# Patient Record
Sex: FEMALE | Race: WHITE | Employment: PART TIME | URBAN - NONMETROPOLITAN AREA
[De-identification: names, ages, dates, MRNs, and addresses within clinical notes are randomized per-mention and may not be internally consistent; named-entity substitution may affect disease eponyms.]

---

## 2017-01-03 DIAGNOSIS — G89.4 CHRONIC PAIN SYNDROME: ICD-10-CM

## 2017-01-03 DIAGNOSIS — M70.71 HIP BURSITIS, RIGHT: ICD-10-CM

## 2017-01-03 DIAGNOSIS — M46.1 SACROILIAC INFLAMMATION (HCC): ICD-10-CM

## 2017-01-03 DIAGNOSIS — M47.816 SPONDYLOSIS OF LUMBAR REGION WITHOUT MYELOPATHY OR RADICULOPATHY: ICD-10-CM

## 2017-01-03 RX ORDER — HYDROCODONE BITARTRATE AND ACETAMINOPHEN 5; 325 MG/1; MG/1
TABLET ORAL
Qty: 30 TABLET | Refills: 0 | Status: SHIPPED | OUTPATIENT
Start: 2017-01-03 | End: 2017-01-25 | Stop reason: SDUPTHER

## 2017-01-10 ENCOUNTER — NURSE TRIAGE (OUTPATIENT)
Dept: ADMINISTRATIVE | Age: 52
End: 2017-01-10

## 2017-01-25 ENCOUNTER — OFFICE VISIT (OUTPATIENT)
Dept: PHYSICAL MEDICINE AND REHAB | Age: 52
End: 2017-01-25

## 2017-01-25 VITALS
HEIGHT: 64 IN | DIASTOLIC BLOOD PRESSURE: 82 MMHG | WEIGHT: 293 LBS | SYSTOLIC BLOOD PRESSURE: 139 MMHG | BODY MASS INDEX: 50.02 KG/M2 | HEART RATE: 101 BPM

## 2017-01-25 DIAGNOSIS — M47.816 SPONDYLOSIS OF LUMBAR REGION WITHOUT MYELOPATHY OR RADICULOPATHY: ICD-10-CM

## 2017-01-25 DIAGNOSIS — M46.1 SACROILIAC INFLAMMATION (HCC): ICD-10-CM

## 2017-01-25 DIAGNOSIS — G89.4 CHRONIC PAIN SYNDROME: ICD-10-CM

## 2017-01-25 DIAGNOSIS — M70.71 HIP BURSITIS, RIGHT: Primary | ICD-10-CM

## 2017-01-25 PROCEDURE — 99213 OFFICE O/P EST LOW 20 MIN: CPT | Performed by: NURSE PRACTITIONER

## 2017-01-25 RX ORDER — MULTIVIT WITH MINERALS/LUTEIN
1000 TABLET ORAL DAILY
COMMUNITY
End: 2017-02-15

## 2017-01-25 RX ORDER — HYDROCODONE BITARTRATE AND ACETAMINOPHEN 5; 325 MG/1; MG/1
1 TABLET ORAL 2 TIMES DAILY PRN
Qty: 60 TABLET | Refills: 0 | Status: SHIPPED | OUTPATIENT
Start: 2017-01-25 | End: 2017-03-10 | Stop reason: SDUPTHER

## 2017-01-25 RX ORDER — VIT C/B6/B5/MAGNESIUM/HERB 173 50-5-6-5MG
CAPSULE ORAL DAILY
COMMUNITY

## 2017-01-25 RX ORDER — CALCIUM CARBONATE 500(1250)
500 TABLET ORAL 2 TIMES DAILY
COMMUNITY

## 2017-01-25 ASSESSMENT — ENCOUNTER SYMPTOMS
CONSTIPATION: 0
COLOR CHANGE: 0
VOMITING: 0
BACK PAIN: 1
COUGH: 0
RHINORRHEA: 0
WHEEZING: 0
NAUSEA: 0
SORE THROAT: 0
ABDOMINAL PAIN: 0
SHORTNESS OF BREATH: 0
DIARRHEA: 0
PHOTOPHOBIA: 0
CHEST TIGHTNESS: 0
EYE PAIN: 0
SINUS PRESSURE: 0

## 2017-02-15 ENCOUNTER — INITIAL CONSULT (OUTPATIENT)
Dept: PULMONOLOGY | Age: 52
End: 2017-02-15

## 2017-02-15 ENCOUNTER — TELEPHONE (OUTPATIENT)
Dept: PULMONOLOGY | Age: 52
End: 2017-02-15

## 2017-02-15 VITALS
WEIGHT: 293 LBS | DIASTOLIC BLOOD PRESSURE: 72 MMHG | OXYGEN SATURATION: 96 % | HEART RATE: 97 BPM | SYSTOLIC BLOOD PRESSURE: 128 MMHG | HEIGHT: 65 IN | BODY MASS INDEX: 48.82 KG/M2

## 2017-02-15 DIAGNOSIS — R06.83 SNORING: Primary | ICD-10-CM

## 2017-02-15 DIAGNOSIS — F32.A DEPRESSION, UNSPECIFIED DEPRESSION TYPE: ICD-10-CM

## 2017-02-15 DIAGNOSIS — G47.10 HYPERSOMNIA: ICD-10-CM

## 2017-02-15 DIAGNOSIS — I10 ESSENTIAL HYPERTENSION: ICD-10-CM

## 2017-02-15 PROCEDURE — 99203 OFFICE O/P NEW LOW 30 MIN: CPT | Performed by: INTERNAL MEDICINE

## 2017-03-08 DIAGNOSIS — G47.33 OSA (OBSTRUCTIVE SLEEP APNEA): Primary | ICD-10-CM

## 2017-03-10 DIAGNOSIS — M46.1 SACROILIAC INFLAMMATION (HCC): ICD-10-CM

## 2017-03-10 DIAGNOSIS — M47.816 SPONDYLOSIS OF LUMBAR REGION WITHOUT MYELOPATHY OR RADICULOPATHY: ICD-10-CM

## 2017-03-10 DIAGNOSIS — G89.4 CHRONIC PAIN SYNDROME: ICD-10-CM

## 2017-03-10 DIAGNOSIS — M70.71 HIP BURSITIS, RIGHT: ICD-10-CM

## 2017-03-10 RX ORDER — HYDROCODONE BITARTRATE AND ACETAMINOPHEN 5; 325 MG/1; MG/1
1 TABLET ORAL 2 TIMES DAILY PRN
Qty: 60 TABLET | Refills: 0 | Status: SHIPPED | OUTPATIENT
Start: 2017-03-10 | End: 2017-04-18 | Stop reason: SDUPTHER

## 2017-03-15 ENCOUNTER — OFFICE VISIT (OUTPATIENT)
Dept: PHYSICAL MEDICINE AND REHAB | Age: 52
End: 2017-03-15

## 2017-03-15 VITALS
DIASTOLIC BLOOD PRESSURE: 66 MMHG | HEART RATE: 92 BPM | WEIGHT: 290 LBS | HEIGHT: 65 IN | BODY MASS INDEX: 48.32 KG/M2 | SYSTOLIC BLOOD PRESSURE: 123 MMHG

## 2017-03-15 DIAGNOSIS — M70.71 HIP BURSITIS, RIGHT: Primary | ICD-10-CM

## 2017-03-15 DIAGNOSIS — M47.816 SPONDYLOSIS OF LUMBAR REGION WITHOUT MYELOPATHY OR RADICULOPATHY: ICD-10-CM

## 2017-03-15 DIAGNOSIS — G89.4 CHRONIC PAIN SYNDROME: ICD-10-CM

## 2017-03-15 DIAGNOSIS — M46.1 SACROILIAC INFLAMMATION (HCC): ICD-10-CM

## 2017-03-15 PROCEDURE — 99213 OFFICE O/P EST LOW 20 MIN: CPT | Performed by: NURSE PRACTITIONER

## 2017-03-15 ASSESSMENT — ENCOUNTER SYMPTOMS
WHEEZING: 0
BACK PAIN: 1
APNEA: 0
STRIDOR: 0
TROUBLE SWALLOWING: 0
EYE PAIN: 0
ABDOMINAL DISTENTION: 0
RHINORRHEA: 0
COUGH: 0
ANAL BLEEDING: 0
FACIAL SWELLING: 0
VOMITING: 0
BLOOD IN STOOL: 0
CHEST TIGHTNESS: 0
VOICE CHANGE: 0
EYE DISCHARGE: 0
CHOKING: 0
SORE THROAT: 0
ABDOMINAL PAIN: 0
SINUS PRESSURE: 0
SHORTNESS OF BREATH: 0
NAUSEA: 0
EYE ITCHING: 0
PHOTOPHOBIA: 0
COLOR CHANGE: 0
CONSTIPATION: 0
EYE REDNESS: 0
RECTAL PAIN: 0
DIARRHEA: 0

## 2017-04-18 DIAGNOSIS — M47.816 SPONDYLOSIS OF LUMBAR REGION WITHOUT MYELOPATHY OR RADICULOPATHY: ICD-10-CM

## 2017-04-18 DIAGNOSIS — M70.71 HIP BURSITIS, RIGHT: ICD-10-CM

## 2017-04-18 DIAGNOSIS — G89.4 CHRONIC PAIN SYNDROME: ICD-10-CM

## 2017-04-18 DIAGNOSIS — M46.1 SACROILIAC INFLAMMATION (HCC): ICD-10-CM

## 2017-04-18 RX ORDER — HYDROCODONE BITARTRATE AND ACETAMINOPHEN 5; 325 MG/1; MG/1
1 TABLET ORAL 2 TIMES DAILY PRN
Qty: 60 TABLET | Refills: 0 | Status: SHIPPED | OUTPATIENT
Start: 2017-04-18 | End: 2017-05-16 | Stop reason: SDUPTHER

## 2017-05-01 ENCOUNTER — OFFICE VISIT (OUTPATIENT)
Dept: PULMONOLOGY | Age: 52
End: 2017-05-01

## 2017-05-01 VITALS
HEIGHT: 65 IN | WEIGHT: 293 LBS | OXYGEN SATURATION: 97 % | SYSTOLIC BLOOD PRESSURE: 112 MMHG | BODY MASS INDEX: 48.82 KG/M2 | HEART RATE: 91 BPM | DIASTOLIC BLOOD PRESSURE: 66 MMHG

## 2017-05-01 DIAGNOSIS — G47.33 OSA ON CPAP: ICD-10-CM

## 2017-05-01 DIAGNOSIS — Z99.89 OSA ON CPAP: ICD-10-CM

## 2017-05-01 PROCEDURE — 99213 OFFICE O/P EST LOW 20 MIN: CPT | Performed by: PHYSICIAN ASSISTANT

## 2017-05-09 ENCOUNTER — TELEPHONE (OUTPATIENT)
Dept: PHYSICAL MEDICINE AND REHAB | Age: 52
End: 2017-05-09

## 2017-05-16 ENCOUNTER — OFFICE VISIT (OUTPATIENT)
Dept: PHYSICAL MEDICINE AND REHAB | Age: 52
End: 2017-05-16

## 2017-05-16 VITALS
HEART RATE: 88 BPM | WEIGHT: 293 LBS | HEIGHT: 65 IN | DIASTOLIC BLOOD PRESSURE: 85 MMHG | SYSTOLIC BLOOD PRESSURE: 141 MMHG | BODY MASS INDEX: 48.82 KG/M2

## 2017-05-16 DIAGNOSIS — M46.1 SACROILIAC INFLAMMATION (HCC): ICD-10-CM

## 2017-05-16 DIAGNOSIS — M70.71 HIP BURSITIS, RIGHT: ICD-10-CM

## 2017-05-16 DIAGNOSIS — G89.4 CHRONIC PAIN SYNDROME: ICD-10-CM

## 2017-05-16 DIAGNOSIS — M47.816 SPONDYLOSIS OF LUMBAR REGION WITHOUT MYELOPATHY OR RADICULOPATHY: Primary | ICD-10-CM

## 2017-05-16 PROCEDURE — 99213 OFFICE O/P EST LOW 20 MIN: CPT | Performed by: NURSE PRACTITIONER

## 2017-05-16 RX ORDER — HYDROCODONE BITARTRATE AND ACETAMINOPHEN 5; 325 MG/1; MG/1
1 TABLET ORAL 2 TIMES DAILY PRN
Qty: 60 TABLET | Refills: 0 | Status: SHIPPED | OUTPATIENT
Start: 2017-05-16 | End: 2017-07-10 | Stop reason: SDUPTHER

## 2017-05-16 ASSESSMENT — ENCOUNTER SYMPTOMS
EYE PAIN: 0
VOMITING: 0
BLOOD IN STOOL: 0
TROUBLE SWALLOWING: 0
BACK PAIN: 1
DIARRHEA: 0
ABDOMINAL DISTENTION: 0
EYE DISCHARGE: 0
CHEST TIGHTNESS: 0
EYE REDNESS: 0
ANAL BLEEDING: 0
SINUS PRESSURE: 0
VOICE CHANGE: 0
STRIDOR: 0
CHOKING: 0
FACIAL SWELLING: 0
RECTAL PAIN: 0
SORE THROAT: 0
NAUSEA: 0
COLOR CHANGE: 0
RHINORRHEA: 0
WHEEZING: 0
APNEA: 0
PHOTOPHOBIA: 0
COUGH: 0
ABDOMINAL PAIN: 0
SHORTNESS OF BREATH: 0
CONSTIPATION: 0
EYE ITCHING: 0

## 2017-07-10 DIAGNOSIS — M70.71 HIP BURSITIS, RIGHT: ICD-10-CM

## 2017-07-10 DIAGNOSIS — M46.1 SACROILIAC INFLAMMATION (HCC): ICD-10-CM

## 2017-07-10 DIAGNOSIS — G89.4 CHRONIC PAIN SYNDROME: ICD-10-CM

## 2017-07-10 DIAGNOSIS — M47.816 SPONDYLOSIS OF LUMBAR REGION WITHOUT MYELOPATHY OR RADICULOPATHY: ICD-10-CM

## 2017-07-10 RX ORDER — HYDROCODONE BITARTRATE AND ACETAMINOPHEN 5; 325 MG/1; MG/1
1 TABLET ORAL 2 TIMES DAILY PRN
Qty: 60 TABLET | Refills: 0 | Status: SHIPPED | OUTPATIENT
Start: 2017-07-10 | End: 2017-08-22 | Stop reason: SDUPTHER

## 2017-08-22 ENCOUNTER — OFFICE VISIT (OUTPATIENT)
Dept: PHYSICAL MEDICINE AND REHAB | Age: 52
End: 2017-08-22
Payer: COMMERCIAL

## 2017-08-22 VITALS
WEIGHT: 293 LBS | HEIGHT: 64 IN | HEART RATE: 85 BPM | SYSTOLIC BLOOD PRESSURE: 117 MMHG | DIASTOLIC BLOOD PRESSURE: 74 MMHG | BODY MASS INDEX: 50.02 KG/M2

## 2017-08-22 DIAGNOSIS — M47.816 SPONDYLOSIS OF LUMBAR REGION WITHOUT MYELOPATHY OR RADICULOPATHY: ICD-10-CM

## 2017-08-22 DIAGNOSIS — M70.61 TROCHANTERIC BURSITIS OF RIGHT HIP: Primary | ICD-10-CM

## 2017-08-22 DIAGNOSIS — G89.4 CHRONIC PAIN SYNDROME: ICD-10-CM

## 2017-08-22 DIAGNOSIS — M46.1 SACROILIAC INFLAMMATION (HCC): ICD-10-CM

## 2017-08-22 PROCEDURE — 99213 OFFICE O/P EST LOW 20 MIN: CPT | Performed by: NURSE PRACTITIONER

## 2017-08-22 RX ORDER — HYDROCODONE BITARTRATE AND ACETAMINOPHEN 5; 325 MG/1; MG/1
1 TABLET ORAL 2 TIMES DAILY PRN
Qty: 60 TABLET | Refills: 0 | Status: SHIPPED | OUTPATIENT
Start: 2017-08-22 | End: 2017-09-25 | Stop reason: SDUPTHER

## 2017-08-22 ASSESSMENT — ENCOUNTER SYMPTOMS
EYES NEGATIVE: 1
BACK PAIN: 1

## 2017-09-15 ENCOUNTER — TELEPHONE (OUTPATIENT)
Dept: PHYSICAL MEDICINE AND REHAB | Age: 52
End: 2017-09-15

## 2017-09-25 DIAGNOSIS — G89.4 CHRONIC PAIN SYNDROME: ICD-10-CM

## 2017-09-25 DIAGNOSIS — M47.816 SPONDYLOSIS OF LUMBAR REGION WITHOUT MYELOPATHY OR RADICULOPATHY: ICD-10-CM

## 2017-09-25 DIAGNOSIS — M46.1 SACROILIAC INFLAMMATION (HCC): ICD-10-CM

## 2017-09-25 RX ORDER — HYDROCODONE BITARTRATE AND ACETAMINOPHEN 5; 325 MG/1; MG/1
1 TABLET ORAL 2 TIMES DAILY PRN
Qty: 60 TABLET | Refills: 0 | Status: SHIPPED | OUTPATIENT
Start: 2017-09-25 | End: 2017-10-30 | Stop reason: SDUPTHER

## 2017-10-16 ENCOUNTER — HOSPITAL ENCOUNTER (OUTPATIENT)
Age: 52
Setting detail: OUTPATIENT SURGERY
Discharge: HOME | End: 2017-10-16
Attending: PAIN MEDICINE | Admitting: PAIN MEDICINE
Payer: COMMERCIAL

## 2017-10-16 ENCOUNTER — ANESTHESIA EVENT (OUTPATIENT)
Dept: OPERATING ROOM | Age: 52
End: 2017-10-16
Payer: COMMERCIAL

## 2017-10-16 ENCOUNTER — ANESTHESIA (OUTPATIENT)
Dept: OPERATING ROOM | Age: 52
End: 2017-10-16
Payer: COMMERCIAL

## 2017-10-16 ENCOUNTER — APPOINTMENT (OUTPATIENT)
Dept: GENERAL RADIOLOGY | Age: 52
End: 2017-10-16
Attending: PAIN MEDICINE
Payer: COMMERCIAL

## 2017-10-16 VITALS
OXYGEN SATURATION: 97 % | HEIGHT: 64 IN | BODY MASS INDEX: 50.02 KG/M2 | TEMPERATURE: 98.2 F | DIASTOLIC BLOOD PRESSURE: 45 MMHG | RESPIRATION RATE: 12 BRPM | HEART RATE: 92 BPM | WEIGHT: 293 LBS | SYSTOLIC BLOOD PRESSURE: 109 MMHG

## 2017-10-16 VITALS — OXYGEN SATURATION: 99 % | DIASTOLIC BLOOD PRESSURE: 51 MMHG | SYSTOLIC BLOOD PRESSURE: 120 MMHG

## 2017-10-16 PROCEDURE — 2500000003 HC RX 250 WO HCPCS: Performed by: NURSE ANESTHETIST, CERTIFIED REGISTERED

## 2017-10-16 PROCEDURE — 2580000003 HC RX 258: Performed by: NURSE ANESTHETIST, CERTIFIED REGISTERED

## 2017-10-16 PROCEDURE — 7100000011 HC PHASE II RECOVERY - ADDTL 15 MIN: Performed by: PAIN MEDICINE

## 2017-10-16 PROCEDURE — 2500000003 HC RX 250 WO HCPCS: Performed by: PAIN MEDICINE

## 2017-10-16 PROCEDURE — 20610 DRAIN/INJ JOINT/BURSA W/O US: CPT | Performed by: PAIN MEDICINE

## 2017-10-16 PROCEDURE — 3700000000 HC ANESTHESIA ATTENDED CARE: Performed by: PAIN MEDICINE

## 2017-10-16 PROCEDURE — 77002 NEEDLE LOCALIZATION BY XRAY: CPT | Performed by: PAIN MEDICINE

## 2017-10-16 PROCEDURE — 6360000002 HC RX W HCPCS: Performed by: PAIN MEDICINE

## 2017-10-16 PROCEDURE — 6360000002 HC RX W HCPCS: Performed by: NURSE ANESTHETIST, CERTIFIED REGISTERED

## 2017-10-16 PROCEDURE — 7100000010 HC PHASE II RECOVERY - FIRST 15 MIN: Performed by: PAIN MEDICINE

## 2017-10-16 PROCEDURE — 3209999900 FLUORO FOR SURGICAL PROCEDURES

## 2017-10-16 PROCEDURE — 3600000054 HC PAIN LEVEL 3 BASE: Performed by: PAIN MEDICINE

## 2017-10-16 RX ORDER — BUPIVACAINE HYDROCHLORIDE 2.5 MG/ML
INJECTION, SOLUTION EPIDURAL; INFILTRATION; INTRACAUDAL PRN
Status: DISCONTINUED | OUTPATIENT
Start: 2017-10-16 | End: 2017-10-16 | Stop reason: HOSPADM

## 2017-10-16 RX ORDER — LIDOCAINE HYDROCHLORIDE 10 MG/ML
INJECTION, SOLUTION INFILTRATION; PERINEURAL PRN
Status: DISCONTINUED | OUTPATIENT
Start: 2017-10-16 | End: 2017-10-16 | Stop reason: SDUPTHER

## 2017-10-16 RX ORDER — LIDOCAINE HYDROCHLORIDE 10 MG/ML
INJECTION, SOLUTION INFILTRATION; PERINEURAL PRN
Status: DISCONTINUED | OUTPATIENT
Start: 2017-10-16 | End: 2017-10-16 | Stop reason: HOSPADM

## 2017-10-16 RX ORDER — PROPOFOL 10 MG/ML
INJECTION, EMULSION INTRAVENOUS PRN
Status: DISCONTINUED | OUTPATIENT
Start: 2017-10-16 | End: 2017-10-16 | Stop reason: SDUPTHER

## 2017-10-16 RX ORDER — SODIUM CHLORIDE 9 MG/ML
INJECTION, SOLUTION INTRAVENOUS CONTINUOUS PRN
Status: DISCONTINUED | OUTPATIENT
Start: 2017-10-16 | End: 2017-10-16 | Stop reason: SDUPTHER

## 2017-10-16 RX ORDER — METHYLPREDNISOLONE ACETATE 80 MG/ML
INJECTION, SUSPENSION INTRA-ARTICULAR; INTRALESIONAL; INTRAMUSCULAR; SOFT TISSUE PRN
Status: DISCONTINUED | OUTPATIENT
Start: 2017-10-16 | End: 2017-10-16 | Stop reason: HOSPADM

## 2017-10-16 RX ADMIN — PROPOFOL 70 MG: 10 INJECTION, EMULSION INTRAVENOUS at 13:01

## 2017-10-16 RX ADMIN — LIDOCAINE HYDROCHLORIDE 20 MG: 10 INJECTION, SOLUTION INFILTRATION; PERINEURAL at 13:01

## 2017-10-16 RX ADMIN — SODIUM CHLORIDE: 9 INJECTION, SOLUTION INTRAVENOUS at 12:58

## 2017-10-16 ASSESSMENT — PAIN DESCRIPTION - DESCRIPTORS
DESCRIPTORS: SHOOTING;ACHING
DESCRIPTORS: ACHING

## 2017-10-16 ASSESSMENT — PULMONARY FUNCTION TESTS
PIF_VALUE: 0

## 2017-10-16 ASSESSMENT — PAIN - FUNCTIONAL ASSESSMENT: PAIN_FUNCTIONAL_ASSESSMENT: 0-10

## 2017-10-16 NOTE — ANESTHESIA POSTPROCEDURE EVALUATION
Department of Anesthesiology  Postprocedure Note    Patient: Brock Huitron  MRN: [id number]  YOB: 1965  Date of evaluation: 10/16/2017  Time:  1:30 PM     Procedure Summary     Date:  10/16/17 Room / Location:  [facility] 03 / [address] [facility]    Anesthesia Start:  1257 Anesthesia Stop:  5442    Procedure:  RIGHT HIP LARGE JOINT INJECTION (Right ) Diagnosis:  (TROCHANTERIC BURSITIS RIGHT HIP)    Surgeon:  Anthony Mendez MD Responsible Provider:  Lambert Olguin DO    Anesthesia Type:  MAC ASA Status:  3          Anesthesia Type: MAC    Jose Phase I:      Jose Phase II:      Last vitals: Reviewed and per EMR flowsheets.        Anesthesia Post Evaluation    Patient location during evaluation: PACU  Patient participation: complete - patient participated  Level of consciousness: awake and alert  Airway patency: patent  Nausea & Vomiting: no nausea and no vomiting  Complications: no  Cardiovascular status: hemodynamically stable  Respiratory status: acceptable  Hydration status: euvolemic

## 2017-10-16 NOTE — ANESTHESIA PRE PROCEDURE
Department of Anesthesiology  Preprocedure Note       Name:  Meghna Sands   Age:  47 y.o.  :  1965                                          MRN:  [id number]         Date:  10/16/2017      Surgeon: Vimal Mosqueda):  Amanda Campo MD    Procedure: Procedure(s):  RIGHT HIP LARGE JOINT INJECTION    Medications prior to admission:   Prior to Admission medications    Medication Sig Start Date End Date Taking? Authorizing Provider   HYDROcodone-acetaminophen (NORCO) 5-325 MG per tablet Take 1 tablet by mouth 2 times daily as needed for Pain . 17  Yes Amanda Campo MD   vitamin D (CHOLECALCIFEROL) 1000 UNIT TABS tablet Take 500 Units by mouth daily   Yes Historical Provider, MD   calcium carbonate (OSCAL) 500 MG TABS tablet Take 500 mg by mouth 2 times daily   Yes Historical Provider, MD   cyclobenzaprine (FLEXERIL) 10 MG tablet Take 1 tablet by mouth 3 times daily as needed for Muscle spasms WARNING: This medication may make your drowsy. Do not operate heavy machinery or motor vehicles during use. 16  Yes Lance Palomo PA-C   lisinopril (PRINIVIL;ZESTRIL) 10 MG tablet Take 10 mg by mouth nightly  16  Yes Historical Provider, MD   meloxicam (MOBIC) 15 MG tablet Take 15 mg by mouth daily  10/3/15  Yes Historical Provider, MD   citalopram (CELEXA) 20 MG tablet Take 20 mg by mouth daily  10/2/15  Yes Historical Provider, MD   spironolactone (ALDACTONE) 25 MG tablet Take 1 tablet by mouth daily. 13  Yes Karon Garcia MD   Misc. Devices MISC SI belt 17   Asmita Carbone, CNP   Turmeric 500 MG CAPS Take by mouth daily    Historical Provider, MD   Tens Unit MISC by Does not apply route 16   Amanda Campo MD       Current medications:    No current facility-administered medications for this encounter. Allergies:     Allergies   Allergen Reactions    Other Other (See Comments)     Artificial sweeteners - migraines    Sulfa Antibiotics Hives    Sulfur Problem List:    Patient Active Problem List   Diagnosis Code    Occult blood in stools R19.5    Second degree hemorrhoids K64.1    ABDI on CPAP G47.33, Z99.89       Past Medical History:        Diagnosis Date    Arthritis     Depression     GERD (gastroesophageal reflux disease)     Hypertension     Hypoglycemia     ABDI on CPAP        Past Surgical History:        Procedure Laterality Date    COLONOSCOPY  2016   [address] [facility] SURGERY  2002     right front upper implant    HAND SURGERY Right 05/15/2015    index finger cleaned out D/T infected cat bite    HEMORRHOID SURGERY  2016    series of 3 bandings for internal hemorrhoids, Dr. Stephy Hdz HISTORY  4/11/16    L4-5, L5-S1 Facet injection    OTHER SURGICAL HISTORY  06/13/2016    Right Hip Injection    TONSILLECTOMY      as child    WISDOM TOOTH EXTRACTION         Social History:    Social History   Substance Use Topics    Smoking status: Former Smoker     Packs/day: 1.00     Years: 7.00     Types: Cigarettes, E-Cigarettes     Start date: 5/1/2005     Quit date: 1/1/2011    Smokeless tobacco: Never Used    Alcohol use 0.0 oz/week      Comment: occasionally, 1-3 times a month                                Counseling given: Not Answered      Vital Signs (Current):   Vitals:    10/09/17 1054 10/16/17 1150   BP:  133/75   Pulse:  98   Resp:  16   Temp:  99.1 °F (37.3 °C)   TempSrc:  Temporal   SpO2:  94%   Weight: 300 lb (136.1 kg) 299 lb 3.2 oz (135.7 kg)   Height: 5' 4\" (1.626 m) 5' 4\" (1.626 m)                                              BP Readings from Last 3 Encounters:   10/16/17 133/75   08/22/17 117/74   07/11/17 113/64       NPO Status: Time of last liquid consumption: 2130                        Time of last solid consumption: 2130                        Date of last liquid consumption: 10/15/17                        Date of last solid food consumption: 10/15/17    BMI:   Wt Readings from Last 3 Encounters:   10/16/17 299 lb [address] [facility],    10/16/2017

## 2017-10-16 NOTE — PROGRESS NOTES
1306-  Patient arrived to Phase II via cart. Spontaneous respirations even and unlabored. Placed on monitor---VSS. Report received from [employer]. 1307-  Assessment completed. Patient is alert and oriented x4. Injection sites clean and dry. IV capped off-- no complications. 1310-  Orange juice and chips given to patient. 1320-  Belongings brought to patient. 1330-  Patient's friend at bedside. 1335-  Patient getting dressed. 1338-  Discharge instructions given. Understanding verbalized. 1344-  Patient discharged in stable condition with all belongings via wheelchair.

## 2017-10-30 ENCOUNTER — OFFICE VISIT (OUTPATIENT)
Dept: PHYSICAL MEDICINE AND REHAB | Age: 52
End: 2017-10-30
Payer: COMMERCIAL

## 2017-10-30 VITALS
DIASTOLIC BLOOD PRESSURE: 75 MMHG | WEIGHT: 293 LBS | BODY MASS INDEX: 50.02 KG/M2 | HEIGHT: 64 IN | HEART RATE: 99 BPM | SYSTOLIC BLOOD PRESSURE: 141 MMHG

## 2017-10-30 DIAGNOSIS — M70.61 TROCHANTERIC BURSITIS OF RIGHT HIP: ICD-10-CM

## 2017-10-30 DIAGNOSIS — M47.816 SPONDYLOSIS OF LUMBAR REGION WITHOUT MYELOPATHY OR RADICULOPATHY: ICD-10-CM

## 2017-10-30 DIAGNOSIS — G89.4 CHRONIC PAIN SYNDROME: ICD-10-CM

## 2017-10-30 DIAGNOSIS — M70.62 TROCHANTERIC BURSITIS OF LEFT HIP: Primary | ICD-10-CM

## 2017-10-30 DIAGNOSIS — M46.1 SACROILIAC INFLAMMATION (HCC): ICD-10-CM

## 2017-10-30 DIAGNOSIS — M16.0 PRIMARY OSTEOARTHRITIS OF BOTH HIPS: ICD-10-CM

## 2017-10-30 PROCEDURE — G8427 DOCREV CUR MEDS BY ELIG CLIN: HCPCS | Performed by: NURSE PRACTITIONER

## 2017-10-30 PROCEDURE — 3014F SCREEN MAMMO DOC REV: CPT | Performed by: NURSE PRACTITIONER

## 2017-10-30 PROCEDURE — G8484 FLU IMMUNIZE NO ADMIN: HCPCS | Performed by: NURSE PRACTITIONER

## 2017-10-30 PROCEDURE — 99213 OFFICE O/P EST LOW 20 MIN: CPT | Performed by: NURSE PRACTITIONER

## 2017-10-30 PROCEDURE — 1036F TOBACCO NON-USER: CPT | Performed by: NURSE PRACTITIONER

## 2017-10-30 PROCEDURE — 3017F COLORECTAL CA SCREEN DOC REV: CPT | Performed by: NURSE PRACTITIONER

## 2017-10-30 PROCEDURE — G8417 CALC BMI ABV UP PARAM F/U: HCPCS | Performed by: NURSE PRACTITIONER

## 2017-10-30 RX ORDER — HYDROCODONE BITARTRATE AND ACETAMINOPHEN 5; 325 MG/1; MG/1
1 TABLET ORAL 2 TIMES DAILY PRN
Qty: 60 TABLET | Refills: 0 | Status: SHIPPED | OUTPATIENT
Start: 2017-10-30 | End: 2017-11-30 | Stop reason: SDUPTHER

## 2017-10-30 ASSESSMENT — ENCOUNTER SYMPTOMS: BACK PAIN: 1

## 2017-10-30 NOTE — PROGRESS NOTES
[facility]  [address]  Dept: [phone]  Dept Fax: [phone]: [phone]    Visit Date: 10/30/2017    Functionality Assessment/Goals Worksheet     On a scale of 0 (Does not Interfere) to 10 (Completely Interferes)     1. Which number describes how during the past week pain has interfered with       the following:  A. General Activity:  10  B. Mood: 9  C. Walking Ability:  10  D. Normal Work (Includes both work outside the home and housework):  10  E. Relations with Other People:   7  F. Sleep:   9  G. Enjoyment of Life:   8    2. Patient Prefers to Take their Pain Medications:     [x]  On a regular basis   [x]  Only when necessary    []  Does not take pain medications    3. What are the Patient's Goals/Expectations for Visiting Pain Management? [x]  Learn about my pain    [x]  Receive Medication   [x]  Physical Therapy     []  Treat Depression   []  Receive Injections    []  Treat Sleep   []  Deal with Anxiety and Stress   []  Treat Opoid Dependence/Addiction   []  Other:      HPI:   Davide Alvarez is a 47 y.o. female is here today for    Chief Complaint:  Left hip pain, right hip pain, lower back pain     HPI    FU right hip large joint injection from 10/16/2016. Receiving 90% relief from right hip injection, feels much better. Now experienceing left hip pain and tenderness \"what was my bad hip is now my good hip\". Lower back and SI pain remain tolerable. Norco remains tolerable    Medications reviewed. Patient constipation  side effects with medications. Patient states she is  taking medications as prescribed. She denies receiving pain medications from other sources. She denies any ER visits since last visit. Pain scale with out pain medications is 10/10. Pain scale with pain medications is  5/10.   Last dose of Norco was 10/30/2017- am    Drug screen reviewed from 8/22/2017- am     The patient is allergic to back: She exhibits decreased range of motion, tenderness and pain. Back:         Legs:  Neurological: She is alert and oriented to person, place, and time. She has normal strength. She is not disoriented. She displays no atrophy. No cranial nerve deficit or sensory deficit. She exhibits normal muscle tone. She displays a negative Romberg sign. Gait abnormal. Coordination normal. She displays no Babinski's sign on the right side. SLR neg. Skin: Skin is warm. No rash noted. She is not diaphoretic. No erythema. No pallor. Psychiatric: Her mood appears not anxious. Her affect is not angry, not blunt, not labile and not inappropriate. Her speech is not rapid and/or pressured, not delayed, not tangential and not slurred. She is not agitated, not aggressive, not hyperactive, not slowed, not withdrawn, not actively hallucinating and not combative. Thought content is not paranoid and not delusional. Cognition and memory are not impaired. She does not express impulsivity or inappropriate judgment. She does not exhibit a depressed mood. She expresses no homicidal and no suicidal ideation. She expresses no suicidal plans and no homicidal plans. She is communicative. She exhibits normal recent memory and normal remote memory. She is attentive. Nursing note and vitals reviewed. Assessment:     1. Trochanteric bursitis of left hip    2. Spondylosis of lumbar region without myelopathy or radiculopathy    3. Sacroiliac inflammation ([address]    4. Trochanteric bursitis of right hip    5. Primary osteoarthritis of both hips    6. Chronic pain syndrome            Plan:      · OARRS reviewed. · Discussed long term side effects of medications. · Discussed tolerance, dependency and addiction. · Previous UDS reviewed. · UDS preformed today for compliance. · Patient told can not receive any pain medications from any other source. · Discussed with pt.may not be pain free.   · No evidence of abuse, diversion or aberrant

## 2017-11-14 RX ORDER — MELOXICAM 15 MG/1
15 TABLET ORAL DAILY
Qty: 30 TABLET | Refills: 0 | Status: SHIPPED | OUTPATIENT
Start: 2017-11-14 | End: 2017-12-12 | Stop reason: SDUPTHER

## 2017-11-30 DIAGNOSIS — G89.4 CHRONIC PAIN SYNDROME: ICD-10-CM

## 2017-11-30 DIAGNOSIS — M47.816 SPONDYLOSIS OF LUMBAR REGION WITHOUT MYELOPATHY OR RADICULOPATHY: ICD-10-CM

## 2017-11-30 DIAGNOSIS — M46.1 SACROILIAC INFLAMMATION (HCC): ICD-10-CM

## 2017-11-30 RX ORDER — HYDROCODONE BITARTRATE AND ACETAMINOPHEN 5; 325 MG/1; MG/1
1 TABLET ORAL 2 TIMES DAILY PRN
Qty: 60 TABLET | Refills: 0 | Status: SHIPPED | OUTPATIENT
Start: 2017-11-30 | End: 2017-12-29 | Stop reason: SDUPTHER

## 2017-11-30 NOTE — TELEPHONE ENCOUNTER
OARRS reviewed. UDS: Reviewed, refill appropriate . Last seen: 10/30/2017.  Follow-up: Future Appointments  Date Time Provider Christine Branch   5/2/2018 3:30 PM [address] [facility]

## 2017-12-05 ENCOUNTER — APPOINTMENT (OUTPATIENT)
Dept: GENERAL RADIOLOGY | Age: 52
End: 2017-12-05
Attending: PAIN MEDICINE
Payer: COMMERCIAL

## 2017-12-05 ENCOUNTER — HOSPITAL ENCOUNTER (OUTPATIENT)
Age: 52
Setting detail: OUTPATIENT SURGERY
Discharge: HOME | End: 2017-12-05
Attending: PAIN MEDICINE | Admitting: PAIN MEDICINE
Payer: COMMERCIAL

## 2017-12-05 ENCOUNTER — ANESTHESIA (OUTPATIENT)
Dept: OPERATING ROOM | Age: 52
End: 2017-12-05
Payer: COMMERCIAL

## 2017-12-05 ENCOUNTER — ANESTHESIA EVENT (OUTPATIENT)
Dept: OPERATING ROOM | Age: 52
End: 2017-12-05
Payer: COMMERCIAL

## 2017-12-05 VITALS — TEMPERATURE: 96.8 F | SYSTOLIC BLOOD PRESSURE: 85 MMHG | DIASTOLIC BLOOD PRESSURE: 55 MMHG | OXYGEN SATURATION: 95 %

## 2017-12-05 VITALS
WEIGHT: 293 LBS | BODY MASS INDEX: 50.02 KG/M2 | RESPIRATION RATE: 16 BRPM | HEART RATE: 94 BPM | SYSTOLIC BLOOD PRESSURE: 101 MMHG | DIASTOLIC BLOOD PRESSURE: 56 MMHG | OXYGEN SATURATION: 95 % | HEIGHT: 64 IN | TEMPERATURE: 97.1 F

## 2017-12-05 PROCEDURE — 3600000054 HC PAIN LEVEL 3 BASE: Performed by: PAIN MEDICINE

## 2017-12-05 PROCEDURE — 6360000002 HC RX W HCPCS: Performed by: NURSE ANESTHETIST, CERTIFIED REGISTERED

## 2017-12-05 PROCEDURE — 3700000000 HC ANESTHESIA ATTENDED CARE: Performed by: PAIN MEDICINE

## 2017-12-05 PROCEDURE — 77002 NEEDLE LOCALIZATION BY XRAY: CPT | Performed by: PAIN MEDICINE

## 2017-12-05 PROCEDURE — 3209999900 FLUORO FOR SURGICAL PROCEDURES

## 2017-12-05 PROCEDURE — 20610 DRAIN/INJ JOINT/BURSA W/O US: CPT | Performed by: PAIN MEDICINE

## 2017-12-05 PROCEDURE — 7100000010 HC PHASE II RECOVERY - FIRST 15 MIN: Performed by: PAIN MEDICINE

## 2017-12-05 PROCEDURE — 6360000002 HC RX W HCPCS: Performed by: PAIN MEDICINE

## 2017-12-05 PROCEDURE — 7100000011 HC PHASE II RECOVERY - ADDTL 15 MIN: Performed by: PAIN MEDICINE

## 2017-12-05 PROCEDURE — 2500000003 HC RX 250 WO HCPCS: Performed by: PAIN MEDICINE

## 2017-12-05 PROCEDURE — A6402 STERILE GAUZE <= 16 SQ IN: HCPCS | Performed by: PAIN MEDICINE

## 2017-12-05 RX ORDER — PROPOFOL 10 MG/ML
INJECTION, EMULSION INTRAVENOUS PRN
Status: DISCONTINUED | OUTPATIENT
Start: 2017-12-05 | End: 2017-12-05 | Stop reason: SDUPTHER

## 2017-12-05 RX ORDER — BUPIVACAINE HYDROCHLORIDE 2.5 MG/ML
INJECTION, SOLUTION EPIDURAL; INFILTRATION; INTRACAUDAL PRN
Status: DISCONTINUED | OUTPATIENT
Start: 2017-12-05 | End: 2017-12-05 | Stop reason: HOSPADM

## 2017-12-05 RX ORDER — LIDOCAINE HYDROCHLORIDE 10 MG/ML
INJECTION, SOLUTION INFILTRATION; PERINEURAL PRN
Status: DISCONTINUED | OUTPATIENT
Start: 2017-12-05 | End: 2017-12-05 | Stop reason: HOSPADM

## 2017-12-05 RX ORDER — METHYLPREDNISOLONE ACETATE 80 MG/ML
INJECTION, SUSPENSION INTRA-ARTICULAR; INTRALESIONAL; INTRAMUSCULAR; SOFT TISSUE PRN
Status: DISCONTINUED | OUTPATIENT
Start: 2017-12-05 | End: 2017-12-05 | Stop reason: HOSPADM

## 2017-12-05 RX ADMIN — PROPOFOL 30 MG: 10 INJECTION, EMULSION INTRAVENOUS at 09:01

## 2017-12-05 RX ADMIN — PROPOFOL 30 MG: 10 INJECTION, EMULSION INTRAVENOUS at 08:58

## 2017-12-05 RX ADMIN — PROPOFOL 20 MG: 10 INJECTION, EMULSION INTRAVENOUS at 09:00

## 2017-12-05 ASSESSMENT — PULMONARY FUNCTION TESTS
PIF_VALUE: 0

## 2017-12-05 ASSESSMENT — PAIN DESCRIPTION - DESCRIPTORS: DESCRIPTORS: CONSTANT

## 2017-12-05 ASSESSMENT — PAIN - FUNCTIONAL ASSESSMENT: PAIN_FUNCTIONAL_ASSESSMENT: 0-10

## 2017-12-05 ASSESSMENT — PAIN SCALES - GENERAL: PAINLEVEL_OUTOF10: 3

## 2017-12-05 NOTE — ANESTHESIA PRE PROCEDURE
Department of Anesthesiology  Preprocedure Note       Name:  Fabien Ricketts   Age:  47 y.o.  :  1965                                          MRN:  [id number]         Date:  2017      Surgeon: Mike Cool):  Roosevelt Whitney MD    Procedure: Procedure(s):  LEFT HIP LARGE JOINT INJECTION    Medications prior to admission:   Prior to Admission medications    Medication Sig Start Date End Date Taking? Authorizing Provider   HYDROcodone-acetaminophen (NORCO) 5-325 MG per tablet Take 1 tablet by mouth 2 times daily as needed for Pain . 17   Allyson Macdonald CNP   meloxicam (MOBIC) 15 MG tablet Take 1 tablet by mouth daily 17  Allyson Macdonald CNP   Misc. Devices MISC SI belt 17   Allyson Macdonald CNP   vitamin D (CHOLECALCIFEROL) 1000 UNIT TABS tablet Take 500 Units by mouth daily    Historical Provider, MD   calcium carbonate (OSCAL) 500 MG TABS tablet Take 500 mg by mouth 2 times daily    Historical Provider, MD   Turmeric 500 MG CAPS Take by mouth daily    Historical Provider, MD   cyclobenzaprine (FLEXERIL) 10 MG tablet Take 1 tablet by mouth 3 times daily as needed for Muscle spasms WARNING: This medication may make your drowsy. Do not operate heavy machinery or motor vehicles during use. 16   Verner Cap, PA-C   Tens Unit MISC by Does not apply route 16   Roosevelt Whitney MD   lisinopril (PRINIVIL;ZESTRIL) 10 MG tablet Take 10 mg by mouth nightly  16   Historical Provider, MD   citalopram (CELEXA) 20 MG tablet Take 20 mg by mouth daily  10/2/15   Historical Provider, MD   spironolactone (ALDACTONE) 25 MG tablet Take 1 tablet by mouth daily. 13   Tera Javed MD       Current medications:    No current facility-administered medications for this encounter. Allergies:     Allergies   Allergen Reactions    Other Other (See Comments)     Artificial sweeteners - migraines    Sulfa Antibiotics Hives    Sulfur        Problem List:

## 2017-12-05 NOTE — ANESTHESIA POSTPROCEDURE EVALUATION
Department of Anesthesiology  Postprocedure Note    Patient: Bharat Chand  MRN: [id number]  YOB: 1965  Date of evaluation: 12/5/2017  Time:  9:20 AM     Procedure Summary     Date:  12/05/17 Room / Location:  [facility] 03 / [address] [facility]    Anesthesia Start:  9480 Anesthesia Stop:  4026    Procedure:  LEFT HIP LARGE JOINT INJECTION (Left ) Diagnosis:  (TROCHANTERIC BURSITIS LEFT HIP)    Surgeon:  Miguel Mcgraw MD Responsible Provider:  Kaushal Maldonado DO    Anesthesia Type:  MAC ASA Status:  3          Anesthesia Type: MAC    Jose Phase I:      Jose Phase II: Jose Score: 10    Last vitals: Reviewed and per EMR flowsheets.        Anesthesia Post Evaluation    Patient location during evaluation: bedside  Patient participation: complete - patient participated  Level of consciousness: sleepy but conscious and responsive to verbal stimuli  Pain score: 0  Airway patency: patent  Nausea & Vomiting: no nausea and no vomiting  Complications: no  Cardiovascular status: hemodynamically stable and blood pressure returned to baseline  Respiratory status: spontaneous ventilation, room air and acceptable  Hydration status: stable

## 2017-12-05 NOTE — OP NOTE
informed consent was obtained. Procedure: The patient is placed in lateral position with left hip upward. The skin over the hip joint was preped and draped in sterile manner. The skin and deep tissues over the greater trochanter were infilitrated with 3 ml of  1% lidocaine. The # 22-gauge, 5-1/2  inch long needle was insrted through the skin under fluroscopy such that the tip of the needle lies in the joint space. Then after negative aspiration a total of 2 ml of 0.25% Marcaine with 80 mg of depomedrol was injected. Patient's vital signs remained stable and tolerated the procedure well. Patient was discharged home in stable condition and will be followed in the pain clinic in the next few weeks for further planning.     Electronically signed by Beena Vora MD on 12/5/2017 at 9:08 AM

## 2017-12-13 RX ORDER — MELOXICAM 15 MG/1
15 TABLET ORAL DAILY
Qty: 30 TABLET | Refills: 0 | Status: SHIPPED | OUTPATIENT
Start: 2017-12-13 | End: 2018-01-04 | Stop reason: SDUPTHER

## 2017-12-29 DIAGNOSIS — G89.4 CHRONIC PAIN SYNDROME: ICD-10-CM

## 2017-12-29 DIAGNOSIS — M47.816 SPONDYLOSIS OF LUMBAR REGION WITHOUT MYELOPATHY OR RADICULOPATHY: ICD-10-CM

## 2017-12-29 DIAGNOSIS — M46.1 SACROILIAC INFLAMMATION (HCC): ICD-10-CM

## 2017-12-29 RX ORDER — HYDROCODONE BITARTRATE AND ACETAMINOPHEN 5; 325 MG/1; MG/1
1 TABLET ORAL 2 TIMES DAILY PRN
Qty: 60 TABLET | Refills: 0 | Status: SHIPPED | OUTPATIENT
Start: 2017-12-30 | End: 2018-02-02 | Stop reason: SDUPTHER

## 2018-01-04 ENCOUNTER — OFFICE VISIT (OUTPATIENT)
Dept: PHYSICAL MEDICINE AND REHAB | Age: 53
End: 2018-01-04
Payer: COMMERCIAL

## 2018-01-04 VITALS
HEIGHT: 64 IN | SYSTOLIC BLOOD PRESSURE: 135 MMHG | DIASTOLIC BLOOD PRESSURE: 78 MMHG | HEART RATE: 83 BPM | WEIGHT: 293 LBS | BODY MASS INDEX: 50.02 KG/M2

## 2018-01-04 DIAGNOSIS — M47.816 SPONDYLOSIS OF LUMBAR REGION WITHOUT MYELOPATHY OR RADICULOPATHY: Primary | ICD-10-CM

## 2018-01-04 DIAGNOSIS — M70.61 TROCHANTERIC BURSITIS OF RIGHT HIP: ICD-10-CM

## 2018-01-04 DIAGNOSIS — G89.4 CHRONIC PAIN SYNDROME: ICD-10-CM

## 2018-01-04 DIAGNOSIS — M70.62 TROCHANTERIC BURSITIS OF LEFT HIP: ICD-10-CM

## 2018-01-04 DIAGNOSIS — M16.0 PRIMARY OSTEOARTHRITIS OF BOTH HIPS: ICD-10-CM

## 2018-01-04 PROCEDURE — 1036F TOBACCO NON-USER: CPT | Performed by: NURSE PRACTITIONER

## 2018-01-04 PROCEDURE — G8427 DOCREV CUR MEDS BY ELIG CLIN: HCPCS | Performed by: NURSE PRACTITIONER

## 2018-01-04 PROCEDURE — 99213 OFFICE O/P EST LOW 20 MIN: CPT | Performed by: NURSE PRACTITIONER

## 2018-01-04 PROCEDURE — G8417 CALC BMI ABV UP PARAM F/U: HCPCS | Performed by: NURSE PRACTITIONER

## 2018-01-04 PROCEDURE — 3014F SCREEN MAMMO DOC REV: CPT | Performed by: NURSE PRACTITIONER

## 2018-01-04 PROCEDURE — G8484 FLU IMMUNIZE NO ADMIN: HCPCS | Performed by: NURSE PRACTITIONER

## 2018-01-04 PROCEDURE — 3017F COLORECTAL CA SCREEN DOC REV: CPT | Performed by: NURSE PRACTITIONER

## 2018-01-04 RX ORDER — MELOXICAM 15 MG/1
15 TABLET ORAL DAILY
Qty: 30 TABLET | Refills: 2 | Status: SHIPPED | OUTPATIENT
Start: 2018-01-04 | End: 2018-04-10 | Stop reason: SDUPTHER

## 2018-01-04 ASSESSMENT — ENCOUNTER SYMPTOMS: BACK PAIN: 1

## 2018-01-04 NOTE — PROGRESS NOTES
WARNING: This medication may make your drowsy. Do not operate heavy machinery or motor vehicles during use., Disp: 15 tablet, Rfl: 0    Tens Unit MISC, by Does not apply route, Disp: 1 each, Rfl: 0    lisinopril (PRINIVIL;ZESTRIL) 10 MG tablet, Take 10 mg by mouth nightly , Disp: , Rfl:     citalopram (CELEXA) 20 MG tablet, Take 20 mg by mouth daily , Disp: , Rfl:     spironolactone (ALDACTONE) 25 MG tablet, Take 1 tablet by mouth daily. , Disp: 15 tablet, Rfl: 0    Subjective:      Review of Systems   Musculoskeletal: Positive for arthralgias, back pain, gait problem, joint swelling and myalgias. Neurological: Positive for weakness. Objective:     Vitals:    01/04/18 0901   BP: 135/78   Site: Left Arm   Position: Sitting   Pulse: 83   Weight: 293 lb 10.4 oz (133.2 kg)   Height: 5' 4.02\" (1.626 m)       Physical Exam   Constitutional: She is oriented to person, place, and time. She appears well-developed and well-nourished. No distress. HENT:   Head: Normocephalic and atraumatic. Right Ear: External ear normal.   Left Ear: External ear normal.   Nose: Nose normal.   Mouth/Throat: Oropharynx is clear and moist. No oropharyngeal exudate. Eyes: Conjunctivae and EOM are normal. Pupils are equal, round, and reactive to light. Right eye exhibits no discharge. Left eye exhibits no discharge. No scleral icterus. Neck: Normal range of motion and full passive range of motion without pain. Neck supple. No muscular tenderness present. No neck rigidity. No edema, no erythema and normal range of motion present. No thyromegaly present. Cardiovascular: Normal rate, regular rhythm, normal heart sounds and intact distal pulses. Exam reveals no gallop and no friction rub. No murmur heard. Pulmonary/Chest: Effort normal and breath sounds normal. No respiratory distress. She has no wheezes. She has no rales. She exhibits no tenderness. Abdominal: Soft. Bowel sounds are normal. She exhibits no distension.

## 2018-02-02 DIAGNOSIS — M46.1 SACROILIAC INFLAMMATION (HCC): ICD-10-CM

## 2018-02-02 DIAGNOSIS — G89.4 CHRONIC PAIN SYNDROME: ICD-10-CM

## 2018-02-02 DIAGNOSIS — M47.816 SPONDYLOSIS OF LUMBAR REGION WITHOUT MYELOPATHY OR RADICULOPATHY: ICD-10-CM

## 2018-02-02 RX ORDER — HYDROCODONE BITARTRATE AND ACETAMINOPHEN 5; 325 MG/1; MG/1
1 TABLET ORAL 2 TIMES DAILY PRN
Qty: 60 TABLET | Refills: 0 | Status: SHIPPED | OUTPATIENT
Start: 2018-02-02 | End: 2018-03-02 | Stop reason: SDUPTHER

## 2018-03-02 DIAGNOSIS — M47.816 SPONDYLOSIS OF LUMBAR REGION WITHOUT MYELOPATHY OR RADICULOPATHY: ICD-10-CM

## 2018-03-02 DIAGNOSIS — M46.1 SACROILIAC INFLAMMATION (HCC): ICD-10-CM

## 2018-03-02 DIAGNOSIS — G89.4 CHRONIC PAIN SYNDROME: ICD-10-CM

## 2018-03-02 RX ORDER — HYDROCODONE BITARTRATE AND ACETAMINOPHEN 5; 325 MG/1; MG/1
1 TABLET ORAL 2 TIMES DAILY PRN
Qty: 60 TABLET | Refills: 0 | Status: SHIPPED | OUTPATIENT
Start: 2018-03-04 | End: 2018-04-03

## 2018-04-11 RX ORDER — MELOXICAM 15 MG/1
15 TABLET ORAL DAILY
Qty: 30 TABLET | Refills: 0 | Status: SHIPPED | OUTPATIENT
Start: 2018-04-11 | End: 2018-05-02 | Stop reason: SDUPTHER

## 2018-04-12 DIAGNOSIS — G89.4 CHRONIC PAIN SYNDROME: Primary | ICD-10-CM

## 2018-04-13 RX ORDER — HYDROCODONE BITARTRATE AND ACETAMINOPHEN 5; 325 MG/1; MG/1
1 TABLET ORAL 2 TIMES DAILY PRN
Qty: 40 TABLET | Refills: 0 | Status: SHIPPED | OUTPATIENT
Start: 2018-04-13 | End: 2018-05-02 | Stop reason: SDUPTHER

## 2018-05-02 ENCOUNTER — OFFICE VISIT (OUTPATIENT)
Dept: PHYSICAL MEDICINE AND REHAB | Age: 53
End: 2018-05-02
Payer: COMMERCIAL

## 2018-05-02 ENCOUNTER — OFFICE VISIT (OUTPATIENT)
Dept: PULMONOLOGY | Age: 53
End: 2018-05-02
Payer: COMMERCIAL

## 2018-05-02 VITALS
WEIGHT: 293 LBS | HEIGHT: 64 IN | HEART RATE: 65 BPM | BODY MASS INDEX: 50.02 KG/M2 | DIASTOLIC BLOOD PRESSURE: 72 MMHG | RESPIRATION RATE: 17 BRPM | OXYGEN SATURATION: 98 % | SYSTOLIC BLOOD PRESSURE: 118 MMHG

## 2018-05-02 VITALS
DIASTOLIC BLOOD PRESSURE: 80 MMHG | SYSTOLIC BLOOD PRESSURE: 140 MMHG | HEART RATE: 82 BPM | BODY MASS INDEX: 50.02 KG/M2 | WEIGHT: 293 LBS | HEIGHT: 64 IN

## 2018-05-02 DIAGNOSIS — M70.62 TROCHANTERIC BURSITIS OF LEFT HIP: ICD-10-CM

## 2018-05-02 DIAGNOSIS — M47.816 SPONDYLOSIS OF LUMBAR REGION WITHOUT MYELOPATHY OR RADICULOPATHY: Primary | ICD-10-CM

## 2018-05-02 DIAGNOSIS — G89.4 CHRONIC PAIN SYNDROME: ICD-10-CM

## 2018-05-02 DIAGNOSIS — M70.61 TROCHANTERIC BURSITIS OF RIGHT HIP: ICD-10-CM

## 2018-05-02 DIAGNOSIS — M16.0 PRIMARY OSTEOARTHRITIS OF BOTH HIPS: ICD-10-CM

## 2018-05-02 DIAGNOSIS — G47.33 OSA ON CPAP: Primary | ICD-10-CM

## 2018-05-02 DIAGNOSIS — M46.1 SACROILIAC INFLAMMATION (HCC): ICD-10-CM

## 2018-05-02 DIAGNOSIS — Z99.89 OSA ON CPAP: Primary | ICD-10-CM

## 2018-05-02 PROCEDURE — G8427 DOCREV CUR MEDS BY ELIG CLIN: HCPCS | Performed by: NURSE PRACTITIONER

## 2018-05-02 PROCEDURE — G8417 CALC BMI ABV UP PARAM F/U: HCPCS | Performed by: PHYSICIAN ASSISTANT

## 2018-05-02 PROCEDURE — 99213 OFFICE O/P EST LOW 20 MIN: CPT | Performed by: PHYSICIAN ASSISTANT

## 2018-05-02 PROCEDURE — 1036F TOBACCO NON-USER: CPT | Performed by: NURSE PRACTITIONER

## 2018-05-02 PROCEDURE — G8427 DOCREV CUR MEDS BY ELIG CLIN: HCPCS | Performed by: PHYSICIAN ASSISTANT

## 2018-05-02 PROCEDURE — 3017F COLORECTAL CA SCREEN DOC REV: CPT | Performed by: NURSE PRACTITIONER

## 2018-05-02 PROCEDURE — 1036F TOBACCO NON-USER: CPT | Performed by: PHYSICIAN ASSISTANT

## 2018-05-02 PROCEDURE — G8417 CALC BMI ABV UP PARAM F/U: HCPCS | Performed by: NURSE PRACTITIONER

## 2018-05-02 PROCEDURE — 3017F COLORECTAL CA SCREEN DOC REV: CPT | Performed by: PHYSICIAN ASSISTANT

## 2018-05-02 PROCEDURE — 99213 OFFICE O/P EST LOW 20 MIN: CPT | Performed by: NURSE PRACTITIONER

## 2018-05-02 RX ORDER — MELOXICAM 15 MG/1
15 TABLET ORAL DAILY
Qty: 30 TABLET | Refills: 0 | Status: SHIPPED | OUTPATIENT
Start: 2018-05-02 | End: 2018-07-09 | Stop reason: SDUPTHER

## 2018-05-02 RX ORDER — AMOXICILLIN 500 MG
CAPSULE ORAL
COMMUNITY
Start: 2018-01-25

## 2018-05-02 RX ORDER — HYDROCODONE BITARTRATE AND ACETAMINOPHEN 5; 325 MG/1; MG/1
1 TABLET ORAL 2 TIMES DAILY PRN
Qty: 60 TABLET | Refills: 0 | Status: SHIPPED | OUTPATIENT
Start: 2018-05-03 | End: 2018-08-01 | Stop reason: SDUPTHER

## 2018-05-02 ASSESSMENT — ENCOUNTER SYMPTOMS
BACK PAIN: 1
EYES NEGATIVE: 1
COUGH: 0
ORTHOPNEA: 0
SPUTUM PRODUCTION: 0
HEARTBURN: 0
SHORTNESS OF BREATH: 0
SORE THROAT: 0
RESPIRATORY NEGATIVE: 1
SINUS PAIN: 0
WHEEZING: 0
BACK PAIN: 1
GASTROINTESTINAL NEGATIVE: 1
NAUSEA: 0

## 2018-05-31 ENCOUNTER — CLINICAL DOCUMENTATION (OUTPATIENT)
Dept: FAMILY MEDICINE CLINIC | Age: 53
End: 2018-05-31

## 2018-05-31 VITALS
WEIGHT: 293 LBS | HEART RATE: 88 BPM | RESPIRATION RATE: 20 BRPM | DIASTOLIC BLOOD PRESSURE: 63 MMHG | SYSTOLIC BLOOD PRESSURE: 115 MMHG | BODY MASS INDEX: 50.02 KG/M2 | TEMPERATURE: 98.6 F | HEIGHT: 64 IN

## 2018-05-31 DIAGNOSIS — I10 ESSENTIAL HYPERTENSION: ICD-10-CM

## 2018-05-31 DIAGNOSIS — G47.33 OSA ON CPAP: ICD-10-CM

## 2018-05-31 DIAGNOSIS — J30.1 CHRONIC SEASONAL ALLERGIC RHINITIS DUE TO POLLEN: ICD-10-CM

## 2018-05-31 DIAGNOSIS — Z96.642 STATUS POST LEFT HIP REPLACEMENT: ICD-10-CM

## 2018-05-31 DIAGNOSIS — M16.12 PRIMARY OSTEOARTHRITIS OF LEFT HIP: Primary | ICD-10-CM

## 2018-05-31 DIAGNOSIS — Z99.89 OSA ON CPAP: ICD-10-CM

## 2018-05-31 DIAGNOSIS — R53.81 PHYSICAL DECONDITIONING: ICD-10-CM

## 2018-05-31 DIAGNOSIS — E55.9 VITAMIN D DEFICIENCY: ICD-10-CM

## 2018-05-31 DIAGNOSIS — F33.42 RECURRENT MAJOR DEPRESSIVE DISORDER, IN FULL REMISSION (HCC): ICD-10-CM

## 2018-06-21 ENCOUNTER — HOSPITAL ENCOUNTER (EMERGENCY)
Age: 53
Discharge: HOME | End: 2018-06-22
Attending: EMERGENCY MEDICINE
Payer: COMMERCIAL

## 2018-06-21 ENCOUNTER — APPOINTMENT (OUTPATIENT)
Dept: GENERAL RADIOLOGY | Age: 53
End: 2018-06-21
Payer: COMMERCIAL

## 2018-06-21 ENCOUNTER — NURSE TRIAGE (OUTPATIENT)
Dept: ADMINISTRATIVE | Age: 53
End: 2018-06-21

## 2018-06-21 DIAGNOSIS — R10.13 DYSPEPSIA: Primary | ICD-10-CM

## 2018-06-21 DIAGNOSIS — S22.080A COMPRESSION FRACTURE OF T12 VERTEBRA (HCC): ICD-10-CM

## 2018-06-21 PROCEDURE — 74018 RADEX ABDOMEN 1 VIEW: CPT

## 2018-06-21 PROCEDURE — 6370000000 HC RX 637 (ALT 250 FOR IP): Performed by: EMERGENCY MEDICINE

## 2018-06-21 PROCEDURE — 99284 EMERGENCY DEPT VISIT MOD MDM: CPT

## 2018-06-21 PROCEDURE — 71046 X-RAY EXAM CHEST 2 VIEWS: CPT

## 2018-06-21 RX ADMIN — LIDOCAINE HYDROCHLORIDE: 20 SOLUTION ORAL; TOPICAL at 22:12

## 2018-06-21 ASSESSMENT — ENCOUNTER SYMPTOMS
WHEEZING: 0
SHORTNESS OF BREATH: 0
DIARRHEA: 0
BLOOD IN STOOL: 0
COUGH: 0
SORE THROAT: 0
ABDOMINAL PAIN: 1
TROUBLE SWALLOWING: 1
NAUSEA: 0
BACK PAIN: 0
VOMITING: 0

## 2018-06-21 ASSESSMENT — PAIN DESCRIPTION - ORIENTATION: ORIENTATION: MID

## 2018-06-21 ASSESSMENT — PAIN SCALES - GENERAL
PAINLEVEL_OUTOF10: 8
PAINLEVEL_OUTOF10: 6

## 2018-06-21 ASSESSMENT — PAIN DESCRIPTION - DESCRIPTORS: DESCRIPTORS: OTHER (COMMENT)

## 2018-06-21 ASSESSMENT — PAIN DESCRIPTION - PAIN TYPE: TYPE: ACUTE PAIN

## 2018-06-21 ASSESSMENT — PAIN DESCRIPTION - LOCATION: LOCATION: THROAT;CHEST

## 2018-06-21 ASSESSMENT — PAIN DESCRIPTION - PROGRESSION: CLINICAL_PROGRESSION: NOT CHANGED

## 2018-06-22 ENCOUNTER — APPOINTMENT (OUTPATIENT)
Dept: GENERAL RADIOLOGY | Age: 53
End: 2018-06-22
Payer: COMMERCIAL

## 2018-06-22 VITALS
WEIGHT: 293 LBS | RESPIRATION RATE: 18 BRPM | OXYGEN SATURATION: 100 % | HEART RATE: 95 BPM | DIASTOLIC BLOOD PRESSURE: 74 MMHG | SYSTOLIC BLOOD PRESSURE: 133 MMHG | TEMPERATURE: 98.4 F | HEIGHT: 65 IN | BODY MASS INDEX: 48.82 KG/M2

## 2018-06-22 PROCEDURE — 72072 X-RAY EXAM THORAC SPINE 3VWS: CPT

## 2018-06-22 PROCEDURE — 72040 X-RAY EXAM NECK SPINE 2-3 VW: CPT

## 2018-06-22 RX ORDER — PANTOPRAZOLE SODIUM 20 MG/1
40 TABLET, DELAYED RELEASE ORAL DAILY
Qty: 30 TABLET | Refills: 0 | Status: ON HOLD | OUTPATIENT
Start: 2018-06-22 | End: 2019-07-18 | Stop reason: CLARIF

## 2018-06-22 ASSESSMENT — PAIN DESCRIPTION - ORIENTATION: ORIENTATION: MID

## 2018-06-22 ASSESSMENT — PAIN SCALES - GENERAL
PAINLEVEL_OUTOF10: 7
PAINLEVEL_OUTOF10: 8

## 2018-06-22 ASSESSMENT — PAIN DESCRIPTION - LOCATION: LOCATION: BACK

## 2018-06-22 ASSESSMENT — PAIN DESCRIPTION - DESCRIPTORS: DESCRIPTORS: ACHING

## 2018-06-22 ASSESSMENT — PAIN DESCRIPTION - PAIN TYPE: TYPE: ACUTE PAIN

## 2018-07-09 RX ORDER — MELOXICAM 15 MG/1
15 TABLET ORAL DAILY
Qty: 30 TABLET | Refills: 2 | Status: SHIPPED | OUTPATIENT
Start: 2018-07-09 | End: 2018-10-08 | Stop reason: SDUPTHER

## 2018-07-09 NOTE — TELEPHONE ENCOUNTER
From: Jeana Garcia  Sent: 7/9/2018 9:37 AM EDT  Subject: Medication Renewal Request    Jermain Morrison would like a refill of the following medications:     meloxicam (MOBIC) 15 MG tablet Rodriguez Mcgarry, TAMIE - CNP]    Preferred pharmacy: [place], [place] - [address] [phone] - F [phone]

## 2018-08-01 ENCOUNTER — OFFICE VISIT (OUTPATIENT)
Dept: PHYSICAL MEDICINE AND REHAB | Age: 53
End: 2018-08-01
Payer: COMMERCIAL

## 2018-08-01 VITALS
SYSTOLIC BLOOD PRESSURE: 138 MMHG | DIASTOLIC BLOOD PRESSURE: 75 MMHG | BODY MASS INDEX: 50.02 KG/M2 | WEIGHT: 293 LBS | HEIGHT: 64 IN | HEART RATE: 80 BPM

## 2018-08-01 DIAGNOSIS — G89.4 CHRONIC PAIN SYNDROME: ICD-10-CM

## 2018-08-01 DIAGNOSIS — M46.1 SACROILIAC INFLAMMATION (HCC): ICD-10-CM

## 2018-08-01 DIAGNOSIS — M54.6 ACUTE BILATERAL THORACIC BACK PAIN: ICD-10-CM

## 2018-08-01 DIAGNOSIS — M47.816 SPONDYLOSIS OF LUMBAR REGION WITHOUT MYELOPATHY OR RADICULOPATHY: ICD-10-CM

## 2018-08-01 DIAGNOSIS — M16.0 PRIMARY OSTEOARTHRITIS OF BOTH HIPS: ICD-10-CM

## 2018-08-01 DIAGNOSIS — M70.61 TROCHANTERIC BURSITIS OF RIGHT HIP: Primary | ICD-10-CM

## 2018-08-01 DIAGNOSIS — S22.080A T12 COMPRESSION FRACTURE (HCC): ICD-10-CM

## 2018-08-01 PROCEDURE — 1036F TOBACCO NON-USER: CPT | Performed by: NURSE PRACTITIONER

## 2018-08-01 PROCEDURE — G8417 CALC BMI ABV UP PARAM F/U: HCPCS | Performed by: NURSE PRACTITIONER

## 2018-08-01 PROCEDURE — G8427 DOCREV CUR MEDS BY ELIG CLIN: HCPCS | Performed by: NURSE PRACTITIONER

## 2018-08-01 PROCEDURE — 3017F COLORECTAL CA SCREEN DOC REV: CPT | Performed by: NURSE PRACTITIONER

## 2018-08-01 PROCEDURE — 99214 OFFICE O/P EST MOD 30 MIN: CPT | Performed by: NURSE PRACTITIONER

## 2018-08-01 RX ORDER — HYDROCODONE BITARTRATE AND ACETAMINOPHEN 5; 325 MG/1; MG/1
1 TABLET ORAL 2 TIMES DAILY PRN
Qty: 60 TABLET | Refills: 0 | Status: SHIPPED | OUTPATIENT
Start: 2018-08-01 | End: 2018-08-31

## 2018-08-01 ASSESSMENT — ENCOUNTER SYMPTOMS: BACK PAIN: 1

## 2018-08-01 NOTE — PROGRESS NOTES
[facility] [address] [facility]  [address] [facility] [address]  Dept: [phone]  Dept Fax: [phone]: [phone]    Visit Date: 8/1/2018    Functionality Assessment/Goals Worksheet     On a scale of 0 (Does not Interfere) to 10 (Completely Interferes)     1. Which number describes how during the past week pain has interfered with       the following:  A. General Activity:  8  B. Mood: 8  C. Walking Ability:  10  D. Normal Work (Includes both work outside the home and housework):  10  E. Relations with Other People:   5  F. Sleep:   8  G. Enjoyment of Life:   8     2. Patient Prefers to Take their Pain Medications:     [x]  On a regular basis   [x]  Only when necessary    []  Does not take pain medications    3. What are the Patient's Goals/Expectations for Visiting Pain Management? [x]  Learn about my pain    [x]  Receive Medication   []  Physical Therapy     []  Treat Depression   [x]  Receive Injections    []  Treat Sleep   []  Deal with Anxiety and Stress   []  Treat Opoid Dependence/Addiction   []  Other:      HPI:   Landy Morin is a 46 y.o. female is here today for    Chief Complaint: Lower back pain, mid back pain,SI pain, hip pain     HPI   3 month FU. Continues to have lower back pain and hip pain and also some mid back apin. Had left total hip hip replacement at [facility] May 23 rd and is recovering and pain is improving. Right hip pain is main complaint- injection has wron off and she received 90% relief from injection in 10/2017. Pain remains tolerable with medications. Back to taking Norco BID prn     Had 1 ER visist end of June and fell in ER and since then having md back pain- x-ray showed T12 compression fracture     Medications reviewed. Patient denies  side effects with medications. Patient states she is  taking medications as prescribed.  She denies receiving pain medications from other sources. She had 1 ER visit since last visit. Pain scale with out pain medications is 10/10. Pain scale with pain medications is  5/10. Last dose of Deridder was today  Drug screen reviewed from 5/2/2018 and was appropriate    The patient is allergic to other; sulfa antibiotics; and sulfur. Past Medical History  Mary Truong  has a past medical history of Allergic rhinitis; Depression; Hypertension; ABDI on CPAP; Osteoarthritis; and Vitamin D deficiency. Past Surgical History  The patient  has a past surgical history that includes Dental surgery (2002); [place] tooth extraction; Hand surgery (Right, 05/15/2015); other surgical history (4/11/16); Colonoscopy (2016); other surgical history (06/13/2016); Hemorrhoid surgery (2016); Tonsillectomy; other surgical history (Right, 10/16/2017); arthrocentesis (Right, 10/16/2017); Nerve Surgery (Left, 12/05/2017); and arthrocentesis (Left, 12/5/2017). Family History  This patient's family history includes Heart Disease in her mother; Substance Abuse in her sister. Social History  Mary Truong  reports that she quit smoking about 7 years ago. Her smoking use included Cigarettes and E-Cigarettes. She started smoking about 13 years ago. She has a 7.00 pack-year smoking history. She has never used smokeless tobacco. She reports that she drinks alcohol. She reports that she does not use drugs. Medications    Current Outpatient Prescriptions:     HYDROcodone-acetaminophen (NORCO) 5-325 MG per tablet, Take 1 tablet by mouth 2 times daily as needed for Pain for up to 30 days. ., Disp: 60 tablet, Rfl: 0    meloxicam (MOBIC) 15 MG tablet, Take 1 tablet by mouth daily, Disp: 30 tablet, Rfl: 2    Omega-3 Fatty Acids (FISH OIL) 1200 MG CAPS, , Disp: , Rfl:     Misc.  Devices MISC, SI belt, Disp: 1 Device, Rfl: 0    vitamin D (CHOLECALCIFEROL) 1000 UNIT TABS tablet, Take 5,000 Units by mouth daily , Disp: , Rfl:     calcium carbonate (OSCAL) 500 MG TABS tablet, Take 500 mg by mouth 2 times daily, Disp: , Rfl:     Turmeric 500 MG CAPS, Take by mouth daily, Disp: , Rfl:     cyclobenzaprine (FLEXERIL) 10 MG tablet, Take 1 tablet by mouth 3 times daily as needed for Muscle spasms WARNING: This medication may make your drowsy. Do not operate heavy machinery or motor vehicles during use., Disp: 15 tablet, Rfl: 0    Tens Unit MISC, by Does not apply route, Disp: 1 each, Rfl: 0    lisinopril (PRINIVIL;ZESTRIL) 10 MG tablet, Take 10 mg by mouth nightly , Disp: , Rfl:     citalopram (CELEXA) 20 MG tablet, Take 20 mg by mouth daily , Disp: , Rfl:     spironolactone (ALDACTONE) 25 MG tablet, Take 1 tablet by mouth daily. , Disp: 15 tablet, Rfl: 0    pantoprazole (PROTONIX) 20 MG tablet, Take 2 tablets by mouth daily for 30 doses, Disp: 30 tablet, Rfl: 0    Subjective:      Review of Systems   Musculoskeletal: Positive for arthralgias, back pain, gait problem, joint swelling, myalgias, neck pain and neck stiffness. Neurological: Positive for weakness. Negative for numbness. Objective:     Vitals:    08/01/18 0900   BP: 138/75   Site: Left Arm   Position: Sitting   Pulse: 80   Weight: (!) 330 lb (149.7 kg)   Height: 5' 4\" (1.626 m)       Physical Exam   Constitutional: She is oriented to person, place, and time. She appears well-developed and well-nourished. No distress. HENT:   Head: Normocephalic and atraumatic. Right Ear: External ear normal.   Left Ear: External ear normal.   Nose: Nose normal.   Mouth/Throat: Oropharynx is clear and moist. No oropharyngeal exudate. Eyes: Conjunctivae and EOM are normal. Pupils are equal, round, and reactive to light. Right eye exhibits no discharge. Left eye exhibits no discharge. No scleral icterus. Neck: Normal range of motion and full passive range of motion without pain. Neck supple. No muscular tenderness present. No neck rigidity. No edema, no erythema and normal range of motion present. No thyromegaly present.    Cardiovascular: Normal

## 2018-08-03 ENCOUNTER — TELEPHONE (OUTPATIENT)
Dept: PHYSICAL MEDICINE AND REHAB | Age: 53
End: 2018-08-03

## 2018-08-03 NOTE — TELEPHONE ENCOUNTER
Clinicals and imaging faxed to [facility] for authorization for procedures

## 2018-09-06 DIAGNOSIS — G89.4 CHRONIC PAIN SYNDROME: Primary | ICD-10-CM

## 2018-09-06 RX ORDER — HYDROCODONE BITARTRATE AND ACETAMINOPHEN 5; 325 MG/1; MG/1
1 TABLET ORAL 2 TIMES DAILY PRN
Qty: 60 TABLET | Refills: 0 | Status: SHIPPED | OUTPATIENT
Start: 2018-09-06 | End: 2018-10-06

## 2018-09-10 NOTE — PROGRESS NOTES
PAT call attempted patient unavailable left message with instructions    NPO after midnight  Bring insurance info and drivers license  Wear comfortable clean clothing  Do not bring jewelry   Shower night before and morning of surgery with a liquid antibacterial soap  Bring list of medications with dosage and how often taken  Follow all instructions given by your physician   needed at discharge  Call [phone] for any questions

## 2018-09-17 ENCOUNTER — APPOINTMENT (OUTPATIENT)
Dept: GENERAL RADIOLOGY | Age: 53
End: 2018-09-17
Attending: PAIN MEDICINE
Payer: COMMERCIAL

## 2018-09-17 ENCOUNTER — ANESTHESIA EVENT (OUTPATIENT)
Dept: OPERATING ROOM | Age: 53
End: 2018-09-17
Payer: COMMERCIAL

## 2018-09-17 ENCOUNTER — ANESTHESIA (OUTPATIENT)
Dept: OPERATING ROOM | Age: 53
End: 2018-09-17
Payer: COMMERCIAL

## 2018-09-17 ENCOUNTER — HOSPITAL ENCOUNTER (OUTPATIENT)
Age: 53
Setting detail: OUTPATIENT SURGERY
Discharge: HOME | End: 2018-09-17
Attending: PAIN MEDICINE | Admitting: PAIN MEDICINE
Payer: COMMERCIAL

## 2018-09-17 VITALS
BODY MASS INDEX: 50.02 KG/M2 | WEIGHT: 293 LBS | RESPIRATION RATE: 16 BRPM | SYSTOLIC BLOOD PRESSURE: 114 MMHG | HEART RATE: 71 BPM | HEIGHT: 64 IN | TEMPERATURE: 97.7 F | DIASTOLIC BLOOD PRESSURE: 61 MMHG | OXYGEN SATURATION: 94 %

## 2018-09-17 VITALS
DIASTOLIC BLOOD PRESSURE: 57 MMHG | OXYGEN SATURATION: 92 % | RESPIRATION RATE: 19 BRPM | SYSTOLIC BLOOD PRESSURE: 98 MMHG

## 2018-09-17 PROCEDURE — 2500000003 HC RX 250 WO HCPCS: Performed by: PAIN MEDICINE

## 2018-09-17 PROCEDURE — 2709999900 HC NON-CHARGEABLE SUPPLY: Performed by: PAIN MEDICINE

## 2018-09-17 PROCEDURE — 3700000000 HC ANESTHESIA ATTENDED CARE: Performed by: PAIN MEDICINE

## 2018-09-17 PROCEDURE — 2500000003 HC RX 250 WO HCPCS: Performed by: NURSE ANESTHETIST, CERTIFIED REGISTERED

## 2018-09-17 PROCEDURE — 7100000011 HC PHASE II RECOVERY - ADDTL 15 MIN: Performed by: PAIN MEDICINE

## 2018-09-17 PROCEDURE — 6360000002 HC RX W HCPCS: Performed by: PAIN MEDICINE

## 2018-09-17 PROCEDURE — 77002 NEEDLE LOCALIZATION BY XRAY: CPT

## 2018-09-17 PROCEDURE — 3209999900 FLUORO FOR SURGICAL PROCEDURES

## 2018-09-17 PROCEDURE — 20610 DRAIN/INJ JOINT/BURSA W/O US: CPT | Performed by: PAIN MEDICINE

## 2018-09-17 PROCEDURE — 77002 NEEDLE LOCALIZATION BY XRAY: CPT | Performed by: PAIN MEDICINE

## 2018-09-17 PROCEDURE — 7100000010 HC PHASE II RECOVERY - FIRST 15 MIN: Performed by: PAIN MEDICINE

## 2018-09-17 PROCEDURE — 3600000054 HC PAIN LEVEL 3 BASE: Performed by: PAIN MEDICINE

## 2018-09-17 PROCEDURE — 6360000002 HC RX W HCPCS: Performed by: NURSE ANESTHETIST, CERTIFIED REGISTERED

## 2018-09-17 RX ORDER — BUPIVACAINE HYDROCHLORIDE 2.5 MG/ML
INJECTION, SOLUTION EPIDURAL; INFILTRATION; INTRACAUDAL PRN
Status: DISCONTINUED | OUTPATIENT
Start: 2018-09-17 | End: 2018-09-17 | Stop reason: HOSPADM

## 2018-09-17 RX ORDER — LIDOCAINE HYDROCHLORIDE 20 MG/ML
INJECTION, SOLUTION INFILTRATION; PERINEURAL PRN
Status: DISCONTINUED | OUTPATIENT
Start: 2018-09-17 | End: 2018-09-17 | Stop reason: SDUPTHER

## 2018-09-17 RX ORDER — LIDOCAINE HYDROCHLORIDE 10 MG/ML
INJECTION, SOLUTION INFILTRATION; PERINEURAL PRN
Status: DISCONTINUED | OUTPATIENT
Start: 2018-09-17 | End: 2018-09-17 | Stop reason: HOSPADM

## 2018-09-17 RX ORDER — FENTANYL CITRATE 50 UG/ML
INJECTION, SOLUTION INTRAMUSCULAR; INTRAVENOUS PRN
Status: DISCONTINUED | OUTPATIENT
Start: 2018-09-17 | End: 2018-09-17 | Stop reason: SDUPTHER

## 2018-09-17 RX ORDER — METHYLPREDNISOLONE ACETATE 80 MG/ML
INJECTION, SUSPENSION INTRA-ARTICULAR; INTRALESIONAL; INTRAMUSCULAR; SOFT TISSUE PRN
Status: DISCONTINUED | OUTPATIENT
Start: 2018-09-17 | End: 2018-09-17 | Stop reason: HOSPADM

## 2018-09-17 RX ADMIN — FENTANYL CITRATE 50 MCG: 50 INJECTION INTRAMUSCULAR; INTRAVENOUS at 08:16

## 2018-09-17 RX ADMIN — LIDOCAINE HYDROCHLORIDE 40 MG: 20 INJECTION, SOLUTION INFILTRATION; PERINEURAL at 08:19

## 2018-09-17 RX ADMIN — PROPOFOL 70 MG: 10 INJECTION, EMULSION INTRAVENOUS at 08:19

## 2018-09-17 ASSESSMENT — PAIN - FUNCTIONAL ASSESSMENT: PAIN_FUNCTIONAL_ASSESSMENT: 0-10

## 2018-09-17 ASSESSMENT — PULMONARY FUNCTION TESTS
PIF_VALUE: 0

## 2018-09-17 ASSESSMENT — PAIN SCALES - GENERAL: PAINLEVEL_OUTOF10: 3

## 2018-09-17 ASSESSMENT — PAIN DESCRIPTION - DESCRIPTORS: DESCRIPTORS: ACHING

## 2018-09-17 NOTE — ANESTHESIA PRE PROCEDURE
Department of Anesthesiology  Preprocedure Note       Name:  Anahy Franks   Age:  46 y.o.  :  1965                                          MRN:  [id number]         Date:  2018      Surgeon: Khoi Rahman):  Arelis Juarez MD    Procedure: Procedure(s):  RIGHT HIP LARGE JOINT STEROID INJECTION    Medications prior to admission:   Prior to Admission medications    Medication Sig Start Date End Date Taking? Authorizing Provider   Cholecalciferol (VITAMIN D3) 5000 units CAPS Take 1 capsule by mouth daily   Yes Historical Provider, MD   Loratadine-Pseudoephedrine (PX ALLERGY RELIEF D, LORATID, PO) Take 1 tablet by mouth daily   Yes Historical Provider, MD   HYDROcodone-acetaminophen (NORCO) 5-325 MG per tablet Take 1 tablet by mouth 2 times daily as needed for Pain for up to 30 days. . 9/6/18 10/6/18 Yes TAMIE Khan - CNP   Omega-3 Fatty Acids (FISH OIL) 1200 MG CAPS  18  Yes Historical Provider, MD   calcium carbonate (OSCAL) 500 MG TABS tablet Take 500 mg by mouth 2 times daily   Yes Historical Provider, MD   Turmeric 500 MG CAPS Take by mouth daily   Yes Historical Provider, MD   cyclobenzaprine (FLEXERIL) 10 MG tablet Take 1 tablet by mouth 3 times daily as needed for Muscle spasms WARNING: This medication may make your drowsy. Do not operate heavy machinery or motor vehicles during use. 16  Yes Christophe Alegria PA-C   lisinopril (PRINIVIL;ZESTRIL) 10 MG tablet Take 10 mg by mouth nightly  16  Yes Historical Provider, MD   citalopram (CELEXA) 20 MG tablet Take 20 mg by mouth daily  10/2/15  Yes Historical Provider, MD   spironolactone (ALDACTONE) 25 MG tablet Take 1 tablet by mouth daily.  13  Yes Dary Min MD   meloxicam (MOBIC) 15 MG tablet Take 1 tablet by mouth daily 7/9/18 10/7/18  TAMIE Partida - CNP   pantoprazole (PROTONIX) 20 MG tablet Take 2 tablets by mouth daily for 30 doses 18  Toya Hernandez MD       Current medications: No current facility-administered medications for this encounter. Allergies:     Allergies   Allergen Reactions    Other Other (See Comments)     Artificial sweeteners - migraines    Sulfa Antibiotics Hives    Sulfur        Problem List:    Patient Active Problem List   Diagnosis Code    Occult blood in stools R19.5    Second degree hemorrhoids K64.1    ABDI on CPAP G47.33, Z99.89    Trochanteric bursitis of right hip M70.61    Primary osteoarthritis of left hip M16.12       Past Medical History:        Diagnosis Date    Allergic rhinitis     Depression     Hypertension     ABDI on CPAP     Osteoarthritis     Vitamin D deficiency        Past Surgical History:        Procedure Laterality Date    ARTHROCENTESIS Right 10/16/2017    RIGHT HIP LARGE JOINT INJECTION performed by Trenton Wallace MD at [facility] ARTHROCENTESIS Left 12/5/2017    LEFT HIP LARGE JOINT INJECTION performed by Trenton Wallace MD at [address] [place] [address]  2002     right front upper implant    HAND SURGERY Right 05/15/2015    index finger cleaned out D/T infected cat bite    HEMORRHOID SURGERY  2016    series of 3 bandings for internal hemorrhoids, Dr. Park Guzman Left 12/05/2017    HIP INJECTION     OTHER SURGICAL HISTORY  4/11/16    L4-5, L5-S1 Facet injection    OTHER SURGICAL HISTORY  06/13/2016    Right Hip Injection    OTHER SURGICAL HISTORY Right 10/16/2017    Hip Injection     TONSILLECTOMY      as child    WISDOM TOOTH EXTRACTION         Social History:    Social History   Substance Use Topics    Smoking status: Former Smoker     Packs/day: 1.00     Years: 7.00     Types: Cigarettes, E-Cigarettes     Start date: 5/1/2005     Quit date: 1/1/2011    Smokeless tobacco: Never Used    Alcohol use 0.0 oz/week      Comment: occasionally, 1-3 times a month                                Counseling given: Not Answered      Vital Signs

## 2018-09-17 NOTE — ANESTHESIA POSTPROCEDURE EVALUATION
Department of Anesthesiology  Postprocedure Note    Patient: Nate Officer  MRN: [id number]  YOB: 1965  Date of evaluation: 9/17/2018  Time:  10:30 AM     Procedure Summary     Date:  09/17/18 Room / Location:  [facility] / [address]    Anesthesia Start:  7300 Anesthesia Stop:  4208    Procedure:  RIGHT HIP LARGE JOINT STEROID INJECTION (Right ) Diagnosis:  (TROCHANTERIC BURSITIS RIGHT HIP)    Surgeon:  Junior Gauthier MD Responsible Provider:  Jayashree Lloyd DO    Anesthesia Type:  MAC ASA Status:  3          Anesthesia Type: MAC    Jose Phase I:      Jose Phase II: Jose Score: 10    Last vitals: Reviewed and per EMR flowsheets.        Anesthesia Post Evaluation    Patient location during evaluation: bedside  Patient participation: complete - patient participated  Level of consciousness: awake and alert  Pain score: 0  Airway patency: patent  Nausea & Vomiting: no nausea and no vomiting  Complications: no  Cardiovascular status: hemodynamically stable  Respiratory status: spontaneous ventilation, room air and acceptable  Hydration status: stable

## 2018-10-03 ENCOUNTER — HOSPITAL ENCOUNTER (OUTPATIENT)
Dept: MRI IMAGING | Age: 53
Discharge: HOME | End: 2018-10-03
Payer: COMMERCIAL

## 2018-10-03 DIAGNOSIS — S22.080A T12 COMPRESSION FRACTURE (HCC): ICD-10-CM

## 2018-10-03 DIAGNOSIS — M54.6 ACUTE BILATERAL THORACIC BACK PAIN: ICD-10-CM

## 2018-10-03 PROCEDURE — 72146 MRI CHEST SPINE W/O DYE: CPT

## 2018-10-08 DIAGNOSIS — G89.4 CHRONIC PAIN SYNDROME: Primary | ICD-10-CM

## 2018-10-08 RX ORDER — HYDROCODONE BITARTRATE AND ACETAMINOPHEN 5; 325 MG/1; MG/1
1 TABLET ORAL 2 TIMES DAILY PRN
Qty: 60 TABLET | Refills: 0 | Status: SHIPPED | OUTPATIENT
Start: 2018-10-08 | End: 2018-11-08 | Stop reason: SDUPTHER

## 2018-10-08 RX ORDER — MELOXICAM 15 MG/1
15 TABLET ORAL DAILY
Qty: 30 TABLET | Refills: 2 | Status: ON HOLD | OUTPATIENT
Start: 2018-10-08 | End: 2018-11-09 | Stop reason: HOSPADM

## 2018-10-12 ENCOUNTER — TELEPHONE (OUTPATIENT)
Dept: PHYSICAL MEDICINE AND REHAB | Age: 53
End: 2018-10-12

## 2018-10-12 NOTE — TELEPHONE ENCOUNTER
Pt.requesting MRI results and wanting to know if it shows anything that is \"fixable\"? Please advise.

## 2018-10-15 ENCOUNTER — TELEPHONE (OUTPATIENT)
Dept: PHYSICAL MEDICINE AND REHAB | Age: 53
End: 2018-10-15

## 2018-10-22 ENCOUNTER — OFFICE VISIT (OUTPATIENT)
Dept: PHYSICAL MEDICINE AND REHAB | Age: 53
End: 2018-10-22
Payer: COMMERCIAL

## 2018-10-22 VITALS — SYSTOLIC BLOOD PRESSURE: 119 MMHG | DIASTOLIC BLOOD PRESSURE: 74 MMHG | HEART RATE: 74 BPM

## 2018-10-22 DIAGNOSIS — S22.080A T12 COMPRESSION FRACTURE (HCC): Primary | ICD-10-CM

## 2018-10-22 DIAGNOSIS — M54.6 ACUTE BILATERAL THORACIC BACK PAIN: ICD-10-CM

## 2018-10-22 DIAGNOSIS — G89.4 CHRONIC PAIN SYNDROME: ICD-10-CM

## 2018-10-22 PROCEDURE — 99213 OFFICE O/P EST LOW 20 MIN: CPT | Performed by: NURSE PRACTITIONER

## 2018-10-22 ASSESSMENT — ENCOUNTER SYMPTOMS
COLOR CHANGE: 0
CHEST TIGHTNESS: 0
COUGH: 1
APNEA: 1
VOMITING: 0
SHORTNESS OF BREATH: 0
BACK PAIN: 1
DIARRHEA: 0
NAUSEA: 0
CONSTIPATION: 1
ABDOMINAL PAIN: 0

## 2018-10-22 NOTE — PROGRESS NOTES
dated December 18, 2016. TECHNIQUE: Sagittal T1, T2 and STIR sequences were obtained through the thoracic spine. Axial T2-weighted images were obtained through the discs. FINDINGS:       No abnormal signal or expansion is present within the thoracic spinal cord. There is subtle flattening of the cord at the T11-T12 level. No abnormal lesion is seen within the spinal canal.       There is preservation of the expected thoracic kyphosis. No malalignment is seen. There is anterior wedging and loss of height of the T12 vertebral body. Depression of the superior endplate is also noted with linear hyperintense STIR signal. The loss of    height measures approximately 61%. This appears worse compared to the prior CT exam of December 18, 2016. Retropulsion of bone into the spinal canal results in mild to moderate spinal canal narrowing at the T11-T12 level. Degenerative anterior endplate spurring is present at multiple levels within the thoracic spine. Spondylotic changes are noted within the lumbar spine. The remaining disc spaces within the thoracic spine are unremarkable. No suspicious finding is seen within the visualized paraspinal soft tissues. There are no gross abnormalities on the localizer images. Impression        There is loss of height of the T12 vertebral body with depression of the superior endplate and linear STIR signal within the marrow space. The loss of height measures approximately 61% which appears worse compared to the prior CT and given the marrow    signal changes, this is suggestive of an acute on chronic compression fracture. Retropulsion of bone into the spinal canal causes mild to moderate spinal canal narrowing at T11-T12 with subtle flattening of the underlying cord. No abnormal signal is    identified within the cord parenchyma. The patientis allergic to other; sulfa antibiotics; and sulfur.     Past Medical History  Boo Yan  has a past medical

## 2018-10-29 ENCOUNTER — TELEPHONE (OUTPATIENT)
Dept: OPERATING ROOM | Age: 53
End: 2018-10-29

## 2018-11-01 ENCOUNTER — TELEPHONE (OUTPATIENT)
Dept: PHYSICAL MEDICINE AND REHAB | Age: 53
End: 2018-11-01

## 2018-11-01 DIAGNOSIS — G89.4 CHRONIC PAIN SYNDROME: ICD-10-CM

## 2018-11-01 DIAGNOSIS — M46.1 SACROILIAC INFLAMMATION (HCC): Primary | ICD-10-CM

## 2018-11-01 DIAGNOSIS — S22.080A COMPRESSION FRACTURE OF T12 VERTEBRA (HCC): ICD-10-CM

## 2018-11-01 RX ORDER — MEDICAL SUPPLY, MISCELLANEOUS
1 EACH MISCELLANEOUS PRN
Qty: 1 EACH | Refills: 0 | Status: SHIPPED | OUTPATIENT
Start: 2018-11-01

## 2018-11-02 ENCOUNTER — TELEPHONE (OUTPATIENT)
Dept: OPERATING ROOM | Age: 53
End: 2018-11-02

## 2018-11-02 DIAGNOSIS — Z01.818 PRE-OP EVALUATION: Primary | ICD-10-CM

## 2018-11-06 ENCOUNTER — PREP FOR PROCEDURE (OUTPATIENT)
Dept: PHYSICAL MEDICINE AND REHAB | Age: 53
End: 2018-11-06

## 2018-11-08 ENCOUNTER — HOSPITAL ENCOUNTER (OUTPATIENT)
Age: 53
Discharge: HOME | End: 2018-11-08
Payer: COMMERCIAL

## 2018-11-08 DIAGNOSIS — Z01.818 PRE-OP EVALUATION: ICD-10-CM

## 2018-11-08 DIAGNOSIS — G89.4 CHRONIC PAIN SYNDROME: ICD-10-CM

## 2018-11-08 LAB
ANION GAP SERPL CALCULATED.3IONS-SCNC: 14 MEQ/L (ref 8–16)
BASOPHILS # BLD: 0.5 %
BASOPHILS ABSOLUTE: 0 THOU/MM3 (ref 0–0.1)
BUN BLDV-MCNC: 15 MG/DL (ref 7–22)
CALCIUM SERPL-MCNC: 10 MG/DL (ref 8.5–10.5)
CHLORIDE BLD-SCNC: 102 MEQ/L (ref 98–111)
CO2: 24 MEQ/L (ref 23–33)
CREAT SERPL-MCNC: 0.8 MG/DL (ref 0.4–1.2)
EKG ATRIAL RATE: 93 BPM
EKG P AXIS: 77 DEGREES
EKG P-R INTERVAL: 172 MS
EKG Q-T INTERVAL: 370 MS
EKG QRS DURATION: 76 MS
EKG QTC CALCULATION (BAZETT): 460 MS
EKG R AXIS: 82 DEGREES
EKG T AXIS: 78 DEGREES
EKG VENTRICULAR RATE: 93 BPM
EOSINOPHIL # BLD: 2.3 %
EOSINOPHILS ABSOLUTE: 0.2 THOU/MM3 (ref 0–0.4)
ERYTHROCYTE [DISTWIDTH] IN BLOOD BY AUTOMATED COUNT: 15.8 % (ref 11.5–14.5)
ERYTHROCYTE [DISTWIDTH] IN BLOOD BY AUTOMATED COUNT: 47.7 FL (ref 35–45)
GFR SERPL CREATININE-BSD FRML MDRD: 75 ML/MIN/1.73M2
GLUCOSE BLD-MCNC: 104 MG/DL (ref 70–108)
HCT VFR BLD CALC: 40.8 % (ref 37–47)
HEMOGLOBIN: 13.6 GM/DL (ref 12–16)
IMMATURE GRANS (ABS): 0.01 THOU/MM3 (ref 0–0.07)
IMMATURE GRANULOCYTES: 0.1 %
LYMPHOCYTES # BLD: 26.5 %
LYMPHOCYTES ABSOLUTE: 2.1 THOU/MM3 (ref 1–4.8)
MCH RBC QN AUTO: 27.9 PG (ref 26–33)
MCHC RBC AUTO-ENTMCNC: 33.3 GM/DL (ref 32.2–35.5)
MCV RBC AUTO: 83.6 FL (ref 81–99)
MONOCYTES # BLD: 7.6 %
MONOCYTES ABSOLUTE: 0.6 THOU/MM3 (ref 0.4–1.3)
NUCLEATED RED BLOOD CELLS: 0 /100 WBC
PLATELET # BLD: 355 THOU/MM3 (ref 130–400)
PMV BLD AUTO: 8.9 FL (ref 9.4–12.4)
POTASSIUM SERPL-SCNC: 4.3 MEQ/L (ref 3.5–5.2)
RBC # BLD: 4.88 MILL/MM3 (ref 4.2–5.4)
SEG NEUTROPHILS: 63 %
SEGMENTED NEUTROPHILS ABSOLUTE COUNT: 5 THOU/MM3 (ref 1.8–7.7)
SODIUM BLD-SCNC: 140 MEQ/L (ref 135–145)
WBC # BLD: 7.9 THOU/MM3 (ref 4.8–10.8)

## 2018-11-08 PROCEDURE — 36415 COLL VENOUS BLD VENIPUNCTURE: CPT

## 2018-11-08 PROCEDURE — 80048 BASIC METABOLIC PNL TOTAL CA: CPT

## 2018-11-08 PROCEDURE — 85025 COMPLETE CBC W/AUTO DIFF WBC: CPT

## 2018-11-08 RX ORDER — HYDROCODONE BITARTRATE AND ACETAMINOPHEN 5; 325 MG/1; MG/1
1 TABLET ORAL 2 TIMES DAILY PRN
Qty: 60 TABLET | Refills: 0 | Status: SHIPPED | OUTPATIENT
Start: 2018-11-08 | End: 2018-12-08

## 2018-11-08 NOTE — TELEPHONE ENCOUNTER
From: Bartolo Miner  Sent: 11/7/2018 10:27 PM EST  Subject: Medication Renewal Request    Jermain Hinds would like a refill of the following medications:     HYDROcodone-acetaminophen (Maddie Brilliant) 5-325 MG per tablet TAMIE Del Real - CNP]    Preferred pharmacy: [facility] [address] [facility] [address] [phone] - F [phone]

## 2018-11-09 ENCOUNTER — ANESTHESIA (OUTPATIENT)
Dept: OPERATING ROOM | Age: 53
End: 2018-11-09
Payer: COMMERCIAL

## 2018-11-09 ENCOUNTER — ANESTHESIA EVENT (OUTPATIENT)
Dept: OPERATING ROOM | Age: 53
End: 2018-11-09
Payer: COMMERCIAL

## 2018-11-09 ENCOUNTER — APPOINTMENT (OUTPATIENT)
Dept: GENERAL RADIOLOGY | Age: 53
End: 2018-11-09
Attending: PAIN MEDICINE
Payer: COMMERCIAL

## 2018-11-09 ENCOUNTER — HOSPITAL ENCOUNTER (OUTPATIENT)
Age: 53
Setting detail: OUTPATIENT SURGERY
Discharge: HOME | End: 2018-11-09
Attending: PAIN MEDICINE | Admitting: PAIN MEDICINE
Payer: COMMERCIAL

## 2018-11-09 VITALS
RESPIRATION RATE: 3 BRPM | OXYGEN SATURATION: 99 % | SYSTOLIC BLOOD PRESSURE: 105 MMHG | DIASTOLIC BLOOD PRESSURE: 59 MMHG | TEMPERATURE: 97.8 F

## 2018-11-09 VITALS
OXYGEN SATURATION: 95 % | BODY MASS INDEX: 48.82 KG/M2 | TEMPERATURE: 97.7 F | HEART RATE: 88 BPM | WEIGHT: 293 LBS | HEIGHT: 65 IN | DIASTOLIC BLOOD PRESSURE: 68 MMHG | SYSTOLIC BLOOD PRESSURE: 127 MMHG | RESPIRATION RATE: 16 BRPM

## 2018-11-09 PROCEDURE — 2709999900 HC NON-CHARGEABLE SUPPLY: Performed by: PAIN MEDICINE

## 2018-11-09 PROCEDURE — 3700000001 HC ADD 15 MINUTES (ANESTHESIA): Performed by: PAIN MEDICINE

## 2018-11-09 PROCEDURE — 2580000003 HC RX 258: Performed by: REGISTERED NURSE

## 2018-11-09 PROCEDURE — 7100000000 HC PACU RECOVERY - FIRST 15 MIN: Performed by: PAIN MEDICINE

## 2018-11-09 PROCEDURE — 7100000011 HC PHASE II RECOVERY - ADDTL 15 MIN: Performed by: PAIN MEDICINE

## 2018-11-09 PROCEDURE — C1894 INTRO/SHEATH, NON-LASER: HCPCS | Performed by: PAIN MEDICINE

## 2018-11-09 PROCEDURE — 2500000003 HC RX 250 WO HCPCS: Performed by: REGISTERED NURSE

## 2018-11-09 PROCEDURE — 88311 DECALCIFY TISSUE: CPT

## 2018-11-09 PROCEDURE — 3600000014 HC SURGERY LEVEL 4 ADDTL 15MIN: Performed by: PAIN MEDICINE

## 2018-11-09 PROCEDURE — 6360000002 HC RX W HCPCS: Performed by: REGISTERED NURSE

## 2018-11-09 PROCEDURE — 3700000000 HC ANESTHESIA ATTENDED CARE: Performed by: PAIN MEDICINE

## 2018-11-09 PROCEDURE — 22513 PERQ VERTEBRAL AUGMENTATION: CPT | Performed by: PAIN MEDICINE

## 2018-11-09 PROCEDURE — 2720000010 HC SURG SUPPLY STERILE: Performed by: PAIN MEDICINE

## 2018-11-09 PROCEDURE — 3209999900 FLUORO FOR SURGICAL PROCEDURES

## 2018-11-09 PROCEDURE — C1713 ANCHOR/SCREW BN/BN,TIS/BN: HCPCS | Performed by: PAIN MEDICINE

## 2018-11-09 PROCEDURE — 6360000004 HC RX CONTRAST MEDICATION: Performed by: PAIN MEDICINE

## 2018-11-09 PROCEDURE — 3600000004 HC SURGERY LEVEL 4 BASE: Performed by: PAIN MEDICINE

## 2018-11-09 PROCEDURE — 88307 TISSUE EXAM BY PATHOLOGIST: CPT

## 2018-11-09 PROCEDURE — 7100000001 HC PACU RECOVERY - ADDTL 15 MIN: Performed by: PAIN MEDICINE

## 2018-11-09 PROCEDURE — 7100000010 HC PHASE II RECOVERY - FIRST 15 MIN: Performed by: PAIN MEDICINE

## 2018-11-09 PROCEDURE — 6370000000 HC RX 637 (ALT 250 FOR IP)

## 2018-11-09 DEVICE — BONE CEMENT CX01B KYPHON XPEDE W MXR US
Type: IMPLANTABLE DEVICE | Status: FUNCTIONAL
Brand: KYPHON® XPEDE™ BONE CEMENT AND KYPHON® MIXER PACK

## 2018-11-09 RX ORDER — CEFAZOLIN SODIUM 1 G/3ML
INJECTION, POWDER, FOR SOLUTION INTRAMUSCULAR; INTRAVENOUS PRN
Status: DISCONTINUED | OUTPATIENT
Start: 2018-11-09 | End: 2018-11-09 | Stop reason: SDUPTHER

## 2018-11-09 RX ORDER — METOCLOPRAMIDE HYDROCHLORIDE 5 MG/ML
INJECTION INTRAMUSCULAR; INTRAVENOUS PRN
Status: DISCONTINUED | OUTPATIENT
Start: 2018-11-09 | End: 2018-11-09 | Stop reason: SDUPTHER

## 2018-11-09 RX ORDER — SODIUM CHLORIDE 9 MG/ML
INJECTION, SOLUTION INTRAVENOUS CONTINUOUS PRN
Status: DISCONTINUED | OUTPATIENT
Start: 2018-11-09 | End: 2018-11-09 | Stop reason: SDUPTHER

## 2018-11-09 RX ORDER — FENTANYL CITRATE 50 UG/ML
25 INJECTION, SOLUTION INTRAMUSCULAR; INTRAVENOUS EVERY 5 MIN PRN
Status: DISCONTINUED | OUTPATIENT
Start: 2018-11-09 | End: 2018-11-09 | Stop reason: HOSPADM

## 2018-11-09 RX ORDER — DIPHENHYDRAMINE HYDROCHLORIDE 50 MG/ML
12.5 INJECTION INTRAMUSCULAR; INTRAVENOUS
Status: DISCONTINUED | OUTPATIENT
Start: 2018-11-09 | End: 2018-11-09 | Stop reason: HOSPADM

## 2018-11-09 RX ORDER — HYDROCODONE BITARTRATE AND ACETAMINOPHEN 5; 325 MG/1; MG/1
TABLET ORAL
Status: COMPLETED
Start: 2018-11-09 | End: 2018-11-09

## 2018-11-09 RX ORDER — PROPOFOL 10 MG/ML
INJECTION, EMULSION INTRAVENOUS PRN
Status: DISCONTINUED | OUTPATIENT
Start: 2018-11-09 | End: 2018-11-09 | Stop reason: SDUPTHER

## 2018-11-09 RX ORDER — MEPERIDINE HYDROCHLORIDE 25 MG/ML
12.5 INJECTION INTRAMUSCULAR; INTRAVENOUS; SUBCUTANEOUS EVERY 5 MIN PRN
Status: DISCONTINUED | OUTPATIENT
Start: 2018-11-09 | End: 2018-11-09 | Stop reason: HOSPADM

## 2018-11-09 RX ORDER — LIDOCAINE HYDROCHLORIDE 20 MG/ML
INJECTION, SOLUTION INFILTRATION; PERINEURAL PRN
Status: DISCONTINUED | OUTPATIENT
Start: 2018-11-09 | End: 2018-11-09 | Stop reason: SDUPTHER

## 2018-11-09 RX ORDER — DEXAMETHASONE SODIUM PHOSPHATE 4 MG/ML
INJECTION, SOLUTION INTRA-ARTICULAR; INTRALESIONAL; INTRAMUSCULAR; INTRAVENOUS; SOFT TISSUE PRN
Status: DISCONTINUED | OUTPATIENT
Start: 2018-11-09 | End: 2018-11-09 | Stop reason: SDUPTHER

## 2018-11-09 RX ORDER — HYDROCODONE BITARTRATE AND ACETAMINOPHEN 5; 325 MG/1; MG/1
1 TABLET ORAL ONCE
Status: COMPLETED | OUTPATIENT
Start: 2018-11-09 | End: 2018-11-09

## 2018-11-09 RX ORDER — PROMETHAZINE HYDROCHLORIDE 25 MG/ML
25 INJECTION, SOLUTION INTRAMUSCULAR; INTRAVENOUS
Status: DISCONTINUED | OUTPATIENT
Start: 2018-11-09 | End: 2018-11-09 | Stop reason: HOSPADM

## 2018-11-09 RX ORDER — SUCCINYLCHOLINE CHLORIDE 20 MG/ML
INJECTION INTRAMUSCULAR; INTRAVENOUS PRN
Status: DISCONTINUED | OUTPATIENT
Start: 2018-11-09 | End: 2018-11-09 | Stop reason: SDUPTHER

## 2018-11-09 RX ORDER — GLYCOPYRROLATE 1 MG/5 ML
SYRINGE (ML) INTRAVENOUS PRN
Status: DISCONTINUED | OUTPATIENT
Start: 2018-11-09 | End: 2018-11-09 | Stop reason: SDUPTHER

## 2018-11-09 RX ORDER — ONDANSETRON 2 MG/ML
INJECTION INTRAMUSCULAR; INTRAVENOUS PRN
Status: DISCONTINUED | OUTPATIENT
Start: 2018-11-09 | End: 2018-11-09 | Stop reason: SDUPTHER

## 2018-11-09 RX ORDER — FENTANYL CITRATE 50 UG/ML
50 INJECTION, SOLUTION INTRAMUSCULAR; INTRAVENOUS EVERY 5 MIN PRN
Status: DISCONTINUED | OUTPATIENT
Start: 2018-11-09 | End: 2018-11-09 | Stop reason: HOSPADM

## 2018-11-09 RX ORDER — FENTANYL CITRATE 50 UG/ML
INJECTION, SOLUTION INTRAMUSCULAR; INTRAVENOUS PRN
Status: DISCONTINUED | OUTPATIENT
Start: 2018-11-09 | End: 2018-11-09 | Stop reason: SDUPTHER

## 2018-11-09 RX ADMIN — HYDROCODONE BITARTRATE AND ACETAMINOPHEN 1 TABLET: 5; 325 TABLET ORAL at 10:04

## 2018-11-09 RX ADMIN — CEFAZOLIN 3000 MG: 1 INJECTION, POWDER, FOR SOLUTION INTRAMUSCULAR; INTRAVENOUS; PARENTERAL at 08:17

## 2018-11-09 RX ADMIN — PHENYLEPHRINE HYDROCHLORIDE 200 MCG: 10 INJECTION INTRAVENOUS at 08:41

## 2018-11-09 RX ADMIN — SODIUM CHLORIDE: 9 INJECTION, SOLUTION INTRAVENOUS at 08:52

## 2018-11-09 RX ADMIN — SUCCINYLCHOLINE CHLORIDE 200 MG: 20 INJECTION, SOLUTION INTRAMUSCULAR; INTRAVENOUS at 08:04

## 2018-11-09 RX ADMIN — PROPOFOL 200 MG: 10 INJECTION, EMULSION INTRAVENOUS at 08:04

## 2018-11-09 RX ADMIN — FENTANYL CITRATE 100 MCG: 50 INJECTION INTRAMUSCULAR; INTRAVENOUS at 08:04

## 2018-11-09 RX ADMIN — METOCLOPRAMIDE 10 MG: 5 INJECTION, SOLUTION INTRAMUSCULAR; INTRAVENOUS at 08:10

## 2018-11-09 RX ADMIN — FENTANYL CITRATE 100 MCG: 50 INJECTION INTRAMUSCULAR; INTRAVENOUS at 08:27

## 2018-11-09 RX ADMIN — PHENYLEPHRINE HYDROCHLORIDE 200 MCG: 10 INJECTION INTRAVENOUS at 09:01

## 2018-11-09 RX ADMIN — LIDOCAINE HYDROCHLORIDE 120 MG: 20 INJECTION, SOLUTION INFILTRATION; PERINEURAL at 08:04

## 2018-11-09 RX ADMIN — PHENYLEPHRINE HYDROCHLORIDE 200 MCG: 10 INJECTION INTRAVENOUS at 08:35

## 2018-11-09 RX ADMIN — Medication 0.2 MG: at 08:00

## 2018-11-09 RX ADMIN — DEXAMETHASONE SODIUM PHOSPHATE 12 MG: 4 INJECTION, SOLUTION INTRAMUSCULAR; INTRAVENOUS at 08:10

## 2018-11-09 RX ADMIN — SODIUM CHLORIDE: 9 INJECTION, SOLUTION INTRAVENOUS at 07:58

## 2018-11-09 RX ADMIN — ONDANSETRON HYDROCHLORIDE 4 MG: 4 INJECTION, SOLUTION INTRAMUSCULAR; INTRAVENOUS at 09:01

## 2018-11-09 ASSESSMENT — PULMONARY FUNCTION TESTS
PIF_VALUE: 29
PIF_VALUE: 0
PIF_VALUE: 1
PIF_VALUE: 30
PIF_VALUE: 29
PIF_VALUE: 30
PIF_VALUE: 31
PIF_VALUE: 0
PIF_VALUE: 2
PIF_VALUE: 30
PIF_VALUE: 2
PIF_VALUE: 21
PIF_VALUE: 29
PIF_VALUE: 21
PIF_VALUE: 29
PIF_VALUE: 30
PIF_VALUE: 29
PIF_VALUE: 2
PIF_VALUE: 29
PIF_VALUE: 29
PIF_VALUE: 22
PIF_VALUE: 29
PIF_VALUE: 30
PIF_VALUE: 29
PIF_VALUE: 30
PIF_VALUE: 32
PIF_VALUE: 30
PIF_VALUE: 27
PIF_VALUE: 29
PIF_VALUE: 0
PIF_VALUE: 29
PIF_VALUE: 30
PIF_VALUE: 29
PIF_VALUE: 27
PIF_VALUE: 30
PIF_VALUE: 1
PIF_VALUE: 29
PIF_VALUE: 0
PIF_VALUE: 29
PIF_VALUE: 30
PIF_VALUE: 29
PIF_VALUE: 29
PIF_VALUE: 1
PIF_VALUE: 27
PIF_VALUE: 29
PIF_VALUE: 29
PIF_VALUE: 5
PIF_VALUE: 30
PIF_VALUE: 27
PIF_VALUE: 30
PIF_VALUE: 30
PIF_VALUE: 29
PIF_VALUE: 28
PIF_VALUE: 29
PIF_VALUE: 29
PIF_VALUE: 30
PIF_VALUE: 31
PIF_VALUE: 29
PIF_VALUE: 29
PIF_VALUE: 30
PIF_VALUE: 0
PIF_VALUE: 31

## 2018-11-09 ASSESSMENT — PAIN SCALES - GENERAL
PAINLEVEL_OUTOF10: 0
PAINLEVEL_OUTOF10: 5
PAINLEVEL_OUTOF10: 3
PAINLEVEL_OUTOF10: 5

## 2018-11-09 ASSESSMENT — PAIN DESCRIPTION - FREQUENCY
FREQUENCY: CONTINUOUS
FREQUENCY: CONTINUOUS

## 2018-11-09 ASSESSMENT — PAIN DESCRIPTION - PROGRESSION: CLINICAL_PROGRESSION: GRADUALLY IMPROVING

## 2018-11-09 ASSESSMENT — PAIN DESCRIPTION - PAIN TYPE
TYPE: SURGICAL PAIN
TYPE: SURGICAL PAIN

## 2018-11-09 ASSESSMENT — PAIN DESCRIPTION - LOCATION: LOCATION: BACK

## 2018-11-09 ASSESSMENT — PAIN DESCRIPTION - DESCRIPTORS
DESCRIPTORS: ACHING
DESCRIPTORS: ACHING;CONSTANT;DISCOMFORT

## 2018-11-09 ASSESSMENT — PAIN - FUNCTIONAL ASSESSMENT: PAIN_FUNCTIONAL_ASSESSMENT: 0-10

## 2018-11-09 ASSESSMENT — PAIN DESCRIPTION - ORIENTATION
ORIENTATION: RIGHT;LOWER
ORIENTATION: RIGHT;LEFT

## 2018-11-09 ASSESSMENT — LIFESTYLE VARIABLES: SMOKING_STATUS: 0

## 2018-11-09 NOTE — OP NOTE
As conservative measures failed, we decided to proceed with vertebral augmentation for the pain relief. The procedure and risks were discussed with patient and an informed consent was obtained. Procedure:   Patient was given 3 grams of Ancef IV prior to the procedure for antibiotic prophylaxis. Patient was given general endotracheal anesthesia and then was placed on the [facility] table with all pressure points well padded. To facilitate the procedure biplanar fluoroscopy was used and by adjusting the angles of fluoroscopy both AP and lateral views of corresponding vertebral body was optimized. Skin over the back was prepped with antiseptic solution and the procedure was done under strict aspetic precautions. Then using #11 blade a small skin incission was made. The the working canula with trochar in place was inserted such that it lies over the lateral aspect of the pedicle. Then it was tapped slowly through the pedicle under constant fluoroscopic surveillance making sure that the medial border of the pedicle was not violated at any time. Then a bone biopsy canula was inserted through the canula and a bone biopsy was obtained. Bone was curetted as it was sclerotic. Then the Kyphon bone tamp was inserted through the working canula. This was done bipedicularly in similar fashion. Good placements were confirmed with fluoroscopy. The bone tamps  were then serially inflated, to reexpand the vertebral bodies, and restore more normal anatomy. In the meantime polymethylmethacrylate was mixed as per the protocol and Kyphon bone fillers were filled with it. Then they were immersed in warm water to obtain adequate consistency. The bone tamps were withdrawn, and bone cement was placed in both balloon cavities, using fluoroscopic guidance.       The following are the volumes of contrast used to inflate the bone tamps and the volume of bone cement injected:         Level T-12                  right---  ---3 Ml of Polymethyl methacrylate                  left---   ----0.5 Ml of Polymethyl methacrylate         Once the cement had set and was seen to be in good position by fluoroscopy, the working canula and bone fillers were removed. Final hemostasis was secured by applying pressure. The incisions were closed with 2-0 silk, followed by sterile dressings. The patient was then turned supine onto the bed and awakened from anesthesia. The patient was extubated in the operating room, and taken to the recovery room in stable condition. The patient tolerated surgery without any complications. Estimated Blood Loss:   5 ml. Sponge, needle, and instrument counts were correct at the end of the case.       Electronically signed by Rivka Winchester MD on 11/9/2018 at 9:09 AMoplasty

## 2018-11-09 NOTE — ANESTHESIA PRE PROCEDURE
Department of Anesthesiology  Preprocedure Note       Name:  Racquel Hugo   Age:  46 y.o.  :  1965                                          MRN:  [id number]         Date:  2018      Surgeon: Michelle Whittington):  Noemi Alexis MD    Procedure: Procedure(s):  RIGHT HIP LARGE JOINT STEROID INJECTION    Medications prior to admission:   Prior to Admission medications    Medication Sig Start Date End Date Taking? Authorizing Provider   HYDROcodone-acetaminophen (NORCO) 5-325 MG per tablet Take 1 tablet by mouth 2 times daily as needed for Pain for up to 30 days. . 18  Hermann Schroeder APRN - CNP   Elastic Bandages & Supports (B & B SACROILIAC BELT) MISC 1 Device by Does not apply route as needed (pain) 18   TAMIE Rubin - CNP   meloxicam (MOBIC) 15 MG tablet Take 1 tablet by mouth daily 10/8/18 1/6/19  Marin Ricketts APRN - CNP   Cholecalciferol (VITAMIN D3) 5000 units CAPS Take 1 capsule by mouth daily    Historical Provider, MD   Loratadine-Pseudoephedrine (PX ALLERGY RELIEF D, LORATID, PO) Take 1 tablet by mouth daily    Historical Provider, MD   pantoprazole (PROTONIX) 20 MG tablet Take 2 tablets by mouth daily for 30 doses 18  Susan Cortez MD   Omega-3 Fatty Acids (FISH OIL) 1200 MG CAPS  18   Historical Provider, MD   calcium carbonate (OSCAL) 500 MG TABS tablet Take 500 mg by mouth 2 times daily    Historical Provider, MD   Turmeric 500 MG CAPS Take by mouth daily    Historical Provider, MD   cyclobenzaprine (FLEXERIL) 10 MG tablet Take 1 tablet by mouth 3 times daily as needed for Muscle spasms WARNING: This medication may make your drowsy. Do not operate heavy machinery or motor vehicles during use.  16   Ly Paulson PA-C   lisinopril (PRINIVIL;ZESTRIL) 10 MG tablet Take 10 mg by mouth nightly  16   Historical Provider, MD   citalopram (CELEXA) 20 MG tablet Take 20 mg by mouth daily  10/2/15   Historical Provider, MD Substance Use Topics    Smoking status: Former Smoker     Packs/day: 1.00     Years: 7.00     Types: Cigarettes, E-Cigarettes     Start date: 5/1/2005     Quit date: 1/1/2011    Smokeless tobacco: Never Used    Alcohol use 0.0 oz/week      Comment: occasionally, 1-3 times a month                                Counseling given: Not Answered      Vital Signs (Current): There were no vitals filed for this visit. BP Readings from Last 3 Encounters:   11/09/18 136/74   10/22/18 119/74   09/17/18 114/61       NPO Status:                                                                                 BMI:   Wt Readings from Last 3 Encounters:   11/09/18 (!) 313 lb (142 kg)   09/17/18 (!) 315 lb (142.9 kg)   08/01/18 (!) 330 lb (149.7 kg)     There is no height or weight on file to calculate BMI.    CBC:   Lab Results   Component Value Date    WBC 7.9 11/08/2018    RBC 4.88 11/08/2018    HGB 13.6 11/08/2018    HCT 40.8 11/08/2018    MCV 83.6 11/08/2018    RDW 14.0 07/11/2017     11/08/2018       CMP:   Lab Results   Component Value Date     11/08/2018    K 4.3 11/08/2018     11/08/2018    CO2 24 11/08/2018    BUN 15 11/08/2018    CREATININE 0.8 11/08/2018    LABGLOM 75 11/08/2018    GLUCOSE 104 11/08/2018    PROT 7.2 07/11/2017    CALCIUM 10.0 11/08/2018    BILITOT 0.2 07/11/2017    ALKPHOS 97 07/11/2017    AST 21 07/11/2017    ALT 29 07/11/2017       POC Tests: No results for input(s): POCGLU, POCNA, POCK, POCCL, POCBUN, POCHEMO, POCHCT in the last 72 hours.     Coags: No results found for: PROTIME, INR, APTT    HCG (If Applicable):   Lab Results   Component Value Date    PREGSERUM NEGATIVE 04/19/2013        ABGs: No results found for: PHART, PO2ART, WTH7HBW, FSU1ZHC, BEART, V1VNLBMJ     Type & Screen (If Applicable):  No results found for: LABABO, [address]    Anesthesia Evaluation  Patient summary reviewed and Nursing notes reviewed no history of anesthetic

## 2018-11-09 NOTE — PROGRESS NOTES
0915 To phase 1 via cart. Report received from [place] [id number]. Pt is arousing to name and encouraged to take deep breaths and cough. SPO2 88% on room air. SPO2 increases after pt coughs. Pt denies pain. Lower back dressing is clean, dry and intact. Hand grasp, pedal push and pull and leg raise bilateral are equal and strong. Pt denies back pain or numbness/tingling. 0920 SPO2 decreases to mid 80's when pt falls to sleep. O2 4L NC applied. Pt encouraged to continue taking deep breaths. Voiced understanding.      0921 SPO2 increases to mid 90's with O2 NC on.    0928 Pt continues to rest quietly in bed. Respirations are easy & non-labored. Skin is warm and dry. 36 Pt continues to deny pain. Requesting drink of water. 6080 Pt given water and drinking without difficulty. O2 discontinued. Respirations are easy & non-labored. A & O x 3. Skin is warm and dry. Moving extremities x 4 without difficulty. 0940 Drinking water without difficulty. SPO2 92-94% on room air. Denies needs at present. 0950 Pt skin is warm and dry. Respirations are easy & non-labored. A & O x 3. Drinking without difficulty. Moved to phase 2 recovery via cart. 0483 Sister to bedside. Call light in reach. Pt given gingerale and jello per request.  Denies needs at present. 1010 Reviewed discharge instructions with patient and sister. Voiced understanding. 1020 Dressing at bedside with sister's assistance. 1030 Pain is \"better\" 3.5/10. Pt resting quietly in room. Respirations are easy & non-labored. A & O x 3. Sister continues at bedside. Pt is dressed and instructed to turn on call light when she is ready to be discharged to car. Voiced understanding. 1040 Skin warm and dry. Respirations easy & non-labored. Dressing clean, dry and intact. Moving all 4 extremities without difficulty. To private vehicle x 1 assist via wheelchair.

## 2018-11-21 ENCOUNTER — OFFICE VISIT (OUTPATIENT)
Dept: PHYSICAL MEDICINE AND REHAB | Age: 53
End: 2018-11-21
Payer: COMMERCIAL

## 2018-11-21 VITALS
HEIGHT: 65 IN | SYSTOLIC BLOOD PRESSURE: 132 MMHG | DIASTOLIC BLOOD PRESSURE: 76 MMHG | BODY MASS INDEX: 48.82 KG/M2 | WEIGHT: 293 LBS | HEART RATE: 79 BPM

## 2018-11-21 DIAGNOSIS — M54.9 MID BACK PAIN: ICD-10-CM

## 2018-11-21 DIAGNOSIS — M47.816 SPONDYLOSIS OF LUMBAR REGION WITHOUT MYELOPATHY OR RADICULOPATHY: ICD-10-CM

## 2018-11-21 DIAGNOSIS — M70.62 TROCHANTERIC BURSITIS OF LEFT HIP: ICD-10-CM

## 2018-11-21 DIAGNOSIS — S22.080A COMPRESSION FRACTURE OF T12 VERTEBRA (HCC): ICD-10-CM

## 2018-11-21 DIAGNOSIS — M47.814 SPONDYLOSIS OF THORACIC REGION WITHOUT MYELOPATHY OR RADICULOPATHY: Primary | ICD-10-CM

## 2018-11-21 DIAGNOSIS — G89.4 CHRONIC PAIN SYNDROME: ICD-10-CM

## 2018-11-21 PROCEDURE — G8427 DOCREV CUR MEDS BY ELIG CLIN: HCPCS | Performed by: NURSE PRACTITIONER

## 2018-11-21 PROCEDURE — 1036F TOBACCO NON-USER: CPT | Performed by: NURSE PRACTITIONER

## 2018-11-21 PROCEDURE — 3017F COLORECTAL CA SCREEN DOC REV: CPT | Performed by: NURSE PRACTITIONER

## 2018-11-21 PROCEDURE — 99214 OFFICE O/P EST MOD 30 MIN: CPT | Performed by: NURSE PRACTITIONER

## 2018-11-21 PROCEDURE — G8417 CALC BMI ABV UP PARAM F/U: HCPCS | Performed by: NURSE PRACTITIONER

## 2018-11-21 PROCEDURE — G8484 FLU IMMUNIZE NO ADMIN: HCPCS | Performed by: NURSE PRACTITIONER

## 2018-11-21 ASSESSMENT — ENCOUNTER SYMPTOMS: BACK PAIN: 1

## 2018-11-21 NOTE — PROGRESS NOTES
OIL) 1200 MG CAPS, , Disp: , Rfl:     calcium carbonate (OSCAL) 500 MG TABS tablet, Take 500 mg by mouth 2 times daily, Disp: , Rfl:     Turmeric 500 MG CAPS, Take by mouth daily, Disp: , Rfl:     cyclobenzaprine (FLEXERIL) 10 MG tablet, Take 1 tablet by mouth 3 times daily as needed for Muscle spasms WARNING: This medication may make your drowsy. Do not operate heavy machinery or motor vehicles during use., Disp: 15 tablet, Rfl: 0    lisinopril (PRINIVIL;ZESTRIL) 10 MG tablet, Take 10 mg by mouth nightly , Disp: , Rfl:     citalopram (CELEXA) 20 MG tablet, Take 20 mg by mouth daily , Disp: , Rfl:     spironolactone (ALDACTONE) 25 MG tablet, Take 1 tablet by mouth daily. , Disp: 15 tablet, Rfl: 0    pantoprazole (PROTONIX) 20 MG tablet, Take 2 tablets by mouth daily for 30 doses, Disp: 30 tablet, Rfl: 0    Subjective:      Review of Systems   Musculoskeletal: Positive for arthralgias, back pain and myalgias. Neurological: Positive for weakness. Negative for numbness. Objective:     Vitals:    11/21/18 1454   BP: 132/76   Site: Right Lower Arm   Position: Sitting   Pulse: 79   Weight: (!) 313 lb 0.9 oz (142 kg)   Height: 5' 5\" (1.651 m)       Physical Exam   Constitutional: She is oriented to person, place, and time. She appears well-developed and well-nourished. No distress. HENT:   Head: Normocephalic and atraumatic. Right Ear: External ear normal.   Left Ear: External ear normal.   Nose: Nose normal.   Mouth/Throat: Oropharynx is clear and moist. No oropharyngeal exudate. Eyes: Pupils are equal, round, and reactive to light. Conjunctivae and EOM are normal. Right eye exhibits no discharge. Left eye exhibits no discharge. No scleral icterus. Neck: Normal range of motion and full passive range of motion without pain. Neck supple. No muscular tenderness present. No neck rigidity. No tracheal deviation, no edema, no erythema and normal range of motion present. No thyromegaly present. Cardiovascular: Normal rate, regular rhythm, normal heart sounds and intact distal pulses. Exam reveals no gallop and no friction rub. No murmur heard. Pulmonary/Chest: Effort normal and breath sounds normal. No respiratory distress. She has no wheezes. She has no rales. She exhibits no tenderness. Abdominal: Soft. Bowel sounds are normal. She exhibits no distension. There is no tenderness. There is no rebound and no guarding. Musculoskeletal: She exhibits tenderness. She exhibits no edema. Right hip: She exhibits tenderness. Left hip: She exhibits decreased range of motion, decreased strength and bony tenderness. Cervical back: She exhibits tenderness. Thoracic back: She exhibits decreased range of motion, bony tenderness and pain. Lumbar back: She exhibits decreased range of motion, tenderness, pain and spasm. Back:         Legs:  Neurological: She is alert and oriented to person, place, and time. She has normal strength. She is not disoriented. She displays no atrophy. No cranial nerve deficit or sensory deficit. She exhibits normal muscle tone. She displays a negative Romberg sign. Gait abnormal. Coordination normal. She displays no Babinski's sign on the right side. SLR neg. Skin: Skin is warm and dry. No rash noted. She is not diaphoretic. No erythema. No pallor. Psychiatric: She has a normal mood and affect. Her behavior is normal. Judgment and thought content normal. Her mood appears not anxious. Her affect is not angry, not blunt, not labile and not inappropriate. Her speech is not rapid and/or pressured, not delayed, not tangential and not slurred. She is not agitated, not aggressive, not hyperactive, not slowed, not withdrawn, not actively hallucinating and not combative. Thought content is not paranoid and not delusional. Cognition and memory are not impaired. She does not express impulsivity or inappropriate judgment.  She does not exhibit a

## 2018-12-05 ENCOUNTER — TELEPHONE (OUTPATIENT)
Dept: PHYSICAL MEDICINE AND REHAB | Age: 53
End: 2018-12-05

## 2018-12-06 ENCOUNTER — TELEPHONE (OUTPATIENT)
Dept: PHYSICAL MEDICINE AND REHAB | Age: 53
End: 2018-12-06

## 2018-12-10 DIAGNOSIS — G89.4 CHRONIC PAIN SYNDROME: Primary | ICD-10-CM

## 2018-12-10 RX ORDER — HYDROCODONE BITARTRATE AND ACETAMINOPHEN 5; 325 MG/1; MG/1
1 TABLET ORAL 2 TIMES DAILY PRN
Qty: 60 TABLET | Refills: 0 | Status: SHIPPED | OUTPATIENT
Start: 2018-12-10 | End: 2019-01-09

## 2018-12-26 ENCOUNTER — PREP FOR PROCEDURE (OUTPATIENT)
Dept: PHYSICAL MEDICINE AND REHAB | Age: 53
End: 2018-12-26

## 2018-12-26 NOTE — H&P
Acids (FISH OIL) 1200 MG CAPS, , Disp: , Rfl:     calcium carbonate (OSCAL) 500 MG TABS tablet, Take 500 mg by mouth 2 times daily, Disp: , Rfl:     Turmeric 500 MG CAPS, Take by mouth daily, Disp: , Rfl:     cyclobenzaprine (FLEXERIL) 10 MG tablet, Take 1 tablet by mouth 3 times daily as needed for Muscle spasms WARNING: This medication may make your drowsy. Do not operate heavy machinery or motor vehicles during use., Disp: 15 tablet, Rfl: 0    lisinopril (PRINIVIL;ZESTRIL) 10 MG tablet, Take 10 mg by mouth nightly , Disp: , Rfl:     citalopram (CELEXA) 20 MG tablet, Take 20 mg by mouth daily , Disp: , Rfl:     spironolactone (ALDACTONE) 25 MG tablet, Take 1 tablet by mouth daily. , Disp: 15 tablet, Rfl: 0    pantoprazole (PROTONIX) 20 MG tablet, Take 2 tablets by mouth daily for 30 doses, Disp: 30 tablet, Rfl: 0        Subjective:      Review of Systems   Musculoskeletal: Positive for arthralgias, back pain and myalgias. Neurological: Positive for weakness. Negative for numbness. Objective:      Vitals                              Weight: (!) 313 lb 0.9 oz (142 kg)   Height: 5' 5\" (1.651 m)            Physical Exam   Constitutional: She is oriented to person, place, and time. She appears well-developed and well-nourished. No distress. HENT:   Head: Normocephalic and atraumatic. Right Ear: External ear normal.   Left Ear: External ear normal.   Nose: Nose normal.   Mouth/Throat: Oropharynx is clear and moist. No oropharyngeal exudate. Eyes: Pupils are equal, round, and reactive to light. Conjunctivae and EOM are normal. Right eye exhibits no discharge. Left eye exhibits no discharge. No scleral icterus. Neck: Normal range of motion and full passive range of motion without pain. Neck supple. No muscular tenderness present. No neck rigidity. No tracheal deviation, no edema, no erythema and normal range of motion present. No thyromegaly present.    Cardiovascular: Normal rate, regular rhythm,

## 2019-01-03 RX ORDER — MELOXICAM 15 MG/1
15 TABLET ORAL DAILY
Qty: 30 TABLET | Refills: 0 | Status: SHIPPED | OUTPATIENT
Start: 2019-01-03 | End: 2019-01-29 | Stop reason: SDUPTHER

## 2019-01-04 ENCOUNTER — APPOINTMENT (OUTPATIENT)
Dept: GENERAL RADIOLOGY | Age: 54
End: 2019-01-04
Attending: PAIN MEDICINE
Payer: COMMERCIAL

## 2019-01-04 ENCOUNTER — ANESTHESIA (OUTPATIENT)
Dept: OPERATING ROOM | Age: 54
End: 2019-01-04
Payer: COMMERCIAL

## 2019-01-04 ENCOUNTER — ANESTHESIA EVENT (OUTPATIENT)
Dept: OPERATING ROOM | Age: 54
End: 2019-01-04
Payer: COMMERCIAL

## 2019-01-04 ENCOUNTER — HOSPITAL ENCOUNTER (OUTPATIENT)
Age: 54
Setting detail: OUTPATIENT SURGERY
Discharge: HOME | End: 2019-01-04
Attending: PAIN MEDICINE | Admitting: PAIN MEDICINE
Payer: COMMERCIAL

## 2019-01-04 VITALS
WEIGHT: 293 LBS | SYSTOLIC BLOOD PRESSURE: 103 MMHG | BODY MASS INDEX: 48.82 KG/M2 | HEIGHT: 65 IN | DIASTOLIC BLOOD PRESSURE: 57 MMHG | OXYGEN SATURATION: 96 % | TEMPERATURE: 98.2 F | RESPIRATION RATE: 16 BRPM | HEART RATE: 86 BPM

## 2019-01-04 VITALS
RESPIRATION RATE: 9 BRPM | SYSTOLIC BLOOD PRESSURE: 118 MMHG | OXYGEN SATURATION: 90 % | DIASTOLIC BLOOD PRESSURE: 63 MMHG

## 2019-01-04 PROCEDURE — 3700000000 HC ANESTHESIA ATTENDED CARE: Performed by: PAIN MEDICINE

## 2019-01-04 PROCEDURE — 6360000002 HC RX W HCPCS: Performed by: NURSE ANESTHETIST, CERTIFIED REGISTERED

## 2019-01-04 PROCEDURE — 3600000054 HC PAIN LEVEL 3 BASE: Performed by: PAIN MEDICINE

## 2019-01-04 PROCEDURE — 6360000002 HC RX W HCPCS: Performed by: PAIN MEDICINE

## 2019-01-04 PROCEDURE — 7100000010 HC PHASE II RECOVERY - FIRST 15 MIN: Performed by: PAIN MEDICINE

## 2019-01-04 PROCEDURE — 2500000003 HC RX 250 WO HCPCS: Performed by: PAIN MEDICINE

## 2019-01-04 PROCEDURE — 64491 INJ PARAVERT F JNT C/T 2 LEV: CPT | Performed by: PAIN MEDICINE

## 2019-01-04 PROCEDURE — 64492 INJ PARAVERT F JNT C/T 3 LEV: CPT | Performed by: PAIN MEDICINE

## 2019-01-04 PROCEDURE — 2709999900 HC NON-CHARGEABLE SUPPLY: Performed by: PAIN MEDICINE

## 2019-01-04 PROCEDURE — 3209999900 FLUORO FOR SURGICAL PROCEDURES

## 2019-01-04 PROCEDURE — 64490 INJ PARAVERT F JNT C/T 1 LEV: CPT | Performed by: PAIN MEDICINE

## 2019-01-04 PROCEDURE — 7100000011 HC PHASE II RECOVERY - ADDTL 15 MIN: Performed by: PAIN MEDICINE

## 2019-01-04 RX ORDER — BETAMETHASONE SODIUM PHOSPHATE AND BETAMETHASONE ACETATE 3; 3 MG/ML; MG/ML
INJECTION, SUSPENSION INTRA-ARTICULAR; INTRALESIONAL; INTRAMUSCULAR; SOFT TISSUE PRN
Status: DISCONTINUED | OUTPATIENT
Start: 2019-01-04 | End: 2019-01-04 | Stop reason: HOSPADM

## 2019-01-04 RX ORDER — ROPIVACAINE HYDROCHLORIDE 2 MG/ML
INJECTION, SOLUTION EPIDURAL; INFILTRATION; PERINEURAL PRN
Status: DISCONTINUED | OUTPATIENT
Start: 2019-01-04 | End: 2019-01-04 | Stop reason: HOSPADM

## 2019-01-04 RX ORDER — PROPOFOL 10 MG/ML
INJECTION, EMULSION INTRAVENOUS PRN
Status: DISCONTINUED | OUTPATIENT
Start: 2019-01-04 | End: 2019-01-04 | Stop reason: SDUPTHER

## 2019-01-04 RX ORDER — LIDOCAINE HYDROCHLORIDE 10 MG/ML
INJECTION, SOLUTION INFILTRATION; PERINEURAL PRN
Status: DISCONTINUED | OUTPATIENT
Start: 2019-01-04 | End: 2019-01-04 | Stop reason: HOSPADM

## 2019-01-04 RX ORDER — LORATADINE 10 MG/1
10 TABLET ORAL DAILY
COMMUNITY

## 2019-01-04 RX ORDER — FENTANYL CITRATE 50 UG/ML
INJECTION, SOLUTION INTRAMUSCULAR; INTRAVENOUS PRN
Status: DISCONTINUED | OUTPATIENT
Start: 2019-01-04 | End: 2019-01-04 | Stop reason: SDUPTHER

## 2019-01-04 RX ADMIN — PROPOFOL 40 MG: 10 INJECTION, EMULSION INTRAVENOUS at 08:16

## 2019-01-04 RX ADMIN — FENTANYL CITRATE 100 MCG: 50 INJECTION INTRAMUSCULAR; INTRAVENOUS at 08:16

## 2019-01-04 ASSESSMENT — PULMONARY FUNCTION TESTS
PIF_VALUE: 0

## 2019-01-04 ASSESSMENT — PAIN - FUNCTIONAL ASSESSMENT: PAIN_FUNCTIONAL_ASSESSMENT: 0-10

## 2019-01-04 ASSESSMENT — PAIN SCALES - GENERAL: PAINLEVEL_OUTOF10: 0

## 2019-01-04 ASSESSMENT — PAIN DESCRIPTION - DESCRIPTORS: DESCRIPTORS: ACHING;SHOOTING

## 2019-01-11 DIAGNOSIS — G89.4 CHRONIC PAIN SYNDROME: Primary | ICD-10-CM

## 2019-01-11 RX ORDER — HYDROCODONE BITARTRATE AND ACETAMINOPHEN 5; 325 MG/1; MG/1
1 TABLET ORAL 2 TIMES DAILY PRN
Qty: 60 TABLET | Refills: 0 | Status: SHIPPED | OUTPATIENT
Start: 2019-01-11 | End: 2019-03-11 | Stop reason: SDUPTHER

## 2019-01-29 ENCOUNTER — OFFICE VISIT (OUTPATIENT)
Dept: PHYSICAL MEDICINE AND REHAB | Age: 54
End: 2019-01-29
Payer: COMMERCIAL

## 2019-01-29 VITALS
DIASTOLIC BLOOD PRESSURE: 68 MMHG | SYSTOLIC BLOOD PRESSURE: 132 MMHG | WEIGHT: 293 LBS | BODY MASS INDEX: 48.82 KG/M2 | HEART RATE: 92 BPM | HEIGHT: 65 IN

## 2019-01-29 DIAGNOSIS — S22.080A COMPRESSION FRACTURE OF T12 VERTEBRA (HCC): ICD-10-CM

## 2019-01-29 DIAGNOSIS — M54.9 MID BACK PAIN: ICD-10-CM

## 2019-01-29 DIAGNOSIS — G89.4 CHRONIC PAIN SYNDROME: ICD-10-CM

## 2019-01-29 DIAGNOSIS — M47.814 SPONDYLOSIS OF THORACIC REGION WITHOUT MYELOPATHY OR RADICULOPATHY: Primary | ICD-10-CM

## 2019-01-29 DIAGNOSIS — M70.62 TROCHANTERIC BURSITIS OF LEFT HIP: ICD-10-CM

## 2019-01-29 DIAGNOSIS — M46.1 SACROILIAC INFLAMMATION (HCC): ICD-10-CM

## 2019-01-29 DIAGNOSIS — M47.816 SPONDYLOSIS OF LUMBAR REGION WITHOUT MYELOPATHY OR RADICULOPATHY: ICD-10-CM

## 2019-01-29 PROCEDURE — G8484 FLU IMMUNIZE NO ADMIN: HCPCS | Performed by: NURSE PRACTITIONER

## 2019-01-29 PROCEDURE — G8427 DOCREV CUR MEDS BY ELIG CLIN: HCPCS | Performed by: NURSE PRACTITIONER

## 2019-01-29 PROCEDURE — G8417 CALC BMI ABV UP PARAM F/U: HCPCS | Performed by: NURSE PRACTITIONER

## 2019-01-29 PROCEDURE — 99213 OFFICE O/P EST LOW 20 MIN: CPT | Performed by: NURSE PRACTITIONER

## 2019-01-29 PROCEDURE — 3017F COLORECTAL CA SCREEN DOC REV: CPT | Performed by: NURSE PRACTITIONER

## 2019-01-29 PROCEDURE — 1036F TOBACCO NON-USER: CPT | Performed by: NURSE PRACTITIONER

## 2019-01-29 RX ORDER — MELOXICAM 15 MG/1
15 TABLET ORAL DAILY
Qty: 30 TABLET | Refills: 0 | Status: SHIPPED | OUTPATIENT
Start: 2019-01-29 | End: 2019-02-27 | Stop reason: SDUPTHER

## 2019-01-29 ASSESSMENT — ENCOUNTER SYMPTOMS: BACK PAIN: 1

## 2019-02-11 ENCOUNTER — PREP FOR PROCEDURE (OUTPATIENT)
Dept: PHYSICAL MEDICINE AND REHAB | Age: 54
End: 2019-02-11

## 2019-02-25 ENCOUNTER — HOSPITAL ENCOUNTER (OUTPATIENT)
Age: 54
Setting detail: OUTPATIENT SURGERY
Discharge: HOME | End: 2019-02-25
Attending: PAIN MEDICINE | Admitting: PAIN MEDICINE
Payer: COMMERCIAL

## 2019-02-25 ENCOUNTER — ANESTHESIA (OUTPATIENT)
Dept: OPERATING ROOM | Age: 54
End: 2019-02-25
Payer: COMMERCIAL

## 2019-02-25 ENCOUNTER — APPOINTMENT (OUTPATIENT)
Dept: GENERAL RADIOLOGY | Age: 54
End: 2019-02-25
Attending: PAIN MEDICINE
Payer: COMMERCIAL

## 2019-02-25 ENCOUNTER — ANESTHESIA EVENT (OUTPATIENT)
Dept: OPERATING ROOM | Age: 54
End: 2019-02-25
Payer: COMMERCIAL

## 2019-02-25 VITALS
TEMPERATURE: 97.1 F | BODY MASS INDEX: 48.82 KG/M2 | HEIGHT: 65 IN | HEART RATE: 90 BPM | RESPIRATION RATE: 16 BRPM | OXYGEN SATURATION: 93 % | DIASTOLIC BLOOD PRESSURE: 51 MMHG | SYSTOLIC BLOOD PRESSURE: 94 MMHG | WEIGHT: 293 LBS

## 2019-02-25 VITALS
DIASTOLIC BLOOD PRESSURE: 69 MMHG | RESPIRATION RATE: 20 BRPM | SYSTOLIC BLOOD PRESSURE: 134 MMHG | OXYGEN SATURATION: 95 %

## 2019-02-25 PROCEDURE — 64492 INJ PARAVERT F JNT C/T 3 LEV: CPT | Performed by: PAIN MEDICINE

## 2019-02-25 PROCEDURE — 2500000003 HC RX 250 WO HCPCS: Performed by: PAIN MEDICINE

## 2019-02-25 PROCEDURE — 7100000011 HC PHASE II RECOVERY - ADDTL 15 MIN: Performed by: PAIN MEDICINE

## 2019-02-25 PROCEDURE — 64491 INJ PARAVERT F JNT C/T 2 LEV: CPT | Performed by: PAIN MEDICINE

## 2019-02-25 PROCEDURE — 64490 INJ PARAVERT F JNT C/T 1 LEV: CPT | Performed by: PAIN MEDICINE

## 2019-02-25 PROCEDURE — 2500000003 HC RX 250 WO HCPCS: Performed by: NURSE ANESTHETIST, CERTIFIED REGISTERED

## 2019-02-25 PROCEDURE — 3700000000 HC ANESTHESIA ATTENDED CARE: Performed by: PAIN MEDICINE

## 2019-02-25 PROCEDURE — 3209999900 FLUORO FOR SURGICAL PROCEDURES

## 2019-02-25 PROCEDURE — 2709999900 HC NON-CHARGEABLE SUPPLY: Performed by: PAIN MEDICINE

## 2019-02-25 PROCEDURE — 6360000002 HC RX W HCPCS: Performed by: PAIN MEDICINE

## 2019-02-25 PROCEDURE — 6360000002 HC RX W HCPCS: Performed by: NURSE ANESTHETIST, CERTIFIED REGISTERED

## 2019-02-25 PROCEDURE — 3600000054 HC PAIN LEVEL 3 BASE: Performed by: PAIN MEDICINE

## 2019-02-25 PROCEDURE — 7100000010 HC PHASE II RECOVERY - FIRST 15 MIN: Performed by: PAIN MEDICINE

## 2019-02-25 RX ORDER — METHYLPREDNISOLONE ACETATE 80 MG/ML
INJECTION, SUSPENSION INTRA-ARTICULAR; INTRALESIONAL; INTRAMUSCULAR; SOFT TISSUE PRN
Status: DISCONTINUED | OUTPATIENT
Start: 2019-02-25 | End: 2019-02-25 | Stop reason: ALTCHOICE

## 2019-02-25 RX ORDER — LIDOCAINE HYDROCHLORIDE 10 MG/ML
INJECTION, SOLUTION INFILTRATION; PERINEURAL PRN
Status: DISCONTINUED | OUTPATIENT
Start: 2019-02-25 | End: 2019-02-25 | Stop reason: ALTCHOICE

## 2019-02-25 RX ORDER — HYDROCODONE BITARTRATE AND ACETAMINOPHEN 5; 325 MG/1; MG/1
1 TABLET ORAL EVERY 6 HOURS PRN
COMMUNITY
End: 2019-07-22 | Stop reason: SDUPTHER

## 2019-02-25 RX ORDER — LIDOCAINE HYDROCHLORIDE 10 MG/ML
INJECTION, SOLUTION INFILTRATION; PERINEURAL PRN
Status: DISCONTINUED | OUTPATIENT
Start: 2019-02-25 | End: 2019-02-25 | Stop reason: SDUPTHER

## 2019-02-25 RX ORDER — PROPOFOL 10 MG/ML
INJECTION, EMULSION INTRAVENOUS PRN
Status: DISCONTINUED | OUTPATIENT
Start: 2019-02-25 | End: 2019-02-25 | Stop reason: SDUPTHER

## 2019-02-25 RX ORDER — ROPIVACAINE HYDROCHLORIDE 2 MG/ML
INJECTION, SOLUTION EPIDURAL; INFILTRATION; PERINEURAL PRN
Status: DISCONTINUED | OUTPATIENT
Start: 2019-02-25 | End: 2019-02-25 | Stop reason: ALTCHOICE

## 2019-02-25 RX ADMIN — LIDOCAINE HYDROCHLORIDE 20 MG: 10 INJECTION, SOLUTION INFILTRATION; PERINEURAL at 08:10

## 2019-02-25 RX ADMIN — PROPOFOL 50 MG: 10 INJECTION, EMULSION INTRAVENOUS at 08:13

## 2019-02-25 RX ADMIN — PROPOFOL 20 MG: 10 INJECTION, EMULSION INTRAVENOUS at 08:15

## 2019-02-25 RX ADMIN — PROPOFOL 100 MG: 10 INJECTION, EMULSION INTRAVENOUS at 08:10

## 2019-02-25 RX ADMIN — PROPOFOL 50 MG: 10 INJECTION, EMULSION INTRAVENOUS at 08:11

## 2019-02-25 ASSESSMENT — PULMONARY FUNCTION TESTS
PIF_VALUE: 0

## 2019-02-25 ASSESSMENT — PAIN SCALES - GENERAL: PAINLEVEL_OUTOF10: 5

## 2019-02-25 ASSESSMENT — PAIN DESCRIPTION - DESCRIPTORS: DESCRIPTORS: ACHING

## 2019-02-25 ASSESSMENT — PAIN - FUNCTIONAL ASSESSMENT: PAIN_FUNCTIONAL_ASSESSMENT: 0-10

## 2019-02-28 RX ORDER — MELOXICAM 15 MG/1
15 TABLET ORAL DAILY
Qty: 30 TABLET | Refills: 0 | Status: SHIPPED | OUTPATIENT
Start: 2019-02-28 | End: 2019-03-29 | Stop reason: SDUPTHER

## 2019-03-11 ENCOUNTER — OFFICE VISIT (OUTPATIENT)
Dept: PHYSICAL MEDICINE AND REHAB | Age: 54
End: 2019-03-11
Payer: COMMERCIAL

## 2019-03-11 VITALS
SYSTOLIC BLOOD PRESSURE: 139 MMHG | HEART RATE: 77 BPM | HEIGHT: 65 IN | BODY MASS INDEX: 48.82 KG/M2 | DIASTOLIC BLOOD PRESSURE: 76 MMHG | WEIGHT: 293 LBS

## 2019-03-11 DIAGNOSIS — M46.1 SACROILIAC INFLAMMATION (HCC): ICD-10-CM

## 2019-03-11 DIAGNOSIS — M70.62 TROCHANTERIC BURSITIS OF LEFT HIP: ICD-10-CM

## 2019-03-11 DIAGNOSIS — G89.4 CHRONIC PAIN SYNDROME: ICD-10-CM

## 2019-03-11 DIAGNOSIS — M47.814 SPONDYLOSIS OF THORACIC REGION WITHOUT MYELOPATHY OR RADICULOPATHY: Primary | ICD-10-CM

## 2019-03-11 DIAGNOSIS — M47.816 SPONDYLOSIS OF LUMBAR REGION WITHOUT MYELOPATHY OR RADICULOPATHY: ICD-10-CM

## 2019-03-11 DIAGNOSIS — M54.9 MID BACK PAIN: ICD-10-CM

## 2019-03-11 DIAGNOSIS — S22.080A COMPRESSION FRACTURE OF T12 VERTEBRA (HCC): ICD-10-CM

## 2019-03-11 PROCEDURE — 1036F TOBACCO NON-USER: CPT | Performed by: NURSE PRACTITIONER

## 2019-03-11 PROCEDURE — G8484 FLU IMMUNIZE NO ADMIN: HCPCS | Performed by: NURSE PRACTITIONER

## 2019-03-11 PROCEDURE — 99213 OFFICE O/P EST LOW 20 MIN: CPT | Performed by: NURSE PRACTITIONER

## 2019-03-11 PROCEDURE — G8427 DOCREV CUR MEDS BY ELIG CLIN: HCPCS | Performed by: NURSE PRACTITIONER

## 2019-03-11 PROCEDURE — G8417 CALC BMI ABV UP PARAM F/U: HCPCS | Performed by: NURSE PRACTITIONER

## 2019-03-11 PROCEDURE — 3017F COLORECTAL CA SCREEN DOC REV: CPT | Performed by: NURSE PRACTITIONER

## 2019-03-11 RX ORDER — HYDROCODONE BITARTRATE AND ACETAMINOPHEN 5; 325 MG/1; MG/1
1 TABLET ORAL 2 TIMES DAILY PRN
Qty: 60 TABLET | Refills: 0 | Status: SHIPPED | OUTPATIENT
Start: 2019-03-11 | End: 2019-04-10 | Stop reason: SDUPTHER

## 2019-03-11 ASSESSMENT — ENCOUNTER SYMPTOMS: BACK PAIN: 1

## 2019-03-29 ENCOUNTER — TELEPHONE (OUTPATIENT)
Dept: PHYSICAL MEDICINE AND REHAB | Age: 54
End: 2019-03-29

## 2019-04-01 RX ORDER — MELOXICAM 15 MG/1
15 TABLET ORAL DAILY
Qty: 30 TABLET | Refills: 0 | Status: SHIPPED | OUTPATIENT
Start: 2019-04-01 | End: 2019-04-29 | Stop reason: SDUPTHER

## 2019-04-09 ENCOUNTER — PREP FOR PROCEDURE (OUTPATIENT)
Dept: PHYSICAL MEDICINE AND REHAB | Age: 54
End: 2019-04-09

## 2019-04-09 NOTE — H&P
(CLARITIN) 10 MG tablet, Take 10 mg by mouth daily, Disp: , Rfl:     Elastic Bandages & Supports (B & B SACROILIAC BELT) MISC, 1 Device by Does not apply route as needed (pain), Disp: 1 each, Rfl: 0    Cholecalciferol (VITAMIN D3) 5000 units CAPS, Take 1 capsule by mouth daily, Disp: , Rfl:     Loratadine-Pseudoephedrine (PX ALLERGY RELIEF D, LORATID, PO), Take 1 tablet by mouth daily, Disp: , Rfl:     pantoprazole (PROTONIX) 20 MG tablet, Take 2 tablets by mouth daily for 30 doses, Disp: 30 tablet, Rfl: 0    Omega-3 Fatty Acids (FISH OIL) 1200 MG CAPS, , Disp: , Rfl:     calcium carbonate (OSCAL) 500 MG TABS tablet, Take 500 mg by mouth 2 times daily, Disp: , Rfl:     Turmeric 500 MG CAPS, Take by mouth daily, Disp: , Rfl:     cyclobenzaprine (FLEXERIL) 10 MG tablet, Take 1 tablet by mouth 3 times daily as needed for Muscle spasms WARNING: This medication may make your drowsy. Do not operate heavy machinery or motor vehicles during use., Disp: 15 tablet, Rfl: 0    lisinopril (PRINIVIL;ZESTRIL) 10 MG tablet, Take 10 mg by mouth nightly , Disp: , Rfl:     citalopram (CELEXA) 20 MG tablet, Take 20 mg by mouth daily , Disp: , Rfl:     spironolactone (ALDACTONE) 25 MG tablet, Take 1 tablet by mouth daily. , Disp: 15 tablet, Rfl: 0        Subjective:      Review of Systems   Musculoskeletal: Positive for arthralgias, back pain and myalgias. Neurological: Positive for weakness. Negative for numbness. Objective:      Vitals                                  Weight: (!) 326 lb 8 oz (148.1 kg)   Height: 5' 5\" (1.651 m)            Physical Exam   Constitutional: She is oriented to person, place, and time. She appears well-developed and well-nourished. No distress. HENT:   Head: Normocephalic and atraumatic. Right Ear: External ear normal.   Left Ear: External ear normal.   Nose: Nose normal.   Mouth/Throat: Oropharynx is clear and moist. No oropharyngeal exudate.    Eyes: Pupils are equal, round, and

## 2019-04-09 NOTE — H&P (VIEW-ONLY)
Charlena Peabody is a 46 y.o. female is here today for     Chief Complaint: Mid back pain             The patientis allergic to other; sulfa antibiotics; and sulfur. Past Medical History  Deborah  has a past medical history of Allergic rhinitis, Depression, Hypertension, ABDI on CPAP, Osteoarthritis, and Vitamin D deficiency. Past Surgical History  The patient  has a past surgical history that includes Dental surgery (2002); [place] tooth extraction; Hand surgery (Right, 05/15/2015); other surgical history (4/11/16); Colonoscopy (2016); other surgical history (06/13/2016); Hemorrhoid surgery (2016); Tonsillectomy; other surgical history (Right, 10/16/2017); arthrocentesis (Right, 10/16/2017); Nerve Surgery (Left, 12/05/2017); arthrocentesis (Left, 12/5/2017); pr arthrocentesis aspir&/inj major jt/bursa w/o us (Right, 9/17/2018); pr office/outpt visit,procedure only (N/A, 11/9/2018); Nerve Block Lumb Facet Level 1 Bilateral (Bilateral, 1/4/2019); and Nerve Block Lumb Facet Level 1 Bilateral (Bilateral, 2/25/2019). Family History  This patient's family history includes Heart Disease in her mother; Substance Abuse in her sister. Social History  Deborah  reports that she quit smoking about 8 years ago. Her smoking use included cigarettes and e-cigarettes. She started smoking about 13 years ago. She has a 7.00 pack-year smoking history. She has never used smokeless tobacco. She reports that she drinks alcohol. She reports that she does not use drugs. Medications    Current Medication      Current Outpatient Medications:     HYDROcodone-acetaminophen (NORCO) 5-325 MG per tablet, Take 1 tablet by mouth 2 times daily as needed for Pain for up to 30 days. , Disp: 60 tablet, Rfl: 0    meloxicam (MOBIC) 15 MG tablet, TAKE 1 TABLET BY MOUTH DAILY, Disp: 30 tablet, Rfl: 0    HYDROcodone-acetaminophen (NORCO) 5-325 MG per tablet, Take 1 tablet by mouth every 6 hours as needed for Pain. ., Disp: , Rfl:     loratadine

## 2019-04-10 DIAGNOSIS — G89.4 CHRONIC PAIN SYNDROME: ICD-10-CM

## 2019-04-11 RX ORDER — HYDROCODONE BITARTRATE AND ACETAMINOPHEN 5; 325 MG/1; MG/1
1 TABLET ORAL 2 TIMES DAILY
Qty: 60 TABLET | Refills: 0 | Status: SHIPPED | OUTPATIENT
Start: 2019-04-11 | End: 2019-05-01 | Stop reason: SDUPTHER

## 2019-04-11 NOTE — TELEPHONE ENCOUNTER
OARRS reviewed  Ketamine Hcl Powder and Gabapentin Powder prescribed by alternate provider (Maureen Frausto)  every 4 days since 3/11/2019              UDS: + for    Hydrocodone POSITIVE CONSISTENT  Hydromorphone POSITIVE CONSISTENT  Norhydrocodone POSITIVE CONSISTENT        Last seen: 3/11/2019.          Follow-up:   Future Appointments   Date Time Provider Christine Sarina   4/29/2019 11:10 AM TAMIE Horowitz CNP [facility] [place]   5/2/2019  1:00 PM Orion Carney PA-C Pul Med [address] [facility]   5/28/2019 11:10 AM TAMIE Horowitz CNP [facility] [place]

## 2019-04-16 ENCOUNTER — APPOINTMENT (OUTPATIENT)
Dept: GENERAL RADIOLOGY | Age: 54
End: 2019-04-16
Attending: PAIN MEDICINE
Payer: COMMERCIAL

## 2019-04-16 ENCOUNTER — ANESTHESIA (OUTPATIENT)
Dept: OPERATING ROOM | Age: 54
End: 2019-04-16
Payer: COMMERCIAL

## 2019-04-16 ENCOUNTER — HOSPITAL ENCOUNTER (OUTPATIENT)
Age: 54
Setting detail: OUTPATIENT SURGERY
Discharge: HOME | End: 2019-04-16
Attending: PAIN MEDICINE | Admitting: PAIN MEDICINE
Payer: COMMERCIAL

## 2019-04-16 ENCOUNTER — ANESTHESIA EVENT (OUTPATIENT)
Dept: OPERATING ROOM | Age: 54
End: 2019-04-16
Payer: COMMERCIAL

## 2019-04-16 VITALS
RESPIRATION RATE: 13 BRPM | OXYGEN SATURATION: 92 % | DIASTOLIC BLOOD PRESSURE: 67 MMHG | SYSTOLIC BLOOD PRESSURE: 118 MMHG

## 2019-04-16 VITALS
HEIGHT: 65 IN | BODY MASS INDEX: 48.82 KG/M2 | OXYGEN SATURATION: 94 % | HEART RATE: 78 BPM | RESPIRATION RATE: 16 BRPM | TEMPERATURE: 98.4 F | WEIGHT: 293 LBS | DIASTOLIC BLOOD PRESSURE: 59 MMHG | SYSTOLIC BLOOD PRESSURE: 108 MMHG

## 2019-04-16 PROCEDURE — 2709999900 HC NON-CHARGEABLE SUPPLY: Performed by: PAIN MEDICINE

## 2019-04-16 PROCEDURE — 3600000057 HC PAIN LEVEL 4 ADDL 15 MIN: Performed by: PAIN MEDICINE

## 2019-04-16 PROCEDURE — 3600000056 HC PAIN LEVEL 4 BASE: Performed by: PAIN MEDICINE

## 2019-04-16 PROCEDURE — 7100000011 HC PHASE II RECOVERY - ADDTL 15 MIN: Performed by: PAIN MEDICINE

## 2019-04-16 PROCEDURE — 3700000000 HC ANESTHESIA ATTENDED CARE: Performed by: PAIN MEDICINE

## 2019-04-16 PROCEDURE — 64634 DESTROY C/TH FACET JNT ADDL: CPT | Performed by: PAIN MEDICINE

## 2019-04-16 PROCEDURE — 2500000003 HC RX 250 WO HCPCS: Performed by: NURSE ANESTHETIST, CERTIFIED REGISTERED

## 2019-04-16 PROCEDURE — 3209999900 FLUORO FOR SURGICAL PROCEDURES

## 2019-04-16 PROCEDURE — 3700000001 HC ADD 15 MINUTES (ANESTHESIA): Performed by: PAIN MEDICINE

## 2019-04-16 PROCEDURE — 6360000002 HC RX W HCPCS: Performed by: NURSE ANESTHETIST, CERTIFIED REGISTERED

## 2019-04-16 PROCEDURE — 7100000010 HC PHASE II RECOVERY - FIRST 15 MIN: Performed by: PAIN MEDICINE

## 2019-04-16 PROCEDURE — 64633 DESTROY CERV/THOR FACET JNT: CPT | Performed by: PAIN MEDICINE

## 2019-04-16 PROCEDURE — 2500000003 HC RX 250 WO HCPCS: Performed by: PAIN MEDICINE

## 2019-04-16 PROCEDURE — 6360000002 HC RX W HCPCS: Performed by: PAIN MEDICINE

## 2019-04-16 RX ORDER — METHYLPREDNISOLONE ACETATE 80 MG/ML
INJECTION, SUSPENSION INTRA-ARTICULAR; INTRALESIONAL; INTRAMUSCULAR; SOFT TISSUE PRN
Status: DISCONTINUED | OUTPATIENT
Start: 2019-04-16 | End: 2019-04-16 | Stop reason: ALTCHOICE

## 2019-04-16 RX ORDER — FENTANYL CITRATE 50 UG/ML
INJECTION, SOLUTION INTRAMUSCULAR; INTRAVENOUS PRN
Status: DISCONTINUED | OUTPATIENT
Start: 2019-04-16 | End: 2019-04-16 | Stop reason: SDUPTHER

## 2019-04-16 RX ORDER — KETOROLAC TROMETHAMINE 30 MG/ML
INJECTION, SOLUTION INTRAMUSCULAR; INTRAVENOUS PRN
Status: DISCONTINUED | OUTPATIENT
Start: 2019-04-16 | End: 2019-04-16 | Stop reason: SDUPTHER

## 2019-04-16 RX ORDER — PROPOFOL 10 MG/ML
INJECTION, EMULSION INTRAVENOUS PRN
Status: DISCONTINUED | OUTPATIENT
Start: 2019-04-16 | End: 2019-04-16 | Stop reason: SDUPTHER

## 2019-04-16 RX ORDER — LIDOCAINE HYDROCHLORIDE 10 MG/ML
INJECTION, SOLUTION INFILTRATION; PERINEURAL PRN
Status: DISCONTINUED | OUTPATIENT
Start: 2019-04-16 | End: 2019-04-16 | Stop reason: SDUPTHER

## 2019-04-16 RX ORDER — ROPIVACAINE HYDROCHLORIDE 2 MG/ML
INJECTION, SOLUTION EPIDURAL; INFILTRATION; PERINEURAL PRN
Status: DISCONTINUED | OUTPATIENT
Start: 2019-04-16 | End: 2019-04-16 | Stop reason: ALTCHOICE

## 2019-04-16 RX ORDER — LIDOCAINE HYDROCHLORIDE 10 MG/ML
INJECTION, SOLUTION EPIDURAL; INFILTRATION; INTRACAUDAL; PERINEURAL PRN
Status: DISCONTINUED | OUTPATIENT
Start: 2019-04-16 | End: 2019-04-16 | Stop reason: ALTCHOICE

## 2019-04-16 RX ADMIN — KETOROLAC TROMETHAMINE 30 MG: 30 INJECTION, SOLUTION INTRAMUSCULAR; INTRAVENOUS at 09:21

## 2019-04-16 RX ADMIN — PROPOFOL 100 MG: 10 INJECTION, EMULSION INTRAVENOUS at 09:15

## 2019-04-16 RX ADMIN — FENTANYL CITRATE 100 MCG: 50 INJECTION INTRAMUSCULAR; INTRAVENOUS at 09:11

## 2019-04-16 RX ADMIN — LIDOCAINE HYDROCHLORIDE 20 MG: 10 INJECTION, SOLUTION INFILTRATION; PERINEURAL at 09:15

## 2019-04-16 ASSESSMENT — PULMONARY FUNCTION TESTS
PIF_VALUE: 0

## 2019-04-16 ASSESSMENT — PAIN DESCRIPTION - DESCRIPTORS: DESCRIPTORS: ACHING

## 2019-04-16 ASSESSMENT — PAIN - FUNCTIONAL ASSESSMENT: PAIN_FUNCTIONAL_ASSESSMENT: 0-10

## 2019-04-16 ASSESSMENT — PAIN SCALES - GENERAL: PAINLEVEL_OUTOF10: 0

## 2019-04-16 NOTE — PROGRESS NOTES
Pt states office called her yesterday to schedule appointment. Office aware pt last dose of meloxicam and vitamins/herbals was 4-14-19-checked with provider ok to have procedure on 4-16-19.

## 2019-04-16 NOTE — ANESTHESIA PRE PROCEDURE
Department of Anesthesiology  Preprocedure Note       Name:  Riya Almazan   Age:  46 y.o.  :  1965                                          MRN:  [id number]         Date:  2019      Surgeon: Cathryn Zambrano):  Ninette Schwab, MD    Procedure: T-facet RFA @ T7-8, 8-9, 9-10 (Right )    Medications prior to admission:   Prior to Admission medications    Medication Sig Start Date End Date Taking? Authorizing Provider   HYDROcodone-acetaminophen (NORCO) 5-325 MG per tablet Take 1 tablet by mouth every 6 hours as needed for Pain. .   Yes Historical Provider, MD   loratadine (CLARITIN) 10 MG tablet Take 10 mg by mouth daily   Yes Historical Provider, MD   cyclobenzaprine (FLEXERIL) 10 MG tablet Take 1 tablet by mouth 3 times daily as needed for Muscle spasms WARNING: This medication may make your drowsy. Do not operate heavy machinery or motor vehicles during use. 16  Yes Michelle Madrid PA-C   lisinopril (PRINIVIL;ZESTRIL) 10 MG tablet Take 10 mg by mouth nightly  16  Yes Historical Provider, MD   citalopram (CELEXA) 20 MG tablet Take 20 mg by mouth daily  10/2/15  Yes Historical Provider, MD   spironolactone (ALDACTONE) 25 MG tablet Take 1 tablet by mouth daily. 13  Yes Tabitha Ryan MD   HYDROcodone-acetaminophen (NORCO) 5-325 MG per tablet Take 1 tablet by mouth 2 times daily for 30 days.  19  TAMIE Cortes CNP   meloxicam [facility]) 15 MG tablet Take 1 tablet by mouth daily 19  ArvTAMIE Sharma CNP   Elastic Bandages & Supports (B & B SACROILIAC BELT) MISC 1 Device by Does not apply route as needed (pain) 18   TAMIE Rubin CNP   Cholecalciferol (VITAMIN D3) 5000 units CAPS Take 1 capsule by mouth daily    Historical Provider, MD   Loratadine-Pseudoephedrine (PX ALLERGY RELIEF D, LORATID, PO) Take 1 tablet by mouth daily    Historical Provider, MD   pantoprazole (PROTONIX) 20 MG tablet Take 2 tablets by mouth daily for 30 doses 6/22/18 7/22/18  Malvin Cox MD   Omega-3 Fatty Acids (FISH OIL) 1200 MG CAPS  1/25/18   Historical Provider, MD   calcium carbonate (OSCAL) 500 MG TABS tablet Take 500 mg by mouth 2 times daily    Historical Provider, MD   Turmeric 500 MG CAPS Take by mouth daily    Historical Provider, MD       Current medications:    No current facility-administered medications for this encounter. Allergies:     Allergies   Allergen Reactions    Other Other (See Comments)     Artificial sweeteners - migraines    Sulfa Antibiotics Hives    Sulfur        Problem List:    Patient Active Problem List   Diagnosis Code    Occult blood in stools R19.5    Second degree hemorrhoids K64.1    ABDI on CPAP G47.33, Z99.89    Trochanteric bursitis of right hip M70.61    Primary osteoarthritis of right hip M16.11    Pathologic thoracic fracture M84.48XA    Localized osteoporosis with current pathological fracture M80.80XA    Spondylosis of thoracic region without myelopathy or radiculopathy M47.814       Past Medical History:        Diagnosis Date    Allergic rhinitis     Depression     Hypertension     ABDI on CPAP     Osteoarthritis     Vitamin D deficiency        Past Surgical History:        Procedure Laterality Date    ARTHROCENTESIS Right 10/16/2017    RIGHT HIP LARGE JOINT INJECTION performed by Dc Avendaño MD at [facility] ARTHROCENTESIS Left 12/5/2017    LEFT HIP LARGE JOINT INJECTION performed by Dc Avendaño MD at [facility]  2016   [facility] DENTAL SURGERY  2002     right front upper implant    HAND SURGERY Right 05/15/2015    index finger cleaned out D/T infected cat bite    HEMORRHOID SURGERY  2016    series of 3 bandings for internal hemorrhoids, Dr. Gerald Padgett 1 BILATERAL Bilateral 1/4/2019    LUMBAR FACET bilateral T-facet MBB @ T7-T8, T8-T9, and T9-T10 performed by Dc Avendaño MD at [facility]       Induction: intravenous. Anesthetic plan and risks discussed with patient. Plan discussed with CRNA.                   Tripp Holman DO   4/16/2019

## 2019-04-16 NOTE — OP NOTE
Pre-Procedure Note    Patient Name: Marley Franco   YOB: 1965  Medical Record Number: [id number]  Date: 3/11/2019    Indication:  Thoracic pain  Consent: On file. Vital Signs:   Vitals:    04/16/19 0745   BP: (!) 115/57   Pulse: 96   Resp: 16   Temp: 97 °F (36.1 °C)   SpO2: 96%       Past Medical History:   has a past medical history of Allergic rhinitis, Depression, Hypertension, ABDI on CPAP, Osteoarthritis, and Vitamin D deficiency. Past Surgical History:   has a past surgical history that includes Dental surgery (2002); [place] tooth extraction; Hand surgery (Right, 05/15/2015); other surgical history (4/11/16); Colonoscopy (2016); other surgical history (06/13/2016); Hemorrhoid surgery (2016); Tonsillectomy; other surgical history (Right, 10/16/2017); arthrocentesis (Right, 10/16/2017); Nerve Surgery (Left, 12/05/2017); arthrocentesis (Left, 12/5/2017); pr arthrocentesis aspir&/inj major jt/bursa w/o us (Right, 9/17/2018); pr office/outpt visit,procedure only (N/A, 11/9/2018); Nerve Block Lumb Facet Level 1 Bilateral (Bilateral, 1/4/2019); and Nerve Block Lumb Facet Level 1 Bilateral (Bilateral, 2/25/2019). Pre-Sedation Documentation and Exam:   Vital signs have been reviewed (see flow sheet for vitals). Sedation/ Anesthesia Plan:   MAC    Patient is an appropriate candidate for plan of sedation: yes    Preoperative Diagnosis:  T-spondylosis    Post-Op Dx: as above    Procedure Performed:  :Radiofrequency ablation of median branches at the levels of T7-8,T8-9 and T9-10right under fluoroscopic guidance     Indication for the Procedure: The patient has ahistory of chronic low back pain that is not responding well to the conservative treatment. Patient's pain is mostly axial in nature. Pain is interfering with the activities of daily living. Physical examination revealed facet tenderness and facet loading is positive.   Patient had undergone lumbar facet joint injections with pain relief that lasted for only a short period of time and had greater than 70% pain relief with confirmatory median branch blocks. Hence we decided to do radiofrequency abalation of median branches for long term pain releif. The procedure and risks  were discussed with the patient and an informed consent was obtained. Procedure:  Right side   A meaningful communication was kept up with the patient throughout the procedure. The patient is placed in prone position and skin over the back was prepped and draped in sterile manner. Then using fluoroscopy the junction of the transverse process of the vertebra with the superior process of the facet joint was observed and the view was optimized. The skin and deep tissues posterior were infiltrated with 6 ml of 1% xylocaine. The RF canula with the 10 mm active tip was introduced through the skin wheal under fluoroscopy guidance such that the tip of the needle lies in the groove of the transverse process with the superior processes of the facet joint. Then a lateral view of the lumbar spine was obtained to make sure the tip of needle is not in the neural foramen. Then electric impedence was checked to make sure it is acceptable. Then a sensory stimulus was applied at 50 Hz up to 1 volt and concordant pain symptoms were reproduced. Then a motor stimulus was applied at 2 Hz up to 2 volts and no motor stimulation was seen in lower extremities. Some multifidus stimulus was seen. Then after negative aspiration a mixture of depomedrol 80 mg  and 0.2%  Ropivacaine 1.5 cc was injected through the needle. Then a initial lesion was done at 80 degrees centigrade for 90 seconds. For L5 median branch block the junction of the ala of  the sacrum with the superior articular process of the facet joint was taken as a reference point.   For the L4 median branch the junction of the transverse process of L5 with the superolateral possible facet joint was taken as a reference point and

## 2019-04-30 ENCOUNTER — TELEPHONE (OUTPATIENT)
Dept: PHYSICAL MEDICINE AND REHAB | Age: 54
End: 2019-04-30

## 2019-04-30 ENCOUNTER — PREP FOR PROCEDURE (OUTPATIENT)
Dept: PHYSICAL MEDICINE AND REHAB | Age: 54
End: 2019-04-30

## 2019-04-30 RX ORDER — MELOXICAM 15 MG/1
15 TABLET ORAL DAILY
Qty: 30 TABLET | Refills: 2 | Status: SHIPPED | OUTPATIENT
Start: 2019-05-01 | End: 2019-07-22 | Stop reason: SDUPTHER

## 2019-04-30 NOTE — H&P
Suyapa Heck a 46 y. o. female is here today for     Chief Complaint: Mid back pain              The patientis allergic to other; sulfa antibiotics; and sulfur.     Past Medical History  Jermain  has a past medical history of Allergic rhinitis, Depression, Hypertension, ABDI on CPAP, Osteoarthritis, and Vitamin D deficiency.     Past Surgical History  The patient  has a past surgical history that includes Dental surgery (2002); [place] tooth extraction; Hand surgery (Right, 05/15/2015); other surgical history (4/11/16); Colonoscopy (2016); other surgical history (06/13/2016); Hemorrhoid surgery (2016); Tonsillectomy; other surgical history (Right, 10/16/2017); arthrocentesis (Right, 10/16/2017); Nerve Surgery (Left, 12/05/2017); arthrocentesis (Left, 12/5/2017); pr arthrocentesis aspir&/inj major jt/bursa w/o us (Right, 9/17/2018); pr office/outpt visit,procedure only (N/A, 11/9/2018); Nerve Block Lumb Facet Level 1 Bilateral (Bilateral, 1/4/2019); and Nerve Block Lumb Facet Level 1 Bilateral (Bilateral, 2/25/2019).    Family History  This patient's family history includes Heart Disease in her mother; Substance Abuse in her sister.     Social History  Jemrain  reports that she quit smoking about 8 years ago. Her smoking use included cigarettes and e-cigarettes. She started smoking about 13 years ago. She has a 7.00 pack-year smoking history. She has never used smokeless tobacco. She reports that she drinks alcohol. She reports that she does not use drugs.     Medications     Current Medication      Current Outpatient Medications:     HYDROcodone-acetaminophen (NORCO) 5-325 MG per tablet, Take 1 tablet by mouth 2 times daily as needed for Pain for up to 30 days. , Disp: 60 tablet, Rfl: 0    meloxicam (MOBIC) 15 MG tablet, TAKE 1 TABLET BY MOUTH DAILY, Disp: 30 tablet, Rfl: 0    HYDROcodone-acetaminophen (NORCO) 5-325 MG per tablet, Take 1 tablet by mouth every 6 hours as needed for Pain. ., Disp: , Rfl:    Eyes: Pupils are equal, round, and reactive to light. Conjunctivae and EOM are normal. Right eye exhibits no discharge. Left eye exhibits no discharge. No scleral icterus. Neck: Normal range of motion and full passive range of motion without pain. Neck supple. No muscular tenderness present. No neck rigidity. No tracheal deviation, no edema, no erythema and normal range of motion present. No thyromegaly present. Cardiovascular: Normal rate, regular rhythm, normal heart sounds and intact distal pulses. Exam reveals no gallop and no friction rub.   No murmur heard. Pulmonary/Chest: Effort normal and breath sounds normal. No respiratory distress. She has no wheezes. She has no rales. She exhibits no tenderness. Abdominal: Soft. Bowel sounds are normal. She exhibits no distension. There is no tenderness. There is no rebound and no guarding. Musculoskeletal: She exhibits tenderness. She exhibits no edema.        Right hip: She exhibits tenderness.        Left hip: She exhibits decreased range of motion, decreased strength and bony tenderness.        Cervical back: She exhibits tenderness.        Thoracic back: She exhibits decreased range of motion, bony tenderness and pain.        Lumbar back: She exhibits decreased range of motion, tenderness, pain and spasm.        Back:         Legs:  Neurological: She is alert and oriented to person, place, and time. She has normal strength. She is not disoriented. She displays no atrophy. No cranial nerve deficit or sensory deficit. She exhibits normal muscle tone. She displays a negative Romberg sign. Gait abnormal. Coordination normal. She displays no Babinski's sign on the right side. SLR neg.   Skin: Skin is warm and dry. No rash noted. She is not diaphoretic. No erythema. No pallor.        Psychiatric: She has a normal mood and affect. Her behavior is normal. Judgment and thought content normal. Her mood appears not anxious.  Her affect is not angry, not blunt, not labile and not inappropriate. Her speech is not rapid and/or pressured, not delayed, not tangential and not slurred. She is not agitated, not aggressive, not hyperactive, not slowed, not withdrawn, not actively hallucinating and not combative. Thought content is not paranoid and not delusional. Cognition and memory are not impaired. She does not express impulsivity or inappropriate judgment. She does not exhibit a depressed mood. She expresses no homicidal and no suicidal ideation. She expresses no suicidal plans and no homicidal plans. She is communicative. She exhibits normal recent memory and normal remote memory. She is attentive.   Nursing note and vitals reviewed.     SCOOTER test: + Sandi Falling test: + bilaterally   Gaenslen test: + bilaterally   Assessment:      1. Spondylosis of thoracic region without myelopathy or radiculopathy    2. Mid back pain    3. Compression fracture of T12 vertebra (HCC)    4. 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 Trochanteric bursitis of left hip    6. Sacroiliac inflammation ([facility] [address]    7.  Chronic pain syndrome             Plan:  Bilateral T-facet RFA @ T7-8, T8-9, and T9-T10 LEFT                 TAMIE Wang - CNP  4/30/2019

## 2019-04-30 NOTE — TELEPHONE ENCOUNTER
I have moved patient's procedure to 5-10-19 to ensure insurance has adequate time for approval.  Notified pt.    ----- Message from Constance Stanton sent at 4/29/2019  3:43 PM EDT -----  Contact: Office Visit  I am not sure. Usually [facility]  Send back by day 4 or 5  ----- Message -----  From: Kevin Cormier RN  Sent: 4/29/2019   1:05 PM  To: HoConstance Dong, #    She r/s her appt for 05/01, will this be enough time to get approval back? ? Roberta Schilder  ----- Message -----  From: Kevin Cormier RN  Sent: 4/26/2019  11:14 AM  To: Constancekalani Stanton, #    Pt has appt on 04/29/19  Roberta Schilder  ----- Message -----  From: Constance Stanton  Sent: 4/26/2019  10:44 AM  To: [facility]    I do not see an updated office visit after 04/16/2109  [facility] % of relief from the previous injection. I have not submitted for Prior Auth yet. Let me know if I should do submit with the existing one or there will be an upcoming visit.

## 2019-05-01 ENCOUNTER — OFFICE VISIT (OUTPATIENT)
Dept: PHYSICAL MEDICINE AND REHAB | Age: 54
End: 2019-05-01
Payer: COMMERCIAL

## 2019-05-01 VITALS
HEIGHT: 65 IN | WEIGHT: 293 LBS | BODY MASS INDEX: 48.82 KG/M2 | SYSTOLIC BLOOD PRESSURE: 134 MMHG | HEART RATE: 86 BPM | DIASTOLIC BLOOD PRESSURE: 74 MMHG

## 2019-05-01 DIAGNOSIS — M25.561 ACUTE PAIN OF RIGHT KNEE: ICD-10-CM

## 2019-05-01 DIAGNOSIS — M46.1 SACROILIAC INFLAMMATION (HCC): ICD-10-CM

## 2019-05-01 DIAGNOSIS — M54.9 MID BACK PAIN: ICD-10-CM

## 2019-05-01 DIAGNOSIS — G89.4 CHRONIC PAIN SYNDROME: ICD-10-CM

## 2019-05-01 DIAGNOSIS — M47.814 SPONDYLOSIS OF THORACIC REGION WITHOUT MYELOPATHY OR RADICULOPATHY: Primary | ICD-10-CM

## 2019-05-01 DIAGNOSIS — M70.62 TROCHANTERIC BURSITIS OF LEFT HIP: ICD-10-CM

## 2019-05-01 DIAGNOSIS — M16.0 PRIMARY OSTEOARTHRITIS OF BOTH HIPS: ICD-10-CM

## 2019-05-01 DIAGNOSIS — M47.816 SPONDYLOSIS OF LUMBAR REGION WITHOUT MYELOPATHY OR RADICULOPATHY: ICD-10-CM

## 2019-05-01 DIAGNOSIS — S22.080A COMPRESSION FRACTURE OF T12 VERTEBRA (HCC): ICD-10-CM

## 2019-05-01 PROCEDURE — G8417 CALC BMI ABV UP PARAM F/U: HCPCS | Performed by: NURSE PRACTITIONER

## 2019-05-01 PROCEDURE — 1036F TOBACCO NON-USER: CPT | Performed by: NURSE PRACTITIONER

## 2019-05-01 PROCEDURE — 3017F COLORECTAL CA SCREEN DOC REV: CPT | Performed by: NURSE PRACTITIONER

## 2019-05-01 PROCEDURE — 99214 OFFICE O/P EST MOD 30 MIN: CPT | Performed by: NURSE PRACTITIONER

## 2019-05-01 PROCEDURE — G8427 DOCREV CUR MEDS BY ELIG CLIN: HCPCS | Performed by: NURSE PRACTITIONER

## 2019-05-01 RX ORDER — HYDROCODONE BITARTRATE AND ACETAMINOPHEN 5; 325 MG/1; MG/1
1 TABLET ORAL 2 TIMES DAILY
Qty: 60 TABLET | Refills: 0 | Status: SHIPPED | OUTPATIENT
Start: 2019-05-11 | End: 2019-06-17 | Stop reason: SDUPTHER

## 2019-05-01 ASSESSMENT — ENCOUNTER SYMPTOMS: BACK PAIN: 1

## 2019-05-01 NOTE — PROGRESS NOTES
[address] [facility]  [address]  Dept: [phone]  Dept Fax: [phone]  Loc: [phone]    Visit Date: 5/1/2019    Functionality Assessment/Goals Worksheet     On a scale of 0 (Does not Interfere) to 10 (Completely Interferes)     1. Which number describes how during the past week pain has interfered with       the following:  A. General Activity:  9  B. Mood: 8  C. Walking Ability:  9  D. Normal Work (Includes both work outside the home and housework):  9  E. Relations with Other People:   6  F. Sleep:   8  G. Enjoyment of Life:   9    2. Patient Prefers to Take their Pain Medications:     [x]  On a regular basis   [x]  Only when necessary    []  Does not take pain medications    3. What are the Patient's Goals/Expectations for Visiting Pain Management? [x]  Learn about my pain    [x]  Receive Medication   []  Physical Therapy     []  Treat Depression   [x]  Receive Injections    []  Treat Sleep   []  Deal with Anxiety and Stress   []  Treat Opoid Dependence/Addiction   []  Other:      HPI:   Judy Keller is a 46 y.o. female is here today for    Chief Complaint: Mid back pain, lower back pain, SI pain, hip pain     HPI   FU Right T-facet RFA from 4/16/2019. Receiving 80% relief of right mid back pain from RFA and continues to receive that relief. Pain is mainly in left mid back. Continues to have lower back pain, knee pain, and neck pain     Patient reports that 1.5 weeks ago had a fall when she tripped over soemthing in the garage and landed on lower back and since having increased lower back pain and right knee pain   Norco prn remains effective     Medications reviewed. Patient constipation side effects with medications. Patient states she is taking medications as prescribed. Shedenies receiving pain medications from other sources.  She denies any ER visits since last visit. Pain scale with out pain medications or at its worst is 8-10/10. Pain scale with pain medications or at its best is 6/10. Last dose of Norco was today  Drug screen reviewed from 3/11/2019 and was appropriate  Pill count was completed today and was appropriate  Patient does not have naloxone available at home. Patient has not required use of naloxone at home since last office visit. The patientis allergic to other; sulfa antibiotics; and sulfur. Past Medical History  Lucy West  has a past medical history of Allergic rhinitis, Depression, Hypertension, ABDI on CPAP, Osteoarthritis, and Vitamin D deficiency. Past Surgical History  The patient  has a past surgical history that includes Dental surgery (2002); [place] tooth extraction; Hand surgery (Right, 05/15/2015); other surgical history (4/11/16); Colonoscopy (2016); other surgical history (06/13/2016); Hemorrhoid surgery (2016); Tonsillectomy; other surgical history (Right, 10/16/2017); arthrocentesis (Right, 10/16/2017); Nerve Surgery (Left, 12/05/2017); arthrocentesis (Left, 12/5/2017); pr arthrocentesis aspir&/inj major jt/bursa w/o us (Right, 9/17/2018); pr office/outpt visit,procedure only (N/A, 11/9/2018); Nerve Block Lumb Facet Level 1 Bilateral (Bilateral, 1/4/2019); Nerve Block Lumb Facet Level 1 Bilateral (Bilateral, 2/25/2019); and Lumbar spine surgery (Right, 4/16/2019). Family History  This patient's family history includes Heart Disease in her mother; Substance Abuse in her sister. Social History  Lucy West  reports that she quit smoking about 8 years ago. Her smoking use included cigarettes and e-cigarettes. She started smoking about 14 years ago. She has a 7.00 pack-year smoking history. She has never used smokeless tobacco. She reports that she drinks alcohol. She reports that she does not use drugs.     Medications    Current Outpatient Medications:     [START ON 5/11/2019] HYDROcodone-acetaminophen (NORCO) 5-325 MG per tablet, Take 1 tablet by mouth 2 times daily for 30 days. , Disp: 60 tablet, Rfl: 0    meloxicam (MOBIC) 15 MG tablet, Take 1 tablet by mouth daily, Disp: 30 tablet, Rfl: 2    HYDROcodone-acetaminophen (NORCO) 5-325 MG per tablet, Take 1 tablet by mouth every 6 hours as needed for Pain. ., Disp: , Rfl:     loratadine (CLARITIN) 10 MG tablet, Take 10 mg by mouth daily, Disp: , Rfl:     Elastic Bandages & Supports (B & B SACROILIAC BELT) MISC, 1 Device by Does not apply route as needed (pain), Disp: 1 each, Rfl: 0    Cholecalciferol (VITAMIN D3) 5000 units CAPS, Take 1 capsule by mouth daily, Disp: , Rfl:     Loratadine-Pseudoephedrine (PX ALLERGY RELIEF D, LORATID, PO), Take 1 tablet by mouth daily, Disp: , Rfl:     Omega-3 Fatty Acids (FISH OIL) 1200 MG CAPS, , Disp: , Rfl:     calcium carbonate (OSCAL) 500 MG TABS tablet, Take 500 mg by mouth 2 times daily, Disp: , Rfl:     Turmeric 500 MG CAPS, Take by mouth daily, Disp: , Rfl:     cyclobenzaprine (FLEXERIL) 10 MG tablet, Take 1 tablet by mouth 3 times daily as needed for Muscle spasms WARNING: This medication may make your drowsy. Do not operate heavy machinery or motor vehicles during use., Disp: 15 tablet, Rfl: 0    lisinopril (PRINIVIL;ZESTRIL) 10 MG tablet, Take 10 mg by mouth nightly , Disp: , Rfl:     citalopram (CELEXA) 20 MG tablet, Take 20 mg by mouth daily , Disp: , Rfl:     spironolactone (ALDACTONE) 25 MG tablet, Take 1 tablet by mouth daily. , Disp: 15 tablet, Rfl: 0    pantoprazole (PROTONIX) 20 MG tablet, Take 2 tablets by mouth daily for 30 doses, Disp: 30 tablet, Rfl: 0    Subjective:      Review of Systems   Musculoskeletal: Positive for arthralgias, back pain, gait problem, myalgias, neck pain and neck stiffness. Neurological: Positive for weakness. Negative for numbness.        Objective:     Vitals:    05/01/19 1035   BP: 134/74   Site: Left Lower Arm   Position: Sitting   Cuff Size: Medium Adult   Pulse: 86   Weight: (!) 333 lb 12.4 oz (151.4 kg)   Height: 5' 5\" (1.651 m)       Physical Exam   Constitutional: She is oriented to person, place, and time. She appears well-developed and well-nourished. No distress. HENT:   Head: Normocephalic and atraumatic. Right Ear: External ear normal.   Left Ear: External ear normal.   Nose: Nose normal.   Mouth/Throat: Oropharynx is clear and moist. No oropharyngeal exudate. Eyes: Pupils are equal, round, and reactive to light. Conjunctivae and EOM are normal. Right eye exhibits no discharge. Left eye exhibits no discharge. No scleral icterus. Neck: Normal range of motion and full passive range of motion without pain. Neck supple. No muscular tenderness present. No neck rigidity. No tracheal deviation, no edema, no erythema and normal range of motion present. No thyromegaly present. Cardiovascular: Normal rate, regular rhythm, normal heart sounds and intact distal pulses. Exam reveals no gallop and no friction rub. No murmur heard. Pulmonary/Chest: Effort normal and breath sounds normal. No respiratory distress. She has no wheezes. She has no rales. She exhibits no tenderness. Abdominal: Soft. Bowel sounds are normal. She exhibits no distension. There is no tenderness. There is no rebound and no guarding. Musculoskeletal: She exhibits tenderness. She exhibits no edema. Right hip: She exhibits tenderness. Left hip: She exhibits decreased range of motion, decreased strength and bony tenderness. Right knee: She exhibits decreased range of motion and bony tenderness. Tenderness found. Cervical back: She exhibits tenderness. Thoracic back: She exhibits decreased range of motion, bony tenderness and pain. Lumbar back: She exhibits decreased range of motion, tenderness, pain and spasm. Back:         Legs:  Neurological: She is alert and oriented to person, place, and time. She has normal strength. She is not disoriented. She displays no atrophy.  No not receive any pain medications from any other source. · No evidence of abuse, diversion or aberrant behavior.  Medications and/or procedures to improve function and quality of life- patient understanding with this and that may not be pain free   Discussed with patient about safe storage of medications at home   Discussed possible weaning of medication dosing dependent on treatment/procedure results.  Discussed with patient about risks with procedure including infection, reaction to medication, increased pain, or bleeding.  Procedure notes reviewed in detail.  Recieveing 80% relief of right mid back pain from right T-RFA and continues to receive that relief. Pain mainly left side. · Plan Left T-facet RFA @ T7-8, T8-9, and T9-T10. For longer term pain relief. Procedure and risks discussed in detail with patient. · Medications remain effective, patient is compliant. Continue Norco 5/325 BID prn- ordered refill  · Ordered right knee x-ray and Lumbar x-ray for increased pain followeing fall       Meds. Prescribed:   Orders Placed This Encounter   Medications    HYDROcodone-acetaminophen (NORCO) 5-325 MG per tablet     Sig: Take 1 tablet by mouth 2 times daily for 30 days. Dispense:  60 tablet     Refill:  0     Reduce doses taken as pain becomes manageable       Return for  Left T-facet RFA @ T7-8, T8-9, and T9-T10. , follow up after procedure.          Electronically signed by TAMIE Rivas CNP on5/1/2019 at 11:01 AM

## 2019-05-02 ENCOUNTER — OFFICE VISIT (OUTPATIENT)
Dept: PULMONOLOGY | Age: 54
End: 2019-05-02
Payer: COMMERCIAL

## 2019-05-02 VITALS
DIASTOLIC BLOOD PRESSURE: 72 MMHG | BODY MASS INDEX: 48.82 KG/M2 | WEIGHT: 293 LBS | SYSTOLIC BLOOD PRESSURE: 118 MMHG | HEART RATE: 99 BPM | OXYGEN SATURATION: 94 % | HEIGHT: 65 IN

## 2019-05-02 DIAGNOSIS — E66.01 MORBID OBESITY WITH BMI OF 50.0-59.9, ADULT (HCC): ICD-10-CM

## 2019-05-02 DIAGNOSIS — G47.33 OSA ON CPAP: Primary | ICD-10-CM

## 2019-05-02 DIAGNOSIS — Z99.89 OSA ON CPAP: Primary | ICD-10-CM

## 2019-05-02 PROCEDURE — 99213 OFFICE O/P EST LOW 20 MIN: CPT | Performed by: PHYSICIAN ASSISTANT

## 2019-05-02 PROCEDURE — 3017F COLORECTAL CA SCREEN DOC REV: CPT | Performed by: PHYSICIAN ASSISTANT

## 2019-05-02 PROCEDURE — G8427 DOCREV CUR MEDS BY ELIG CLIN: HCPCS | Performed by: PHYSICIAN ASSISTANT

## 2019-05-02 PROCEDURE — 1036F TOBACCO NON-USER: CPT | Performed by: PHYSICIAN ASSISTANT

## 2019-05-02 PROCEDURE — G8417 CALC BMI ABV UP PARAM F/U: HCPCS | Performed by: PHYSICIAN ASSISTANT

## 2019-05-02 ASSESSMENT — ENCOUNTER SYMPTOMS
BACK PAIN: 0
COUGH: 0
CHEST TIGHTNESS: 0
WHEEZING: 0
EYES NEGATIVE: 1
NAUSEA: 0
ALLERGIC/IMMUNOLOGIC NEGATIVE: 1
STRIDOR: 0
DIARRHEA: 0
SHORTNESS OF BREATH: 0

## 2019-05-02 NOTE — PROGRESS NOTES
5-325 MG per tablet Take 1 tablet by mouth 2 times daily for 30 days. 60 tablet 0    meloxicam (MOBIC) 15 MG tablet Take 1 tablet by mouth daily 30 tablet 2    HYDROcodone-acetaminophen (NORCO) 5-325 MG per tablet Take 1 tablet by mouth every 6 hours as needed for Pain. Raejean Blank loratadine (CLARITIN) 10 MG tablet Take 10 mg by mouth daily      Elastic Bandages & Supports (B & B SACROILIAC BELT) MISC 1 Device by Does not apply route as needed (pain) 1 each 0    Cholecalciferol (VITAMIN D3) 5000 units CAPS Take 1 capsule by mouth daily      Loratadine-Pseudoephedrine (PX ALLERGY RELIEF D, LORATID, PO) Take 1 tablet by mouth daily      Omega-3 Fatty Acids (FISH OIL) 1200 MG CAPS       calcium carbonate (OSCAL) 500 MG TABS tablet Take 500 mg by mouth 2 times daily      Turmeric 500 MG CAPS Take by mouth daily      cyclobenzaprine (FLEXERIL) 10 MG tablet Take 1 tablet by mouth 3 times daily as needed for Muscle spasms WARNING: This medication may make your drowsy. Do not operate heavy machinery or motor vehicles during use. 15 tablet 0    lisinopril (PRINIVIL;ZESTRIL) 10 MG tablet Take 10 mg by mouth nightly       citalopram (CELEXA) 20 MG tablet Take 20 mg by mouth daily       spironolactone (ALDACTONE) 25 MG tablet Take 1 tablet by mouth daily. 15 tablet 0    pantoprazole (PROTONIX) 20 MG tablet Take 2 tablets by mouth daily for 30 doses 30 tablet 0     No current facility-administered medications for this visit. Family History   Problem Relation Age of Onset    Substance Abuse Sister     Heart Disease Mother         stent    Cancer Neg Hx     Diabetes Neg Hx     High Blood Pressure Neg Hx     Stroke Neg Hx         Review of Systems -   Review of Systems   Constitutional: Negative for activity change, appetite change, chills and fever. HENT: Negative for congestion and postnasal drip. Eyes: Negative.     Respiratory: Negative for cough, chest tightness, shortness of breath, wheezing and stridor. Cardiovascular: Negative for chest pain and leg swelling. Gastrointestinal: Negative for diarrhea and nausea. Endocrine: Negative. Genitourinary: Negative. Musculoskeletal: Negative. Negative for arthralgias and back pain. Skin: Negative. Allergic/Immunologic: Negative. Neurological: Negative. Negative for dizziness and light-headedness. Psychiatric/Behavioral: Negative. All other systems reviewed and are negative. Physical Exam:    BMI:  Body mass index is 55.95 kg/m². Wt Readings from Last 3 Encounters:   05/02/19 (!) 336 lb 3.2 oz (152.5 kg)   05/01/19 (!) 333 lb 12.4 oz (151.4 kg)   04/16/19 (!) 333 lb 12.8 oz (151.4 kg)     Weight stable / unchanged  Vitals: /72   Pulse 99   Ht 5' 5\" (1.651 m)   Wt (!) 336 lb 3.2 oz (152.5 kg)   LMP 10/06/2014   SpO2 94% Comment: room air at rest  BMI 55.95 kg/m²       Physical Exam   Constitutional: She is oriented to person, place, and time. She appears well-developed and well-nourished. HENT:   Head: Normocephalic and atraumatic. Right Ear: External ear normal.   Left Ear: External ear normal.   Mouth/Throat: Oropharynx is clear and moist.   Eyes: Pupils are equal, round, and reactive to light. Conjunctivae and EOM are normal.   Neck: Normal range of motion. Neck supple. Cardiovascular: Normal rate, regular rhythm and normal heart sounds. Pulmonary/Chest: Effort normal and breath sounds normal.   Abdominal: Soft. Musculoskeletal: Normal range of motion. Neurological: She is alert and oriented to person, place, and time. Skin: Skin is warm and dry. Psychiatric: She has a normal mood and affect. Her behavior is normal. Judgment and thought content normal.         ASSESSMENT/DIAGNOSIS     Diagnosis Orders   1. ABDI on CPAP     2. Morbid obesity with BMI of 50.0-59.9, adult [facility])              Plan   Do you need any equipment today?  Yes update supplies    - She  was advised to continue current positive airway pressure therapy with above described pressure. - She  advised to keep goodcompliance with current recommended pressure to get optimal results and clinical improvement  - Recommend 7-9 hours of sleep with PAP  - She was advised to call [employer] company regarding supplies if needed.   -She call my office for earlier appointment if needed for worsening of sleep symptoms.   - She was instructed on weight loss  - Lucy West was educated about my impression and plan. Patient verbalizesunderstanding.   We will see Judy Keller back in: 1 year with download    Information added by my medical assistant/LPN was reviewed today       Lito Nabil VACA, DIONISIO  5/2/2019

## 2019-05-16 ENCOUNTER — PREP FOR PROCEDURE (OUTPATIENT)
Dept: PHYSICAL MEDICINE AND REHAB | Age: 54
End: 2019-05-16

## 2019-05-17 ENCOUNTER — HOSPITAL ENCOUNTER (OUTPATIENT)
Age: 54
Discharge: HOME | End: 2019-05-17
Payer: COMMERCIAL

## 2019-05-17 ENCOUNTER — HOSPITAL ENCOUNTER (OUTPATIENT)
Dept: GENERAL RADIOLOGY | Age: 54
Discharge: HOME | End: 2019-05-17
Payer: COMMERCIAL

## 2019-05-17 DIAGNOSIS — M25.561 ACUTE PAIN OF RIGHT KNEE: ICD-10-CM

## 2019-05-17 DIAGNOSIS — M47.816 SPONDYLOSIS OF LUMBAR REGION WITHOUT MYELOPATHY OR RADICULOPATHY: ICD-10-CM

## 2019-05-17 PROCEDURE — 73562 X-RAY EXAM OF KNEE 3: CPT

## 2019-05-17 PROCEDURE — 72100 X-RAY EXAM L-S SPINE 2/3 VWS: CPT

## 2019-05-20 ENCOUNTER — PREP FOR PROCEDURE (OUTPATIENT)
Dept: PHYSICAL MEDICINE AND REHAB | Age: 54
End: 2019-05-20

## 2019-05-20 NOTE — H&P (VIEW-ONLY)
for Pain. ., Disp: , Rfl:     loratadine (CLARITIN) 10 MG tablet, Take 10 mg by mouth daily, Disp: , Rfl:     Elastic Bandages & Supports (B & B SACROILIAC BELT) MISC, 1 Device by Does not apply route as needed (pain), Disp: 1 each, Rfl: 0    Cholecalciferol (VITAMIN D3) 5000 units CAPS, Take 1 capsule by mouth daily, Disp: , Rfl:     Loratadine-Pseudoephedrine (PX ALLERGY RELIEF D, LORATID, PO), Take 1 tablet by mouth daily, Disp: , Rfl:     Omega-3 Fatty Acids (FISH OIL) 1200 MG CAPS, , Disp: , Rfl:     calcium carbonate (OSCAL) 500 MG TABS tablet, Take 500 mg by mouth 2 times daily, Disp: , Rfl:     Turmeric 500 MG CAPS, Take by mouth daily, Disp: , Rfl:     cyclobenzaprine (FLEXERIL) 10 MG tablet, Take 1 tablet by mouth 3 times daily as needed for Muscle spasms WARNING: This medication may make your drowsy. Do not operate heavy machinery or motor vehicles during use., Disp: 15 tablet, Rfl: 0    lisinopril (PRINIVIL;ZESTRIL) 10 MG tablet, Take 10 mg by mouth nightly , Disp: , Rfl:     citalopram (CELEXA) 20 MG tablet, Take 20 mg by mouth daily , Disp: , Rfl:     spironolactone (ALDACTONE) 25 MG tablet, Take 1 tablet by mouth daily. , Disp: 15 tablet, Rfl: 0    pantoprazole (PROTONIX) 20 MG tablet, Take 2 tablets by mouth daily for 30 doses, Disp: 30 tablet, Rfl: 0        Subjective:      Review of Systems   Musculoskeletal: Positive for arthralgias, back pain, gait problem, myalgias, neck pain and neck stiffness. Neurological: Positive for weakness. Negative for numbness. Objective:      Vitals                                  Weight: (!) 333 lb 12.4 oz (151.4 kg)   Height: 5' 5\" (1.651 m)            Physical Exam   Constitutional: She is oriented to person, place, and time. She appears well-developed and well-nourished. No distress. HENT:   Head: Normocephalic and atraumatic.    Right Ear: External ear normal.   Left Ear: External ear normal.   Nose: Nose normal.   Mouth/Throat: Oropharynx is clear and moist. No oropharyngeal exudate. Eyes: Pupils are equal, round, and reactive to light. Conjunctivae and EOM are normal. Right eye exhibits no discharge. Left eye exhibits no discharge. No scleral icterus. Neck: Normal range of motion and full passive range of motion without pain. Neck supple. No muscular tenderness present. No neck rigidity. No tracheal deviation, no edema, no erythema and normal range of motion present. No thyromegaly present. Cardiovascular: Normal rate, regular rhythm, normal heart sounds and intact distal pulses. Exam reveals no gallop and no friction rub. No murmur heard. Pulmonary/Chest: Effort normal and breath sounds normal. No respiratory distress. She has no wheezes. She has no rales. She exhibits no tenderness. Abdominal: Soft. Bowel sounds are normal. She exhibits no distension. There is no tenderness. There is no rebound and no guarding. Musculoskeletal: She exhibits tenderness. She exhibits no edema. Right hip: She exhibits tenderness. Left hip: She exhibits decreased range of motion, decreased strength and bony tenderness. Right knee: She exhibits decreased range of motion and bony tenderness. Tenderness found. Cervical back: She exhibits tenderness. Thoracic back: She exhibits decreased range of motion, bony tenderness and pain. Lumbar back: She exhibits decreased range of motion, tenderness, pain and spasm. Back:         Legs:  Neurological: She is alert and oriented to person, place, and time. She has normal strength. She is not disoriented. She displays no atrophy. No cranial nerve deficit or sensory deficit. She exhibits normal muscle tone. She displays a negative Romberg sign. Gait abnormal. Coordination normal. She displays no Babinski's sign on the right side. SLR neg. Skin: Skin is warm and dry. No rash noted. She is not diaphoretic. No erythema. No pallor.         Psychiatric: She has a normal mood and affect. Her behavior is normal. Judgment and thought content normal. Her mood appears not anxious. Her affect is not angry, not blunt, not labile and not inappropriate. Her speech is not rapid and/or pressured, not delayed, not tangential and not slurred. She is not agitated, not aggressive, not hyperactive, not slowed, not withdrawn, not actively hallucinating and not combative. Thought content is not paranoid and not delusional. Cognition and memory are not impaired. She does not express impulsivity or inappropriate judgment. She does not exhibit a depressed mood. She expresses no homicidal and no suicidal ideation. She expresses no suicidal plans and no homicidal plans. She is communicative. She exhibits normal recent memory and normal remote memory. She is attentive. Nursing note and vitals reviewed. SCOOTER test: + bilaterally   Yeomans test: + bilaterally   Gaenslen test: + bilaterally   Assessment:      1. Spondylosis of thoracic region without myelopathy or radiculopathy    2. Mid back pain    3. Compression fracture of T12 vertebra (HCC)    4. 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 Trochanteric bursitis of left hip    6. Sacroiliac inflammation ([facility] [address]    7. Primary osteoarthritis of both hips    8. Acute pain of right knee    9.  Chronic pain syndrome          Plan:  Left T-facet RFA @ T7-8, T8-9, and T9-T10       TAMIE Dominguez - CNP  5/20/2019

## 2019-05-20 NOTE — H&P
Richmond Marmolejo is a 46 y.o. female is here today for     Chief Complaint: Mid back pain              The patientis allergic to other; sulfa antibiotics; and sulfur. Past Medical History  Sheila Kennedy  has a past medical history of Allergic rhinitis, Depression, Hypertension, ABDI on CPAP, Osteoarthritis, and Vitamin D deficiency. Past Surgical History  The patient  has a past surgical history that includes Dental surgery (2002); [place] tooth extraction; Hand surgery (Right, 05/15/2015); other surgical history (4/11/16); Colonoscopy (2016); other surgical history (06/13/2016); Hemorrhoid surgery (2016); Tonsillectomy; other surgical history (Right, 10/16/2017); arthrocentesis (Right, 10/16/2017); Nerve Surgery (Left, 12/05/2017); arthrocentesis (Left, 12/5/2017); pr arthrocentesis aspir&/inj major jt/bursa w/o us (Right, 9/17/2018); pr office/outpt visit,procedure only (N/A, 11/9/2018); Nerve Block Lumb Facet Level 1 Bilateral (Bilateral, 1/4/2019); Nerve Block Lumb Facet Level 1 Bilateral (Bilateral, 2/25/2019); and Lumbar spine surgery (Right, 4/16/2019). Family History  This patient's family history includes Heart Disease in her mother; Substance Abuse in her sister. Social History  Sheila Kennedy  reports that she quit smoking about 8 years ago. Her smoking use included cigarettes and e-cigarettes. She started smoking about 14 years ago. She has a 7.00 pack-year smoking history. She has never used smokeless tobacco. She reports that she drinks alcohol. She reports that she does not use drugs. Medications    Current Medication      Current Outpatient Medications:     [START ON 5/11/2019] HYDROcodone-acetaminophen (NORCO) 5-325 MG per tablet, Take 1 tablet by mouth 2 times daily for 30 days. , Disp: 60 tablet, Rfl: 0    meloxicam (MOBIC) 15 MG tablet, Take 1 tablet by mouth daily, Disp: 30 tablet, Rfl: 2    HYDROcodone-acetaminophen (NORCO) 5-325 MG per tablet, Take 1 tablet by mouth every 6 hours as needed for Pain. ., Disp: , Rfl:     loratadine (CLARITIN) 10 MG tablet, Take 10 mg by mouth daily, Disp: , Rfl:     Elastic Bandages & Supports (B & B SACROILIAC BELT) MISC, 1 Device by Does not apply route as needed (pain), Disp: 1 each, Rfl: 0    Cholecalciferol (VITAMIN D3) 5000 units CAPS, Take 1 capsule by mouth daily, Disp: , Rfl:     Loratadine-Pseudoephedrine (PX ALLERGY RELIEF D, LORATID, PO), Take 1 tablet by mouth daily, Disp: , Rfl:     Omega-3 Fatty Acids (FISH OIL) 1200 MG CAPS, , Disp: , Rfl:     calcium carbonate (OSCAL) 500 MG TABS tablet, Take 500 mg by mouth 2 times daily, Disp: , Rfl:     Turmeric 500 MG CAPS, Take by mouth daily, Disp: , Rfl:     cyclobenzaprine (FLEXERIL) 10 MG tablet, Take 1 tablet by mouth 3 times daily as needed for Muscle spasms WARNING: This medication may make your drowsy. Do not operate heavy machinery or motor vehicles during use., Disp: 15 tablet, Rfl: 0    lisinopril (PRINIVIL;ZESTRIL) 10 MG tablet, Take 10 mg by mouth nightly , Disp: , Rfl:     citalopram (CELEXA) 20 MG tablet, Take 20 mg by mouth daily , Disp: , Rfl:     spironolactone (ALDACTONE) 25 MG tablet, Take 1 tablet by mouth daily. , Disp: 15 tablet, Rfl: 0    pantoprazole (PROTONIX) 20 MG tablet, Take 2 tablets by mouth daily for 30 doses, Disp: 30 tablet, Rfl: 0        Subjective:      Review of Systems   Musculoskeletal: Positive for arthralgias, back pain, gait problem, myalgias, neck pain and neck stiffness. Neurological: Positive for weakness. Negative for numbness. Objective:      Vitals                                  Weight: (!) 333 lb 12.4 oz (151.4 kg)   Height: 5' 5\" (1.651 m)            Physical Exam   Constitutional: She is oriented to person, place, and time. She appears well-developed and well-nourished. No distress. HENT:   Head: Normocephalic and atraumatic.    Right Ear: External ear normal.   Left Ear: External ear normal.   Nose: Nose normal.   Mouth/Throat: Oropharynx and affect. Her behavior is normal. Judgment and thought content normal. Her mood appears not anxious. Her affect is not angry, not blunt, not labile and not inappropriate. Her speech is not rapid and/or pressured, not delayed, not tangential and not slurred. She is not agitated, not aggressive, not hyperactive, not slowed, not withdrawn, not actively hallucinating and not combative. Thought content is not paranoid and not delusional. Cognition and memory are not impaired. She does not express impulsivity or inappropriate judgment. She does not exhibit a depressed mood. She expresses no homicidal and no suicidal ideation. She expresses no suicidal plans and no homicidal plans. She is communicative. She exhibits normal recent memory and normal remote memory. She is attentive. Nursing note and vitals reviewed. SCOOTER test: + bilaterally   Yeomans test: + bilaterally   Gaenslen test: + bilaterally   Assessment:      1. Spondylosis of thoracic region without myelopathy or radiculopathy    2. Mid back pain    3. Compression fracture of T12 vertebra (HCC)    4. 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 Trochanteric bursitis of left hip    6. Sacroiliac inflammation ([facility] [address]    7. Primary osteoarthritis of both hips    8. Acute pain of right knee    9.  Chronic pain syndrome          Plan:  Left T-facet RFA @ T7-8, T8-9, and T9-T10       TAMIE Thurston - CNP  5/20/2019

## 2019-06-07 ENCOUNTER — HOSPITAL ENCOUNTER (OUTPATIENT)
Dept: PHYSICAL THERAPY | Age: 54
Setting detail: THERAPIES SERIES
Discharge: HOME | End: 2019-06-07
Payer: COMMERCIAL

## 2019-06-07 PROCEDURE — 97162 PT EVAL MOD COMPLEX 30 MIN: CPT

## 2019-06-07 PROCEDURE — 97530 THERAPEUTIC ACTIVITIES: CPT

## 2019-06-07 PROCEDURE — 97140 MANUAL THERAPY 1/> REGIONS: CPT

## 2019-06-07 ASSESSMENT — PAIN DESCRIPTION - FREQUENCY: FREQUENCY: CONTINUOUS

## 2019-06-07 ASSESSMENT — PAIN DESCRIPTION - LOCATION: LOCATION: BACK;SACRUM;LEG

## 2019-06-07 ASSESSMENT — PAIN DESCRIPTION - DESCRIPTORS: DESCRIPTORS: ACHING;PRESSURE;SHARP;SHOOTING

## 2019-06-07 ASSESSMENT — PAIN DESCRIPTION - ORIENTATION: ORIENTATION: RIGHT

## 2019-06-07 ASSESSMENT — PAIN SCALES - GENERAL: PAINLEVEL_OUTOF10: 7

## 2019-06-07 ASSESSMENT — PAIN DESCRIPTION - PAIN TYPE: TYPE: CHRONIC PAIN

## 2019-06-07 NOTE — FLOWSHEET NOTE
** PLEASE SIGN, DATE AND TIME CERTIFICATION BELOW AND RETURN TO [facility] OUTPATIENT REHABILITATION (FAX #: [phone]). ATTEST/CO-SIGN IF ACCESSING VIA [employer]. THANK YOU.**    I certify that I have examined the patient below and determined that Physical Medicine and Rehabilitation service is necessary and that I approve the established plan of care for up to 90 days or as specifically noted. Attestation, signature or co-signature of physician indicates approval of certification requirements.    ________________________ ____________ __________  Physician Signature   Date   Time       [address]     Time In: 3972  Time Out: 2931  Minutes: 45  Timed Code Treatment Minutes: 23 Minutes           Modified Oswestry 56%. (back)     Date: 2019  Patient Name: Marley Franco,  Gender:  female        CSN: [id number]   : 1965  (53 y.o.)        Referring Practitioner: Sheila Pickering CNP       Diagnosis: M53.3 (ICD-10-CM) - Sacrococcygeal disorders, not elsewhere classified  Treatment Diagnosis: lumbar pain, SI pain with right ilium forward rotation, difficulty tolerating prolonged standing and walking. Additional Pertinent Hx: comorbidities: history of nerve blocks to right hip, lumbar facets L45, L5S1, HTN, kyphoplasty at thoracic spine 18, left hip replacement 18. In pain management with Dr. Ron Ryan. General:  PT Visit Information  Onset Date: 19  PT Insurance Information: [employer] 30 visits PT/OT/ST each per calendar year, aquatic yes, modalities yes, HP and CP are not covered. Total # of Visits Approved: 30  Total # of Visits to Date: 1  Plan of Care/Certification Expiration Date: 19  Progress Note Counter: 1/10 for PN         See Medical History Questionnaire for information related to allergies and medications.     Subjective:  Chart Reviewed: Yes  Response To Previous Treatment: Not applicable  Family / Caregiver Present: No  Comments: Follow up as indicated/needed with Lavon Aguirre CNP  2nd round of nerve burning on left side in July 2019. Dr. Lavern Castellano. Other (Comment): Pain of right SI joint     Subjective: Patient reports that she flared up her right SI joint when she fell 6 weeks ago. Note symptoms worsening with right low back and SI pain and symptoms of numbness that are intermittent at RLE to mid foot. She had nerve burn at lumbar region on right on 4/16/19. This did help with some of her pain. Walking aggravates lumbar and SI region as does leaning forward pulls on her back. Standing at kitchen counter also aggravates back. She does use a reacher and rolling chair to get around her house. Wendy takes Meloxicam, Flexeril and Norco daily for pain. To ease symptoms she can sit after standing/walking with pain tolerable. But notes semireclined helps the most. Also uses heat and warm shower but this does not make much difference. Pain:  Patient Currently in Pain: Yes  Pain Assessment: 0-10  Pain Level: 7  Pain Type: Chronic pain  Pain Location: Back, Sacrum, Leg  Pain Orientation: Right  Pain Radiating Towards: right lateral lumbar right SI region and intermittent RLE symptoms to mid foot.    Pain Descriptors: Aching, Pressure, Sharp, Shooting  Pain Frequency: Continuous(RLE intermittent)     Social/Functional History:    Type of Home: House  Home Layout: One level  Home Access: Stairs to enter without rails  Entrance Stairs - Number of Steps: 2 steps  Home Equipment: 4 wheeled walker, Cane     Bathroom Shower/Tub: Tub/Shower unit, Shower chair without back  Bathroom Equipment: Shower chair  Bathroom Accessibility: Accessible       ADL Assistance: Independent  Homemaking Assistance: Independent  Ambulation Assistance: Independent  Transfer Assistance: Independent    Active : Yes  Mode of Transportation: Car  Occupation: Unemployed  Leisure & Hobbies: enjoys hooklying position due to comfort and patient Short of breath. Exercise 3: Do abdominal isometric during day while seated, standing and walking at 20-50% contraction. Exercise 4: Body mechanics. Continue to use reacher to retrieve objects at home, Logroll technique for bed mobility. No bending or twisting. Activity Tolerance:  Activity Tolerance: Patient limited by pain, Patient Tolerated treatment well  Activity Tolerance: Reports of decreased pain level from 7/10 to 3/10 following session. Assessment: Body structures, Functions, Activity limitations: Decreased functional mobility , Decreased ADL status, Decreased ROM, Decreased strength, Decreased safe awareness, Decreased endurance, Decreased balance, Increased Pain  Assessment: 53year old female referred to PT with SI dysfunction and inflammation She also has right lateral lumbar pain with intermittent RLE symptoms at buttock and posterior thigh. Note decreased core strength, hip strength and decreased walking and standing tolerance. Muscle lEnergy Technique was completed for right anterior rotation . Noted improved alignment following correction and patient reporting of decreased pain levels at right SI region. Will follow 2x per week to address SI dysfunction and lumbar pain with  also decreased core strength and hip strength. Prognosis: Good  Discharge Recommendations: Continue to assess pending progress    Patient Education:  Patient Education: Patient educated in plan of care. Educated in proper body mechanics for bed mobility and use of reacher to retrieve objects. Also instructed in abdominal isometrics this session. Plan:  Times per week: 2x  Plan weeks: 8 weeks  Specific instructions for Next Treatment: Check SI alignment for right anterior ilium rotation. Muscle energy Technique for correction.  Modalities: HP with interferential e stim, US, core strengthening, hip strengthening. body mechanics. Current Treatment Recommendations: Strengthening, ROM, Balance Training, Neuromuscular Re-education, Home Exercise Program, Manual Therapy - Joint Manipulation, Manual Therapy - Soft Tissue Mobilization, Modalities, Aquatics, Pain Management    History: Personal factors or comorbidities that impact plan of care - High Complexity: 3 or more personal factors or comorbidities. See history section above for details. Examination: Body structures and functions, activity limitations, participation restrictions; using standardized tests and measures - Moderate Complexity: 3 or morebody structures and functional, activity limitations and/or participation restrictions. See restrictions and objective section above for details. Clinical Presentation: Moderate - Evolving with Changing Characteristics: lumbar and SI dysfunction with difficulty walking and standing. Also complaints of intermittent right LE symptoms. Decision Making: Moderate Complexity due to see above for explanations. Decision making was based on patient assessment and decision making process in determining plan of care and establishing reasonable expectations for measurable functional outcomes. Evaluation Complexity: Based on the findings of patient history, examination, clinical presentation, and decision making during this evaluation, the evaluation of Ariel Lauren  is of medium complexity. Goals:  Patient goals : To be able to move with less back pain. Less discomfort at left SI region, No right leg symptoms with pain and numbness to mid foot.      Short term goals  Time Frame for Short term goals: 4 weeks  Short term goal 1: Patient to report of decreased pain level from 7/10 at right lateral lumbar and right SI region with standing and walking to 3/10  Short term goal 2: Patient to demonstrate increased lumbar ROM through painfree ROM with flexion 10 inches to 5 inches with increased ease to standing from flexed position  Short term goal 3: Patient to demonstrate knowledge of proper body mechanics to decreased low back straing. Short term goal 4: Patient to demonstrate increased core strength with ability to complete 15 reps of a conservative program in 15 minutes. Short term goal 5: Patient to demonstrate increased hip strength bilaterally to 4/5 without low back pain and tolerating MMT. Long term goals  Time Frame for Long term goals : 8 weeks  Long term goal 1: Oswestry 56% to no greater than 30%   Long term goal 2: Patient independent in home ex program with increased core strength, hip strength, and improved mobility with increased tolerance to standing and walking.      Rochel Ganser, [facility] [facility]

## 2019-06-12 ENCOUNTER — HOSPITAL ENCOUNTER (OUTPATIENT)
Dept: PHYSICAL THERAPY | Age: 54
Setting detail: THERAPIES SERIES
Discharge: HOME | End: 2019-06-12
Payer: COMMERCIAL

## 2019-06-12 PROCEDURE — 97110 THERAPEUTIC EXERCISES: CPT

## 2019-06-12 PROCEDURE — G0283 ELEC STIM OTHER THAN WOUND: HCPCS

## 2019-06-12 ASSESSMENT — PAIN DESCRIPTION - LOCATION: LOCATION: BACK

## 2019-06-12 ASSESSMENT — PAIN DESCRIPTION - ORIENTATION: ORIENTATION: RIGHT

## 2019-06-12 ASSESSMENT — PAIN SCALES - GENERAL: PAINLEVEL_OUTOF10: 8

## 2019-06-12 ASSESSMENT — PAIN DESCRIPTION - PAIN TYPE: TYPE: CHRONIC PAIN

## 2019-06-12 NOTE — PROGRESS NOTES
[place]     Time In: 3223  Time Out: 6021  Minutes: 34  Timed Code Treatment Minutes: 19 Minutes                Date: 2019  Patient Name: Cristobal Mac,  Gender:  female        CSN: [id number]   : 1965  (53 y.o.)       Referring Practitioner: Inés Lombardo CNP       Diagnosis: M53.3 (ICD-10-CM) - Sacrococcygeal disorders, not elsewhere classified  Treatment Diagnosis: lumbar pain, SI pain with right ilium forward rotation, difficulty tolerating prolonged standing and walking. Additional Pertinent Hx: comorbidities: history of nerve blocks to right hip, lumbar facets L45, L5S1, HTN, kyphoplasty at thoracic spine 18, left hip replacement 18. In pain management with Dr. Luis Mantilla. General:  PT Visit Information  Onset Date: 19  PT Insurance Information: [employer] 30 visits PT/OT/ST each per calendar year, aquatic yes, modalities yes, HP and CP are not covered. Total # of Visits Approved: 30  Total # of Visits to Date: 2  Plan of Care/Certification Expiration Date: 19  Progress Note Counter: 2/10 for PN               Subjective:  Family / Caregiver Present: No  Comments: Follow up as indicated/needed with Inés Lombardo CNP  2nd round of nerve burning on left side in 2019. Dr. Luis Mantilla. Other (Comment): Pain of right SI joint     Subjective: Patient reporting pain level 8/10 today and states that she is off her anti inflammatories. Pain:  Patient Currently in Pain: Yes  Pain Assessment: 0-10  Pain Level: 8  Pain Type: Chronic pain  Pain Location: Back  Pain Orientation: Right      Objective  Exercises  Exercise 3: Abdominal bracing: 10 x 5 seconds.  Pelvic tilt 10 x 5 seconds  Exercise 4: Bracing with: SAQ, hip adduction (ball), hip abduction( green) x 10 holding for 5 seconds each  Exercise 5: Estim (IFC) to low back in right side lying with MHP: 15 Short term goal 5: Patient to demonstrate increased hip strength bilaterally to 4/5 without low back pain and tolerating MMT. Long term goals  Time Frame for Long term goals : 8 weeks  Long term goal 1: Oswestry 56% to no greater than 30%   Long term goal 2: Patient independent in home ex program with increased core strength, hip strength, and improved mobility with increased tolerance to standing and walking.      Sean Pallas, [id number]

## 2019-06-13 ENCOUNTER — ANESTHESIA EVENT (OUTPATIENT)
Dept: OPERATING ROOM | Age: 54
End: 2019-06-13
Payer: COMMERCIAL

## 2019-06-13 ENCOUNTER — APPOINTMENT (OUTPATIENT)
Dept: GENERAL RADIOLOGY | Age: 54
End: 2019-06-13
Attending: PAIN MEDICINE
Payer: COMMERCIAL

## 2019-06-13 ENCOUNTER — ANESTHESIA (OUTPATIENT)
Dept: OPERATING ROOM | Age: 54
End: 2019-06-13
Payer: COMMERCIAL

## 2019-06-13 ENCOUNTER — HOSPITAL ENCOUNTER (OUTPATIENT)
Age: 54
Setting detail: OUTPATIENT SURGERY
Discharge: HOME | End: 2019-06-13
Attending: PAIN MEDICINE | Admitting: PAIN MEDICINE
Payer: COMMERCIAL

## 2019-06-13 VITALS
OXYGEN SATURATION: 85 % | SYSTOLIC BLOOD PRESSURE: 111 MMHG | RESPIRATION RATE: 20 BRPM | DIASTOLIC BLOOD PRESSURE: 53 MMHG

## 2019-06-13 VITALS
WEIGHT: 293 LBS | BODY MASS INDEX: 48.82 KG/M2 | RESPIRATION RATE: 16 BRPM | SYSTOLIC BLOOD PRESSURE: 102 MMHG | OXYGEN SATURATION: 94 % | HEART RATE: 86 BPM | DIASTOLIC BLOOD PRESSURE: 59 MMHG | HEIGHT: 65 IN | TEMPERATURE: 97.5 F

## 2019-06-13 LAB — GLUCOSE BLD-MCNC: 105 MG/DL (ref 70–108)

## 2019-06-13 PROCEDURE — 6360000002 HC RX W HCPCS: Performed by: PAIN MEDICINE

## 2019-06-13 PROCEDURE — 6360000002 HC RX W HCPCS: Performed by: NURSE ANESTHETIST, CERTIFIED REGISTERED

## 2019-06-13 PROCEDURE — 3600000057 HC PAIN LEVEL 4 ADDL 15 MIN: Performed by: PAIN MEDICINE

## 2019-06-13 PROCEDURE — 7100000011 HC PHASE II RECOVERY - ADDTL 15 MIN: Performed by: PAIN MEDICINE

## 2019-06-13 PROCEDURE — 3600000056 HC PAIN LEVEL 4 BASE: Performed by: PAIN MEDICINE

## 2019-06-13 PROCEDURE — 3700000001 HC ADD 15 MINUTES (ANESTHESIA): Performed by: PAIN MEDICINE

## 2019-06-13 PROCEDURE — 82948 REAGENT STRIP/BLOOD GLUCOSE: CPT

## 2019-06-13 PROCEDURE — 2500000003 HC RX 250 WO HCPCS: Performed by: PAIN MEDICINE

## 2019-06-13 PROCEDURE — 3700000000 HC ANESTHESIA ATTENDED CARE: Performed by: PAIN MEDICINE

## 2019-06-13 PROCEDURE — 64633 DESTROY CERV/THOR FACET JNT: CPT | Performed by: PAIN MEDICINE

## 2019-06-13 PROCEDURE — 7100000010 HC PHASE II RECOVERY - FIRST 15 MIN: Performed by: PAIN MEDICINE

## 2019-06-13 PROCEDURE — 64634 DESTROY C/TH FACET JNT ADDL: CPT | Performed by: PAIN MEDICINE

## 2019-06-13 PROCEDURE — 2500000003 HC RX 250 WO HCPCS: Performed by: NURSE ANESTHETIST, CERTIFIED REGISTERED

## 2019-06-13 PROCEDURE — 3209999900 FLUORO FOR SURGICAL PROCEDURES

## 2019-06-13 PROCEDURE — 2709999900 HC NON-CHARGEABLE SUPPLY: Performed by: PAIN MEDICINE

## 2019-06-13 RX ORDER — LIDOCAINE HYDROCHLORIDE 10 MG/ML
INJECTION, SOLUTION INFILTRATION; PERINEURAL PRN
Status: DISCONTINUED | OUTPATIENT
Start: 2019-06-13 | End: 2019-06-13 | Stop reason: SDUPTHER

## 2019-06-13 RX ORDER — ROPIVACAINE HYDROCHLORIDE 2 MG/ML
INJECTION, SOLUTION EPIDURAL; INFILTRATION; PERINEURAL PRN
Status: DISCONTINUED | OUTPATIENT
Start: 2019-06-13 | End: 2019-06-13 | Stop reason: ALTCHOICE

## 2019-06-13 RX ORDER — FENTANYL CITRATE 50 UG/ML
INJECTION, SOLUTION INTRAMUSCULAR; INTRAVENOUS PRN
Status: DISCONTINUED | OUTPATIENT
Start: 2019-06-13 | End: 2019-06-13 | Stop reason: SDUPTHER

## 2019-06-13 RX ORDER — LIDOCAINE HYDROCHLORIDE 10 MG/ML
INJECTION, SOLUTION EPIDURAL; INFILTRATION; INTRACAUDAL; PERINEURAL PRN
Status: DISCONTINUED | OUTPATIENT
Start: 2019-06-13 | End: 2019-06-13 | Stop reason: ALTCHOICE

## 2019-06-13 RX ORDER — METHYLPREDNISOLONE ACETATE 80 MG/ML
INJECTION, SUSPENSION INTRA-ARTICULAR; INTRALESIONAL; INTRAMUSCULAR; SOFT TISSUE PRN
Status: DISCONTINUED | OUTPATIENT
Start: 2019-06-13 | End: 2019-06-13 | Stop reason: ALTCHOICE

## 2019-06-13 RX ORDER — PROPOFOL 10 MG/ML
INJECTION, EMULSION INTRAVENOUS PRN
Status: DISCONTINUED | OUTPATIENT
Start: 2019-06-13 | End: 2019-06-13 | Stop reason: SDUPTHER

## 2019-06-13 RX ADMIN — FENTANYL CITRATE 50 MCG: 50 INJECTION INTRAMUSCULAR; INTRAVENOUS at 12:19

## 2019-06-13 RX ADMIN — PROPOFOL 50 MG: 10 INJECTION, EMULSION INTRAVENOUS at 12:22

## 2019-06-13 RX ADMIN — LIDOCAINE HYDROCHLORIDE 20 MG: 10 INJECTION, SOLUTION INFILTRATION; PERINEURAL at 12:22

## 2019-06-13 RX ADMIN — FENTANYL CITRATE 50 MCG: 50 INJECTION INTRAMUSCULAR; INTRAVENOUS at 12:14

## 2019-06-13 RX ADMIN — PROPOFOL 30 MG: 10 INJECTION, EMULSION INTRAVENOUS at 12:23

## 2019-06-13 ASSESSMENT — PULMONARY FUNCTION TESTS
PIF_VALUE: 0

## 2019-06-13 ASSESSMENT — PAIN SCALES - GENERAL: PAINLEVEL_OUTOF10: 0

## 2019-06-13 ASSESSMENT — PAIN - FUNCTIONAL ASSESSMENT: PAIN_FUNCTIONAL_ASSESSMENT: 0-10

## 2019-06-13 NOTE — ANESTHESIA POSTPROCEDURE EVALUATION
Department of Anesthesiology  Postprocedure Note    Patient: Mariluz Nolasco  MRN: [id number]  YOB: 1965  Date of evaluation: 6/13/2019  Time:  1:43 PM     Procedure Summary     Date:  06/13/19 Room / Location:  [facility] OR 03 / [address] [facility]    Anesthesia Start:  1210 Anesthesia Stop:  1226    Procedure:  T-facet RFA @ T7-8, 8-9, 9-10 LEFT (Left ) Diagnosis:  (Thoracic spondylosis)    Surgeon:  Artur Del Castillo MD Responsible Provider:  Terri Salazar MD    Anesthesia Type:  MAC ASA Status:  3          Anesthesia Type: MAC    Jose Phase I:      Jose Phase II: Jose Score: 9    Last vitals: Reviewed and per EMR flowsheets.        Anesthesia Post Evaluation    Patient location during evaluation: PACU  Patient participation: complete - patient participated  Level of consciousness: awake  Nausea & Vomiting: no nausea  Complications: no  Cardiovascular status: hemodynamically stable  Respiratory status: acceptable

## 2019-06-13 NOTE — INTERVAL H&P NOTE
H&P Update    Patient's History and Physical from May 20, 2019 was reviewed. Patient examined. There has been no change.     Carol Oar

## 2019-06-13 NOTE — PROGRESS NOTES
1227-Patient to Phase II via cart. Report received from [facility]. Patient drowsy but responds to command. Vitals obtained and stable. Respirations even and unlabored on room air. Patient denies pain and nausea. 1230-Patient provided with snack and drink. Patient provided with call light use. Patient denies needs. 1245-Patient resting with family at bedside. IV removed with no complications. Patient getting dressed with assistance from family. 1300-Patient discharged in stable condition. All belongings given to patient. Patient ambulated to car with assistance from RN. Patient tolerated well.

## 2019-06-13 NOTE — ANESTHESIA PRE PROCEDURE
10/2/15   Historical Provider, MD   spironolactone (ALDACTONE) 25 MG tablet Take 1 tablet by mouth daily. 4/19/13   Mansi Jordan MD       Current medications:    No current outpatient medications on file. No current facility-administered medications for this visit. Allergies:     Allergies   Allergen Reactions    Other Other (See Comments)     Artificial sweeteners - migraines    Sulfa Antibiotics Hives    Sulfur        Problem List:    Patient Active Problem List   Diagnosis Code    Occult blood in stools R19.5    Second degree hemorrhoids K64.1    ABDI on CPAP G47.33, Z99.89    Trochanteric bursitis of right hip M70.61    Primary osteoarthritis of right hip M16.11    Pathologic thoracic fracture M84.48XA    Localized osteoporosis with current pathological fracture M80.80XA    Spondylosis of thoracic region without myelopathy or radiculopathy M47.814    Morbid obesity with BMI of 50.0-59.9, adult ([facility] [address] E66.01, Z68.43       Past Medical History:        Diagnosis Date    Allergic rhinitis     Depression     Hypertension     ABDI on CPAP     Osteoarthritis     Vitamin D deficiency        Past Surgical History:        Procedure Laterality Date    ARTHROCENTESIS Right 10/16/2017    RIGHT HIP LARGE JOINT INJECTION performed by Prince Pugh MD at [address] [facility] ARTHROCENTESIS Left 12/5/2017    LEFT HIP LARGE JOINT INJECTION performed by Prince Pugh MD at [address] [facility]  2016   [facility] DENTAL SURGERY  2002     right front upper implant    HAND SURGERY Right 05/15/2015    index finger cleaned out D/T infected cat bite    HEMORRHOID SURGERY  2016    series of 3 bandings for internal hemorrhoids, Dr. Woods Other Right 4/16/2019    T-facet RFA @ T7-8, 8-9, 9-10 performed by Prince Pugh MD at [address] [place] [address], [address] [place] [address] LUMB FACET LEVEL 1 BILATERAL Bilateral 1/4/2019    LUMBAR FACET bilateral T-facet MBB Results   Component Value Date    WBC 7.9 11/08/2018    RBC 4.88 11/08/2018    HGB 13.6 11/08/2018    HCT 40.8 11/08/2018    MCV 83.6 11/08/2018    RDW 14.0 07/11/2017     11/08/2018       CMP:   Lab Results   Component Value Date     11/08/2018    K 4.3 11/08/2018     11/08/2018    CO2 24 11/08/2018    BUN 15 11/08/2018    CREATININE 0.8 11/08/2018    LABGLOM 75 11/08/2018    GLUCOSE 104 11/08/2018    PROT 7.2 07/11/2017    CALCIUM 10.0 11/08/2018    BILITOT 0.2 07/11/2017    ALKPHOS 97 07/11/2017    AST 21 07/11/2017    ALT 29 07/11/2017       POC Tests:   Recent Labs     06/13/19  1119   POCGLU 105       Coags: No results found for: PROTIME, INR, APTT    HCG (If Applicable):   Lab Results   Component Value Date    PREGSERUM NEGATIVE 04/19/2013        ABGs: No results found for: PHART, PO2ART, PQB8MJC, MND4RAD, BEART, K9YEGATU     Type & Screen (If Applicable):  No results found for: [facility]    Anesthesia Evaluation  Patient summary reviewed and Nursing notes reviewed no history of anesthetic complications:   Airway: Mallampati: III  TM distance: >3 FB   Neck ROM: limited  Mouth opening: > = 3 FB Dental:          Pulmonary:   (+) sleep apnea: on CPAP,  decreased breath sounds,                             Cardiovascular:  Exercise tolerance: poor (<4 METS),   (+) hypertension:,                   Neuro/Psych:   (+) psychiatric history:            GI/Hepatic/Renal:   (+) morbid obesity          Endo/Other: Negative Endo/Other ROS             Pt had no PAT visit       Abdominal:   (+) obese,     Abdomen: soft. Vascular: negative vascular ROS. Anesthesia Plan      MAC     ASA 3       Induction: intravenous. Anesthetic plan and risks discussed with patient. Plan discussed with CRNA.                   Sheldon Chavez MD   6/13/2019

## 2019-06-13 NOTE — OP NOTE
Pre-Procedure Note    Patient Name: Kandi Willingham   YOB: 1965  Medical Record Number: [id number]  Date: 6/13/19    Indication:  Thoracic pain  Consent: On file. Vital Signs:   Vitals:    06/13/19 1115   BP: 132/60   Pulse: 89   Resp: 16   Temp: 96.9 °F (36.1 °C)   SpO2: 95%       Past Medical History:   has a past medical history of Allergic rhinitis, Depression, Hypertension, ABDI on CPAP, Osteoarthritis, and Vitamin D deficiency. Past Surgical History:   has a past surgical history that includes Dental surgery (2002); [place] tooth extraction; Hand surgery (Right, 05/15/2015); other surgical history (4/11/16); Colonoscopy (2016); other surgical history (06/13/2016); Hemorrhoid surgery (2016); Tonsillectomy; other surgical history (Right, 10/16/2017); arthrocentesis (Right, 10/16/2017); Nerve Surgery (Left, 12/05/2017); arthrocentesis (Left, 12/5/2017); pr arthrocentesis aspir&/inj major jt/bursa w/o us (Right, 9/17/2018); pr office/outpt visit,procedure only (N/A, 11/9/2018); Nerve Block Lumb Facet Level 1 Bilateral (Bilateral, 1/4/2019); Nerve Block Lumb Facet Level 1 Bilateral (Bilateral, 2/25/2019); and Lumbar spine surgery (Right, 4/16/2019). Pre-Sedation Documentation and Exam:   Vital signs have been reviewed (see flow sheet for vitals). Sedation/ Anesthesia Plan:   MAC    Patient is an appropriate candidate for plan of sedation: yes    Preoperative Diagnosis:  T-spondylosis    Post-Op Dx: as above      Procedure Performed:  :Radiofrequency ablation of median branches at the levels of  T7-8,T8-9 and T9-10 left under fluoroscopic guidance     Indication for the Procedure: The patient has ahistory of chronic low back pain that is not responding well to the conservative treatment. Patient's pain is mostly axial in nature. Pain is interfering with the activities of daily living. Physical examination revealed facet tenderness and facet loading is positive.   Patient had undergone lumbar facet joint injections with pain relief that lasted for only a short period of time and had greater than 70% pain relief with confirmatory median branch blocks. Hence we decided to do radiofrequency abalation of median branches for long term pain releif. The procedure and risks  were discussed with the patient and an informed consent was obtained. Procedure:  Left side   A meaningful communication was kept up with the patient throughout the procedure. The patient is placed in prone position and skin over the back was prepped and draped in sterile manner. Then using fluoroscopy the junction of the transverse process of the vertebra with the superior process of the facet joint was observed and the view was optimized. The skin and deep tissues posterior were infiltrated with 6 ml of  1% xylocaine  The RF canula with the 10 mm active tip was introduced through the skin wheal under fluoroscopy guidance such that the tip of the needle lies in the groove of the transverse process with the superior processes of the facet joint. Then a lateral view of the lumbar spine was obtained to make sure the tip of needle is not in the neural foramen. Then electric impedence was checked to make sure it is acceptable. Then a sensory stimulus was applied at 50 Hz up to 1 volt and concordant pain symptoms were reproduced. Then a motor stimulus was applied at 2 Hz up to 2 volts and no motor stimulation was seen in lower extremities. Some multifidus stimulus was seen. Then after negative aspiration a mixture of depomedrol 80 mg  and 0.2%  Ropivacaine 1.5 cc  was injected through the needle. Then a initial lesion was done at 80 degrees centigrade for 90 seconds. Patient's vital signs and neurological status remained stable throughout the procedure and post procedural period. The patient tolerated the procedure well and was discharged home in stable condition.     Electronically signed by Alin Forrest MD on 6/13/19 at 12:12 PM

## 2019-06-14 ENCOUNTER — APPOINTMENT (OUTPATIENT)
Dept: PHYSICAL THERAPY | Age: 54
End: 2019-06-14
Payer: COMMERCIAL

## 2019-06-17 ENCOUNTER — OFFICE VISIT (OUTPATIENT)
Dept: PHYSICAL MEDICINE AND REHAB | Age: 54
End: 2019-06-17
Payer: COMMERCIAL

## 2019-06-17 VITALS
WEIGHT: 293 LBS | HEIGHT: 65 IN | BODY MASS INDEX: 48.82 KG/M2 | SYSTOLIC BLOOD PRESSURE: 134 MMHG | HEART RATE: 68 BPM | DIASTOLIC BLOOD PRESSURE: 72 MMHG

## 2019-06-17 DIAGNOSIS — G89.4 CHRONIC PAIN SYNDROME: ICD-10-CM

## 2019-06-17 DIAGNOSIS — M54.9 MID BACK PAIN: ICD-10-CM

## 2019-06-17 DIAGNOSIS — M17.11 PRIMARY OSTEOARTHRITIS OF RIGHT KNEE: Primary | ICD-10-CM

## 2019-06-17 DIAGNOSIS — M70.62 TROCHANTERIC BURSITIS OF LEFT HIP: ICD-10-CM

## 2019-06-17 DIAGNOSIS — M47.816 SPONDYLOSIS OF LUMBAR REGION WITHOUT MYELOPATHY OR RADICULOPATHY: ICD-10-CM

## 2019-06-17 DIAGNOSIS — M47.814 SPONDYLOSIS OF THORACIC REGION WITHOUT MYELOPATHY OR RADICULOPATHY: ICD-10-CM

## 2019-06-17 DIAGNOSIS — M46.1 SACROILIAC INFLAMMATION (HCC): ICD-10-CM

## 2019-06-17 PROCEDURE — 3017F COLORECTAL CA SCREEN DOC REV: CPT | Performed by: NURSE PRACTITIONER

## 2019-06-17 PROCEDURE — G8427 DOCREV CUR MEDS BY ELIG CLIN: HCPCS | Performed by: NURSE PRACTITIONER

## 2019-06-17 PROCEDURE — 99214 OFFICE O/P EST MOD 30 MIN: CPT | Performed by: NURSE PRACTITIONER

## 2019-06-17 PROCEDURE — G8417 CALC BMI ABV UP PARAM F/U: HCPCS | Performed by: NURSE PRACTITIONER

## 2019-06-17 PROCEDURE — 1036F TOBACCO NON-USER: CPT | Performed by: NURSE PRACTITIONER

## 2019-06-17 RX ORDER — HYDROCODONE BITARTRATE AND ACETAMINOPHEN 5; 325 MG/1; MG/1
1 TABLET ORAL 2 TIMES DAILY
Qty: 60 TABLET | Refills: 0 | Status: SHIPPED | OUTPATIENT
Start: 2019-06-17 | End: 2019-07-17

## 2019-06-17 ASSESSMENT — ENCOUNTER SYMPTOMS: BACK PAIN: 1

## 2019-06-17 NOTE — PROGRESS NOTES
medications or at its best is 5/10. Last dose of Norco was 2 days ago ran out   Drug screen reviewed from 5/1/2019 and was appropriate  Pill count was completed today and was appropriate  Patient does not have naloxone available at home. Patient has not required use of naloxone at home since last office visit. L-xray:  1. Moderate degenerative facet arthropathy L4-5 and L5-S1 levels bilaterally. Moderate disc space narrowing and degenerative disc disease L5-S1. Moderate degenerative vertebral body spondylosis scattered in the lumbar and lower thoracic spine. 2. Old fracture T12 with prior vertebroplasty. Left hip prosthesis. Sacroiliac joints unremarkable. Right knee x-ray:   FINDINGS:   There is narrowing at the medial femorotibial compartment and patellofemoral compartment. There is no acute fracture or dislocation.       There is spurring at the medial aspect of the patella.       There is no significant joint effusion.       No soft tissue abnormality.           Impression   Degenerative changes with no acute fracture or dislocation involving the right knee. The patientis allergic to other; sulfa antibiotics; and sulfur. Past Medical History  Young Guess  has a past medical history of Allergic rhinitis, Depression, Hypertension, ABDI on CPAP, Osteoarthritis, and Vitamin D deficiency. Past Surgical History  The patient  has a past surgical history that includes Dental surgery (2002); [place] tooth extraction; Hand surgery (Right, 05/15/2015); other surgical history (4/11/16); Colonoscopy (2016); other surgical history (06/13/2016); Hemorrhoid surgery (2016); Tonsillectomy; other surgical history (Right, 10/16/2017); arthrocentesis (Right, 10/16/2017); Nerve Surgery (Left, 12/05/2017); arthrocentesis (Left, 12/5/2017); pr arthrocentesis aspir&/inj major jt/bursa w/o us (Right, 9/17/2018); pr office/outpt visit,procedure only (N/A, 11/9/2018);  Nerve Block Lumb Facet Level 1 Bilateral (Bilateral, 1/4/2019); Nerve Block Lumb Facet Level 1 Bilateral (Bilateral, 2/25/2019); Lumbar spine surgery (Right, 4/16/2019); and Lumbar spine surgery (Left, 6/13/2019). Family History  This patient's family history includes Heart Disease in her mother; Substance Abuse in her sister. Social History  Theodora Up  reports that she quit smoking about 8 years ago. Her smoking use included cigarettes and e-cigarettes. She started smoking about 14 years ago. She has a 7.00 pack-year smoking history. She has never used smokeless tobacco. She reports that she drinks alcohol. She reports that she does not use drugs. Medications    Current Outpatient Medications:     HYDROcodone-acetaminophen (NORCO) 5-325 MG per tablet, Take 1 tablet by mouth 2 times daily for 30 days. , Disp: 60 tablet, Rfl: 0    meloxicam (MOBIC) 15 MG tablet, Take 1 tablet by mouth daily, Disp: 30 tablet, Rfl: 2    HYDROcodone-acetaminophen (NORCO) 5-325 MG per tablet, Take 1 tablet by mouth every 6 hours as needed for Pain. ., Disp: , Rfl:     loratadine (CLARITIN) 10 MG tablet, Take 10 mg by mouth daily, Disp: , Rfl:     Elastic Bandages & Supports (B & B SACROILIAC BELT) MISC, 1 Device by Does not apply route as needed (pain), Disp: 1 each, Rfl: 0    Cholecalciferol (VITAMIN D3) 5000 units CAPS, Take 1 capsule by mouth daily, Disp: , Rfl:     Loratadine-Pseudoephedrine (PX ALLERGY RELIEF D, LORATID, PO), Take 1 tablet by mouth daily, Disp: , Rfl:     Omega-3 Fatty Acids (FISH OIL) 1200 MG CAPS, , Disp: , Rfl:     calcium carbonate (OSCAL) 500 MG TABS tablet, Take 500 mg by mouth 2 times daily, Disp: , Rfl:     Turmeric 500 MG CAPS, Take by mouth daily, Disp: , Rfl:     cyclobenzaprine (FLEXERIL) 10 MG tablet, Take 1 tablet by mouth 3 times daily as needed for Muscle spasms WARNING: This medication may make your drowsy.  Do not operate heavy machinery or motor vehicles during use., Disp: 15 tablet, Rfl: 0    lisinopril (PRINIVIL;ZESTRIL) 10 MG tablet, Take 10 mg by mouth nightly , Disp: , Rfl:     citalopram (CELEXA) 20 MG tablet, Take 20 mg by mouth daily , Disp: , Rfl:     spironolactone (ALDACTONE) 25 MG tablet, Take 1 tablet by mouth daily. , Disp: 15 tablet, Rfl: 0    pantoprazole (PROTONIX) 20 MG tablet, Take 2 tablets by mouth daily for 30 doses, Disp: 30 tablet, Rfl: 0    Subjective:      Review of Systems   Musculoskeletal: Positive for arthralgias, back pain, gait problem, joint swelling and myalgias. Neurological: Positive for weakness. Negative for numbness. Objective:     Vitals:    06/17/19 1010   BP: 134/72   Site: Right Lower Arm   Position: Sitting   Cuff Size: Medium Adult   Pulse: 68   Weight: (!) 334 lb (151.5 kg)   Height: 5' 5\" (1.651 m)       Physical Exam   Constitutional: She is oriented to person, place, and time. She appears well-developed and well-nourished. No distress. HENT:   Head: Normocephalic and atraumatic. Right Ear: External ear normal.   Left Ear: External ear normal.   Nose: Nose normal.   Mouth/Throat: Oropharynx is clear and moist. No oropharyngeal exudate. Eyes: Pupils are equal, round, and reactive to light. Conjunctivae and EOM are normal. Right eye exhibits no discharge. Left eye exhibits no discharge. No scleral icterus. Neck: Normal range of motion and full passive range of motion without pain. Neck supple. No muscular tenderness present. No neck rigidity. No tracheal deviation, no edema, no erythema and normal range of motion present. No thyromegaly present. Cardiovascular: Normal rate, regular rhythm, normal heart sounds and intact distal pulses. Exam reveals no gallop and no friction rub. No murmur heard. Pulmonary/Chest: Effort normal and breath sounds normal. No respiratory distress. She has no wheezes. She has no rales. She exhibits no tenderness. Abdominal: Soft. Bowel sounds are normal. She exhibits no distension. There is no tenderness. There is no rebound and no guarding. Musculoskeletal: She exhibits tenderness. She exhibits no edema. Right hip: She exhibits tenderness. Left hip: She exhibits decreased range of motion, decreased strength and bony tenderness. Right knee: She exhibits decreased range of motion and bony tenderness. Tenderness found. Cervical back: She exhibits tenderness. Thoracic back: She exhibits decreased range of motion, bony tenderness and pain. Lumbar back: She exhibits decreased range of motion, tenderness, pain and spasm. Back:         Legs:  Neurological: She is alert and oriented to person, place, and time. She has normal strength. She is not disoriented. She displays no atrophy. No cranial nerve deficit or sensory deficit. She exhibits normal muscle tone. She displays a negative Romberg sign. Gait abnormal. Coordination normal. She displays no Babinski's sign on the right side. SLR neg. Skin: Skin is warm and dry. No rash noted. She is not diaphoretic. No erythema. No pallor. Psychiatric: She has a normal mood and affect. Her behavior is normal. Judgment and thought content normal. Her mood appears not anxious. Her affect is not angry, not blunt, not labile and not inappropriate. Her speech is not rapid and/or pressured, not delayed, not tangential and not slurred. She is not agitated, not aggressive, not hyperactive, not slowed, not withdrawn, not actively hallucinating and not combative. Thought content is not paranoid and not delusional. Cognition and memory are not impaired. She does not express impulsivity or inappropriate judgment. She does not exhibit a depressed mood. She expresses no homicidal and no suicidal ideation. She expresses no suicidal plans and no homicidal plans. She is communicative. She exhibits normal recent memory and normal remote memory. She is attentive. Nursing note and vitals reviewed.     SCOOTER test: + bilaterally   Yeomans test: + bilaterally   Gaenslen test: + bilaterally      Assessment:     1. Primary osteoarthritis of right knee    2. Spondylosis of thoracic region without myelopathy or radiculopathy    3. Spondylosis of lumbar region without myelopathy or radiculopathy    4. Mid back pain    5. Sacroiliac inflammation ([address]    6. Trochanteric bursitis of left hip    7. Chronic pain syndrome            Plan:      · OARRS reviewed. Current MED: 10.00  · Patient was offered naloxone for home. Refused. · Discussed long term side effects of medications, tolerance, dependency and addiction. · Previous UDS reviewed  · UDS preformed today for compliance. · Patient told can not receive any pain medications from any other source. · No evidence of abuse, diversion or aberrant behavior.  Medications and/or procedures to improve function and quality of life- patient understanding with this and that may not be pain free   Discussed with patient about safe storage of medications at home   Discussed possible weaning of medication dosing dependent on treatment/procedure results.  Discussed with patient about risks with procedure including infection, reaction to medication, increased pain, or bleeding.  Procedure notes reviewed in detail.  Receiving 95% relief of mid back pain at site of T-facet RFA and continues to receive that relief   Reviewed right knee xray and lumbar xray    Plan right knee steroid injection. Procedure and risks discussed in detail with patient.  Discussed bilateral L-facet MBB in future   · Medications remain effective, patient is compliant. Continue Norco 5/325 BID prn- ordered refill      Meds. Prescribed:   Orders Placed This Encounter   Medications    HYDROcodone-acetaminophen (NORCO) 5-325 MG per tablet     Sig: Take 1 tablet by mouth 2 times daily for 30 days. Dispense:  60 tablet     Refill:  0     Reduce doses taken as pain becomes manageable       Return for right knee steroid injection. , follow up after procedure. Electronically signed by TAMIE Villa CNP on6/17/2019 at 10:27 AM

## 2019-06-19 ENCOUNTER — HOSPITAL ENCOUNTER (OUTPATIENT)
Dept: PHYSICAL THERAPY | Age: 54
Setting detail: THERAPIES SERIES
Discharge: HOME | End: 2019-06-19
Payer: COMMERCIAL

## 2019-06-19 PROCEDURE — 97032 APPL MODALITY 1+ESTIM EA 15: CPT

## 2019-06-19 PROCEDURE — 97110 THERAPEUTIC EXERCISES: CPT

## 2019-06-19 ASSESSMENT — PAIN DESCRIPTION - PAIN TYPE: TYPE: CHRONIC PAIN

## 2019-06-19 ASSESSMENT — PAIN DESCRIPTION - ORIENTATION: ORIENTATION: RIGHT

## 2019-06-19 ASSESSMENT — PAIN DESCRIPTION - LOCATION: LOCATION: BACK

## 2019-06-19 ASSESSMENT — PAIN SCALES - GENERAL: PAINLEVEL_OUTOF10: 7

## 2019-06-19 NOTE — PROGRESS NOTES
[place]     Time In: 08  Time Out: 1419  Minutes: 44  Timed Code Treatment Minutes:44 Minutes                Date: 2019  Patient Name: Keisha Felipe,  Gender:  female        CSN: [id number]   : 1965  (53 y.o.)       Referring Practitioner: Ila Ackerman CNP       Diagnosis: M53.3 (ICD-10-CM) - Sacrococcygeal disorders, not elsewhere classified  Treatment Diagnosis: lumbar pain, SI pain with right ilium forward rotation, difficulty tolerating prolonged standing and walking. Additional Pertinent Hx: comorbidities: history of nerve blocks to right hip, lumbar facets L45, L5S1, HTN, kyphoplasty at thoracic spine 18, left hip replacement 18. In pain management with Dr. Anatoliy Ulloa. General:  PT Visit Information  Onset Date: 19  PT Insurance Information: [employer] 30 visits PT/OT/ST each per calendar year, aquatic yes, modalities yes, HP and CP are not covered. Total # of Visits Approved: 30  Total # of Visits to Date: 3  Plan of Care/Certification Expiration Date: 19  Progress Note Counter: 3/10 for PN               Subjective:  Chart Reviewed: Yes  Patient assessed for rehabilitation services?: Yes  Response To Previous Treatment: Patient with no complaints from previous session. Family / Caregiver Present: No  Comments: Follow up as indicated/needed with Ila Ackerman CNP  2nd round of nerve burning on left side in 2019. Dr. Anatoliy Ulloa. Other (Comment): Pain of right SI joint     Subjective: Pt reports pain 7/10. Pt very talkative and mentioning \"fell about a month a go. It wrecked my SI joint. \" Pt states:\" I just started. I haven't had much therapy, so I haven't had a lot of relief. \"     Pain:  Patient Currently in Pain: Yes  Pain Level: 7  Pain Type: Chronic pain  Pain Location: Back  Pain Orientation: Right      Objective for right anterior ilium rotation. Muscle energy Technique for correction. Modalities: HP with interferential e stim, US, core strengthening, hip strengthening. body mechanics. Current Treatment Recommendations: Strengthening, ROM, Balance Training, Neuromuscular Re-education, Home Exercise Program, Manual Therapy - Joint Manipulation, Manual Therapy - Soft Tissue Mobilization, Modalities, Aquatics, Pain Management    Goals:  Patient goals : To be able to move with less back pain. Less discomfort at left SI region, No right leg symptoms with pain and numbness to mid foot. Short term goals  Time Frame for Short term goals: 4 weeks  Short term goal 1: Patient to report of decreased pain level from 7/10 at right lateral lumbar and right SI region with standing and walking to 3/10  Short term goal 2: Patient to demonstrate increased lumbar ROM through painfree ROM with flexion 10 inches to 5 inches with increased ease to standing from flexed position  Short term goal 3: Patient to demonstrate knowledge of proper body mechanics to decreased low back straing. Short term goal 4: Patient to demonstrate increased core strength with ability to complete 15 reps of a conservative program in 15 minutes. Short term goal 5: Patient to demonstrate increased hip strength bilaterally to 4/5 without low back pain and tolerating MMT. Long term goals  Time Frame for Long term goals : 8 weeks  Long term goal 1: Oswestry 56% to no greater than 30%   Long term goal 2: Patient independent in home ex program with increased core strength, hip strength, and improved mobility with increased tolerance to standing and walking.      Praful Emanuel  [id number]

## 2019-06-21 ENCOUNTER — APPOINTMENT (OUTPATIENT)
Dept: PHYSICAL THERAPY | Age: 54
End: 2019-06-21
Payer: COMMERCIAL

## 2019-06-24 ENCOUNTER — HOSPITAL ENCOUNTER (OUTPATIENT)
Dept: PHYSICAL THERAPY | Age: 54
Setting detail: THERAPIES SERIES
End: 2019-06-24
Payer: COMMERCIAL

## 2019-06-27 ENCOUNTER — HOSPITAL ENCOUNTER (OUTPATIENT)
Dept: PHYSICAL THERAPY | Age: 54
Setting detail: THERAPIES SERIES
Discharge: HOME | End: 2019-06-27
Payer: COMMERCIAL

## 2019-06-27 PROCEDURE — 97110 THERAPEUTIC EXERCISES: CPT

## 2019-06-27 PROCEDURE — 97035 APP MDLTY 1+ULTRASOUND EA 15: CPT

## 2019-06-27 ASSESSMENT — PAIN SCALES - GENERAL: PAINLEVEL_OUTOF10: 8

## 2019-06-27 NOTE — PROGRESS NOTES
[place]     Time In: 1438  Time Out: 8114  Minutes: 45  Timed Code Treatment Minutes: 45 Minutes                Date: 2019  Patient Name: Adrienne Wright,  Gender:  female        CSN: [id number]   : 1965  (53 y.o.)       Referring Practitioner: Ruy Newberry CNP       Diagnosis: M53.3 (ICD-10-CM) - Sacrococcygeal disorders, not elsewhere classified  Treatment Diagnosis: lumbar pain, SI pain with right ilium forward rotation, difficulty tolerating prolonged standing and walking. Additional Pertinent Hx: comorbidities: history of nerve blocks to right hip, lumbar facets L45, L5S1, HTN, kyphoplasty at thoracic spine 18, left hip replacement 18. In pain management with Dr. Felix Walsh. General:  PT Visit Information  Onset Date: 19  PT Insurance Information: [employer] 30 visits PT/OT/ST each per calendar year, aquatic yes, modalities yes, HP and CP are not covered. Total # of Visits Approved: 30  Total # of Visits to Date: 4  Plan of Care/Certification Expiration Date: 19  Progress Note Counter: 4/10 for PN               Subjective:  Chart Reviewed: Yes  Patient assessed for rehabilitation services?: Yes  Response To Previous Treatment: Patient with no complaints from previous session. Family / Caregiver Present: No  Comments: Follow up as indicated/needed with Ruy Newberry CNP  2nd round of nerve burning on left side in 2019. Dr. Felix Walsh. Other (Comment): Pain of right SI joint     Subjective: Patient states slept on couch last night. Took Norco and Flexeril 1 hour ago. Patient reports that pain remains at right low back and SI. Not getting much relief.      Pain:  Patient Currently in Pain: Yes  Pain Assessment: 0-10  Pain Level: 8      Objective    Exercises  Exercise 1: Slight right anterior rotation ilium: MET correction in hooklying with right hip with pain and numbness to mid foot. Short term goals  Time Frame for Short term goals: 4 weeks  Short term goal 1: Patient to report of decreased pain level from 7/10 at right lateral lumbar and right SI region with standing and walking to 3/10  Short term goal 2: Patient to demonstrate increased lumbar ROM through painfree ROM with flexion 10 inches to 5 inches with increased ease to standing from flexed position  Short term goal 3: Patient to demonstrate knowledge of proper body mechanics to decreased low back straing. Short term goal 4: Patient to demonstrate increased core strength with ability to complete 15 reps of a conservative program in 15 minutes. Short term goal 5: Patient to demonstrate increased hip strength bilaterally to 4/5 without low back pain and tolerating MMT. Long term goals  Time Frame for Long term goals : 8 weeks  Long term goal 1: Oswestry 56% to no greater than 30%   Long term goal 2: Patient independent in home ex program with increased core strength, hip strength, and improved mobility with increased tolerance to standing and walking.      Jolan Rack, [address]

## 2019-07-01 ENCOUNTER — HOSPITAL ENCOUNTER (OUTPATIENT)
Dept: PHYSICAL THERAPY | Age: 54
Setting detail: THERAPIES SERIES
Discharge: HOME | End: 2019-07-01
Payer: COMMERCIAL

## 2019-07-01 PROCEDURE — 97110 THERAPEUTIC EXERCISES: CPT

## 2019-07-01 PROCEDURE — 97035 APP MDLTY 1+ULTRASOUND EA 15: CPT

## 2019-07-01 ASSESSMENT — PAIN SCALES - GENERAL: PAINLEVEL_OUTOF10: 6

## 2019-07-03 ENCOUNTER — HOSPITAL ENCOUNTER (OUTPATIENT)
Dept: PHYSICAL THERAPY | Age: 54
Setting detail: THERAPIES SERIES
Discharge: HOME | End: 2019-07-03
Payer: COMMERCIAL

## 2019-07-03 PROCEDURE — 97035 APP MDLTY 1+ULTRASOUND EA 15: CPT

## 2019-07-03 PROCEDURE — 97110 THERAPEUTIC EXERCISES: CPT

## 2019-07-03 ASSESSMENT — PAIN SCALES - GENERAL: PAINLEVEL_OUTOF10: 5

## 2019-07-03 ASSESSMENT — PAIN DESCRIPTION - LOCATION: LOCATION: BACK

## 2019-07-03 ASSESSMENT — PAIN DESCRIPTION - ORIENTATION: ORIENTATION: RIGHT

## 2019-07-03 ASSESSMENT — PAIN DESCRIPTION - PAIN TYPE: TYPE: CHRONIC PAIN

## 2019-07-03 NOTE — PROGRESS NOTES
with interferential e stim, US, core strengthening, hip strengthening. body mechanics. Current Treatment Recommendations: Strengthening, ROM, Balance Training, Neuromuscular Re-education, Home Exercise Program, Manual Therapy - Joint Manipulation, Manual Therapy - Soft Tissue Mobilization, Modalities, Aquatics, Pain Management    Goals:  Patient goals : To be able to move with less back pain. Less discomfort at left SI region, No right leg symptoms with pain and numbness to mid foot. GOALS BEING MET Noting no numbness in RLE with driving and no increased back pain with driving. Back pain is less overall and walking better. Bending and lifting continue to aggravate back. Short term goals  Time Frame for Short term goals: 4 weeks 6 sessions 7/3/19. Short term goal 1: Patient to report of decreased pain level from 7/10 at right lateral lumbar and right SI region with standing and walking to 3/10 GOAL NOT MET Average pain level  5/10 now. Doing better. Less right low back and SI pain. Short term goal 2: Patient to demonstrate increased lumbar ROM through painfree ROM with flexion 10 inches to 5 inches with increased ease to standing from flexed position  GOAL NOT MET But did improve to 7 inches fingertips from floor. Short term goal 3: Patient to demonstrate knowledge of proper body mechanics to decreased low back straing. GOAL MET Patient watching body mechanics and not lifting, Bed mobility with log roll technique. Short term goal 4: Patient to demonstrate increased core strength with ability to complete 15 reps of a conservative program in 15 minutes. GOAL MET Patient progressing with back stabilizaiton ex with conservative program. Plan to progress ex in next sessions. Short term goal 5: Patient to demonstrate increased hip strength bilaterally to 4/5 without low back pain and tolerating MMT. GOAL MET hip flexion 5/5 right/left without pain produced in back.  no new goal.     Long term goals  Time

## 2019-07-09 ENCOUNTER — PREP FOR PROCEDURE (OUTPATIENT)
Dept: PHYSICAL MEDICINE AND REHAB | Age: 54
End: 2019-07-09

## 2019-07-09 NOTE — H&P (VIEW-ONLY)
Heart Disease in her mother; Substance Abuse in her sister. Social History  Geovanna Buckley  reports that she quit smoking about 8 years ago. Her smoking use included cigarettes and e-cigarettes. She started smoking about 14 years ago. She has a 7.00 pack-year smoking history. She has never used smokeless tobacco. She reports that she drinks alcohol. She reports that she does not use drugs. Medications    Current Medication      Current Outpatient Medications:     HYDROcodone-acetaminophen (NORCO) 5-325 MG per tablet, Take 1 tablet by mouth 2 times daily for 30 days. , Disp: 60 tablet, Rfl: 0    meloxicam (MOBIC) 15 MG tablet, Take 1 tablet by mouth daily, Disp: 30 tablet, Rfl: 2    HYDROcodone-acetaminophen (NORCO) 5-325 MG per tablet, Take 1 tablet by mouth every 6 hours as needed for Pain. ., Disp: , Rfl:     loratadine (CLARITIN) 10 MG tablet, Take 10 mg by mouth daily, Disp: , Rfl:     Elastic Bandages & Supports (B & B SACROILIAC BELT) MISC, 1 Device by Does not apply route as needed (pain), Disp: 1 each, Rfl: 0    Cholecalciferol (VITAMIN D3) 5000 units CAPS, Take 1 capsule by mouth daily, Disp: , Rfl:     Loratadine-Pseudoephedrine (PX ALLERGY RELIEF D, LORATID, PO), Take 1 tablet by mouth daily, Disp: , Rfl:     Omega-3 Fatty Acids (FISH OIL) 1200 MG CAPS, , Disp: , Rfl:     calcium carbonate (OSCAL) 500 MG TABS tablet, Take 500 mg by mouth 2 times daily, Disp: , Rfl:     Turmeric 500 MG CAPS, Take by mouth daily, Disp: , Rfl:     cyclobenzaprine (FLEXERIL) 10 MG tablet, Take 1 tablet by mouth 3 times daily as needed for Muscle spasms WARNING: This medication may make your drowsy.  Do not operate heavy machinery or motor vehicles during use., Disp: 15 tablet, Rfl: 0    lisinopril (PRINIVIL;ZESTRIL) 10 MG tablet, Take 10 mg by mouth nightly , Disp: , Rfl:     citalopram (CELEXA) 20 MG tablet, Take 20 mg by mouth daily , Disp: , Rfl:     spironolactone (ALDACTONE) 25 MG tablet, Take 1 tablet by mouth bony tenderness. Tenderness found. Cervical back: She exhibits tenderness. Thoracic back: She exhibits decreased range of motion, bony tenderness and pain. Lumbar back: She exhibits decreased range of motion, tenderness, pain and spasm. Back:         Legs:  Neurological: She is alert and oriented to person, place, and time. She has normal strength. She is not disoriented. She displays no atrophy. No cranial nerve deficit or sensory deficit. She exhibits normal muscle tone. She displays a negative Romberg sign. Gait abnormal. Coordination normal. She displays no Babinski's sign on the right side. SLR neg. Skin: Skin is warm and dry. No rash noted. She is not diaphoretic. No erythema. No pallor. Psychiatric: She has a normal mood and affect. Her behavior is normal. Judgment and thought content normal. Her mood appears not anxious. Her affect is not angry, not blunt, not labile and not inappropriate. Her speech is not rapid and/or pressured, not delayed, not tangential and not slurred. She is not agitated, not aggressive, not hyperactive, not slowed, not withdrawn, not actively hallucinating and not combative. Thought content is not paranoid and not delusional. Cognition and memory are not impaired. She does not express impulsivity or inappropriate judgment. She does not exhibit a depressed mood. She expresses no homicidal and no suicidal ideation. She expresses no suicidal plans and no homicidal plans. She is communicative. She exhibits normal recent memory and normal remote memory. She is attentive. Nursing note and vitals reviewed. SCOOTER test: + bilaterally   Yeomans test: + bilaterally   Gaenslen test: + bilaterally   Assessment:      1. Primary osteoarthritis of right knee    2. Spondylosis of thoracic region without myelopathy or radiculopathy    3. Spondylosis of lumbar region without myelopathy or radiculopathy    4. Mid back pain    5. Sacroiliac inflammation ([facility] [address]    6.

## 2019-07-09 NOTE — TELEPHONE ENCOUNTER
OARRS reviewed. UDS: consistent. Last seen: 1/4/2018.  Follow-up: 3/20/18 Problem: Pain  Goal: #Acceptable pain level achieved/maintained at rest using NRS/Faces  This goal is used for patients who can self-report.  Acceptable means the level is at or below the identified comfort/function goal.  Patient is anxious about the increasing amount of pain in the bilateral arms. As needed pain medication given. Intervention reduced pain.

## 2019-07-09 NOTE — H&P
Demario Orozco is a 53 y.o. female is here today for     Chief Complaint: knee pain           L-xray:  1. Moderate degenerative facet arthropathy L4-5 and L5-S1 levels bilaterally. Moderate disc space narrowing and degenerative disc disease L5-S1. Moderate degenerative vertebral body spondylosis scattered in the lumbar and lower thoracic spine. 2. Old fracture T12 with prior vertebroplasty. Left hip prosthesis. Sacroiliac joints unremarkable. Right knee x-ray:   FINDINGS:   There is narrowing at the medial femorotibial compartment and patellofemoral compartment. There is no acute fracture or dislocation. There is spurring at the medial aspect of the patella. There is no significant joint effusion. No soft tissue abnormality. Impression   Degenerative changes with no acute fracture or dislocation involving the right knee. The patientis allergic to other; sulfa antibiotics; and sulfur. Past Medical History  Camron Archuleta  has a past medical history of Allergic rhinitis, Depression, Hypertension, ABDI on CPAP, Osteoarthritis, and Vitamin D deficiency. Past Surgical History  The patient  has a past surgical history that includes Dental surgery (2002); [place] tooth extraction; Hand surgery (Right, 05/15/2015); other surgical history (4/11/16); Colonoscopy (2016); other surgical history (06/13/2016); Hemorrhoid surgery (2016); Tonsillectomy; other surgical history (Right, 10/16/2017); arthrocentesis (Right, 10/16/2017); Nerve Surgery (Left, 12/05/2017); arthrocentesis (Left, 12/5/2017); pr arthrocentesis aspir&/inj major jt/bursa w/o us (Right, 9/17/2018); pr office/outpt visit,procedure only (N/A, 11/9/2018); Nerve Block Lumb Facet Level 1 Bilateral (Bilateral, 1/4/2019); Nerve Block Lumb Facet Level 1 Bilateral (Bilateral, 2/25/2019); Lumbar spine surgery (Right, 4/16/2019); and Lumbar spine surgery (Left, 6/13/2019).      Family History  This patient's family history includes Heart Disease in her mother; Substance Abuse in her sister. Social History  [facility]  reports that she quit smoking about 8 years ago. Her smoking use included cigarettes and e-cigarettes. She started smoking about 14 years ago. She has a 7.00 pack-year smoking history. She has never used smokeless tobacco. She reports that she drinks alcohol. She reports that she does not use drugs. Medications    Current Medication      Current Outpatient Medications:     HYDROcodone-acetaminophen (NORCO) 5-325 MG per tablet, Take 1 tablet by mouth 2 times daily for 30 days. , Disp: 60 tablet, Rfl: 0    meloxicam (MOBIC) 15 MG tablet, Take 1 tablet by mouth daily, Disp: 30 tablet, Rfl: 2    HYDROcodone-acetaminophen (NORCO) 5-325 MG per tablet, Take 1 tablet by mouth every 6 hours as needed for Pain. ., Disp: , Rfl:     loratadine (CLARITIN) 10 MG tablet, Take 10 mg by mouth daily, Disp: , Rfl:     Elastic Bandages & Supports (B & B SACROILIAC BELT) MISC, 1 Device by Does not apply route as needed (pain), Disp: 1 each, Rfl: 0    Cholecalciferol (VITAMIN D3) 5000 units CAPS, Take 1 capsule by mouth daily, Disp: , Rfl:     Loratadine-Pseudoephedrine (PX ALLERGY RELIEF D, LORATID, PO), Take 1 tablet by mouth daily, Disp: , Rfl:     Omega-3 Fatty Acids (FISH OIL) 1200 MG CAPS, , Disp: , Rfl:     calcium carbonate (OSCAL) 500 MG TABS tablet, Take 500 mg by mouth 2 times daily, Disp: , Rfl:     Turmeric 500 MG CAPS, Take by mouth daily, Disp: , Rfl:     cyclobenzaprine (FLEXERIL) 10 MG tablet, Take 1 tablet by mouth 3 times daily as needed for Muscle spasms WARNING: This medication may make your drowsy.  Do not operate heavy machinery or motor vehicles during use., Disp: 15 tablet, Rfl: 0    lisinopril (PRINIVIL;ZESTRIL) 10 MG tablet, Take 10 mg by mouth nightly , Disp: , Rfl:     citalopram (CELEXA) 20 MG tablet, Take 20 mg by mouth daily , Disp: , Rfl:     spironolactone (ALDACTONE) 25 MG tablet, Take 1 tablet by mouth

## 2019-07-10 ENCOUNTER — HOSPITAL ENCOUNTER (OUTPATIENT)
Dept: PHYSICAL THERAPY | Age: 54
Setting detail: THERAPIES SERIES
Discharge: HOME | End: 2019-07-10
Payer: COMMERCIAL

## 2019-07-10 PROCEDURE — 97110 THERAPEUTIC EXERCISES: CPT

## 2019-07-10 ASSESSMENT — PAIN DESCRIPTION - ORIENTATION: ORIENTATION: RIGHT

## 2019-07-10 ASSESSMENT — PAIN DESCRIPTION - LOCATION: LOCATION: BACK

## 2019-07-10 ASSESSMENT — PAIN SCALES - GENERAL: PAINLEVEL_OUTOF10: 5

## 2019-07-10 ASSESSMENT — PAIN DESCRIPTION - PAIN TYPE: TYPE: CHRONIC PAIN

## 2019-07-10 NOTE — PROGRESS NOTES
[facility]     Time In: 3560  Time Out: 4993  Minutes: 30  Timed Code Treatment Minutes: 30 Minutes                Date: 7/10/2019  Patient Name: Pierre Boateng,  Gender:  female        CSN: [id number]   : 1965  (53 y.o.)       Referring Practitioner: Herve Burgos CNP       Diagnosis: M53.3 (ICD-10-CM) - Sacrococcygeal disorders, not elsewhere classified  Treatment Diagnosis: lumbar pain, SI pain with right ilium forward rotation, difficulty tolerating prolonged standing and walking. Additional Pertinent Hx: comorbidities: history of nerve blocks to right hip, lumbar facets L45, L5S1, HTN, kyphoplasty at thoracic spine 18, left hip replacement 18. In pain management with Dr. Justice Mc. General:  PT Visit Information  Onset Date: 19  PT Insurance Information: [facility] Medicaid 30 visits PT/OT/ST each per calendar year, aquatic yes, modalities yes, HP and CP are not covered. Total # of Visits Approved: 30  Total # of Visits to Date: 7  Plan of Care/Certification Expiration Date: 19  Progress Note Counter:   for PN                Subjective:  Chart Reviewed: Yes  Patient assessed for rehabilitation services?: Yes  Response To Previous Treatment: Patient with no complaints from previous session. Family / Caregiver Present: No  Comments: Follow up as indicated/needed with Herve Burgos CNP  2nd round of nerve burning on left side in 2019. Dr. Justice Mc. Other (Comment): Pain of right SI joint     Subjective: Pt late to session and out of breath. Pt rested in lobby- very talkative and complaining about her R knee. \"I had to walk from [facility]. No parking spots. \"     Pain:  Patient Currently in Pain: Yes  Pain Level: 5  Pain Type: Chronic pain  Pain Location: Back  Pain Orientation: Right  Pain Radiating Towards: Knee 9/10      Objective                   Exercises  Exercise Therapy - Joint Manipulation, Manual Therapy - Soft Tissue Mobilization, Modalities, Aquatics, Pain Management    Goals:  Patient goals : To be able to move with less back pain. Less discomfort at left SI region, No right leg symptoms with pain and numbness to mid foot. GOALS BEING MET Noting no numbness in RLE with driving and no increased back pain with driving. Back pain is less overall and walking better. Bending and lifting continue to aggravate back. Short term goals  Time Frame for Short term goals: 4 weeks 6 sessions 7/3/19. Short term goal 1: Patient to report of decreased pain level from 7/10 at right lateral lumbar and right SI region with standing and walking to 3/10 GOAL NOT MET Average pain level  5/10 now. Doing better. Less right low back and SI pain. Short term goal 2: Patient to demonstrate increased lumbar ROM through painfree ROM with flexion 10 inches to 5 inches with increased ease to standing from flexed position  GOAL NOT MET But did improve to 7 inches fingertips from floor. Short term goal 3: Patient to demonstrate knowledge of proper body mechanics to decreased low back straing. GOAL MET Patient watching body mechanics and not lifting, Bed mobility with log roll technique. Short term goal 4: Patient to demonstrate increased core strength with ability to complete 15 reps of a conservative program in 15 minutes. GOAL MET Patient progressing with back stabilizaiton ex with conservative program. Plan to progress ex in next sessions. Short term goal 5: Patient to demonstrate increased hip strength bilaterally to 4/5 without low back pain and tolerating MMT. GOAL MET hip flexion 5/5 right/left without pain produced in back.  no new goal.     Long term goals  Time Frame for Long term goals : 8 weeks 6 sessions 7/3/19 (3 weeks)   Long term goal 1: Oswestry 56% to no greater than 30% GOAL NOT MET Oswestry 48% continue goal.   Long term goal 2: Patient independent in home ex program

## 2019-07-12 ENCOUNTER — HOSPITAL ENCOUNTER (OUTPATIENT)
Dept: PHYSICAL THERAPY | Age: 54
Setting detail: THERAPIES SERIES
Discharge: HOME | End: 2019-07-12
Payer: COMMERCIAL

## 2019-07-12 PROCEDURE — 97110 THERAPEUTIC EXERCISES: CPT

## 2019-07-12 ASSESSMENT — PAIN SCALES - GENERAL: PAINLEVEL_OUTOF10: 6

## 2019-07-12 NOTE — PROGRESS NOTES
[place]     Time In: 3193  Time Out: 1031  Minutes: 38  Timed Code Treatment Minutes: 38 Minutes     Date: 2019  Patient Name: Hollis Hartley,  Gender:  female        CSN: [id number]   : 1965  (53 y.o.)       Referring Practitioner: Santos Santos CNP       Diagnosis: M53.3 (ICD-10-CM) - Sacrococcygeal disorders, not elsewhere classified  Treatment Diagnosis: lumbar pain, SI pain with right ilium forward rotation, difficulty tolerating prolonged standing and walking. Additional Pertinent Hx: comorbidities: history of nerve blocks to right hip, lumbar facets L45, L5S1, HTN, kyphoplasty at thoracic spine 18, left hip replacement 18. In pain management with Dr. Jacquelyn Phillips. General:  PT Visit Information  Onset Date: 19  PT Insurance Information: [employer] Medicaid 30 visits PT/OT/ST each per calendar year, aquatic yes, modalities yes, HP and CP are not covered. Total # of Visits Approved: 30  Total # of Visits to Date: 8  Plan of Care/Certification Expiration Date: 19  Progress Note Counter: 2  for PN              Subjective:  Chart Reviewed: Yes  Patient assessed for rehabilitation services?: Yes  Response To Previous Treatment: Patient with no complaints from previous session. Family / Caregiver Present: No  Comments: Follow up as indicated/needed with Santos Santos CNP  2nd round of nerve burning on left side in 2019. Dr. Jacquelyn Phillips. Other (Comment): Pain of right SI joint     Subjective: Patient reports she having more pain at the Left SI today but the Right SI is feeling better. Pain:  Patient Currently in Pain: Yes  Pain Assessment: 0-10  Pain Level: 6(6/10 Left SI region, 4/10 RIght SI region, Right leg numbness)    Objective    Exercises  Exercise 2: Moist HP on back during below exercises.     Exercise 3: On MHP: Abdominal bracing: 10 x 5 seconds, reciprocal UE x10, reciprocal LE x10, straight leg raises 10x bilateral - poor abdominal awareness noted. Exercise 4: On MHP: hip adduction (ball), hip abduction (green) x15 holding for 5 seconds each  Exercise 7: Seated LAQ hold 5 seconds and marching 15x each with abdominal bracing. Exercise 8: Standing abdominal bracing Bilateral 3-way hip x10 (small range), marching x10 - no pain       Activity Tolerance:  Activity Tolerance: Patient Tolerated treatment well    Assessment: Body structures, Functions, Activity limitations: Decreased functional mobility , Decreased ADL status, Decreased ROM, Decreased strength, Decreased safe awareness, Decreased endurance, Decreased balance, Increased Pain  Assessment: Patient's pain decreased to 3/10 in sitting position at end of session. Patient works through exercises then reports pain after. Continuous reminders to engage core muscles during exercises. Patient taking breaks as needed between sessions. Prognosis: Good  REQUIRES PT FOLLOW UP: Yes  Discharge Recommendations: Continue to assess pending progress    Patient Education:  Patient Education: Monitor pain, abd bracing at all times, hip flex/abd and seated standing marching -use heat    Plan:  Times per week: 2x  Plan weeks: 8 weeks  Specific instructions for Next Treatment: Check SI alignment for right anterior ilium rotation. Muscle energy Technique for correction. Modalities: HP with interferential e stim, US, core strengthening, hip strengthening. body mechanics. Current Treatment Recommendations: Strengthening, ROM, Balance Training, Neuromuscular Re-education, Home Exercise Program, Manual Therapy - Joint Manipulation, Manual Therapy - Soft Tissue Mobilization, Modalities, Aquatics, Pain Management  Plan Comment: Continue with current POC    Goals:  Patient goals : To be able to move with less back pain. Less discomfort at left SI region, No right leg symptoms with pain and numbness to mid foot.      Short term goals  Time

## 2019-07-16 ENCOUNTER — HOSPITAL ENCOUNTER (OUTPATIENT)
Dept: PHYSICAL THERAPY | Age: 54
Setting detail: THERAPIES SERIES
Discharge: HOME | End: 2019-07-16
Payer: COMMERCIAL

## 2019-07-16 PROCEDURE — 97110 THERAPEUTIC EXERCISES: CPT

## 2019-07-16 ASSESSMENT — PAIN DESCRIPTION - PAIN TYPE: TYPE: CHRONIC PAIN

## 2019-07-16 ASSESSMENT — PAIN SCALES - GENERAL: PAINLEVEL_OUTOF10: 6

## 2019-07-16 NOTE — PROGRESS NOTES
6  Pain Type: Chronic pain  Pain Radiating Towards: right and left SI area, inferior shoulder blade. Objective    Exercises  Exercise 3: : Abdominal bracing: 10 x 5 seconds, reciprocal UE x15, reciprocal LE x15, straight leg raises 5x RLE, no reps with LLE due to hip pain today  Exercise 4: hip adduction (ball), hip abduction blue) x15 holding for 5 seconds each  Exercise 5: SKTC right 3x hold 10 seconds, left hold on due to hip pain. Exercise 6: Nustep L1 for 3 minutes with abdominal bracing seat 7, (newer unit)   Exercise 7: Seated LAQ hold 5 seconds and marching 15x each with abdominal bracing. Exercise 8: Standing abdominal bracing Bilateral 3-way hip x10 (small range), marching x10 , heelraises x10          Activity Tolerance:  Activity Tolerance: Patient Tolerated treatment well  Activity Tolerance: Pain level remained 6/10. Tolerated progressions well. Note increased fatigue today with shortness of breath    Assessment: Body structures, Functions, Activity limitations: Decreased functional mobility , Decreased ADL status, Decreased ROM, Decreased strength, Decreased safe awareness, Decreased endurance, Decreased balance, Increased Pain  Assessment: Note more shortness of breath in session today with standing ex and walking limited distances 40 feet. Increased reps of some of the ex to 15. Note no gaurding today with sit<>stand transtion and walking. Prognosis: Good  Discharge Recommendations: Continue to assess pending progress    Patient Education:  Patient Education: Monitor pain, abd bracing at all times, hip flex/abd and seated standing marching -                      Plan:  Times per week: 2x  Plan weeks: 8 weeks  Specific instructions for Next Treatment: Check SI alignment for right anterior ilium rotation. Muscle energy Technique for correction. Modalities: HP with interferential e stim, US, core strengthening, hip strengthening. body mechanics.    Current Treatment Recommendations: Strengthening, ROM, Balance Training, Neuromuscular Re-education, Home Exercise Program, Manual Therapy - Joint Manipulation, Manual Therapy - Soft Tissue Mobilization, Modalities, Aquatics, Pain Management  Plan Comment: Continue with current POC    Goals:  Patient goals : To be able to move with less back pain. Less discomfort at left SI region, No right leg symptoms with pain and numbness to mid foot. Short term goals  Time Frame for Short term goals: 4 weeks 6 sessions 7/3/19. Short term goal 1: Patient to report of decreased pain level from 7/10 at right lateral lumbar and right SI region with standing and walking to 3/10   Short term goal 2: Patient to demonstrate increased lumbar ROM through painfree ROM with flexion 10 inches to 5 inches with increased ease to standing from flexed position     Long term goals  Time Frame for Long term goals : 8 weeks 6 sessions 7/3/19 (3 weeks)   Long term goal 1: Oswestry 56% to no greater than 30%   Long term goal 2: Patient independent in home ex program with increased core strength, hip strength, and improved mobility with increased tolerance to standing and walking.       Gina Treviño, [address] [facility]

## 2019-07-18 ENCOUNTER — HOSPITAL ENCOUNTER (OUTPATIENT)
Age: 54
Setting detail: OUTPATIENT SURGERY
Discharge: HOME | End: 2019-07-18
Attending: PAIN MEDICINE | Admitting: PAIN MEDICINE
Payer: COMMERCIAL

## 2019-07-18 ENCOUNTER — ANESTHESIA (OUTPATIENT)
Dept: OPERATING ROOM | Age: 54
End: 2019-07-18
Payer: COMMERCIAL

## 2019-07-18 ENCOUNTER — APPOINTMENT (OUTPATIENT)
Dept: GENERAL RADIOLOGY | Age: 54
End: 2019-07-18
Attending: PAIN MEDICINE
Payer: COMMERCIAL

## 2019-07-18 ENCOUNTER — ANESTHESIA EVENT (OUTPATIENT)
Dept: OPERATING ROOM | Age: 54
End: 2019-07-18
Payer: COMMERCIAL

## 2019-07-18 VITALS
SYSTOLIC BLOOD PRESSURE: 119 MMHG | RESPIRATION RATE: 6 BRPM | OXYGEN SATURATION: 94 % | DIASTOLIC BLOOD PRESSURE: 79 MMHG

## 2019-07-18 VITALS
SYSTOLIC BLOOD PRESSURE: 71 MMHG | HEART RATE: 79 BPM | OXYGEN SATURATION: 93 % | HEIGHT: 65 IN | TEMPERATURE: 97.7 F | WEIGHT: 293 LBS | RESPIRATION RATE: 16 BRPM | DIASTOLIC BLOOD PRESSURE: 36 MMHG | BODY MASS INDEX: 48.82 KG/M2

## 2019-07-18 PROCEDURE — 7100000011 HC PHASE II RECOVERY - ADDTL 15 MIN: Performed by: PAIN MEDICINE

## 2019-07-18 PROCEDURE — 6360000002 HC RX W HCPCS: Performed by: PAIN MEDICINE

## 2019-07-18 PROCEDURE — 3209999900 FLUORO FOR SURGICAL PROCEDURES

## 2019-07-18 PROCEDURE — 7100000010 HC PHASE II RECOVERY - FIRST 15 MIN: Performed by: PAIN MEDICINE

## 2019-07-18 PROCEDURE — 3600000052 HC PAIN LEVEL 2 BASE: Performed by: PAIN MEDICINE

## 2019-07-18 PROCEDURE — 77002 NEEDLE LOCALIZATION BY XRAY: CPT | Performed by: PAIN MEDICINE

## 2019-07-18 PROCEDURE — 3700000000 HC ANESTHESIA ATTENDED CARE: Performed by: PAIN MEDICINE

## 2019-07-18 PROCEDURE — 6360000002 HC RX W HCPCS: Performed by: NURSE ANESTHETIST, CERTIFIED REGISTERED

## 2019-07-18 PROCEDURE — 20610 DRAIN/INJ JOINT/BURSA W/O US: CPT | Performed by: PAIN MEDICINE

## 2019-07-18 PROCEDURE — 2500000003 HC RX 250 WO HCPCS: Performed by: PAIN MEDICINE

## 2019-07-18 RX ORDER — METHYLPREDNISOLONE ACETATE 80 MG/ML
INJECTION, SUSPENSION INTRA-ARTICULAR; INTRALESIONAL; INTRAMUSCULAR; SOFT TISSUE PRN
Status: DISCONTINUED | OUTPATIENT
Start: 2019-07-18 | End: 2019-07-18 | Stop reason: ALTCHOICE

## 2019-07-18 RX ORDER — LIDOCAINE HYDROCHLORIDE 10 MG/ML
INJECTION, SOLUTION INFILTRATION; PERINEURAL PRN
Status: DISCONTINUED | OUTPATIENT
Start: 2019-07-18 | End: 2019-07-18 | Stop reason: ALTCHOICE

## 2019-07-18 RX ORDER — FENTANYL CITRATE 50 UG/ML
INJECTION, SOLUTION INTRAMUSCULAR; INTRAVENOUS PRN
Status: DISCONTINUED | OUTPATIENT
Start: 2019-07-18 | End: 2019-07-18 | Stop reason: SDUPTHER

## 2019-07-18 RX ORDER — PROPOFOL 10 MG/ML
INJECTION, EMULSION INTRAVENOUS PRN
Status: DISCONTINUED | OUTPATIENT
Start: 2019-07-18 | End: 2019-07-18 | Stop reason: SDUPTHER

## 2019-07-18 RX ORDER — ROPIVACAINE HYDROCHLORIDE 2 MG/ML
INJECTION, SOLUTION EPIDURAL; INFILTRATION; PERINEURAL PRN
Status: DISCONTINUED | OUTPATIENT
Start: 2019-07-18 | End: 2019-07-18 | Stop reason: ALTCHOICE

## 2019-07-18 RX ADMIN — FENTANYL CITRATE 100 MCG: 50 INJECTION INTRAMUSCULAR; INTRAVENOUS at 10:31

## 2019-07-18 RX ADMIN — PROPOFOL 50 MG: 10 INJECTION, EMULSION INTRAVENOUS at 10:31

## 2019-07-18 ASSESSMENT — PAIN - FUNCTIONAL ASSESSMENT: PAIN_FUNCTIONAL_ASSESSMENT: 0-10

## 2019-07-18 ASSESSMENT — PAIN DESCRIPTION - DESCRIPTORS: DESCRIPTORS: ACHING;CONSTANT;DISCOMFORT

## 2019-07-18 NOTE — ANESTHESIA PRE PROCEDURE
Department of Anesthesiology  Preprocedure Note       Name:  Hollis Hartley   Age:  53 y.o.  :  1965                                          MRN:  [id number]         Date:  2019      Surgeon: Emmett Varghese):  Shivani Bwoen MD    Procedure: Rt. Knee Steroid Injection (Right )    Medications prior to admission:   Prior to Admission medications    Medication Sig Start Date End Date Taking? Authorizing Provider   HYDROcodone-acetaminophen (NORCO) 5-325 MG per tablet Take 1 tablet by mouth every 6 hours as needed for Pain. .   Yes Historical Provider, MD   loratadine (CLARITIN) 10 MG tablet Take 10 mg by mouth daily   Yes Historical Provider, MD   Cholecalciferol (VITAMIN D3) 5000 units CAPS Take 1 capsule by mouth daily   Yes Historical Provider, MD   Loratadine-Pseudoephedrine (PX ALLERGY RELIEF D, LORATID, PO) Take 1 tablet by mouth daily   Yes Historical Provider, MD   cyclobenzaprine (FLEXERIL) 10 MG tablet Take 1 tablet by mouth 3 times daily as needed for Muscle spasms WARNING: This medication may make your drowsy. Do not operate heavy machinery or motor vehicles during use. 16  Yes Maximus Benavides PA-C   lisinopril (PRINIVIL;ZESTRIL) 10 MG tablet Take 10 mg by mouth nightly  16  Yes Historical Provider, MD   citalopram (CELEXA) 20 MG tablet Take 20 mg by mouth daily  10/2/15  Yes Historical Provider, MD   spironolactone (ALDACTONE) 25 MG tablet Take 1 tablet by mouth daily.  13  Yes Lita Bower MD   meloxicam (MOBIC) 15 MG tablet Take 1 tablet by mouth daily 19  TAMIE Olivares CNP   Elastic Bandages & Supports (B & B SACROILIAC BELT) MISC 1 Device by Does not apply route as needed (pain) 18   TAMIE Rubin CNP   Omega-3 Fatty Acids (FISH OIL) 1200 MG CAPS  18   Historical Provider, MD   calcium carbonate (OSCAL) 500 MG TABS tablet Take 500 mg by mouth 2 times daily    Historical Provider, MD   Turmeric 500 MG CAPS Take by mouth 07/18/19                        Date of last solid food consumption: 07/17/19    BMI:   Wt Readings from Last 3 Encounters:   07/18/19 (!) 331 lb (150.1 kg)   06/17/19 (!) 334 lb (151.5 kg)   06/13/19 (!) 334 lb 9.6 oz (151.8 kg)     Body mass index is 55.08 kg/m². CBC:   Lab Results   Component Value Date    WBC 7.9 11/08/2018    RBC 4.88 11/08/2018    HGB 13.6 11/08/2018    HCT 40.8 11/08/2018    MCV 83.6 11/08/2018    RDW 14.0 07/11/2017     11/08/2018       CMP:   Lab Results   Component Value Date     11/08/2018    K 4.3 11/08/2018     11/08/2018    CO2 24 11/08/2018    BUN 15 11/08/2018    CREATININE 0.8 11/08/2018    LABGLOM 75 11/08/2018    GLUCOSE 104 11/08/2018    PROT 7.2 07/11/2017    CALCIUM 10.0 11/08/2018    BILITOT 0.2 07/11/2017    ALKPHOS 97 07/11/2017    AST 21 07/11/2017    ALT 29 07/11/2017       POC Tests: No results for input(s): POCGLU, POCNA, POCK, POCCL, POCBUN, POCHEMO, POCHCT in the last 72 hours. Coags: No results found for: PROTIME, INR, APTT    HCG (If Applicable):   Lab Results   Component Value Date    PREGSERUM NEGATIVE 04/19/2013        ABGs: No results found for: PHART, PO2ART, RYO1XSJ, IJY7FEB, BEART, [id number]     Type & Screen (If Applicable):  No results found for: LABABO, LABRH    Anesthesia Evaluation    Airway: Mallampati: III  TM distance: >3 FB   Neck ROM: full  Mouth opening: > = 3 FB Dental:          Pulmonary: breath sounds clear to auscultation  (+) sleep apnea:                             Cardiovascular:            Rhythm: regular                      Neuro/Psych:   (+) psychiatric history:            GI/Hepatic/Renal:   (+) morbid obesity          Endo/Other:                     Abdominal:   (+) obese,         Vascular:                                        Anesthesia Plan      MAC     ASA 3       Induction: intravenous. Anesthetic plan and risks discussed with patient. Plan discussed with CRNA.                   Jessica Oneill MD

## 2019-07-18 NOTE — OP NOTE
inject the Right knee joint for the pain relief. The procedure and the risks were discussed with the patient and an informed consent was obtained. Procedure: The patient is placed in supine/sitting position. The Right knee was flexed to about 90 degrees. Landmarks under fluoroscopy identified. Skin over the Right knee was prepped with Betadine and draped in sterile manner. Then skin and deep tissues medial to the patellar tendon just above the tibial plateau were infiltrated with 3  ml of 1% xylocaine. The #22-gauge 3-1/2 inch spinal needle was introduced through the skin wheal. Then the needle is directed parallel to the tibial plateau and slightly medial direction into the knee joint. Then after negative aspiration a total of 80 mg of depomedrol  and 2 ml of 0.2% ropivacaine  were injected into the joint. The needle is removed and a Band-Aid was placed over the needle insertion site. Patient tolerated the procedure well and the vital signs remained stable. Patient will be seen in the pain clinic in two weeks for follow up and further planning.     Electronically signed by Danitza Rod MD on 7/18/19 at 10:27 AM

## 2019-07-18 NOTE — ANESTHESIA POSTPROCEDURE EVALUATION
Department of Anesthesiology  Postprocedure Note    Patient: Nancy Matt  MRN: [id number]  YOB: 1965  Date of evaluation: 7/18/2019  Time:  1:18 PM     Procedure Summary     Date:  07/18/19 Room / Location:  [facility] OR 03 / [address] [facility]    Anesthesia Start:  1029 Anesthesia Stop:  1033    Procedure:  Rt. Knee Steroid Injection (Right ) Diagnosis:  (Primary Osteoarthritis of Rt. Knee)    Surgeon:  Rj Bartlett MD Responsible Provider:  Martha Galindo MD    Anesthesia Type:  MAC ASA Status:  3          Anesthesia Type: MAC    Jose Phase I:      Jose Phase II: Jose Score: 9    Last vitals: Reviewed and per EMR flowsheets.        Anesthesia Post Evaluation    Patient location during evaluation: PACU  Patient participation: complete - patient participated  Level of consciousness: awake  Airway patency: patent  Nausea & Vomiting: no vomiting and no nausea  Complications: no  Cardiovascular status: hemodynamically stable  Respiratory status: acceptable  Hydration status: stable

## 2019-07-22 ENCOUNTER — HOSPITAL ENCOUNTER (OUTPATIENT)
Dept: PHYSICAL THERAPY | Age: 54
Setting detail: THERAPIES SERIES
End: 2019-07-22
Payer: COMMERCIAL

## 2019-07-22 DIAGNOSIS — G89.4 CHRONIC PAIN SYNDROME: Primary | ICD-10-CM

## 2019-07-22 RX ORDER — HYDROCODONE BITARTRATE AND ACETAMINOPHEN 5; 325 MG/1; MG/1
1 TABLET ORAL 2 TIMES DAILY PRN
Qty: 60 TABLET | Refills: 0 | Status: SHIPPED | OUTPATIENT
Start: 2019-07-22 | End: 2019-08-19 | Stop reason: SDUPTHER

## 2019-07-22 RX ORDER — MELOXICAM 15 MG/1
15 TABLET ORAL DAILY
Qty: 30 TABLET | Refills: 2 | Status: SHIPPED | OUTPATIENT
Start: 2019-07-22 | End: 2019-11-17 | Stop reason: SDUPTHER

## 2019-07-26 ENCOUNTER — HOSPITAL ENCOUNTER (OUTPATIENT)
Dept: PHYSICAL THERAPY | Age: 54
Setting detail: THERAPIES SERIES
Discharge: HOME | End: 2019-07-26
Payer: COMMERCIAL

## 2019-07-26 PROCEDURE — 97110 THERAPEUTIC EXERCISES: CPT

## 2019-07-26 ASSESSMENT — PAIN SCALES - GENERAL: PAINLEVEL_OUTOF10: 5

## 2019-07-26 NOTE — PROGRESS NOTES
[address]     Time In: 1243  Time Out: 4722  Minutes: 35  Timed Code Treatment Minutes: 35 Minutes                Date: 2019  Patient Name: Taveras Service,  Gender:  female        CSN: [id number]   : 1965  (53 y.o.)       Referring Practitioner: Jung Lin CNP       Diagnosis: M53.3 (ICD-10-CM) - Sacrococcygeal disorders, not elsewhere classified  Treatment Diagnosis: lumbar pain, SI pain with right ilium forward rotation, difficulty tolerating prolonged standing and walking. Additional Pertinent Hx: comorbidities: history of nerve blocks to right hip, lumbar facets L45, L5S1, HTN, kyphoplasty at thoracic spine 18, left hip replacement 18. In pain management with Dr. Venu Geronimo. General:  PT Visit Information  Onset Date: 19  PT Insurance Information: [employer] 30 visits PT/OT/ST each per calendar year, aquatic yes, modalities yes, HP and CP are not covered. Total # of Visits Approved: 30  Total # of Visits to Date: 10  Plan of Care/Certification Expiration Date: 19  Progress Note Counter:  for PN                Subjective:  Chart Reviewed: Yes  Response To Previous Treatment: Patient with no complaints from previous session. Family / Caregiver Present: No  Comments: Follow up as indicated/needed with Jung Lin CNP  2nd round of nerve burning on left side in 2019. Dr. Venu Geronimo. Other (Comment): Pain of right SI joint     Subjective: Patient cancelled last session due to neck and mid back pain. It was not her low back or SI region though. She did have right knee injection and right knee is much better as well as her low back region. Notes SI areas is less flared up and noting less pain on left SI region as compared to when she started PT. She was able to walk into therapy from parking lot without increased  pain at [facility] region.        Pain:  Patient Currently in Pain: Yes  Pain Assessment: 0-10  Pain Level: 5      Objective    Exercises  Exercise 1: left upslip with leg pull on left 3x with cough on count of 3. Slight right anterior rotation ilium: MET correction in hooklying with right hip extension resisted in 90/90 position  and LLE extended on bed 3x hold 10 seconds   Exercise 2:     Exercise 3: : Abdominal bracing: 10 x 5 seconds, reciprocal UE x15, reciprocal LE x15, straight leg raises 5x   Exercise 4: hip adduction (ball), hip abduction blue) x15 holding for 5 seconds each seated position. Exercise 5: SKTC right/left 3x hold 10 seconds,  Exercise 6: Nustep L1 for 3 minutes with abdominal bracing seat 7, (newer unit)   Exercise 7: Seated LAQ hold 5 seconds and marching 15x each with abdominal bracing. Exercise 8: Standing abdominal bracing Bilateral 3-way hip x10-15 (small range), marching x10 , heelraises x10          Activity Tolerance:  Activity Tolerance: Patient Tolerated treatment well  Activity Tolerance: Pain level reports of decreased pain level to 4/10. \"Doing pretty good\" following PT session. Continue to have mild fatigue and shortness of breath during session. Paced activities. Assessment: Body structures, Functions, Activity limitations: Decreased functional mobility , Decreased ADL status, Decreased ROM, Decreased strength, Decreased safe awareness, Decreased endurance, Decreased balance, Increased Pain  Assessment: Pain level decreased 4/10. Patient had left upslip and right anterior rotation of ilium. MET technique for anterior rotation correction and leg pull RLE for upslip completed. Note no gaurding following with transfers, ambulation or bed mobility. Continues to have shortness of breath and fatigues easily . Provided rest breaks between ex as needed.    Prognosis: Good  Discharge Recommendations: Continue to assess pending progress    Patient Education:  Patient Education: Monitor pain, abd bracing at all times, hip 7/3/19 (3 weeks)   Long term goal 1: Oswestry 56% to no greater than 30%   Long term goal 2: Patient independent in home ex program with increased core strength, hip strength, and improved mobility with increased tolerance to standing and walking.       Dayanna Phan, [address] [facility]

## 2019-07-29 ENCOUNTER — APPOINTMENT (OUTPATIENT)
Dept: PHYSICAL THERAPY | Age: 54
End: 2019-07-29
Payer: COMMERCIAL

## 2019-07-29 NOTE — PROGRESS NOTES
[facility]  PHYSICAL THERAPY MISSED TREATMENT NOTE  OUTPATIENT REHABILITATION    Date: 2019  Patient Name: Jillian Brenner        MRN: [id number]   : 1965  (53 y.o.)  Gender: female           PT Visit Information  Canceled Appointment: 1    REASON FOR MISSED TREATMENT:  Cancelled due to illness.       Emerita Trejo, [address]

## 2019-08-01 ENCOUNTER — HOSPITAL ENCOUNTER (OUTPATIENT)
Dept: PHYSICAL THERAPY | Age: 54
Setting detail: THERAPIES SERIES
Discharge: HOME | End: 2019-08-01
Payer: COMMERCIAL

## 2019-08-05 ENCOUNTER — OFFICE VISIT (OUTPATIENT)
Dept: PHYSICAL MEDICINE AND REHAB | Age: 54
End: 2019-08-05
Payer: COMMERCIAL

## 2019-08-05 VITALS
WEIGHT: 293 LBS | DIASTOLIC BLOOD PRESSURE: 76 MMHG | SYSTOLIC BLOOD PRESSURE: 128 MMHG | HEART RATE: 80 BPM | HEIGHT: 65 IN | BODY MASS INDEX: 48.82 KG/M2

## 2019-08-05 DIAGNOSIS — G89.4 CHRONIC PAIN SYNDROME: ICD-10-CM

## 2019-08-05 DIAGNOSIS — M54.9 MID BACK PAIN: ICD-10-CM

## 2019-08-05 DIAGNOSIS — M47.814 SPONDYLOSIS OF THORACIC REGION WITHOUT MYELOPATHY OR RADICULOPATHY: Primary | ICD-10-CM

## 2019-08-05 DIAGNOSIS — M17.11 PRIMARY OSTEOARTHRITIS OF RIGHT KNEE: ICD-10-CM

## 2019-08-05 DIAGNOSIS — M47.816 SPONDYLOSIS OF LUMBAR REGION WITHOUT MYELOPATHY OR RADICULOPATHY: ICD-10-CM

## 2019-08-05 PROCEDURE — 3017F COLORECTAL CA SCREEN DOC REV: CPT | Performed by: NURSE PRACTITIONER

## 2019-08-05 PROCEDURE — G8427 DOCREV CUR MEDS BY ELIG CLIN: HCPCS | Performed by: NURSE PRACTITIONER

## 2019-08-05 PROCEDURE — 99214 OFFICE O/P EST MOD 30 MIN: CPT | Performed by: NURSE PRACTITIONER

## 2019-08-05 PROCEDURE — 1036F TOBACCO NON-USER: CPT | Performed by: NURSE PRACTITIONER

## 2019-08-05 PROCEDURE — G8417 CALC BMI ABV UP PARAM F/U: HCPCS | Performed by: NURSE PRACTITIONER

## 2019-08-05 ASSESSMENT — ENCOUNTER SYMPTOMS: BACK PAIN: 1

## 2019-08-05 NOTE — PROGRESS NOTES
[facility]  [address]  Dept: [phone]  Dept Fax: [phone]: [phone]    Visit Date: 8/5/2019    Functionality Assessment/Goals Worksheet     On a scale of 0 (Does not Interfere) to 10 (Completely Interferes)     1. Which number describes how during the past week pain has interfered with       the following:  A. General Activity:  7  B. Mood: 6  C. Walking Ability:  9  D. Normal Work (Includes both work outside the home and housework):  9  E. Relations with Other People:   5  F. Sleep:   5  G. Enjoyment of Life:   7    2. Patient Prefers to Take their Pain Medications:     [x]  On a regular basis   [x]  Only when necessary    []  Does not take pain medications    3. What are the Patient's Goals/Expectations for Visiting Pain Management? [x]  Learn about my pain    [x]  Receive Medication   [x]  Physical Therapy     []  Treat Depression   [x]  Receive Injections    []  Treat Sleep   []  Deal with Anxiety and Stress   []  Treat Opoid Dependence/Addiction   []  Other:      HPI:   Roxane Rosenbaum is a 53 y.o. female is here today for    Chief Complaint: back pain, knee pain     HPI   FU right knee injection from 7/18/2019. Receiving 80% relief of right knee pain, much better and is able to tolerate more activity. Continues to receive relief of mid back pain from bilateral T-facet RFA, feels that still has mid upper back pain maybe a little above that site. Aching pain. Norco prn remains effective. Ambulating with cane. Medications reviewed. Patient denies side effects with medications. Patient states she is taking medications as prescribed. Shedenies receiving pain medications from other sources. She denies any ER visits since last visit. Pain scale with out pain medications or at its worst is 8/10.   Pain scale with pain medications or at its best is Facet Level 1 Bilateral (Bilateral, 2/25/2019); Lumbar spine surgery (Right, 4/16/2019); Lumbar spine surgery (Left, 6/13/2019); and arthrocentesis (Right, 7/18/2019). Family History  This patient's family history includes Heart Disease in her mother; Substance Abuse in her sister. Social History  Jeremy Alston  reports that she quit smoking about 8 years ago. Her smoking use included cigarettes and e-cigarettes. She started smoking about 14 years ago. She has a 7.00 pack-year smoking history. She has never used smokeless tobacco. She reports that she drinks alcohol. She reports that she does not use drugs. Medications    Current Outpatient Medications:     meloxicam (MOBIC) 15 MG tablet, Take 1 tablet by mouth daily, Disp: 30 tablet, Rfl: 2    HYDROcodone-acetaminophen (NORCO) 5-325 MG per tablet, Take 1 tablet by mouth 2 times daily as needed for Pain for up to 30 days. , Disp: 60 tablet, Rfl: 0    loratadine (CLARITIN) 10 MG tablet, Take 10 mg by mouth daily, Disp: , Rfl:     Elastic Bandages & Supports (B & B SACROILIAC BELT) MISC, 1 Device by Does not apply route as needed (pain), Disp: 1 each, Rfl: 0    Cholecalciferol (VITAMIN D3) 5000 units CAPS, Take 1 capsule by mouth daily, Disp: , Rfl:     Loratadine-Pseudoephedrine (PX ALLERGY RELIEF D, LORATID, PO), Take 1 tablet by mouth daily, Disp: , Rfl:     Omega-3 Fatty Acids (FISH OIL) 1200 MG CAPS, , Disp: , Rfl:     calcium carbonate (OSCAL) 500 MG TABS tablet, Take 500 mg by mouth 2 times daily, Disp: , Rfl:     Turmeric 500 MG CAPS, Take by mouth daily, Disp: , Rfl:     cyclobenzaprine (FLEXERIL) 10 MG tablet, Take 1 tablet by mouth 3 times daily as needed for Muscle spasms WARNING: This medication may make your drowsy.  Do not operate heavy machinery or motor vehicles during use., Disp: 15 tablet, Rfl: 0    lisinopril (PRINIVIL;ZESTRIL) 10 MG tablet, Take 10 mg by mouth nightly , Disp: , Rfl:     citalopram (CELEXA) 20 MG tablet, Take 20 mg by exhibits decreased range of motion and bony tenderness. Tenderness found. Cervical back: She exhibits tenderness. Thoracic back: She exhibits decreased range of motion, bony tenderness and pain. Lumbar back: She exhibits decreased range of motion, tenderness, pain and spasm. Back:         Legs:  Neurological: She is alert and oriented to person, place, and time. She has normal strength. She is not disoriented. She displays no atrophy. No cranial nerve deficit or sensory deficit. She exhibits normal muscle tone. She displays a negative Romberg sign. Gait abnormal. Coordination normal. She displays no Babinski's sign on the right side. SLR neg. Skin: Skin is warm and dry. No rash noted. She is not diaphoretic. No erythema. No pallor. Psychiatric: She has a normal mood and affect. Her behavior is normal. Judgment and thought content normal. Her mood appears not anxious. Her affect is not angry, not blunt, not labile and not inappropriate. Her speech is not rapid and/or pressured, not delayed, not tangential and not slurred. She is not agitated, not aggressive, not hyperactive, not slowed, not withdrawn, not actively hallucinating and not combative. Thought content is not paranoid and not delusional. Cognition and memory are not impaired. She does not express impulsivity or inappropriate judgment. She does not exhibit a depressed mood. She expresses no homicidal and no suicidal ideation. She expresses no suicidal plans and no homicidal plans. She is communicative. She exhibits normal recent memory and normal remote memory. She is attentive. Nursing note and vitals reviewed. SCOOTER test: + bilaterally   Yeomans test: + bilaterally   Gaenslen test: + bilaterally      Assessment:     1. Spondylosis of thoracic region without myelopathy or radiculopathy    2. Mid back pain    3. Spondylosis of lumbar region without myelopathy or radiculopathy    4.  Primary osteoarthritis of right knee

## 2019-08-09 ENCOUNTER — HOSPITAL ENCOUNTER (OUTPATIENT)
Dept: PHYSICAL THERAPY | Age: 54
Setting detail: THERAPIES SERIES
Discharge: HOME | End: 2019-08-09
Payer: COMMERCIAL

## 2019-08-09 PROCEDURE — 97110 THERAPEUTIC EXERCISES: CPT

## 2019-08-09 ASSESSMENT — PAIN DESCRIPTION - ORIENTATION: ORIENTATION: RIGHT;LEFT

## 2019-08-09 ASSESSMENT — PAIN DESCRIPTION - PAIN TYPE: TYPE: CHRONIC PAIN

## 2019-08-09 ASSESSMENT — PAIN SCALES - GENERAL: PAINLEVEL_OUTOF10: 4

## 2019-08-09 ASSESSMENT — PAIN DESCRIPTION - LOCATION: LOCATION: BACK

## 2019-08-09 NOTE — DISCHARGE SUMMARY
Location: Back  Pain Orientation: Right, Left  Pain Radiating Towards: right lumbosacral >left SI region. Objective             Exercises  Exercise 3: : Abdominal bracing: 10 x 5 seconds, reciprocal UE x15, reciprocal LE x15, straight leg raises 5x HEP   Exercise 4: hip adduction (ball), hip abduction blue) x15 holding for 5 seconds each seated position. HEP   Exercise 5: SKTC right/left 3x hold 10 seconds,HEP   Exercise 6: Nustep L1 for 3 minutes with abdominal bracing seat 7, (newer unit) NT  Exercise 7: Seated LAQ hold 5 seconds and marching 15x each with abdominal bracing. HEP   Exercise 8: Standing abdominal bracing Bilateral 3-way hip x10-15 (small range), marching x10 , heelraises x10 HEP   Exercise 9: Reassessment 8/9/19          Activity Tolerance:  Activity Tolerance: Patient Tolerated treatment well  Activity Tolerance: Patient demonstrates independente in HEP. Pain management decreased overall low back and SI pain with improved mobility. Assessment:  Assessment: Reassessment today. Refer to goal summary for status. Patient progressed well in PT program for management of SI and lumbosacral pain. Goals being met with improvement with flexibility, strength and ambulation tolerance. Transfers no gaurding noted with sit <>stand. Patient independent in HEP. Patient Education:  Patient Education: Continue HEP as issued. Plan:  Plan Comment: Discharge from PT with goals in progress and being met. Patient independent in HEP>     Goals:  Patient goals : To be able to move with less back pain. Less discomfort at left SI region, No right leg symptoms with pain and numbness to mid foot. GOAL MET Patient having decreased low back and SI pain. No longer has aching down right leg.      Short term goals  Time Frame for Short term goals: 4 weeks 6 sessions 8/9/19 Discharge note  Short term goal 1: Patient to report of decreased pain level from 7/10 at right lateral lumbar and right

## 2019-08-19 DIAGNOSIS — G89.4 CHRONIC PAIN SYNDROME: ICD-10-CM

## 2019-08-20 RX ORDER — HYDROCODONE BITARTRATE AND ACETAMINOPHEN 5; 325 MG/1; MG/1
1 TABLET ORAL 2 TIMES DAILY PRN
Qty: 60 TABLET | Refills: 0 | Status: SHIPPED | OUTPATIENT
Start: 2019-08-21 | End: 2019-09-19 | Stop reason: SDUPTHER

## 2019-08-20 NOTE — TELEPHONE ENCOUNTER
OARRS reviewed. UDS: + for  Hydrocodone consistent. Narcan offered: yes  Last seen: 8/5/2019.  Follow-up:   Future Appointments   Date Time Provider Christine Branch   9/30/2019  8:30 AM TAMIE Byrd - CNP [facility] - [facility]   5/4/2020  1:45 PM Manuel Shetty, [facility]

## 2019-08-26 ENCOUNTER — APPOINTMENT (OUTPATIENT)
Dept: GENERAL RADIOLOGY | Age: 54
End: 2019-08-26
Payer: COMMERCIAL

## 2019-08-26 ENCOUNTER — HOSPITAL ENCOUNTER (EMERGENCY)
Age: 54
Discharge: HOME | End: 2019-08-26
Payer: COMMERCIAL

## 2019-08-26 VITALS
DIASTOLIC BLOOD PRESSURE: 64 MMHG | HEIGHT: 65 IN | SYSTOLIC BLOOD PRESSURE: 138 MMHG | TEMPERATURE: 99.1 F | OXYGEN SATURATION: 100 % | RESPIRATION RATE: 14 BRPM | BODY MASS INDEX: 48.82 KG/M2 | WEIGHT: 293 LBS | HEART RATE: 97 BPM

## 2019-08-26 DIAGNOSIS — R10.11 ABDOMINAL WALL PAIN IN BOTH UPPER QUADRANTS: ICD-10-CM

## 2019-08-26 DIAGNOSIS — R07.89 CHEST WALL PAIN: Primary | ICD-10-CM

## 2019-08-26 DIAGNOSIS — R10.12 ABDOMINAL WALL PAIN IN BOTH UPPER QUADRANTS: ICD-10-CM

## 2019-08-26 LAB
EKG ATRIAL RATE: 97 BPM
EKG P AXIS: 52 DEGREES
EKG P-R INTERVAL: 168 MS
EKG Q-T INTERVAL: 340 MS
EKG QRS DURATION: 74 MS
EKG QTC CALCULATION (BAZETT): 431 MS
EKG R AXIS: 74 DEGREES
EKG T AXIS: 70 DEGREES
EKG VENTRICULAR RATE: 97 BPM

## 2019-08-26 PROCEDURE — 93005 ELECTROCARDIOGRAM TRACING: CPT | Performed by: PHYSICIAN ASSISTANT

## 2019-08-26 PROCEDURE — 74022 RADEX COMPL AQT ABD SERIES: CPT

## 2019-08-26 PROCEDURE — 99285 EMERGENCY DEPT VISIT HI MDM: CPT

## 2019-08-26 PROCEDURE — 96372 THER/PROPH/DIAG INJ SC/IM: CPT

## 2019-08-26 PROCEDURE — 6370000000 HC RX 637 (ALT 250 FOR IP): Performed by: PHYSICIAN ASSISTANT

## 2019-08-26 PROCEDURE — 6360000002 HC RX W HCPCS: Performed by: PHYSICIAN ASSISTANT

## 2019-08-26 RX ORDER — KETOROLAC TROMETHAMINE 30 MG/ML
30 INJECTION, SOLUTION INTRAMUSCULAR; INTRAVENOUS ONCE
Status: COMPLETED | OUTPATIENT
Start: 2019-08-26 | End: 2019-08-26

## 2019-08-26 RX ORDER — CYCLOBENZAPRINE HCL 10 MG
10 TABLET ORAL ONCE
Status: COMPLETED | OUTPATIENT
Start: 2019-08-26 | End: 2019-08-26

## 2019-08-26 RX ADMIN — KETOROLAC TROMETHAMINE 30 MG: 30 INJECTION, SOLUTION INTRAMUSCULAR at 23:18

## 2019-08-26 RX ADMIN — CYCLOBENZAPRINE HYDROCHLORIDE 10 MG: 10 TABLET, FILM COATED ORAL at 23:18

## 2019-08-26 ASSESSMENT — PAIN DESCRIPTION - LOCATION: LOCATION: ABDOMEN;CHEST

## 2019-08-26 ASSESSMENT — ENCOUNTER SYMPTOMS
RHINORRHEA: 0
SORE THROAT: 0
ABDOMINAL PAIN: 1
NAUSEA: 1
DIARRHEA: 0
BACK PAIN: 0
COUGH: 0
VOMITING: 0
SHORTNESS OF BREATH: 0
PHOTOPHOBIA: 0

## 2019-08-26 ASSESSMENT — PAIN SCALES - GENERAL
PAINLEVEL_OUTOF10: 7
PAINLEVEL_OUTOF10: 7

## 2019-08-26 ASSESSMENT — PAIN DESCRIPTION - PAIN TYPE: TYPE: ACUTE PAIN

## 2019-08-26 ASSESSMENT — PAIN DESCRIPTION - DESCRIPTORS: DESCRIPTORS: SHARP

## 2019-08-26 ASSESSMENT — HEART SCORE: ECG: 0

## 2019-08-27 PROCEDURE — 93010 ELECTROCARDIOGRAM REPORT: CPT | Performed by: NUCLEAR MEDICINE

## 2019-08-27 NOTE — ED PROVIDER NOTES
(PX ALLERGY RELIEF D, LORATID, PO) Take 1 tablet by mouth dailyHistorical Med      Omega-3 Fatty Acids (FISH OIL) 1200 MG CAPS Historical Med      calcium carbonate (OSCAL) 500 MG TABS tablet Take 500 mg by mouth 2 times daily      Turmeric 500 MG CAPS Take by mouth daily      cyclobenzaprine (FLEXERIL) 10 MG tablet Take 1 tablet by mouth 3 times daily as needed for Muscle spasms WARNING: This medication may make your drowsy. Do not operate heavy machinery or motor vehicles during use., Disp-15 tablet, R-0      lisinopril (PRINIVIL;ZESTRIL) 10 MG tablet Take 10 mg by mouth nightly       citalopram (CELEXA) 20 MG tablet Take 20 mg by mouth daily       spironolactone (ALDACTONE) 25 MG tablet Take 1 tablet by mouth daily. , Disp-15 tablet, R-0             ALLERGIES     is allergic to other; saccharin; sulfa antibiotics; and sulfur. FAMILY HISTORY     She indicated that her mother is alive. She indicated that her father is alive. She indicated that her sister is alive. She indicated that the status of her neg hx is unknown.   family history includes Heart Disease in her mother; Substance Abuse in her sister. SOCIAL HISTORY    reports that she quit smoking about 8 years ago. Her smoking use included cigarettes and e-cigarettes. She started smoking about 14 years ago. She has a 7.00 pack-year smoking history. She has never used smokeless tobacco. She reports that she drinks alcohol. She reports that she does not use drugs. PHYSICAL EXAM     INITIAL VITALS:  height is 5' 5\" (1.651 m) and weight is 330 lb (149.7 kg) (abnormal). Her oral temperature is 99.1 °F (37.3 °C). Her blood pressure is 138/64 and her pulse is 97. Her respiration is 14 and oxygen saturation is 100%. Physical Exam   Constitutional: She is oriented to person, place, and time. Vital signs are normal. She appears well-developed and well-nourished. Non-toxic appearance. No distress. HENT:   Head: Normocephalic and atraumatic.    Right Ear: duration of their stay. I have considered ACS, intra-abdominal trauma and this patient's presentation is not consistent with such entities and therefore, no further testing was warranted. The patient was comfortable with the plan of discharge home and to follow up with Ariadna Cardona CNP. Anticipatory guidance was given. The patient is instructed to return to the emergency department for any worsening of their symptoms. Patient was discharged from the emergency department in good condition with all questions answered. See disposition below. I have given the patient strict written and verbal instructions about care at home, follow-up, and signs and symptoms of worsening of condition and they did verbalize understanding. CRITICAL CARE:   None    CONSULTS:  None    PROCEDURES:  None    FINAL IMPRESSION      1. Chest wall pain    2. Abdominal wall pain in both upper quadrants          DISPOSITION/PLAN     1. Chest wall pain    2. Abdominal wall pain in both upper quadrants        PATIENT REFERRED TO:  TAMIE Newell CNP  [address] [phone]    Schedule an appointment as soon as possible for a visit in 2 days        DISCHARGE MEDICATIONS:  Discharge Medication List as of 8/26/2019 11:09 PM          (Please note that portions of this note were completed with a voice recognition program.  Efforts were made to edit the dictations but occasionally words are mis-transcribed.)    Provider:  I personally performed the services described in the documentation, reviewed and edited the documentation which was dictated to the scribe in my presence, and it accurately records my words and actions.     Callie Cesar PA-C 08/27/19 9:41 PM    [place], [place]  08/27/19 1453

## 2019-08-27 NOTE — ED NOTES
Bed: 021A  Expected date: 8/26/19  Expected time: 9:34 PM  Means of arrival: [facility]  Comments:     Paul Adhikari RN  08/26/19 4717

## 2019-08-29 ENCOUNTER — PREP FOR PROCEDURE (OUTPATIENT)
Dept: PHYSICAL MEDICINE AND REHAB | Age: 54
End: 2019-08-29

## 2019-08-29 NOTE — H&P
History  This patient's family history includes Heart Disease in her mother; Substance Abuse in her sister.  [facility]  reports that she quit smoking about 8 years ago. Her smoking use included cigarettes and e-cigarettes. She started smoking about 14 years ago. She has a 7.00 pack-year smoking history. She has never used smokeless tobacco. She reports that she drinks alcohol. She reports that she does not use drugs.     Medications    Current Medication      Current Outpatient Medications:     meloxicam (MOBIC) 15 MG tablet, Take 1 tablet by mouth daily, Disp: 30 tablet, Rfl: 2    HYDROcodone-acetaminophen (NORCO) 5-325 MG per tablet, Take 1 tablet by mouth 2 times daily as needed for Pain for up to 30 days. , Disp: 60 tablet, Rfl: 0    loratadine (CLARITIN) 10 MG tablet, Take 10 mg by mouth daily, Disp: , Rfl:     Elastic Bandages & Supports (B & B SACROILIAC BELT) MISC, 1 Device by Does not apply route as needed (pain), Disp: 1 each, Rfl: 0    Cholecalciferol (VITAMIN D3) 5000 units CAPS, Take 1 capsule by mouth daily, Disp: , Rfl:     Loratadine-Pseudoephedrine (PX ALLERGY RELIEF D, LORATID, PO), Take 1 tablet by mouth daily, Disp: , Rfl:     Omega-3 Fatty Acids (FISH OIL) 1200 MG CAPS, , Disp: , Rfl:     calcium carbonate (OSCAL) 500 MG TABS tablet, Take 500 mg by mouth 2 times daily, Disp: , Rfl:     Turmeric 500 MG CAPS, Take by mouth daily, Disp: , Rfl:     cyclobenzaprine (FLEXERIL) 10 MG tablet, Take 1 tablet by mouth 3 times daily as needed for Muscle spasms WARNING: This medication may make your drowsy. Do not operate heavy machinery or motor vehicles during use., Disp: 15 tablet, Rfl: 0    lisinopril (PRINIVIL;ZESTRIL) 10 MG tablet, Take 10 mg by mouth nightly , Disp: , Rfl:     citalopram (CELEXA) 20 MG tablet, Take 20 mg by mouth daily , Disp: , Rfl:     spironolactone (ALDACTONE) 25 MG tablet, Take 1 tablet by mouth daily. , Disp: 15 tablet, Rfl: 0        Subjective: Review of Systems   Musculoskeletal: Positive for arthralgias, back pain, gait problem, joint swelling and myalgias. Neurological: Negative for numbness.         Objective:      Vitals                                  Weight: (!) 330 lb 14.6 oz (150.1 kg)   Height: 5' 5\" (1.651 m)            Physical Exam   Constitutional: She is oriented to person, place, and time. She appears well-developed and well-nourished. No distress. HENT:   Head: Normocephalic and atraumatic. Right Ear: External ear normal.   Left Ear: External ear normal.   Nose: Nose normal.   Mouth/Throat: Oropharynx is clear and moist. No oropharyngeal exudate. Eyes: Pupils are equal, round, and reactive to light. Conjunctivae and EOM are normal. Right eye exhibits no discharge. Left eye exhibits no discharge. No scleral icterus. Neck: Normal range of motion and full passive range of motion without pain. Neck supple. No muscular tenderness present. No neck rigidity. No tracheal deviation, no edema, no erythema and normal range of motion present. No thyromegaly present. Cardiovascular: Normal rate, regular rhythm, normal heart sounds and intact distal pulses. Exam reveals no gallop and no friction rub. No murmur heard. Pulmonary/Chest: Effort normal and breath sounds normal. No respiratory distress. She has no wheezes. She has no rales. She exhibits no tenderness. Abdominal: Soft. Bowel sounds are normal. She exhibits no distension. There is no tenderness. There is no rebound and no guarding. Musculoskeletal: She exhibits tenderness. She exhibits no edema. Right hip: She exhibits tenderness. Left hip: She exhibits decreased range of motion, decreased strength and bony tenderness. Right knee: She exhibits decreased range of motion and bony tenderness. Tenderness found. Cervical back: She exhibits tenderness. Thoracic back: She exhibits decreased range of motion, bony tenderness and pain.

## 2019-09-13 ENCOUNTER — ANESTHESIA (OUTPATIENT)
Dept: OPERATING ROOM | Age: 54
End: 2019-09-13
Payer: COMMERCIAL

## 2019-09-13 ENCOUNTER — ANESTHESIA EVENT (OUTPATIENT)
Dept: OPERATING ROOM | Age: 54
End: 2019-09-13
Payer: COMMERCIAL

## 2019-09-13 ENCOUNTER — APPOINTMENT (OUTPATIENT)
Dept: GENERAL RADIOLOGY | Age: 54
End: 2019-09-13
Attending: PAIN MEDICINE
Payer: COMMERCIAL

## 2019-09-13 ENCOUNTER — HOSPITAL ENCOUNTER (OUTPATIENT)
Age: 54
Setting detail: OUTPATIENT SURGERY
Discharge: HOME | End: 2019-09-13
Attending: PAIN MEDICINE | Admitting: PAIN MEDICINE
Payer: COMMERCIAL

## 2019-09-13 VITALS
DIASTOLIC BLOOD PRESSURE: 62 MMHG | OXYGEN SATURATION: 97 % | RESPIRATION RATE: 15 BRPM | SYSTOLIC BLOOD PRESSURE: 104 MMHG

## 2019-09-13 VITALS
TEMPERATURE: 98.5 F | SYSTOLIC BLOOD PRESSURE: 113 MMHG | DIASTOLIC BLOOD PRESSURE: 86 MMHG | WEIGHT: 293 LBS | RESPIRATION RATE: 16 BRPM | HEIGHT: 65 IN | OXYGEN SATURATION: 94 % | HEART RATE: 86 BPM | BODY MASS INDEX: 48.82 KG/M2

## 2019-09-13 PROCEDURE — 3700000001 HC ADD 15 MINUTES (ANESTHESIA): Performed by: PAIN MEDICINE

## 2019-09-13 PROCEDURE — 3700000000 HC ANESTHESIA ATTENDED CARE: Performed by: PAIN MEDICINE

## 2019-09-13 PROCEDURE — 64491 INJ PARAVERT F JNT C/T 2 LEV: CPT | Performed by: PAIN MEDICINE

## 2019-09-13 PROCEDURE — 6360000002 HC RX W HCPCS: Performed by: NURSE ANESTHETIST, CERTIFIED REGISTERED

## 2019-09-13 PROCEDURE — 2500000003 HC RX 250 WO HCPCS: Performed by: PAIN MEDICINE

## 2019-09-13 PROCEDURE — 3600000054 HC PAIN LEVEL 3 BASE: Performed by: PAIN MEDICINE

## 2019-09-13 PROCEDURE — 7100000010 HC PHASE II RECOVERY - FIRST 15 MIN: Performed by: PAIN MEDICINE

## 2019-09-13 PROCEDURE — 2709999900 HC NON-CHARGEABLE SUPPLY: Performed by: PAIN MEDICINE

## 2019-09-13 PROCEDURE — 64492 INJ PARAVERT F JNT C/T 3 LEV: CPT | Performed by: PAIN MEDICINE

## 2019-09-13 PROCEDURE — 64490 INJ PARAVERT F JNT C/T 1 LEV: CPT | Performed by: PAIN MEDICINE

## 2019-09-13 PROCEDURE — 7100000011 HC PHASE II RECOVERY - ADDTL 15 MIN: Performed by: PAIN MEDICINE

## 2019-09-13 PROCEDURE — 3600000055 HC PAIN LEVEL 3 ADDL 15 MIN: Performed by: PAIN MEDICINE

## 2019-09-13 PROCEDURE — 6360000002 HC RX W HCPCS: Performed by: PAIN MEDICINE

## 2019-09-13 PROCEDURE — 3209999900 FLUORO FOR SURGICAL PROCEDURES

## 2019-09-13 RX ORDER — LIDOCAINE HYDROCHLORIDE 10 MG/ML
INJECTION, SOLUTION INFILTRATION; PERINEURAL PRN
Status: DISCONTINUED | OUTPATIENT
Start: 2019-09-13 | End: 2019-09-13 | Stop reason: ALTCHOICE

## 2019-09-13 RX ORDER — METHYLPREDNISOLONE ACETATE 80 MG/ML
INJECTION, SUSPENSION INTRA-ARTICULAR; INTRALESIONAL; INTRAMUSCULAR; SOFT TISSUE PRN
Status: DISCONTINUED | OUTPATIENT
Start: 2019-09-13 | End: 2019-09-13 | Stop reason: ALTCHOICE

## 2019-09-13 RX ORDER — PROPOFOL 10 MG/ML
INJECTION, EMULSION INTRAVENOUS PRN
Status: DISCONTINUED | OUTPATIENT
Start: 2019-09-13 | End: 2019-09-13 | Stop reason: SDUPTHER

## 2019-09-13 RX ORDER — ROPIVACAINE HYDROCHLORIDE 2 MG/ML
INJECTION, SOLUTION EPIDURAL; INFILTRATION; PERINEURAL PRN
Status: DISCONTINUED | OUTPATIENT
Start: 2019-09-13 | End: 2019-09-13 | Stop reason: ALTCHOICE

## 2019-09-13 RX ADMIN — PROPOFOL 50 MG: 10 INJECTION, EMULSION INTRAVENOUS at 08:11

## 2019-09-13 RX ADMIN — PROPOFOL 50 MG: 10 INJECTION, EMULSION INTRAVENOUS at 08:20

## 2019-09-13 RX ADMIN — PROPOFOL 50 MG: 10 INJECTION, EMULSION INTRAVENOUS at 08:06

## 2019-09-13 RX ADMIN — PROPOFOL 40 MG: 10 INJECTION, EMULSION INTRAVENOUS at 08:07

## 2019-09-13 RX ADMIN — PROPOFOL 20 MG: 10 INJECTION, EMULSION INTRAVENOUS at 08:16

## 2019-09-13 RX ADMIN — PROPOFOL 40 MG: 10 INJECTION, EMULSION INTRAVENOUS at 08:14

## 2019-09-13 ASSESSMENT — PULMONARY FUNCTION TESTS
PIF_VALUE: 0

## 2019-09-13 ASSESSMENT — PAIN - FUNCTIONAL ASSESSMENT: PAIN_FUNCTIONAL_ASSESSMENT: 0-10

## 2019-09-13 ASSESSMENT — PAIN DESCRIPTION - DESCRIPTORS: DESCRIPTORS: ACHING;CONSTANT

## 2019-09-13 NOTE — OP NOTE
Pre-Procedure Note    Patient Name: Marie Woodson   YOB: 1965  Medical Record Number: [id number]  Date: 9/13/19       Indication: Thoracic pain  Consent: On file. Vital Signs:   Vitals:    09/13/19 0713   BP: 132/67   Pulse: 94   Resp: 16   Temp: 96.9 °F (36.1 °C)   SpO2: 94%       Past Medical History:   has a past medical history of Allergic rhinitis, Depression, Hypertension, ABDI on CPAP, Osteoarthritis, and Vitamin D deficiency. Past Surgical History:   has a past surgical history that includes Dental surgery (2002); [employer] tooth extraction; Hand surgery (Right, 05/15/2015); other surgical history (4/11/16); Colonoscopy (2016); other surgical history (06/13/2016); Hemorrhoid surgery (2016); Tonsillectomy; other surgical history (Right, 10/16/2017); arthrocentesis (Right, 10/16/2017); Nerve Surgery (Left, 12/05/2017); arthrocentesis (Left, 12/5/2017); pr arthrocentesis aspir&/inj major jt/bursa w/o us (Right, 9/17/2018); pr office/outpt visit,procedure only (N/A, 11/9/2018); Nerve Block Lumb Facet Level 1 Bilateral (Bilateral, 1/4/2019); Nerve Block Lumb Facet Level 1 Bilateral (Bilateral, 2/25/2019); Lumbar spine surgery (Right, 4/16/2019); Lumbar spine surgery (Left, 6/13/2019); and arthrocentesis (Right, 7/18/2019). Pre-Sedation Documentation and Exam:   Vital signs have been reviewed (see flow sheet for vitals). Sedation/ Anesthesia Plan:   MAC    Patient is an appropriate candidate for plan of sedation: yes         Preoperative Diagnosis:  T-spondylosis     Post-Op Dx: as above     Procedure Performed:  Diagnostic/Confirmatory medianbBranch blocks at the levels of T4-5,T5-6 and T6-7 bilateral under fluoroscopic guidance # 1.     Indication for the Procedure: The patient has history of chronic low back pain is not responding well to the conservative treatment. Patient's pain is mostly axial in nature. Pain is interfering with the activities of daily living.   Physical examination

## 2019-09-13 NOTE — ANESTHESIA PRE PROCEDURE
25 MG tablet Take 1 tablet by mouth daily. 4/19/13   Carlton Love MD       Current medications:    No current outpatient medications on file. No current facility-administered medications for this visit. Allergies: Allergies   Allergen Reactions    Other Other (See Comments)     Artificial sweeteners - migraines    Saccharin     Sulfa Antibiotics Hives    Sulfur        Problem List:    Patient Active Problem List   Diagnosis Code    Occult blood in stools R19.5    Second degree hemorrhoids K64.1    ABDI on CPAP G47.33, Z99.89    Trochanteric bursitis of right hip M70.61    Primary osteoarthritis of right hip M16.11    Pathologic thoracic fracture M84.48XA    Localized osteoporosis with current pathological fracture M80.80XA    Spondylosis of thoracic region without myelopathy or radiculopathy M47.814    Morbid obesity with BMI of 50.0-59.9, adult (HCC) E66.01, Z68.43    Primary osteoarthritis of right knee M17.11       Past Medical History:        Diagnosis Date    Allergic rhinitis     Depression     Hypertension     ABDI on CPAP     Osteoarthritis     Vitamin D deficiency        Past Surgical History:        Procedure Laterality Date    ARTHROCENTESIS Right 10/16/2017    RIGHT HIP LARGE JOINT INJECTION performed by Jessi Bell MD at [address] [place] [address] ARTHROCENTESIS Left 12/5/2017    LEFT HIP LARGE JOINT INJECTION performed by Jessi Bell MD at [address] [place] [address] ARTHROCENTESIS Right 7/18/2019    Rt.  Knee Steroid Injection performed by Jessi Bell MD at [address] [place], [place] [address] [facility]  2002     right front upper implant    HAND SURGERY Right 05/15/2015    index finger cleaned out D/T infected cat bite    HEMORRHOID SURGERY  2016    series of 3 bandings for internal hemorrhoids, Dr. Charlotte Gallardo Right 4/16/2019    T-facet RFA @ T7-8, 8-9, 9-10 performed by Jessi Bell MD at [address] Left 2019    T-facet RFA @ T7-8, 8-9, 9-10 LEFT performed by Kristina Aguilera MD at [place] [address] FACET LEVEL 1 BILATERAL Bilateral 2019    LUMBAR FACET bilateral T-facet MBB @ T7-T8, T8-T9, and T9-T10 performed by Kristina Aguilera MD at [place] [address] FACET LEVEL 1 BILATERAL Bilateral 2019    Thoracic FACET bilateral T-facet MBB @ T7-T8, T8-T9, and T9-T10 MBB #2 performed by Kristina Aguilera MD at [place] [address] Left 2017    HIP INJECTION     OTHER SURGICAL HISTORY  16    L4-5, L5-S1 Facet injection    OTHER SURGICAL HISTORY  2016    Right Hip Injection    OTHER SURGICAL HISTORY Right 10/16/2017    Hip Injection     [place] ARTHROCENTESIS ASPIR&/INJ MAJOR JT/BURSA W/O US Right 2018    RIGHT HIP LARGE JOINT STEROID INJECTION performed by Kristina Aguilera MD at [address] [place] [address] OFFICE/OUTPT VISIT,PROCEDURE ONLY N/A 2018    Kyphoplasty T12 BILATERAL performed by Kristina Aguilera MD at [place] [address]      as child    WISDOM TOOTH EXTRACTION         Social History:    Social History     Tobacco Use    Smoking status: Former Smoker     Packs/day: 1.00     Years: 7.00     Pack years: 7.00     Types: Cigarettes, E-Cigarettes     Start date: 2005     Last attempt to quit: 2011     Years since quittin.7    Smokeless tobacco: Never Used   Substance Use Topics    Alcohol use: Yes     Alcohol/week: 0.0 standard drinks     Comment: occasionally, 1-3 times a month                                Counseling given: Not Answered      Vital Signs (Current): There were no vitals filed for this visit.                                            BP Readings from Last 3 Encounters:   19 138/64   19 128/76   19 (!) 71/36       NPO Status: BMI:   Wt Readings from Last 3 Encounters:   08/26/19 (!) 330 lb (149.7 kg)   08/05/19 (!) 330 lb 14.6 oz (150.1 kg)   07/18/19 (!) 331 lb (150.1 kg)     There is no height or weight on file to calculate BMI.    CBC:   Lab Results   Component Value Date    WBC 7.9 11/08/2018    RBC 4.88 11/08/2018    HGB 13.6 11/08/2018    HCT 40.8 11/08/2018    MCV 83.6 11/08/2018    RDW 14.0 07/11/2017     11/08/2018       CMP:   Lab Results   Component Value Date     11/08/2018    K 4.3 11/08/2018     11/08/2018    CO2 24 11/08/2018    BUN 15 11/08/2018    CREATININE 0.8 11/08/2018    LABGLOM 75 11/08/2018    GLUCOSE 104 11/08/2018    PROT 7.2 07/11/2017    CALCIUM 10.0 11/08/2018    BILITOT 0.2 07/11/2017    ALKPHOS 97 07/11/2017    AST 21 07/11/2017    ALT 29 07/11/2017       POC Tests: No results for input(s): POCGLU, POCNA, POCK, POCCL, POCBUN, POCHEMO, POCHCT in the last 72 hours. Coags: No results found for: PROTIME, INR, APTT    HCG (If Applicable):   Lab Results   Component Value Date    PREGSERUM NEGATIVE 04/19/2013        ABGs: No results found for: PHART, PO2ART, OSH0QSU, AOX4RBZ, BEART, V1HDBSHZ     Type & Screen (If Applicable):  No results found for: LABABO, LABRH    Anesthesia Evaluation    Airway: Mallampati: III  TM distance: >3 FB   Neck ROM: full  Mouth opening: > = 3 FB Dental:          Pulmonary: breath sounds clear to auscultation  (+) sleep apnea:            Patient did not smoke on day of surgery. Cardiovascular:  Exercise tolerance: good (>4 METS),   (+) hypertension:,         Rhythm: regular                      Neuro/Psych:   (+) psychiatric history:            GI/Hepatic/Renal:   (+) morbid obesity          Endo/Other:              Pt had no PAT visit       Abdominal:   (+) obese,         Vascular:                                          Anesthesia Plan      MAC     ASA 3       Induction: intravenous.       Anesthetic

## 2019-09-13 NOTE — ANESTHESIA POSTPROCEDURE EVALUATION
Department of Anesthesiology  Postprocedure Note    Patient: Luisa Silverman  MRN: [id number]  YOB: 1965  Date of evaluation: 9/13/2019  Time:  10:20 AM     Procedure Summary     Date:  09/13/19 Room / Location:  [facility] OR 03  [address] [facility]    Anesthesia Start:  [address] Anesthesia Stop:  1508    Procedure:  bilateral T-facet MBB # 1 @ T4-5, T5-6, and T6-7. (Bilateral Back) Diagnosis:  (thoriacic spondylosis)    Surgeon:  Janine Gaines MD Responsible Provider:  Kailyn Keenan MD    Anesthesia Type:  MAC ASA Status:  3          Anesthesia Type: MAC    Jose Phase I:      Jose Phase II: Jose Score: 9    Last vitals: Reviewed and per EMR flowsheets.        Anesthesia Post Evaluation    Patient location during evaluation: PACU  Patient participation: complete - patient participated  Level of consciousness: awake and alert  Airway patency: patent  Nausea & Vomiting: no nausea  Complications: no  Cardiovascular status: blood pressure returned to baseline and hemodynamically stable  Respiratory status: acceptable and spontaneous ventilation  Hydration status: euvolemic

## 2019-09-19 DIAGNOSIS — G89.4 CHRONIC PAIN SYNDROME: ICD-10-CM

## 2019-09-19 RX ORDER — HYDROCODONE BITARTRATE AND ACETAMINOPHEN 5; 325 MG/1; MG/1
1 TABLET ORAL 2 TIMES DAILY PRN
Qty: 60 TABLET | Refills: 0 | Status: SHIPPED | OUTPATIENT
Start: 2019-09-20 | End: 2019-10-15 | Stop reason: SDUPTHER

## 2019-10-15 ENCOUNTER — OFFICE VISIT (OUTPATIENT)
Dept: PHYSICAL MEDICINE AND REHAB | Age: 54
End: 2019-10-15
Payer: COMMERCIAL

## 2019-10-15 VITALS
BODY MASS INDEX: 48.82 KG/M2 | WEIGHT: 293 LBS | HEART RATE: 82 BPM | DIASTOLIC BLOOD PRESSURE: 68 MMHG | HEIGHT: 65 IN | SYSTOLIC BLOOD PRESSURE: 132 MMHG

## 2019-10-15 DIAGNOSIS — M46.1 SACROILIAC INFLAMMATION (HCC): ICD-10-CM

## 2019-10-15 DIAGNOSIS — M47.816 SPONDYLOSIS OF LUMBAR REGION WITHOUT MYELOPATHY OR RADICULOPATHY: ICD-10-CM

## 2019-10-15 DIAGNOSIS — M16.0 PRIMARY OSTEOARTHRITIS OF BOTH HIPS: ICD-10-CM

## 2019-10-15 DIAGNOSIS — G89.4 CHRONIC PAIN SYNDROME: ICD-10-CM

## 2019-10-15 DIAGNOSIS — M47.814 SPONDYLOSIS OF THORACIC REGION WITHOUT MYELOPATHY OR RADICULOPATHY: Primary | ICD-10-CM

## 2019-10-15 DIAGNOSIS — M47.812 CERVICAL SPONDYLOSIS: ICD-10-CM

## 2019-10-15 DIAGNOSIS — M17.11 PRIMARY OSTEOARTHRITIS OF RIGHT KNEE: ICD-10-CM

## 2019-10-15 PROCEDURE — 99213 OFFICE O/P EST LOW 20 MIN: CPT | Performed by: NURSE PRACTITIONER

## 2019-10-15 PROCEDURE — 3017F COLORECTAL CA SCREEN DOC REV: CPT | Performed by: NURSE PRACTITIONER

## 2019-10-15 PROCEDURE — 1036F TOBACCO NON-USER: CPT | Performed by: NURSE PRACTITIONER

## 2019-10-15 PROCEDURE — G8484 FLU IMMUNIZE NO ADMIN: HCPCS | Performed by: NURSE PRACTITIONER

## 2019-10-15 PROCEDURE — G8427 DOCREV CUR MEDS BY ELIG CLIN: HCPCS | Performed by: NURSE PRACTITIONER

## 2019-10-15 PROCEDURE — G8417 CALC BMI ABV UP PARAM F/U: HCPCS | Performed by: NURSE PRACTITIONER

## 2019-10-15 RX ORDER — HYDROCODONE BITARTRATE AND ACETAMINOPHEN 5; 325 MG/1; MG/1
1 TABLET ORAL 2 TIMES DAILY PRN
Qty: 60 TABLET | Refills: 0 | Status: SHIPPED | OUTPATIENT
Start: 2019-10-19 | End: 2019-11-18 | Stop reason: SDUPTHER

## 2019-10-15 ASSESSMENT — ENCOUNTER SYMPTOMS: BACK PAIN: 1

## 2019-11-18 DIAGNOSIS — M47.812 CERVICAL SPONDYLOSIS: ICD-10-CM

## 2019-11-18 DIAGNOSIS — M47.814 SPONDYLOSIS OF THORACIC REGION WITHOUT MYELOPATHY OR RADICULOPATHY: Primary | ICD-10-CM

## 2019-11-18 DIAGNOSIS — M47.816 SPONDYLOSIS OF LUMBAR REGION WITHOUT MYELOPATHY OR RADICULOPATHY: ICD-10-CM

## 2019-11-18 DIAGNOSIS — M16.0 PRIMARY OSTEOARTHRITIS OF BOTH HIPS: ICD-10-CM

## 2019-11-18 DIAGNOSIS — G89.4 CHRONIC PAIN SYNDROME: ICD-10-CM

## 2019-11-18 DIAGNOSIS — M17.11 PRIMARY OSTEOARTHRITIS OF RIGHT KNEE: ICD-10-CM

## 2019-11-18 RX ORDER — HYDROCODONE BITARTRATE AND ACETAMINOPHEN 5; 325 MG/1; MG/1
1 TABLET ORAL 2 TIMES DAILY PRN
Qty: 60 TABLET | Refills: 0 | Status: SHIPPED | OUTPATIENT
Start: 2019-11-20 | End: 2019-12-18 | Stop reason: SDUPTHER

## 2019-11-18 RX ORDER — MELOXICAM 15 MG/1
15 TABLET ORAL DAILY
Qty: 30 TABLET | Refills: 2 | Status: SHIPPED | OUTPATIENT
Start: 2019-11-18 | End: 2020-04-23 | Stop reason: SDUPTHER

## 2019-12-04 ENCOUNTER — HOSPITAL ENCOUNTER (OUTPATIENT)
Dept: PHYSICAL THERAPY | Age: 54
Setting detail: THERAPIES SERIES
Discharge: HOME | End: 2019-12-04
Payer: COMMERCIAL

## 2019-12-18 DIAGNOSIS — G89.4 CHRONIC PAIN SYNDROME: ICD-10-CM

## 2019-12-19 RX ORDER — HYDROCODONE BITARTRATE AND ACETAMINOPHEN 5; 325 MG/1; MG/1
1 TABLET ORAL 2 TIMES DAILY PRN
Qty: 60 TABLET | Refills: 0 | Status: SHIPPED | OUTPATIENT
Start: 2019-12-20 | End: 2020-01-13 | Stop reason: SDUPTHER

## 2020-01-13 ENCOUNTER — OFFICE VISIT (OUTPATIENT)
Dept: PHYSICAL MEDICINE AND REHAB | Age: 55
End: 2020-01-13
Payer: COMMERCIAL

## 2020-01-13 VITALS
BODY MASS INDEX: 48.82 KG/M2 | HEART RATE: 88 BPM | HEIGHT: 65 IN | DIASTOLIC BLOOD PRESSURE: 80 MMHG | WEIGHT: 293 LBS | SYSTOLIC BLOOD PRESSURE: 138 MMHG

## 2020-01-13 PROCEDURE — 99213 OFFICE O/P EST LOW 20 MIN: CPT | Performed by: NURSE PRACTITIONER

## 2020-01-13 PROCEDURE — 1036F TOBACCO NON-USER: CPT | Performed by: NURSE PRACTITIONER

## 2020-01-13 PROCEDURE — G8484 FLU IMMUNIZE NO ADMIN: HCPCS | Performed by: NURSE PRACTITIONER

## 2020-01-13 PROCEDURE — 3017F COLORECTAL CA SCREEN DOC REV: CPT | Performed by: NURSE PRACTITIONER

## 2020-01-13 PROCEDURE — G8427 DOCREV CUR MEDS BY ELIG CLIN: HCPCS | Performed by: NURSE PRACTITIONER

## 2020-01-13 PROCEDURE — G8417 CALC BMI ABV UP PARAM F/U: HCPCS | Performed by: NURSE PRACTITIONER

## 2020-01-13 RX ORDER — HYDROCODONE BITARTRATE AND ACETAMINOPHEN 5; 325 MG/1; MG/1
1 TABLET ORAL 2 TIMES DAILY PRN
Qty: 60 TABLET | Refills: 0 | Status: SHIPPED | OUTPATIENT
Start: 2020-01-19 | End: 2020-02-17 | Stop reason: SDUPTHER

## 2020-01-13 ASSESSMENT — ENCOUNTER SYMPTOMS: BACK PAIN: 1

## 2020-01-13 NOTE — PROGRESS NOTES
distress. Appearance: She is well-developed. She is not diaphoretic. HENT:      Head: Normocephalic and atraumatic. Right Ear: External ear normal.      Left Ear: External ear normal.      Nose: Nose normal.      Mouth/Throat:      Pharynx: No oropharyngeal exudate. Eyes:      General: No scleral icterus. Right eye: No discharge. Left eye: No discharge. Conjunctiva/sclera: Conjunctivae normal.      Pupils: Pupils are equal, round, and reactive to light. Neck:      Musculoskeletal: Full passive range of motion without pain, normal range of motion and neck supple. Normal range of motion. No edema, erythema, neck rigidity or muscular tenderness. Thyroid: No thyromegaly. Trachea: No tracheal deviation. Cardiovascular:      Rate and Rhythm: Normal rate and regular rhythm. Heart sounds: Normal heart sounds. No murmur. No friction rub. No gallop. Pulmonary:      Effort: Pulmonary effort is normal. No respiratory distress. Breath sounds: Normal breath sounds. No wheezing or rales. Chest:      Chest wall: No tenderness. Abdominal:      General: Bowel sounds are normal. There is no distension. Palpations: Abdomen is soft. Tenderness: There is no tenderness. There is no guarding or rebound. Musculoskeletal:         General: Tenderness present. Right hip: She exhibits tenderness. Left hip: She exhibits decreased range of motion, decreased strength and bony tenderness. Right knee: She exhibits decreased range of motion and bony tenderness. Tenderness found. Cervical back: She exhibits tenderness. Thoracic back: She exhibits decreased range of motion, bony tenderness and pain. Lumbar back: She exhibits decreased range of motion, tenderness, pain and spasm. Back:         Legs:    Skin:     General: Skin is warm and dry. Coloration: Skin is not pale. Findings: No erythema or rash.    Neurological:      Mental Status: She is alert and oriented to person, place, and time. She is not disoriented. Cranial Nerves: No cranial nerve deficit. Sensory: No sensory deficit. Motor: No atrophy or abnormal muscle tone. Coordination: Coordination normal.      Gait: Gait abnormal.      Deep Tendon Reflexes: Babinski sign absent on the right side. Comments: SLR neg. Psychiatric:         Attention and Perception: She is attentive. Mood and Affect: Mood is not anxious or depressed. Affect is not labile, blunt, angry or inappropriate. Speech: She is communicative. Speech is not rapid and pressured, delayed, slurred or tangential.         Behavior: Behavior normal. Behavior is not agitated, slowed, aggressive, withdrawn, hyperactive or combative. Thought Content: Thought content normal. Thought content is not paranoid or delusional. Thought content does not include homicidal or suicidal ideation. Thought content does not include homicidal or suicidal plan. Cognition and Memory: Memory is not impaired. She does not exhibit impaired recent memory or impaired remote memory. Judgment: Judgment normal. Judgment is not impulsive or inappropriate. SCOOTER test: + bilaterally   Yeomans test: + bilaterally   Gaenslen test: + bilaterally      Assessment:     1. Primary osteoarthritis of right knee    2. Primary osteoarthritis of both hips    3. Spondylosis of thoracic region without myelopathy or radiculopathy    4. 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 Mid back pain    6. Chronic pain syndrome            Plan:      · OARRS reviewed. Current MED: 10.00  · Patient was offered naloxone for home. Refused. · Discussed long term side effects of medications, tolerance, dependency and addiction. · Previous UDS reviewed  · UDS preformed today for compliance. · Patient told can not receive any pain medications from any other source.   · No evidence of abuse, diversion or aberrant behavior.  Medications and/or procedures to improve function and quality of life- patient understanding with this and that may not be pain free   Discussed with patient about safe storage of medications at home   Discussed possible weaning of medication dosing dependent on treatment/procedure results.  Discussed with patient about risks with procedure including infection, reaction to medication, increased pain, or bleeding.  Right knee injection waning. Received over 80% relief    Plan top repeat right knee steroid injection. Procedure and risks discussed in detail with patient.  Discussed repeating T-facet RFA or T-facet MBB in future. · Medications remain effective, patient is compliant. Continue Norco 5/325 BID prn- Ordered refill       Meds. Prescribed:   Orders Placed This Encounter   Medications    HYDROcodone-acetaminophen (NORCO) 5-325 MG per tablet     Sig: Take 1 tablet by mouth 2 times daily as needed for Pain for up to 30 days. Dispense:  60 tablet     Refill:  0     Reduce doses taken as pain becomes manageable       Return for Right knee steroid injection. , follow up after procedure.          Electronically signed by TAMIE Mahoney CNP on1/13/2020 at 8:49 AM

## 2020-01-27 ENCOUNTER — PREP FOR PROCEDURE (OUTPATIENT)
Dept: PHYSICAL MEDICINE AND REHAB | Age: 55
End: 2020-01-27

## 2020-01-27 NOTE — H&P (VIEW-ONLY)
MG tablet, Take 1 tablet by mouth daily, Disp: 30 tablet, Rfl: 2    loratadine (CLARITIN) 10 MG tablet, Take 10 mg by mouth daily, Disp: , Rfl:     Elastic Bandages & Supports (B & B SACROILIAC BELT) MISC, 1 Device by Does not apply route as needed (pain), Disp: 1 each, Rfl: 0    Cholecalciferol (VITAMIN D3) 5000 units CAPS, Take 1 capsule by mouth daily, Disp: , Rfl:     Loratadine-Pseudoephedrine (PX ALLERGY RELIEF D, LORATID, PO), Take 1 tablet by mouth daily, Disp: , Rfl:     Omega-3 Fatty Acids (FISH OIL) 1200 MG CAPS, , Disp: , Rfl:     calcium carbonate (OSCAL) 500 MG TABS tablet, Take 500 mg by mouth 2 times daily, Disp: , Rfl:     Turmeric 500 MG CAPS, Take by mouth daily, Disp: , Rfl:     cyclobenzaprine (FLEXERIL) 10 MG tablet, Take 1 tablet by mouth 3 times daily as needed for Muscle spasms WARNING: This medication may make your drowsy. Do not operate heavy machinery or motor vehicles during use., Disp: 15 tablet, Rfl: 0    lisinopril (PRINIVIL;ZESTRIL) 10 MG tablet, Take 10 mg by mouth nightly , Disp: , Rfl:     citalopram (CELEXA) 20 MG tablet, Take 20 mg by mouth daily , Disp: , Rfl:     spironolactone (ALDACTONE) 25 MG tablet, Take 1 tablet by mouth daily. , Disp: 15 tablet, Rfl: 0        Subjective:      Review of Systems   Musculoskeletal: Positive for arthralgias, back pain, myalgias, neck pain and neck stiffness. Neurological: Positive for weakness. Negative for numbness.         Objective:      Vitals                                  Weight: (!) 340 lb (154.2 kg)   Height: 5' 5\" (1.651 m)            Physical Exam  Vitals signs and nursing note reviewed. Constitutional:       General: She is not in acute distress. Appearance: She is well-developed. She is not diaphoretic. HENT:      Head: Normocephalic and atraumatic. Right Ear: External ear normal.      Left Ear: External ear normal.      Nose: Nose normal.      Mouth/Throat:      Pharynx: No oropharyngeal exudate. Eyes:      General: No scleral icterus. Right eye: No discharge. Left eye: No discharge. Conjunctiva/sclera: Conjunctivae normal.      Pupils: Pupils are equal, round, and reactive to light. Neck:      Musculoskeletal: Full passive range of motion without pain, normal range of motion and neck supple. Normal range of motion. No edema, erythema, neck rigidity or muscular tenderness. Thyroid: No thyromegaly. Trachea: No tracheal deviation. Cardiovascular:      Rate and Rhythm: Normal rate and regular rhythm. Heart sounds: Normal heart sounds. No murmur. No friction rub. No gallop. Pulmonary:      Effort: Pulmonary effort is normal. No respiratory distress. Breath sounds: Normal breath sounds. No wheezing or rales. Chest:      Chest wall: No tenderness. Abdominal:      General: Bowel sounds are normal. There is no distension. Palpations: Abdomen is soft. Tenderness: There is no tenderness. There is no guarding or rebound. Musculoskeletal:         General: Tenderness present. Right hip: She exhibits tenderness. Left hip: She exhibits decreased range of motion, decreased strength and bony tenderness. Right knee: She exhibits decreased range of motion and bony tenderness. Tenderness found. Cervical back: She exhibits tenderness. Thoracic back: She exhibits decreased range of motion, bony tenderness and pain. Lumbar back: She exhibits decreased range of motion, tenderness, pain and spasm. Back:         Legs:    Skin:     General: Skin is warm and dry. Coloration: Skin is not pale. Findings: No erythema or rash. Neurological:      Mental Status: She is alert and oriented to person, place, and time. She is not disoriented. Cranial Nerves: No cranial nerve deficit. Sensory: No sensory deficit. Motor: No atrophy or abnormal muscle tone.       Coordination: Coordination normal.      Gait: Gait abnormal. Deep Tendon Reflexes: Babinski sign absent on the right side. Comments: SLR neg. Psychiatric:         Attention and Perception: She is attentive. Mood and Affect: Mood is not anxious or depressed. Affect is not labile, blunt, angry or inappropriate. Speech: She is communicative. Speech is not rapid and pressured, delayed, slurred or tangential.         Behavior: Behavior normal. Behavior is not agitated, slowed, aggressive, withdrawn, hyperactive or combative. Thought Content: Thought content normal. Thought content is not paranoid or delusional. Thought content does not include homicidal or suicidal ideation. Thought content does not include homicidal or suicidal plan. Cognition and Memory: Memory is not impaired. She does not exhibit impaired recent memory or impaired remote memory. Judgment: Judgment normal. Judgment is not impulsive or inappropriate.         SCOOTER test: + bilaterally   Yeomans test: + bilaterally   Gaenslen test: + bilaterally   Assessment:      1. Primary osteoarthritis of right knee    2. Primary osteoarthritis of both hips    3. Spondylosis of thoracic region without myelopathy or radiculopathy    4. 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 Mid back pain    6.  Chronic pain syndrome          Plan:  Right knee steroid injection       TAMIE Wetzel - CNP  1/27/2020

## 2020-01-27 NOTE — H&P
Renetta Lam is a 53 y.o. female is here today for     Chief Complaint: Right knee pain             The patientis allergic to walnut [macadamia nut oil]; other; saccharin; sulfa antibiotics; and sulfur.     Past Medical History  Derek Segura  has a past medical history of Allergic rhinitis, Depression, Hypertension, ABDI on CPAP, Osteoarthritis, and Vitamin D deficiency.     Past Surgical History  The patient  has a past surgical history that includes Dental surgery (2002); [place] tooth extraction; Hand surgery (Right, 05/15/2015); other surgical history (4/11/16); Colonoscopy (2016); other surgical history (06/13/2016); Hemorrhoid surgery (2016); Tonsillectomy; other surgical history (Right, 10/16/2017); arthrocentesis (Right, 10/16/2017); Nerve Surgery (Left, 12/05/2017); arthrocentesis (Left, 12/5/2017); pr arthrocentesis aspir&/inj major jt/bursa w/o us (Right, 9/17/2018); pr office/outpt visit,procedure only (N/A, 11/9/2018); Nerve Block Lumb Facet Level 1 Bilateral (Bilateral, 1/4/2019); Nerve Block Lumb Facet Level 1 Bilateral (Bilateral, 2/25/2019); Lumbar spine surgery (Right, 4/16/2019); Lumbar spine surgery (Left, 6/13/2019); arthrocentesis (Right, 7/18/2019); and Nerve Surgery (Bilateral, 9/13/2019).    Family History  This patient's family history includes Heart Disease in her mother; Substance Abuse in her sister.     Social History  Derek Segura  reports that she quit smoking about 9 years ago. Her smoking use included cigarettes and e-cigarettes. She started smoking about 14 years ago. She has a 7.00 pack-year smoking history. She has never used smokeless tobacco. She reports current alcohol use. She reports that she does not use drugs.     Medications    Current Medication      Current Outpatient Medications:     [START ON 1/19/2020] HYDROcodone-acetaminophen (NORCO) 5-325 MG per tablet, Take 1 tablet by mouth 2 times daily as needed for Pain for up to 30 days. , Disp: 60 tablet, Rfl: 0    meloxicam (MOBIC) 15 MG tablet, Take 1 tablet by mouth daily, Disp: 30 tablet, Rfl: 2    loratadine (CLARITIN) 10 MG tablet, Take 10 mg by mouth daily, Disp: , Rfl:     Elastic Bandages & Supports (B & B SACROILIAC BELT) MISC, 1 Device by Does not apply route as needed (pain), Disp: 1 each, Rfl: 0    Cholecalciferol (VITAMIN D3) 5000 units CAPS, Take 1 capsule by mouth daily, Disp: , Rfl:     Loratadine-Pseudoephedrine (PX ALLERGY RELIEF D, LORATID, PO), Take 1 tablet by mouth daily, Disp: , Rfl:     Omega-3 Fatty Acids (FISH OIL) 1200 MG CAPS, , Disp: , Rfl:     calcium carbonate (OSCAL) 500 MG TABS tablet, Take 500 mg by mouth 2 times daily, Disp: , Rfl:     Turmeric 500 MG CAPS, Take by mouth daily, Disp: , Rfl:     cyclobenzaprine (FLEXERIL) 10 MG tablet, Take 1 tablet by mouth 3 times daily as needed for Muscle spasms WARNING: This medication may make your drowsy. Do not operate heavy machinery or motor vehicles during use., Disp: 15 tablet, Rfl: 0    lisinopril (PRINIVIL;ZESTRIL) 10 MG tablet, Take 10 mg by mouth nightly , Disp: , Rfl:     citalopram (CELEXA) 20 MG tablet, Take 20 mg by mouth daily , Disp: , Rfl:     spironolactone (ALDACTONE) 25 MG tablet, Take 1 tablet by mouth daily. , Disp: 15 tablet, Rfl: 0        Subjective:      Review of Systems   Musculoskeletal: Positive for arthralgias, back pain, myalgias, neck pain and neck stiffness. Neurological: Positive for weakness. Negative for numbness.         Objective:      Vitals                                  Weight: (!) 340 lb (154.2 kg)   Height: 5' 5\" (1.651 m)            Physical Exam  Vitals signs and nursing note reviewed. Constitutional:       General: She is not in acute distress. Appearance: She is well-developed. She is not diaphoretic. HENT:      Head: Normocephalic and atraumatic. Right Ear: External ear normal.      Left Ear: External ear normal.      Nose: Nose normal.      Mouth/Throat:      Pharynx: No oropharyngeal exudate. Deep Tendon Reflexes: Babinski sign absent on the right side. Comments: SLR neg. Psychiatric:         Attention and Perception: She is attentive. Mood and Affect: Mood is not anxious or depressed. Affect is not labile, blunt, angry or inappropriate. Speech: She is communicative. Speech is not rapid and pressured, delayed, slurred or tangential.         Behavior: Behavior normal. Behavior is not agitated, slowed, aggressive, withdrawn, hyperactive or combative. Thought Content: Thought content normal. Thought content is not paranoid or delusional. Thought content does not include homicidal or suicidal ideation. Thought content does not include homicidal or suicidal plan. Cognition and Memory: Memory is not impaired. She does not exhibit impaired recent memory or impaired remote memory. Judgment: Judgment normal. Judgment is not impulsive or inappropriate.         SCOOTER test: + bilaterally   Yeomans test: + bilaterally   Gaenslen test: + bilaterally   Assessment:      1. Primary osteoarthritis of right knee    2. Primary osteoarthritis of both hips    3. Spondylosis of thoracic region without myelopathy or radiculopathy    4. 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 Mid back pain    6.  Chronic pain syndrome          Plan:  Right knee steroid injection       Jody Severin, TAMIE - CNP  1/27/2020

## 2020-02-06 ENCOUNTER — HOSPITAL ENCOUNTER (OUTPATIENT)
Age: 55
Discharge: HOME | End: 2020-02-06
Payer: COMMERCIAL

## 2020-02-06 LAB
ALBUMIN SERPL-MCNC: 4.4 G/DL (ref 3.5–5.1)
ALP BLD-CCNC: 118 U/L (ref 38–126)
ALT SERPL-CCNC: 69 U/L (ref 11–66)
ANION GAP SERPL CALCULATED.3IONS-SCNC: 14 MEQ/L (ref 8–16)
AST SERPL-CCNC: 35 U/L (ref 5–40)
BASOPHILS # BLD: 0.7 %
BASOPHILS ABSOLUTE: 0.1 THOU/MM3 (ref 0–0.1)
BILIRUB SERPL-MCNC: 0.4 MG/DL (ref 0.3–1.2)
BUN BLDV-MCNC: 14 MG/DL (ref 7–22)
CALCIUM SERPL-MCNC: 10.3 MG/DL (ref 8.5–10.5)
CHLORIDE BLD-SCNC: 101 MEQ/L (ref 98–111)
CHOLESTEROL, TOTAL: 210 MG/DL (ref 100–199)
CO2: 27 MEQ/L (ref 23–33)
CREAT SERPL-MCNC: 0.8 MG/DL (ref 0.4–1.2)
EOSINOPHIL # BLD: 3.1 %
EOSINOPHILS ABSOLUTE: 0.2 THOU/MM3 (ref 0–0.4)
ERYTHROCYTE [DISTWIDTH] IN BLOOD BY AUTOMATED COUNT: 13.6 % (ref 11.5–14.5)
ERYTHROCYTE [DISTWIDTH] IN BLOOD BY AUTOMATED COUNT: 43.9 FL (ref 35–45)
GFR SERPL CREATININE-BSD FRML MDRD: 74 ML/MIN/1.73M2
GLUCOSE BLD-MCNC: 123 MG/DL (ref 70–108)
HCT VFR BLD CALC: 44.7 % (ref 37–47)
HDLC SERPL-MCNC: 61 MG/DL
HEMOGLOBIN: 14.7 GM/DL (ref 12–16)
IMMATURE GRANS (ABS): 0.02 THOU/MM3 (ref 0–0.07)
IMMATURE GRANULOCYTES: 0.3 %
LDL CHOLESTEROL CALCULATED: 117 MG/DL
LYMPHOCYTES # BLD: 33 %
LYMPHOCYTES ABSOLUTE: 2.4 THOU/MM3 (ref 1–4.8)
MCH RBC QN AUTO: 29.4 PG (ref 26–33)
MCHC RBC AUTO-ENTMCNC: 32.9 GM/DL (ref 32.2–35.5)
MCV RBC AUTO: 89.4 FL (ref 81–99)
MONOCYTES # BLD: 8.5 %
MONOCYTES ABSOLUTE: 0.6 THOU/MM3 (ref 0.4–1.3)
NUCLEATED RED BLOOD CELLS: 0 /100 WBC
PLATELET # BLD: 342 THOU/MM3 (ref 130–400)
PMV BLD AUTO: 8.8 FL (ref 9.4–12.4)
POTASSIUM SERPL-SCNC: 4.2 MEQ/L (ref 3.5–5.2)
RBC # BLD: 5 MILL/MM3 (ref 4.2–5.4)
SEG NEUTROPHILS: 54.4 %
SEGMENTED NEUTROPHILS ABSOLUTE COUNT: 4 THOU/MM3 (ref 1.8–7.7)
SODIUM BLD-SCNC: 142 MEQ/L (ref 135–145)
TOTAL PROTEIN: 7.9 G/DL (ref 6.1–8)
TRIGL SERPL-MCNC: 159 MG/DL (ref 0–199)
TSH SERPL DL<=0.05 MIU/L-ACNC: 1.09 UIU/ML (ref 0.4–4.2)
WBC # BLD: 7.4 THOU/MM3 (ref 4.8–10.8)

## 2020-02-06 PROCEDURE — 82306 VITAMIN D 25 HYDROXY: CPT

## 2020-02-06 PROCEDURE — 85025 COMPLETE CBC W/AUTO DIFF WBC: CPT

## 2020-02-06 PROCEDURE — 80061 LIPID PANEL: CPT

## 2020-02-06 PROCEDURE — 36415 COLL VENOUS BLD VENIPUNCTURE: CPT

## 2020-02-06 PROCEDURE — 80053 COMPREHEN METABOLIC PANEL: CPT

## 2020-02-06 PROCEDURE — 84443 ASSAY THYROID STIM HORMONE: CPT

## 2020-02-07 ENCOUNTER — APPOINTMENT (OUTPATIENT)
Dept: GENERAL RADIOLOGY | Age: 55
End: 2020-02-07
Attending: PAIN MEDICINE
Payer: COMMERCIAL

## 2020-02-07 ENCOUNTER — HOSPITAL ENCOUNTER (OUTPATIENT)
Age: 55
Setting detail: OUTPATIENT SURGERY
Discharge: HOME | End: 2020-02-07
Attending: PAIN MEDICINE | Admitting: PAIN MEDICINE
Payer: COMMERCIAL

## 2020-02-07 ENCOUNTER — ANESTHESIA (OUTPATIENT)
Dept: OPERATING ROOM | Age: 55
End: 2020-02-07
Payer: COMMERCIAL

## 2020-02-07 ENCOUNTER — ANESTHESIA EVENT (OUTPATIENT)
Dept: OPERATING ROOM | Age: 55
End: 2020-02-07
Payer: COMMERCIAL

## 2020-02-07 VITALS
TEMPERATURE: 97 F | DIASTOLIC BLOOD PRESSURE: 52 MMHG | WEIGHT: 293 LBS | HEART RATE: 88 BPM | SYSTOLIC BLOOD PRESSURE: 112 MMHG | HEIGHT: 65 IN | OXYGEN SATURATION: 93 % | RESPIRATION RATE: 18 BRPM | BODY MASS INDEX: 48.82 KG/M2

## 2020-02-07 VITALS
SYSTOLIC BLOOD PRESSURE: 96 MMHG | OXYGEN SATURATION: 93 % | RESPIRATION RATE: 25 BRPM | DIASTOLIC BLOOD PRESSURE: 54 MMHG

## 2020-02-07 PROCEDURE — 7100000010 HC PHASE II RECOVERY - FIRST 15 MIN: Performed by: PAIN MEDICINE

## 2020-02-07 PROCEDURE — 6360000002 HC RX W HCPCS: Performed by: NURSE ANESTHETIST, CERTIFIED REGISTERED

## 2020-02-07 PROCEDURE — 77002 NEEDLE LOCALIZATION BY XRAY: CPT | Performed by: PAIN MEDICINE

## 2020-02-07 PROCEDURE — 20610 DRAIN/INJ JOINT/BURSA W/O US: CPT | Performed by: PAIN MEDICINE

## 2020-02-07 PROCEDURE — 3700000000 HC ANESTHESIA ATTENDED CARE: Performed by: PAIN MEDICINE

## 2020-02-07 PROCEDURE — 2709999900 HC NON-CHARGEABLE SUPPLY: Performed by: PAIN MEDICINE

## 2020-02-07 PROCEDURE — 2500000003 HC RX 250 WO HCPCS: Performed by: PAIN MEDICINE

## 2020-02-07 PROCEDURE — 3600000002 HC SURGERY LEVEL 2 BASE: Performed by: PAIN MEDICINE

## 2020-02-07 PROCEDURE — 6360000002 HC RX W HCPCS: Performed by: PAIN MEDICINE

## 2020-02-07 PROCEDURE — 7100000011 HC PHASE II RECOVERY - ADDTL 15 MIN: Performed by: PAIN MEDICINE

## 2020-02-07 PROCEDURE — 3209999900 FLUORO FOR SURGICAL PROCEDURES

## 2020-02-07 RX ORDER — PROPOFOL 10 MG/ML
INJECTION, EMULSION INTRAVENOUS PRN
Status: DISCONTINUED | OUTPATIENT
Start: 2020-02-07 | End: 2020-02-07 | Stop reason: SDUPTHER

## 2020-02-07 RX ORDER — LIDOCAINE HYDROCHLORIDE 10 MG/ML
INJECTION, SOLUTION INFILTRATION; PERINEURAL PRN
Status: DISCONTINUED | OUTPATIENT
Start: 2020-02-07 | End: 2020-02-07 | Stop reason: ALTCHOICE

## 2020-02-07 RX ORDER — BUPIVACAINE HYDROCHLORIDE 2.5 MG/ML
INJECTION, SOLUTION EPIDURAL; INFILTRATION; INTRACAUDAL PRN
Status: DISCONTINUED | OUTPATIENT
Start: 2020-02-07 | End: 2020-02-07 | Stop reason: ALTCHOICE

## 2020-02-07 RX ORDER — METHYLPREDNISOLONE ACETATE 80 MG/ML
INJECTION, SUSPENSION INTRA-ARTICULAR; INTRALESIONAL; INTRAMUSCULAR; SOFT TISSUE PRN
Status: DISCONTINUED | OUTPATIENT
Start: 2020-02-07 | End: 2020-02-07 | Stop reason: ALTCHOICE

## 2020-02-07 RX ADMIN — PROPOFOL 140 MG: 10 INJECTION, EMULSION INTRAVENOUS at 08:19

## 2020-02-07 ASSESSMENT — PULMONARY FUNCTION TESTS
PIF_VALUE: 0

## 2020-02-07 ASSESSMENT — PAIN DESCRIPTION - DESCRIPTORS: DESCRIPTORS: ACHING;THROBBING

## 2020-02-07 ASSESSMENT — PAIN - FUNCTIONAL ASSESSMENT: PAIN_FUNCTIONAL_ASSESSMENT: 0-10

## 2020-02-07 ASSESSMENT — PAIN SCALES - GENERAL: PAINLEVEL_OUTOF10: 0

## 2020-02-07 NOTE — ANESTHESIA POSTPROCEDURE EVALUATION
Department of Anesthesiology  Postprocedure Note    Patient: Angelika Bell  MRN: [id number]  YOB: 1965  Date of evaluation: 2/7/2020  Time:  1:31 PM     Procedure Summary     Date:  02/07/20 Room / Location:  [address] [place] [address] 03 / 138 [facility]    Anesthesia Start:  6227 Anesthesia Stop:  4030    Procedure:  Right knee steroid injection (Left ) Diagnosis:  (TROCHANTERIC BURSITIS LEFT HIP)    Surgeon:  Víctor Cancino MD Responsible Provider:  Alexus Hernandez MD    Anesthesia Type:  MAC ASA Status:  3          Anesthesia Type: MAC    Jose Phase I:      Jose Phase II: Jose Score: 10    Last vitals: Reviewed and per EMR flowsheets.        Anesthesia Post Evaluation    Patient location during evaluation: PACU  Patient participation: complete - patient participated  Level of consciousness: awake  Airway patency: patent  Nausea & Vomiting: no vomiting and no nausea  Complications: no  Cardiovascular status: hemodynamically stable  Respiratory status: acceptable  Hydration status: stable

## 2020-02-07 NOTE — ANESTHESIA PRE PROCEDURE
performed by Marylen Guillaume, MD at [facility] [address] Left 2019    T-facet RFA @ T7-8, 8-9, 9-10 LEFT performed by Marylen Guillaume, MD at [facility] 41 FACET LEVEL 1 BILATERAL Bilateral 2019    LUMBAR FACET bilateral T-facet MBB @ T7-T8, T8-T9, and T9-T10 performed by Marylen Guillaume, MD at [place] [address] FACET LEVEL 1 BILATERAL Bilateral 2019    Thoracic FACET bilateral T-facet MBB @ T7-T8, T8-T9, and T9-T10 MBB #2 performed by Marylen Guillaume, MD at [place] [address] Left 2017    HIP INJECTION     NERVE SURGERY Bilateral 2019    bilateral T-facet MBB # 1 @ T4-5, T5-6, and T6-7. performed by Marylen Guillaume, MD at [place] [address]  16    L4-5, L5-S1 Facet injection    OTHER SURGICAL HISTORY  2016    Right Hip Injection    OTHER SURGICAL HISTORY Right 10/16/2017    Hip Injection     [place] ARTHROCENTESIS ASPIR&/INJ MAJOR JT/BURSA W/O US Right 2018    RIGHT HIP LARGE JOINT STEROID INJECTION performed by Marylen Guillaume, MD at [address] [place] [address] OFFICE/OUTPT VISIT,PROCEDURE ONLY N/A 2018    Kyphoplasty T12 BILATERAL performed by Marylen Guillaume, MD at [place] [address]      as child    WISDOM TOOTH EXTRACTION         Social History:    Social History     Tobacco Use    Smoking status: Former Smoker     Packs/day: 1.00     Years: 7.00     Pack years: 7.00     Types: Cigarettes, E-Cigarettes     Start date: 2005     Last attempt to quit: 2011     Years since quittin.1    Smokeless tobacco: Never Used   Substance Use Topics    Alcohol use:  Yes     Alcohol/week: 0.0 standard drinks     Comment: occasionally, 1-3 times a month                                Counseling given: Not Answered      Vital Signs (Current):   Vitals:    20 0727   BP: Cardiovascular:    (+) hypertension:,         Rhythm: regular                      Neuro/Psych:   (+) psychiatric history:            GI/Hepatic/Renal:   (+) morbid obesity          Endo/Other:                     Abdominal:   (+) obese,         Vascular:                                        Anesthesia Plan      MAC     ASA 3       Induction: intravenous. Anesthetic plan and risks discussed with patient. Plan discussed with CRNA.                   Kit Diaz MD   2/7/2020

## 2020-02-07 NOTE — OP NOTE
Pre-Procedure Note    Patient Name: Radha Mike   YOB: 1965  Medical Record Number: [id number]  Date: 2/7/20       Indication:  Right knee pain  Consent: On file. Vital Signs:   Vitals:    02/07/20 0727   BP: 135/81   Pulse: 94   Resp: 16   Temp: 97.2 °F (36.2 °C)   SpO2: 96%       Past Medical History:   has a past medical history of Allergic rhinitis, Depression, Hypertension, ABDI on CPAP, Osteoarthritis, and Vitamin D deficiency. Past Surgical History:   has a past surgical history that includes Dental surgery (2002); [place] tooth extraction; Hand surgery (Right, 05/15/2015); other surgical history (4/11/16); Colonoscopy (2016); other surgical history (06/13/2016); Hemorrhoid surgery (2016); Tonsillectomy; other surgical history (Right, 10/16/2017); arthrocentesis (Right, 10/16/2017); Nerve Surgery (Left, 12/05/2017); arthrocentesis (Left, 12/5/2017); pr arthrocentesis aspir&/inj major jt/bursa w/o us (Right, 9/17/2018); pr office/outpt visit,procedure only (N/A, 11/9/2018); Nerve Block Lumb Facet Level 1 Bilateral (Bilateral, 1/4/2019); Nerve Block Lumb Facet Level 1 Bilateral (Bilateral, 2/25/2019); Lumbar spine surgery (Right, 4/16/2019); Lumbar spine surgery (Left, 6/13/2019); arthrocentesis (Right, 7/18/2019); and Nerve Surgery (Bilateral, 9/13/2019). Pre-Sedation Documentation and Exam:   Vital signs have been reviewed (see flow sheet for vitals). Sedation/ Anesthesia Plan:   intravenous sedation   as needed. Patient is an appropriate candidate for plan of sedation: yes    Preoperative Diagnosis: Osteoarthritis of the Right knee. Postoperative Diagnosis: Osteoarthritis of the Right knee. Procedure Performed:  Injection of Right knee under fluoroscopy    Indication for the Procedure: The patient has Right knee pain not responding to conservative treatment. Examination showed decreased range of motion, swelling and effusion. Tenderness was found.   Medial joint line and lateral joint line tenderness was noted. Hence we decided to inject the Right knee joint for the pain relief. The procedure and the risks were discussed with the patient and an informed consent was obtained. Procedure: The patient is placed in supine/sitting position. The Right knee was flexed to about 90 degrees. Landmarks under fluoroscopy identified. Skin over the Right knee was prepped with Betadine and draped in sterile manner. Then skin and deep tissues medial to the patellar tendon just above the tibial plateau were infiltrated with 3  ml of 1% xylocaine. The #22-gauge 3-1/2 inch spinal needle was introduced through the skin wheal. Then the needle is directed parallel to the tibial plateau and slightly medial direction into the knee joint. Then after negative aspiration a total of 80 mg of depomedrol and 1 ml of 0.25% Marcaine were injected into the joint. The needle is removed and a Band-Aid was placed over the needle insertion site. EBL-0    Patient tolerated the procedure well and the vital signs remained stable. Patient will be seen in the pain clinic in two weeks for follow up and further planning.     Electronically signed by Steven Benitez MD on 2/7/20 at 8:18 AM

## 2020-02-07 NOTE — INTERVAL H&P NOTE
H&P Update    Patient's History and Physical from January 27, 2020 was reviewed. Patient examined. There has been no change.     Electronically signed by Chelsea Carlos MD on 2/7/20 at 7:57 AM

## 2020-02-10 LAB — VITAMIN D2 AND D3, TOTAL: NORMAL

## 2020-02-12 ENCOUNTER — PATIENT MESSAGE (OUTPATIENT)
Dept: PHYSICAL MEDICINE AND REHAB | Age: 55
End: 2020-02-12

## 2020-02-19 RX ORDER — HYDROCODONE BITARTRATE AND ACETAMINOPHEN 5; 325 MG/1; MG/1
1 TABLET ORAL 2 TIMES DAILY PRN
Qty: 60 TABLET | Refills: 0 | Status: SHIPPED | OUTPATIENT
Start: 2020-02-19 | End: 2020-03-20

## 2020-02-19 RX ORDER — HYDROCODONE BITARTRATE AND ACETAMINOPHEN 5; 325 MG/1; MG/1
1 TABLET ORAL 2 TIMES DAILY PRN
Qty: 60 TABLET | Refills: 0 | Status: SHIPPED | OUTPATIENT
Start: 2020-02-19 | End: 2020-03-17 | Stop reason: SDUPTHER

## 2020-02-19 NOTE — TELEPHONE ENCOUNTER
OARRS reviewed. UDS: + for  Flexeril and hydrocodone consistent. Narcan offered: yes  Last seen: 1/13/2020.  Follow-up:   Future Appointments   Date Time Provider Christine Sarina   2/20/2020  7:45 AM Amy Vega APRN - CNP [facility] [place]   5/4/2020  1:45 PM Mely Tineo, [facility]

## 2020-02-20 ENCOUNTER — OFFICE VISIT (OUTPATIENT)
Dept: PHYSICAL MEDICINE AND REHAB | Age: 55
End: 2020-02-20
Payer: COMMERCIAL

## 2020-02-20 VITALS
SYSTOLIC BLOOD PRESSURE: 132 MMHG | WEIGHT: 293 LBS | HEIGHT: 65 IN | BODY MASS INDEX: 48.82 KG/M2 | DIASTOLIC BLOOD PRESSURE: 80 MMHG

## 2020-02-20 PROCEDURE — G8417 CALC BMI ABV UP PARAM F/U: HCPCS | Performed by: NURSE PRACTITIONER

## 2020-02-20 PROCEDURE — G8427 DOCREV CUR MEDS BY ELIG CLIN: HCPCS | Performed by: NURSE PRACTITIONER

## 2020-02-20 PROCEDURE — G8484 FLU IMMUNIZE NO ADMIN: HCPCS | Performed by: NURSE PRACTITIONER

## 2020-02-20 PROCEDURE — 3017F COLORECTAL CA SCREEN DOC REV: CPT | Performed by: NURSE PRACTITIONER

## 2020-02-20 PROCEDURE — 99213 OFFICE O/P EST LOW 20 MIN: CPT | Performed by: NURSE PRACTITIONER

## 2020-02-20 PROCEDURE — 1036F TOBACCO NON-USER: CPT | Performed by: NURSE PRACTITIONER

## 2020-02-20 ASSESSMENT — ENCOUNTER SYMPTOMS: BACK PAIN: 1

## 2020-02-20 NOTE — PROGRESS NOTES
[facility]  [address]  Dept: [phone]  Dept Fax: [phone]: [phone]    Visit Date: 2/20/2020    Functionality Assessment/Goals Worksheet     On a scale of 0 (Does not Interfere) to 10 (Completely Interferes)     1. Which number describes how during the past week pain has interfered with       the following:  A. General Activity:  9  B. Mood: 8  C. Walking Ability:  9  D. Normal Work (Includes both work outside the home and housework):  10  E. Relations with Other People:   6  F. Sleep:   10  G. Enjoyment of Life:   8    2. Patient Prefers to Take their Pain Medications:     [x]  On a regular basis   [x]  Only when necessary    []  Does not take pain medications    3. What are the Patient's Goals/Expectations for Visiting Pain Management? []  Learn about my pain    [x]  Receive Medication   []  Physical Therapy     []  Treat Depression   [x]  Receive Injections    []  Treat Sleep   []  Deal with Anxiety and Stress   []  Treat Opoid Dependence/Addiction   []  Other:      HPI:   René Randhawa is a 53 y.o. female is here today for    Chief Complaint: Right knee pain, back pain     HPI   FU from right knee injection from 2/7/2020. Recieveing relief from kneeinjection of about 60%, definitely better but still has aching pain. Continue pain throughout back and neck- still awaiting to staryt therapy for neck pain. Patient reports T-facet RFA has worn off and would like yto repeat   Norco prn remains effective   Medications reviewed. Patient denies side effects with medications. Patient states she is taking medications as prescribed. Shedenies receiving pain medications from other sources. She denies any ER visits since last visit. Pain scale with out pain medications or at its worst is 10/10. Pain scale with pain medications or at its best is 4/10.   Last dose of per tablet, Take 1 tablet by mouth 2 times daily as needed for Pain for up to 30 days. , Disp: 60 tablet, Rfl: 0    HYDROcodone-acetaminophen (NORCO) 5-325 MG per tablet, Take 1 tablet by mouth 2 times daily as needed for Pain for up to 30 days. , Disp: 60 tablet, Rfl: 0    loratadine (CLARITIN) 10 MG tablet, Take 10 mg by mouth daily, Disp: , Rfl:     Elastic Bandages & Supports (B & B SACROILIAC BELT) MISC, 1 Device by Does not apply route as needed (pain), Disp: 1 each, Rfl: 0    Cholecalciferol (VITAMIN D3) 5000 units CAPS, Take 1 capsule by mouth daily, Disp: , Rfl:     Loratadine-Pseudoephedrine (PX ALLERGY RELIEF D, LORATID, PO), Take 1 tablet by mouth daily, Disp: , Rfl:     Omega-3 Fatty Acids (FISH OIL) 1200 MG CAPS, , Disp: , Rfl:     calcium carbonate (OSCAL) 500 MG TABS tablet, Take 500 mg by mouth 2 times daily, Disp: , Rfl:     Turmeric 500 MG CAPS, Take by mouth daily, Disp: , Rfl:     cyclobenzaprine (FLEXERIL) 10 MG tablet, Take 1 tablet by mouth 3 times daily as needed for Muscle spasms WARNING: This medication may make your drowsy. Do not operate heavy machinery or motor vehicles during use., Disp: 15 tablet, Rfl: 0    lisinopril (PRINIVIL;ZESTRIL) 10 MG tablet, Take 10 mg by mouth nightly , Disp: , Rfl:     citalopram (CELEXA) 20 MG tablet, Take 20 mg by mouth daily , Disp: , Rfl:     spironolactone (ALDACTONE) 25 MG tablet, Take 1 tablet by mouth daily. , Disp: 15 tablet, Rfl: 0    meloxicam (MOBIC) 15 MG tablet, Take 1 tablet by mouth daily, Disp: 30 tablet, Rfl: 2    Subjective:      Review of Systems   Musculoskeletal: Positive for arthralgias, back pain, gait problem, joint swelling, myalgias, neck pain and neck stiffness. Neurological: Positive for weakness. Negative for numbness. Objective:     Vitals:    02/20/20 0741   BP: 132/80   Weight: (!) 334 lb 3.5 oz (151.6 kg)   Height: 5' 5\" (1.651 m)       Physical Exam  Vitals signs and nursing note reviewed.

## 2020-02-27 ENCOUNTER — TELEPHONE (OUTPATIENT)
Dept: PHYSICAL MEDICINE AND REHAB | Age: 55
End: 2020-02-27

## 2020-03-17 RX ORDER — HYDROCODONE BITARTRATE AND ACETAMINOPHEN 5; 325 MG/1; MG/1
1 TABLET ORAL 2 TIMES DAILY PRN
Qty: 60 TABLET | Refills: 0 | Status: CANCELLED | OUTPATIENT
Start: 2020-03-17 | End: 2020-04-16

## 2020-03-17 RX ORDER — HYDROCODONE BITARTRATE AND ACETAMINOPHEN 5; 325 MG/1; MG/1
1 TABLET ORAL 2 TIMES DAILY PRN
Qty: 60 TABLET | Refills: 0 | Status: SHIPPED | OUTPATIENT
Start: 2020-03-20 | End: 2020-04-19

## 2020-04-23 ENCOUNTER — PATIENT MESSAGE (OUTPATIENT)
Dept: PHYSICAL MEDICINE AND REHAB | Age: 55
End: 2020-04-23

## 2020-04-24 RX ORDER — HYDROCODONE BITARTRATE AND ACETAMINOPHEN 5; 325 MG/1; MG/1
1 TABLET ORAL 2 TIMES DAILY PRN
Qty: 60 TABLET | Refills: 0 | Status: SHIPPED | OUTPATIENT
Start: 2020-04-24 | End: 2020-05-20 | Stop reason: SDUPTHER

## 2020-04-24 NOTE — TELEPHONE ENCOUNTER
OARRS reviewed. UDS: + for  Norco and Flexeril consistent. Narcan offered: offered  Last seen: 2/20/2020.  Follow-up: No follow up

## 2020-04-26 RX ORDER — MELOXICAM 15 MG/1
15 TABLET ORAL DAILY
Qty: 30 TABLET | Refills: 2 | Status: SHIPPED | OUTPATIENT
Start: 2020-04-26 | End: 2020-08-10 | Stop reason: SDUPTHER

## 2020-05-04 ENCOUNTER — TELEMEDICINE (OUTPATIENT)
Dept: PULMONOLOGY | Age: 55
End: 2020-05-04
Payer: COMMERCIAL

## 2020-05-04 ENCOUNTER — TELEMEDICINE (OUTPATIENT)
Dept: PHYSICAL MEDICINE AND REHAB | Age: 55
End: 2020-05-04
Payer: COMMERCIAL

## 2020-05-04 PROCEDURE — 3017F COLORECTAL CA SCREEN DOC REV: CPT | Performed by: PHYSICIAN ASSISTANT

## 2020-05-04 PROCEDURE — 1036F TOBACCO NON-USER: CPT | Performed by: PHYSICIAN ASSISTANT

## 2020-05-04 PROCEDURE — G8417 CALC BMI ABV UP PARAM F/U: HCPCS | Performed by: PHYSICIAN ASSISTANT

## 2020-05-04 PROCEDURE — 99213 OFFICE O/P EST LOW 20 MIN: CPT | Performed by: NURSE PRACTITIONER

## 2020-05-04 PROCEDURE — G8427 DOCREV CUR MEDS BY ELIG CLIN: HCPCS | Performed by: NURSE PRACTITIONER

## 2020-05-04 PROCEDURE — G8428 CUR MEDS NOT DOCUMENT: HCPCS | Performed by: PHYSICIAN ASSISTANT

## 2020-05-04 PROCEDURE — 3017F COLORECTAL CA SCREEN DOC REV: CPT | Performed by: NURSE PRACTITIONER

## 2020-05-04 PROCEDURE — 99213 OFFICE O/P EST LOW 20 MIN: CPT | Performed by: PHYSICIAN ASSISTANT

## 2020-05-04 ASSESSMENT — ENCOUNTER SYMPTOMS
STRIDOR: 0
COUGH: 0
RESPIRATORY NEGATIVE: 1
NAUSEA: 0
SHORTNESS OF BREATH: 0
EYES NEGATIVE: 1
SINUS PAIN: 1
BACK PAIN: 1
EYES NEGATIVE: 1
CHEST TIGHTNESS: 0
DIARRHEA: 0
GASTROINTESTINAL NEGATIVE: 1
ALLERGIC/IMMUNOLOGIC NEGATIVE: 1
BACK PAIN: 1
WHEEZING: 0

## 2020-05-04 NOTE — PROGRESS NOTES
[facility] and [address] [place] [address]         [phone]  5/4/2020   Chief Complaint   Patient presents with    Follow-up     ABDI      PAP Download:   Original or initialAHI: 84.9     Date of initial study: 3/7/17    Compliant  97%     Noncompliant 3 %     PAP Type CPAP 15  Avg Hrs/Day 7hr 30min  AHI: 3.0   Recorded compliance dates , 3/30/20-4/28/20   Machine/Mfg: ResMEd  Interface: nasal    Provider:    __-HMBEVERLY Locke        _x_ Arthor Ohm    __ Doyce Breech            __[facility]   __[place]:       __Other        Here is a scan of the most recent download:          Presentation:   Evan Pelaez presents for sleep medicine follow up for obstructive sleep apnea  Since the last visit, Evan Pelaez is doing well with PAP. Here for 1 year follow up. Mask is fitting well. She is sleeping well and feels rested. Equipment issues: The pressure is  acceptable, the mask is acceptable     Sleep issues:  Do you feel better? Yes  More rested? Yes   Better concentration? yes    Progress History:   Since last visit any new medical issues? No  New ER or hospitlal visits? No  Any new or changes in medicines? No  Any new sleep medicines? No        Past Medical History:   Diagnosis Date    Allergic rhinitis     Depression     Hypertension     ABDI on CPAP     Osteoarthritis     Vitamin D deficiency        Past Surgical History:   Procedure Laterality Date    ARTHROCENTESIS Right 10/16/2017    RIGHT HIP LARGE JOINT INJECTION performed by Dane Koch MD at [address] [place] [address] ARTHROCENTESIS Left 12/5/2017    LEFT HIP LARGE JOINT INJECTION performed by Dane Koch MD at [address] [place] [address] ARTHROCENTESIS Right 7/18/2019    Rt.  Knee Steroid Injection performed by Dane Koch MD at [address] [place] [address]  2016   [facility]  2002     right front upper implant    HAND SURGERY Right 05/15/2015    index finger cleaned out D/T infected cat bite    HEMORRHOID SURGERY  2016    series of 3 bandings for internal hemorrhoids, Dr. Elmer Guillory Left 2/7/2020    Right knee steroid injection performed by Jef Tse MD at [address] Right 4/16/2019    T-facet RFA @ T7-8, 8-9, 9-10 performed by Jef Tse MD at [address] Left 6/13/2019    T-facet RFA @ T7-8, 8-9, 9-10 LEFT performed by Jef Tse MD at [place] [address] FACET LEVEL 1 BILATERAL Bilateral 1/4/2019    LUMBAR FACET bilateral T-facet MBB @ T7-T8, T8-T9, and T9-T10 performed by Jef Tse MD at [place] [address] FACET LEVEL 1 BILATERAL Bilateral 2/25/2019    Thoracic FACET bilateral T-facet MBB @ T7-T8, T8-T9, and T9-T10 MBB #2 performed by Jef Tse MD at [place] [address] Left 12/05/2017    HIP INJECTION     NERVE SURGERY Bilateral 9/13/2019    bilateral T-facet MBB # 1 @ T4-5, T5-6, and T6-7. performed by Jef Tse MD at [facility]  4/11/16    L4-5, L5-S1 Facet injection    OTHER SURGICAL HISTORY  06/13/2016    Right Hip Injection    OTHER SURGICAL HISTORY Right 10/16/2017    Hip Injection     [place] ARTHROCENTESIS ASPIR&/INJ MAJOR JT/BURSA W/O US Right 9/17/2018    RIGHT HIP LARGE JOINT STEROID INJECTION performed by Jef Tse MD at [address] [place] [address] OFFICE/OUTPT VISIT,PROCEDURE ONLY N/A 11/9/2018    Kyphoplasty T12 BILATERAL performed by Jef Tse MD at [place] [address]      as child    WISDOM TOOTH EXTRACTION         Social History     Tobacco Use    Smoking status: Former Smoker     Packs/day: 1.00     Years: 7.00     Pack years: 7.00     Types: Cigarettes, E-Cigarettes     Start date: 5/1/2005     Last attempt to quit: 1/1/2011     Years since quitting: 9. 3    Smokeless tobacco: Never Used   Substance Use Topics    Alcohol use: Yes     Alcohol/week: 0.0 standard drinks     Comment: occasionally, 1-3 times a month    Drug use: No       Allergies   Allergen Reactions    [place] [Macadamia Nut Oil] Other (See Comments)     Numbness and Tingling in mouth    Other Other (See Comments)     Artificial sweeteners - migraines    Saccharin     Sulfa Antibiotics Hives    Sulfur        Current Outpatient Medications   Medication Sig Dispense Refill    meloxicam (MOBIC) 15 MG tablet Take 1 tablet by mouth daily 30 tablet 2    HYDROcodone-acetaminophen (NORCO) 5-325 MG per tablet Take 1 tablet by mouth 2 times daily as needed for Pain for up to 30 days. 60 tablet 0    loratadine (CLARITIN) 10 MG tablet Take 10 mg by mouth daily      Elastic Bandages & Supports (B & B SACROILIAC BELT) MISC 1 Device by Does not apply route as needed (pain) 1 each 0    Cholecalciferol (VITAMIN D3) 5000 units CAPS Take 1 capsule by mouth daily      Loratadine-Pseudoephedrine (PX ALLERGY RELIEF D, LORATID, PO) Take 1 tablet by mouth daily      Omega-3 Fatty Acids (FISH OIL) 1200 MG CAPS       calcium carbonate (OSCAL) 500 MG TABS tablet Take 500 mg by mouth 2 times daily      Turmeric 500 MG CAPS Take by mouth daily      cyclobenzaprine (FLEXERIL) 10 MG tablet Take 1 tablet by mouth 3 times daily as needed for Muscle spasms WARNING: This medication may make your drowsy. Do not operate heavy machinery or motor vehicles during use. 15 tablet 0    lisinopril (PRINIVIL;ZESTRIL) 10 MG tablet Take 10 mg by mouth nightly       citalopram (CELEXA) 20 MG tablet Take 20 mg by mouth daily       spironolactone (ALDACTONE) 25 MG tablet Take 1 tablet by mouth daily. 15 tablet 0     No current facility-administered medications for this visit.         Family History   Problem Relation Age of Onset    Substance Abuse Sister     Heart Disease Mother         stent    Cancer Neg Hx     Diabetes

## 2020-05-04 NOTE — PROGRESS NOTES
(N/A, 11/9/2018); Nerve Block Lumb Facet Level 1 Bilateral (Bilateral, 1/4/2019); Nerve Block Lumb Facet Level 1 Bilateral (Bilateral, 2/25/2019); Lumbar spine surgery (Right, 4/16/2019); Lumbar spine surgery (Left, 6/13/2019); arthrocentesis (Right, 7/18/2019); Nerve Surgery (Bilateral, 9/13/2019); and hip surgery (Left, 2/7/2020). Family History  This patient's family history includes Heart Disease in her mother; Substance Abuse in her sister. Social History  Buzz Morton  reports that she quit smoking about 9 years ago. Her smoking use included cigarettes and e-cigarettes. She started smoking about 15 years ago. She has a 7.00 pack-year smoking history. She has never used smokeless tobacco. She reports current alcohol use. She reports that she does not use drugs. Medications    Current Outpatient Medications:     meloxicam (MOBIC) 15 MG tablet, Take 1 tablet by mouth daily, Disp: 30 tablet, Rfl: 2    HYDROcodone-acetaminophen (NORCO) 5-325 MG per tablet, Take 1 tablet by mouth 2 times daily as needed for Pain for up to 30 days. , Disp: 60 tablet, Rfl: 0    loratadine (CLARITIN) 10 MG tablet, Take 10 mg by mouth daily, Disp: , Rfl:     Elastic Bandages & Supports (B & B SACROILIAC BELT) MISC, 1 Device by Does not apply route as needed (pain), Disp: 1 each, Rfl: 0    Cholecalciferol (VITAMIN D3) 5000 units CAPS, Take 1 capsule by mouth daily, Disp: , Rfl:     Loratadine-Pseudoephedrine (PX ALLERGY RELIEF D, LORATID, PO), Take 1 tablet by mouth daily, Disp: , Rfl:     Omega-3 Fatty Acids (FISH OIL) 1200 MG CAPS, , Disp: , Rfl:     calcium carbonate (OSCAL) 500 MG TABS tablet, Take 500 mg by mouth 2 times daily, Disp: , Rfl:     Turmeric 500 MG CAPS, Take by mouth daily, Disp: , Rfl:     cyclobenzaprine (FLEXERIL) 10 MG tablet, Take 1 tablet by mouth 3 times daily as needed for Muscle spasms WARNING: This medication may make your drowsy.  Do not operate heavy machinery or motor vehicles during use., Disp: 15 tablet, Rfl: 0    lisinopril (PRINIVIL;ZESTRIL) 10 MG tablet, Take 10 mg by mouth nightly , Disp: , Rfl:     citalopram (CELEXA) 20 MG tablet, Take 20 mg by mouth daily , Disp: , Rfl:     spironolactone (ALDACTONE) 25 MG tablet, Take 1 tablet by mouth daily. , Disp: 15 tablet, Rfl: 0    Subjective:      Review of Systems   Constitutional: Negative. HENT: Positive for congestion and sinus pain. Eyes: Negative. Respiratory: Negative. Cardiovascular: Positive for leg swelling. Gastrointestinal: Negative. Genitourinary: Negative. Musculoskeletal: Positive for arthralgias, back pain, myalgias, neck pain and neck stiffness. Skin: Negative. Neurological: Negative. Psychiatric/Behavioral: Negative. Objective: There were no vitals filed for this visit. Physical Exam  Constitutional:       Appearance: Normal appearance. HENT:      Head: Normocephalic. Neurological:      Mental Status: She is alert and oriented to person, place, and time. Psychiatric:         Mood and Affect: Mood normal.          Assessment:     1. Spondylosis of thoracic region without myelopathy or radiculopathy    2. Spondylosis of lumbar region without myelopathy or radiculopathy    3. Mid back pain    4. Cervical spondylosis    5. Sacroiliac inflammation ([address]    6. Primary osteoarthritis of right knee    7. Primary osteoarthritis of both hips    8. Chronic pain syndrome            Plan:      · OARRS reviewed. Current MED: 10.00  · Patient was offered naloxone for home. Refused  · Discussed long term side effects of medications, tolerance, dependency and addiction. · Previous UDS reviewed  · UDS preformed today for compliance. · Patient told can not receive any pain medications from any other source. · No evidence of abuse, diversion or aberrant behavior.    Medications and/or procedures to improve function and quality of life- patient understanding with this and that may not be pain

## 2020-05-21 RX ORDER — HYDROCODONE BITARTRATE AND ACETAMINOPHEN 5; 325 MG/1; MG/1
1 TABLET ORAL 2 TIMES DAILY PRN
Qty: 60 TABLET | Refills: 0 | Status: SHIPPED | OUTPATIENT
Start: 2020-05-24 | End: 2020-06-22 | Stop reason: SDUPTHER

## 2020-05-21 NOTE — TELEPHONE ENCOUNTER
OARRS reviewed. UDS: + for  Flexeril, Norco consistent. Narcan offered: offered  Last seen: 5/04/2020.  Follow-up: 06/29/2020

## 2020-06-22 RX ORDER — HYDROCODONE BITARTRATE AND ACETAMINOPHEN 5; 325 MG/1; MG/1
1 TABLET ORAL 2 TIMES DAILY PRN
Qty: 60 TABLET | Refills: 0 | Status: SHIPPED | OUTPATIENT
Start: 2020-06-23 | End: 2020-07-21 | Stop reason: SDUPTHER

## 2020-06-29 ENCOUNTER — OFFICE VISIT (OUTPATIENT)
Dept: PHYSICAL MEDICINE AND REHAB | Age: 55
End: 2020-06-29
Payer: COMMERCIAL

## 2020-06-29 VITALS
TEMPERATURE: 97.1 F | BODY MASS INDEX: 48.82 KG/M2 | WEIGHT: 293 LBS | DIASTOLIC BLOOD PRESSURE: 60 MMHG | HEART RATE: 86 BPM | SYSTOLIC BLOOD PRESSURE: 116 MMHG | HEIGHT: 65 IN

## 2020-06-29 PROCEDURE — 1036F TOBACCO NON-USER: CPT | Performed by: NURSE PRACTITIONER

## 2020-06-29 PROCEDURE — G8417 CALC BMI ABV UP PARAM F/U: HCPCS | Performed by: NURSE PRACTITIONER

## 2020-06-29 PROCEDURE — G8427 DOCREV CUR MEDS BY ELIG CLIN: HCPCS | Performed by: NURSE PRACTITIONER

## 2020-06-29 PROCEDURE — 3017F COLORECTAL CA SCREEN DOC REV: CPT | Performed by: NURSE PRACTITIONER

## 2020-06-29 PROCEDURE — 99213 OFFICE O/P EST LOW 20 MIN: CPT | Performed by: NURSE PRACTITIONER

## 2020-06-29 ASSESSMENT — ENCOUNTER SYMPTOMS: BACK PAIN: 1

## 2020-06-29 NOTE — PROGRESS NOTES
pain medications or at its best is 5-6/10. Last dose of Norco was yesterday  Drug screen reviewed from 1/13/2020 and was appropriate  Pill count was completed today and was appropriate  Patient does not have naloxone available at home. Patient has not required use of naloxone at home since last office visit. The patientis allergic to walnut [macadamia nut oil]; other; saccharin; sulfa antibiotics; and sulfur. Past Medical History  Lizz Yee  has a past medical history of Allergic rhinitis, Depression, Hypertension, ABDI on CPAP, Osteoarthritis, and Vitamin D deficiency. Past Surgical History  The patient  has a past surgical history that includes Dental surgery (2002); [place] tooth extraction; Hand surgery (Right, 05/15/2015); other surgical history (4/11/16); Colonoscopy (2016); other surgical history (06/13/2016); Hemorrhoid surgery (2016); Tonsillectomy; other surgical history (Right, 10/16/2017); arthrocentesis (Right, 10/16/2017); Nerve Surgery (Left, 12/05/2017); arthrocentesis (Left, 12/5/2017); pr arthrocentesis aspir&/inj major jt/bursa w/o us (Right, 9/17/2018); pr office/outpt visit,procedure only (N/A, 11/9/2018); Nerve Block Lumb Facet Level 1 Bilateral (Bilateral, 1/4/2019); Nerve Block Lumb Facet Level 1 Bilateral (Bilateral, 2/25/2019); Lumbar spine surgery (Right, 4/16/2019); Lumbar spine surgery (Left, 6/13/2019); arthrocentesis (Right, 7/18/2019); Nerve Surgery (Bilateral, 9/13/2019); and hip surgery (Left, 2/7/2020). Family History  This patient's family history includes Heart Disease in her mother; Substance Abuse in her sister. Social History  Lizz Yee  reports that she quit smoking about 9 years ago. Her smoking use included cigarettes and e-cigarettes. She started smoking about 15 years ago. She has a 7.00 pack-year smoking history. She has never used smokeless tobacco. She reports current alcohol use. She reports that she does not use drugs.     Medications    Current Outpatient 5\" (1.651 m)       Physical Exam  Vitals signs and nursing note reviewed. Constitutional:       General: She is not in acute distress. Appearance: She is well-developed. She is not diaphoretic. HENT:      Head: Normocephalic and atraumatic. Right Ear: External ear normal.      Left Ear: External ear normal.      Nose: Nose normal.      Mouth/Throat:      Pharynx: No oropharyngeal exudate. Eyes:      General: No scleral icterus. Right eye: No discharge. Left eye: No discharge. Conjunctiva/sclera: Conjunctivae normal.      Pupils: Pupils are equal, round, and reactive to light. Neck:      Musculoskeletal: Full passive range of motion without pain, normal range of motion and neck supple. Normal range of motion. No edema, erythema, neck rigidity or muscular tenderness. Thyroid: No thyromegaly. Trachea: No tracheal deviation. Cardiovascular:      Rate and Rhythm: Normal rate and regular rhythm. Heart sounds: Normal heart sounds. No murmur. No friction rub. No gallop. Pulmonary:      Effort: Pulmonary effort is normal. No respiratory distress. Breath sounds: Normal breath sounds. No wheezing or rales. Chest:      Chest wall: No tenderness. Abdominal:      General: Bowel sounds are normal. There is no distension. Palpations: Abdomen is soft. Tenderness: There is no abdominal tenderness. There is no guarding or rebound. Musculoskeletal:         General: Tenderness present. Right hip: She exhibits tenderness. Left hip: She exhibits decreased range of motion, decreased strength and bony tenderness. Right knee: She exhibits decreased range of motion and bony tenderness. Tenderness found. Cervical back: She exhibits tenderness. Thoracic back: She exhibits decreased range of motion, bony tenderness and pain. Lumbar back: She exhibits decreased range of motion, tenderness, pain and spasm.         Back:

## 2020-07-22 RX ORDER — HYDROCODONE BITARTRATE AND ACETAMINOPHEN 5; 325 MG/1; MG/1
1 TABLET ORAL 2 TIMES DAILY PRN
Qty: 60 TABLET | Refills: 0 | Status: SHIPPED | OUTPATIENT
Start: 2020-07-25 | End: 2020-08-24

## 2020-07-22 NOTE — TELEPHONE ENCOUNTER
OARRS reviewed. UDS: + for  Flexeril, Norco consistent. Narcan offered: offered  Last seen: 6/29/2020.  Follow-up: 08/17/2020

## 2020-07-25 ENCOUNTER — HOSPITAL ENCOUNTER (OUTPATIENT)
Age: 55
Discharge: HOME | End: 2020-07-25
Payer: COMMERCIAL

## 2020-07-25 PROCEDURE — U0003 INFECTIOUS AGENT DETECTION BY NUCLEIC ACID (DNA OR RNA); SEVERE ACUTE RESPIRATORY SYNDROME CORONAVIRUS 2 (SARS-COV-2) (CORONAVIRUS DISEASE [COVID-19]), AMPLIFIED PROBE TECHNIQUE, MAKING USE OF HIGH THROUGHPUT TECHNOLOGIES AS DESCRIBED BY CMS-2020-01-R: HCPCS

## 2020-07-27 LAB — SARS-COV-2: NOT DETECTED

## 2020-07-30 ENCOUNTER — TELEPHONE (OUTPATIENT)
Dept: PHYSICAL MEDICINE AND REHAB | Age: 55
End: 2020-07-30

## 2020-07-31 ENCOUNTER — APPOINTMENT (OUTPATIENT)
Dept: GENERAL RADIOLOGY | Age: 55
End: 2020-07-31
Attending: PAIN MEDICINE
Payer: COMMERCIAL

## 2020-07-31 ENCOUNTER — ANESTHESIA (OUTPATIENT)
Dept: OPERATING ROOM | Age: 55
End: 2020-07-31
Payer: COMMERCIAL

## 2020-07-31 ENCOUNTER — HOSPITAL ENCOUNTER (OUTPATIENT)
Age: 55
Setting detail: OUTPATIENT SURGERY
Discharge: HOME | End: 2020-07-31
Attending: PAIN MEDICINE | Admitting: PAIN MEDICINE
Payer: COMMERCIAL

## 2020-07-31 ENCOUNTER — ANESTHESIA EVENT (OUTPATIENT)
Dept: OPERATING ROOM | Age: 55
End: 2020-07-31
Payer: COMMERCIAL

## 2020-07-31 VITALS
OXYGEN SATURATION: 93 % | SYSTOLIC BLOOD PRESSURE: 113 MMHG | DIASTOLIC BLOOD PRESSURE: 58 MMHG | RESPIRATION RATE: 27 BRPM

## 2020-07-31 VITALS
WEIGHT: 293 LBS | SYSTOLIC BLOOD PRESSURE: 106 MMHG | HEIGHT: 65 IN | DIASTOLIC BLOOD PRESSURE: 47 MMHG | RESPIRATION RATE: 16 BRPM | HEART RATE: 87 BPM | OXYGEN SATURATION: 94 % | TEMPERATURE: 97.3 F | BODY MASS INDEX: 48.82 KG/M2

## 2020-07-31 PROCEDURE — 3209999900 FLUORO FOR SURGICAL PROCEDURES

## 2020-07-31 PROCEDURE — 3700000000 HC ANESTHESIA ATTENDED CARE: Performed by: PAIN MEDICINE

## 2020-07-31 PROCEDURE — 7100000010 HC PHASE II RECOVERY - FIRST 15 MIN: Performed by: PAIN MEDICINE

## 2020-07-31 PROCEDURE — 7100000011 HC PHASE II RECOVERY - ADDTL 15 MIN: Performed by: PAIN MEDICINE

## 2020-07-31 PROCEDURE — 6360000002 HC RX W HCPCS: Performed by: NURSE ANESTHETIST, CERTIFIED REGISTERED

## 2020-07-31 PROCEDURE — 2709999900 HC NON-CHARGEABLE SUPPLY: Performed by: PAIN MEDICINE

## 2020-07-31 PROCEDURE — 2500000003 HC RX 250 WO HCPCS: Performed by: PAIN MEDICINE

## 2020-07-31 PROCEDURE — 64450 NJX AA&/STRD OTHER PN/BRANCH: CPT | Performed by: PAIN MEDICINE

## 2020-07-31 PROCEDURE — 3600000052 HC PAIN LEVEL 2 BASE: Performed by: PAIN MEDICINE

## 2020-07-31 PROCEDURE — 2500000003 HC RX 250 WO HCPCS: Performed by: NURSE ANESTHETIST, CERTIFIED REGISTERED

## 2020-07-31 RX ORDER — PROPOFOL 10 MG/ML
INJECTION, EMULSION INTRAVENOUS PRN
Status: DISCONTINUED | OUTPATIENT
Start: 2020-07-31 | End: 2020-07-31 | Stop reason: SDUPTHER

## 2020-07-31 RX ORDER — LIDOCAINE HYDROCHLORIDE 20 MG/ML
INJECTION, SOLUTION EPIDURAL; INFILTRATION; INTRACAUDAL; PERINEURAL PRN
Status: DISCONTINUED | OUTPATIENT
Start: 2020-07-31 | End: 2020-07-31 | Stop reason: SDUPTHER

## 2020-07-31 RX ORDER — LIDOCAINE HYDROCHLORIDE 10 MG/ML
INJECTION, SOLUTION INFILTRATION; PERINEURAL PRN
Status: DISCONTINUED | OUTPATIENT
Start: 2020-07-31 | End: 2020-07-31 | Stop reason: ALTCHOICE

## 2020-07-31 RX ORDER — BUPIVACAINE HYDROCHLORIDE 2.5 MG/ML
INJECTION, SOLUTION EPIDURAL; INFILTRATION; INTRACAUDAL PRN
Status: DISCONTINUED | OUTPATIENT
Start: 2020-07-31 | End: 2020-07-31 | Stop reason: ALTCHOICE

## 2020-07-31 RX ADMIN — LIDOCAINE HYDROCHLORIDE 50 MG: 20 INJECTION, SOLUTION EPIDURAL; INFILTRATION; INTRACAUDAL; PERINEURAL at 08:35

## 2020-07-31 RX ADMIN — PROPOFOL 190 MG: 10 INJECTION, EMULSION INTRAVENOUS at 08:35

## 2020-07-31 ASSESSMENT — PAIN - FUNCTIONAL ASSESSMENT: PAIN_FUNCTIONAL_ASSESSMENT: 0-10

## 2020-07-31 ASSESSMENT — PULMONARY FUNCTION TESTS
PIF_VALUE: 0

## 2020-07-31 NOTE — ANESTHESIA PRE PROCEDURE
Department of Anesthesiology  Preprocedure Note       Name:  Melene Bloch   Age:  63 y.o.  :  1965                                          MRN:  [id number]         Date:  2020      Surgeon: Chloe Degree):  Amy Mcneill MD    Procedure: Procedure(s):  Right knee genicular nerve block    Medications prior to admission:   Prior to Admission medications    Medication Sig Start Date End Date Taking? Authorizing Provider   HYDROcodone-acetaminophen (NORCO) 5-325 MG per tablet Take 1 tablet by mouth 2 times daily as needed for Pain for up to 30 days. 20 Yes TAMIE Rubalcava CNP   loratadine (CLARITIN) 10 MG tablet Take 10 mg by mouth daily   Yes Historical Provider, MD   Cholecalciferol (VITAMIN D3) 5000 units CAPS Take 1 capsule by mouth daily   Yes Historical Provider, MD   Loratadine-Pseudoephedrine (PX ALLERGY RELIEF D, LORATID, PO) Take 1 tablet by mouth daily   Yes Historical Provider, MD   Omega-3 Fatty Acids (FISH OIL) 1200 MG CAPS  18  Yes Historical Provider, MD   calcium carbonate (OSCAL) 500 MG TABS tablet Take 500 mg by mouth 2 times daily   Yes Historical Provider, MD   Turmeric 500 MG CAPS Take by mouth daily   Yes Historical Provider, MD   cyclobenzaprine (FLEXERIL) 10 MG tablet Take 1 tablet by mouth 3 times daily as needed for Muscle spasms WARNING: This medication may make your drowsy. Do not operate heavy machinery or motor vehicles during use. 16  Yes Heraclio Cerrato PA-C   lisinopril (PRINIVIL;ZESTRIL) 10 MG tablet Take 10 mg by mouth nightly  16  Yes Historical Provider, MD   citalopram (CELEXA) 20 MG tablet Take 20 mg by mouth daily  10/2/15  Yes Historical Provider, MD   spironolactone (ALDACTONE) 25 MG tablet Take 1 tablet by mouth daily.  13  Yes Phoenix Holbrook MD   meloxicam (MOBIC) 15 MG tablet Take 1 tablet by mouth daily 20  TAMIE Rubalcava CNP   Elastic Bandages & Supports (B & B SACROILIAC BELT) MISC 1 injection performed by Ozzie Foster MD at [address] Right 2019    T-facet RFA @ T7-8, 8-9, 9-10 performed by Ozzie Foster MD at [address] Left 2019    T-facet RFA @ T7-8, 8-9, 9-10 LEFT performed by Ozzie Foster MD at [place] [address] FACET LEVEL 1 BILATERAL Bilateral 2019    LUMBAR FACET bilateral T-facet MBB @ T7-T8, T8-T9, and T9-T10 performed by Ozzie Foster MD at [place] [address] FACET LEVEL 1 BILATERAL Bilateral 2019    Thoracic FACET bilateral T-facet MBB @ T7-T8, T8-T9, and T9-T10 MBB #2 performed by Ozzie Foster MD at [place] [address] Left 2017    HIP INJECTION     NERVE SURGERY Bilateral 2019    bilateral T-facet MBB # 1 @ T4-5, T5-6, and T6-7. performed by Ozzie Foster MD at [facility]  16    L4-5, L5-S1 Facet injection    OTHER SURGICAL HISTORY  2016    Right Hip Injection    OTHER SURGICAL HISTORY Right 10/16/2017    Hip Injection     AL ARTHROCENTESIS ASPIR&/INJ MAJOR JT/BURSA W/O US Right 2018    RIGHT HIP LARGE JOINT STEROID INJECTION performed by Ozzie Foster MD at [address] [place] [address] OFFICE/OUTPT VISIT,PROCEDURE ONLY N/A 2018    Kyphoplasty T12 BILATERAL performed by Ozzie Foster MD at [place] [address]      as child    WISDOM TOOTH EXTRACTION         Social History:    Social History     Tobacco Use    Smoking status: Former Smoker     Packs/day: 1.00     Years: 7.00     Pack years: 7.00     Types: Cigarettes, E-Cigarettes     Start date: 2005     Last attempt to quit: 2011     Years since quittin.5    Smokeless tobacco: Never Used   Substance Use Topics    Alcohol use:  Yes     Alcohol/week: 0.0 standard drinks     Comment: Applicable):  No results found for: HIV, HEPCAB    COVID-19 Screening (If Applicable):   Lab Results   Component Value Date    COVID19 Not Detected 07/25/2020         Anesthesia Evaluation    Airway: Mallampati: II  TM distance: >3 FB   Neck ROM: full  Mouth opening: > = 3 FB Dental:          Pulmonary: breath sounds clear to auscultation  (+) sleep apnea:                             Cardiovascular:    (+) hypertension:,         Rhythm: regular                      Neuro/Psych:   (+) psychiatric history:            GI/Hepatic/Renal:   (+) morbid obesity          Endo/Other:                     Abdominal:   (+) obese,         Vascular:                                        Anesthesia Plan      MAC     ASA 3       Induction: intravenous. Anesthetic plan and risks discussed with patient. Plan discussed with CRNA.                   Gil Lozoya MD   7/31/2020

## 2020-07-31 NOTE — H&P
H & P  Continue pain throughout back and neck aching and dull   Still was not able to schedule PT and insurance wont cover repeat T-RFA until 6 weeks of therapy. Patient states \"I have been holding off d/t virus\".      Continues to have aching right knee pain. Does not feel that relief from last knee injection lasted.      Norco remains effective in decreasing pain     Medications reviewed. Patient denies side effects with medications. Patient states she is taking medications as prescribed. Shedenies receiving pain medications from other sources. She denies any ER visits since last visit.     Pain scale with out pain medications or at its worst is 10/10. Pain scale with pain medications or at its best is 5-6/10. Last dose of Norco was yesterday  Drug screen reviewed from 1/13/2020 and was appropriate  Pill count was completed today and was appropriate  Patient does not have naloxone available at home. Patient has not required use of naloxone at home since last office visit.         The patientis allergic to walnut [macadamia nut oil]; other; saccharin; sulfa antibiotics; and sulfur.     Past Medical History  Sarthak Dewey  has a past medical history of Allergic rhinitis, Depression, Hypertension, ABDI on CPAP, Osteoarthritis, and Vitamin D deficiency.     Past Surgical History  The patient  has a past surgical history that includes Dental surgery (2002); [place] tooth extraction; Hand surgery (Right, 05/15/2015); other surgical history (4/11/16); Colonoscopy (2016); other surgical history (06/13/2016); Hemorrhoid surgery (2016); Tonsillectomy; other surgical history (Right, 10/16/2017); arthrocentesis (Right, 10/16/2017); Nerve Surgery (Left, 12/05/2017); arthrocentesis (Left, 12/5/2017); pr arthrocentesis aspir&/inj major jt/bursa w/o us (Right, 9/17/2018); pr office/outpt visit,procedure only (N/A, 11/9/2018); Nerve Block Lumb Facet Level 1 Bilateral (Bilateral, 1/4/2019);  Nerve Block Lumb Facet Level 1 Bilateral (Bilateral, 2/25/2019); Lumbar spine surgery (Right, 4/16/2019); Lumbar spine surgery (Left, 6/13/2019); arthrocentesis (Right, 7/18/2019); Nerve Surgery (Bilateral, 9/13/2019); and hip surgery (Left, 2/7/2020).    Family History  This patient's family history includes Heart Disease in her mother; Substance Abuse in her sister.     Social History  Sina Duong  reports that she quit smoking about 9 years ago. Her smoking use included cigarettes and e-cigarettes. She started smoking about 15 years ago. She has a 7.00 pack-year smoking history. She has never used smokeless tobacco. She reports current alcohol use. She reports that she does not use drugs.     Medications  Current Medication     Current Outpatient Medications:     HYDROcodone-acetaminophen (NORCO) 5-325 MG per tablet, Take 1 tablet by mouth 2 times daily as needed for Pain for up to 30 days. , Disp: 60 tablet, Rfl: 0    meloxicam (MOBIC) 15 MG tablet, Take 1 tablet by mouth daily, Disp: 30 tablet, Rfl: 2    loratadine (CLARITIN) 10 MG tablet, Take 10 mg by mouth daily, Disp: , Rfl:     Elastic Bandages & Supports (B & B SACROILIAC BELT) MISC, 1 Device by Does not apply route as needed (pain), Disp: 1 each, Rfl: 0    Cholecalciferol (VITAMIN D3) 5000 units CAPS, Take 1 capsule by mouth daily, Disp: , Rfl:     Loratadine-Pseudoephedrine (PX ALLERGY RELIEF D, LORATID, PO), Take 1 tablet by mouth daily, Disp: , Rfl:     Omega-3 Fatty Acids (FISH OIL) 1200 MG CAPS, , Disp: , Rfl:     calcium carbonate (OSCAL) 500 MG TABS tablet, Take 500 mg by mouth 2 times daily, Disp: , Rfl:     Turmeric 500 MG CAPS, Take by mouth daily, Disp: , Rfl:     cyclobenzaprine (FLEXERIL) 10 MG tablet, Take 1 tablet by mouth 3 times daily as needed for Muscle spasms WARNING: This medication may make your drowsy.  Do not operate heavy machinery or motor vehicles during use., Disp: 15 tablet, Rfl: 0    lisinopril (PRINIVIL;ZESTRIL) 10 MG tablet, Take 10 mg by mouth nightly , Disp: , Rfl:     citalopram (CELEXA) 20 MG tablet, Take 20 mg by mouth daily , Disp: , Rfl:     spironolactone (ALDACTONE) 25 MG tablet, Take 1 tablet by mouth daily. , Disp: 15 tablet, Rfl: 0        Subjective:      Review of Systems   Cardiovascular: Positive for leg swelling. Musculoskeletal: Positive for arthralgias, back pain, gait problem, joint swelling, myalgias and neck stiffness. Neurological: Positive for weakness. Negative for numbness. Psychiatric/Behavioral: Negative.          Objective:      Vitals       Vitals:     06/29/20 1159   BP: 116/60   Site: Left Upper Arm   Position: Sitting   Cuff Size: Large Adult   Pulse: 86   Temp: 97.1 °F (36.2 °C)   TempSrc: Temporal   Weight: (!) 335 lb (152 kg)   Height: 5' 5\" (1.651 m)           Physical Exam  Vitals signs and nursing note reviewed. Constitutional:       General: She is not in acute distress. Appearance: She is well-developed. She is not diaphoretic. HENT:      Head: Normocephalic and atraumatic. Right Ear: External ear normal.      Left Ear: External ear normal.      Nose: Nose normal.      Mouth/Throat:      Pharynx: No oropharyngeal exudate. Eyes:      General: No scleral icterus. Right eye: No discharge. Left eye: No discharge. Conjunctiva/sclera: Conjunctivae normal.      Pupils: Pupils are equal, round, and reactive to light. Neck:      Musculoskeletal: Full passive range of motion without pain, normal range of motion and neck supple. Normal range of motion. No edema, erythema, neck rigidity or muscular tenderness. Thyroid: No thyromegaly. Trachea: No tracheal deviation. Cardiovascular:      Rate and Rhythm: Normal rate and regular rhythm. Heart sounds: Normal heart sounds. No murmur. No friction rub. No gallop. Pulmonary:      Effort: Pulmonary effort is normal. No respiratory distress. Breath sounds: Normal breath sounds. No wheezing or rales.    Chest:      Chest wall: No tenderness. Abdominal:      General: Bowel sounds are normal. There is no distension. Palpations: Abdomen is soft. Tenderness: There is no abdominal tenderness. There is no guarding or rebound. Musculoskeletal:         General: Tenderness present. Right hip: She exhibits tenderness. Left hip: She exhibits decreased range of motion, decreased strength and bony tenderness. Right knee: She exhibits decreased range of motion and bony tenderness. Tenderness found. Cervical back: She exhibits tenderness. Thoracic back: She exhibits decreased range of motion, bony tenderness and pain. Lumbar back: She exhibits decreased range of motion, tenderness, pain and spasm. Back:         Legs:    Skin:     General: Skin is warm and dry. Coloration: Skin is not pale. Findings: No erythema or rash. Neurological:      Mental Status: She is alert and oriented to person, place, and time. She is not disoriented. Cranial Nerves: No cranial nerve deficit. Sensory: No sensory deficit. Motor: No atrophy or abnormal muscle tone. Coordination: Coordination normal.      Gait: Gait abnormal.      Deep Tendon Reflexes: Babinski sign absent on the right side. Comments: SLR neg. Psychiatric:         Attention and Perception: She is attentive. Mood and Affect: Mood is not anxious or depressed. Affect is not labile, blunt, angry or inappropriate. Speech: She is communicative. Speech is not rapid and pressured, delayed, slurred or tangential.         Behavior: Behavior normal. Behavior is not agitated, slowed, aggressive, withdrawn, hyperactive or combative. Thought Content: Thought content normal. Thought content is not paranoid or delusional. Thought content does not include homicidal or suicidal ideation. Thought content does not include homicidal or suicidal plan. Cognition and Memory: Memory is not impaired.  She does not exhibit impaired recent memory or impaired remote memory. Judgment: Judgment normal. Judgment is not impulsive or inappropriate.         SCOOTER test: + bilaterally   Yeomans test: + bilaterally   Gaenslen test: + bilaterally      Assessment:      1. Primary osteoarthritis of right knee    2. Spondylosis of thoracic region without myelopathy or radiculopathy    3. Spondylosis of lumbar region without myelopathy or radiculopathy    4. Mid back pain    5. Cervical spondylosis    6. Sacroiliac inflammation ([facility] [address]    7. Chronic pain syndrome            Plan:      · OARRS reviewed. Current MED: 10.00  · Patient was offered naloxone for home. Refused. · Discussed long term side effects of medications, tolerance, dependency and addiction. · Previous UDS reviewed  · UDS preformed today for compliance. · Patient told can not receive any pain medications from any other source. · No evidence of abuse, diversion or aberrant behavior. · Medications and/or procedures to improve function and quality of life- patient understanding with this and that may not be pain free  · Discussed with patient about safe storage of medications at home  · Discussed possible weaning of medication dosing dependent on treatment/procedure results. · Discussed with patient about risks with procedure including infection, reaction to medication, increased pain, or bleeding. · Procedure notes reviewed in detail. · Right knee steroid injection did not last very long   · Plan Right knee genicular nerve block for diagnostic. Procedure and risks discussed in detail with patient. · Discussed therapies and then repeating bilateral T-facet RFA. · Medications remain effective, patient is compliant. Continue Norco 5/325 BID prn- Filled 6/25/2020        Meds. Prescribed:     Encounter Medications    No orders of the defined types were placed in this encounter.        Return for Right knee genicular nerve block. , follow up after procedure.

## 2020-07-31 NOTE — H&P
[address] [place] [address]  History and Physical Update    Pt Name: Marisol Starks  MRN: [id number]  YOB: 1965  Date of evaluation: 7/31/2020      I have examined the patient and reviewed the H&P/Consult and there are no changes to the patient or plans.         Electronically signed by Sera Shankar MD on 7/31/2020 at 8:37 AM

## 2020-07-31 NOTE — OP NOTE
Operative Note  Pre-Procedure Note    Patient Name: Tay Cook   YOB: 1965  Medical Record Number: [id number]  Date: 7/31/20       Indication:  Right knee pain  Consent: On file. Vital Signs:   Vitals:    07/31/20 0742   BP: (!) 123/58   Pulse: 89   Resp: 16   Temp: 96.7 °F (35.9 °C)   SpO2: 96%       Past Medical History:   has a past medical history of Allergic rhinitis, Depression, Hypertension, ABDI on CPAP, Osteoarthritis, and Vitamin D deficiency. Past Surgical History:   has a past surgical history that includes Dental surgery (2002); [place] tooth extraction; Hand surgery (Right, 05/15/2015); other surgical history (4/11/16); Colonoscopy (2016); other surgical history (06/13/2016); Hemorrhoid surgery (2016); Tonsillectomy; other surgical history (Right, 10/16/2017); arthrocentesis (Right, 10/16/2017); Nerve Surgery (Left, 12/05/2017); arthrocentesis (Left, 12/5/2017); pr arthrocentesis aspir&/inj major jt/bursa w/o us (Right, 9/17/2018); pr office/outpt visit,procedure only (N/A, 11/9/2018); Nerve Block Lumb Facet Level 1 Bilateral (Bilateral, 1/4/2019); Nerve Block Lumb Facet Level 1 Bilateral (Bilateral, 2/25/2019); Lumbar spine surgery (Right, 4/16/2019); Lumbar spine surgery (Left, 6/13/2019); arthrocentesis (Right, 7/18/2019); Nerve Surgery (Bilateral, 9/13/2019); and hip surgery (Left, 2/7/2020). Pre-Sedation Documentation and Exam:   Vital signs have been reviewed (see flow sheet for vitals). Sedation/ Anesthesia Plan:   MAC    Patient is an appropriate candidate for plan of sedation: yes    Preoperative Diagnosis: Osteoarthritis of the Right knee. Postoperative Diagnosis: Osteoarthritis of the Right knee. Procedure Performed:  Genicular nerve block Right knee under fluoroscopy    Indication for the Procedure: The patient has Right knee pain not responding to conservative treatment. Examination showed decreased range of motion, swelling and effusion.  Tenderness was found. Medial joint line and lateral joint line tenderness was noted. Hence we decided to inject the Right knee joint for the pain relief. The procedure and the risks were discussed with the patient and an informed consent was obtained. Procedure: The patient is placed in supine. The Right knee was flexed to about 90 degrees. Landmarks under fluoroscopy identified. Skin over the Right knee was prepped with Betadine and draped in sterile manner. Landmarks are identified under fluoroscopy guidance and marked ( Superior lateral genicular nerve at the shaft of femur and epicondyle. Then the superior medial genicular nerve at the shaft of femur and epicondyle. Then inferior medial genicular nerve at the tibial shaft, epicondyle/tibia). Subcutaneous infiltration with 1% xylocaine 3 cc is injected at each site. Then under fluoroscopy guidance introduced a 22 g spinal needle at above sites. Needle placement was confirmed by lateral and AP views. After negative aspiration, injected 21cc of 0.25% marcaine . A total of 3 cc of 0.25% marcaine l was used. The needles removed and band-aid placed. Patient tolerated the procedure well and the vital signs remained stable. EBL-0  Patient will be seen in the pain clinic for follow up and further planning.     Electronically signed by Karen Nieto MD on 7/31/20 at 8:38 AM EDT

## 2020-07-31 NOTE — PROGRESS NOTES
5808 patient to phase 2 via cart. Surgery RN at bedside with report. Awake and vitals stable. Injection sites clean and dry. 1845 gave patient snack and drink.     4340 IV removed no complications    8580 patient getting dressed    [id number] escorted to discharge

## 2020-07-31 NOTE — ANESTHESIA POSTPROCEDURE EVALUATION
Department of Anesthesiology  Postprocedure Note    Patient: Mirella Mike  MRN: [id number]  YOB: 1965  Date of evaluation: 7/31/2020  Time:  12:07 PM     Procedure Summary     Date:  07/31/20 Room / Location:  [address] [place] [address] Ilya    Anesthesia Start:  4979 Anesthesia Stop:  4174    Procedure:  Right knee genicular nerve block (Right ) Diagnosis:  (Primary Osteoarthritis of Rt. Knee)    Surgeon:  Haroldo Espinoza MD Responsible Provider:  Judie Tadeo MD    Anesthesia Type:  MAC ASA Status:  3          Anesthesia Type: MAC    Jose Phase I:      Jose Phase II: Jose Score: 10    Last vitals: Reviewed and per EMR flowsheets.        Anesthesia Post Evaluation    Patient location during evaluation: PACU  Patient participation: complete - patient participated  Level of consciousness: awake  Airway patency: patent  Nausea & Vomiting: no vomiting and no nausea  Complications: no  Cardiovascular status: hemodynamically stable  Respiratory status: acceptable  Hydration status: stable

## 2020-08-11 RX ORDER — MELOXICAM 15 MG/1
15 TABLET ORAL DAILY
Qty: 30 TABLET | Refills: 2 | Status: SHIPPED | OUTPATIENT
Start: 2020-08-11 | End: 2021-06-24 | Stop reason: SDUPTHER

## 2020-08-17 ENCOUNTER — TELEMEDICINE (OUTPATIENT)
Dept: PHYSICAL MEDICINE AND REHAB | Age: 55
End: 2020-08-17
Payer: COMMERCIAL

## 2020-08-17 PROCEDURE — G8428 CUR MEDS NOT DOCUMENT: HCPCS | Performed by: NURSE PRACTITIONER

## 2020-08-17 PROCEDURE — 99213 OFFICE O/P EST LOW 20 MIN: CPT | Performed by: NURSE PRACTITIONER

## 2020-08-17 PROCEDURE — 3017F COLORECTAL CA SCREEN DOC REV: CPT | Performed by: NURSE PRACTITIONER

## 2020-08-17 ASSESSMENT — ENCOUNTER SYMPTOMS
RESPIRATORY NEGATIVE: 1
BACK PAIN: 1
VOICE CHANGE: 0
GASTROINTESTINAL NEGATIVE: 1
SORE THROAT: 1
EYES NEGATIVE: 1
SINUS PAIN: 1

## 2020-08-17 NOTE — PROGRESS NOTES
HPI:   Thea Jones is a 63 y.o. female is here today for    Chief Complaint: Right knee pain, back pain, neck pain     HPI   Video Visit. TELEHEALTH EVALUATION -- Audio/Visual (During [id number] public [facility] emergency). Location of visit: patients home, providers office. Patient asked for viseo visit because she has been having a sore throat   FU from right knee genicular nerve block from 7/31/2020. Received about 90% relief of right knee pain for 5 full days. Patient was able to tolerate full activity with less pain. After 3 days right knee pain returned to the level it was prior to procedure. Sharp and aching pain increased with walking. Continues to have  pain throughout back and neck aching and dull. Still was not able to schedule PT and insurance wont cover repeat T-RFA until 6 weeks of therapy. Norco prn remains effective   Medications reviewed. Patient denies side effects with medications. Patient states she is taking medications as prescribed. Shedenies receiving pain medications from other sources. She denies any ER visits since last visit. Pain scale with out pain medications or at its worst is 7/10. Pain scale with pain medications or at its best is 3/10. Last dose of [place] was yesterday  Drug screen reviewed from 6/29/2020 and was appropriate  Pill count was not completed today d/t video visit   Patient does not have naloxone available at home. Patient has not required use of naloxone at home since last office visit. The patientis allergic to walnut [macadamia nut oil]; other; saccharin; sulfa antibiotics; and sulfur. Past Medical History  Tonie Junior  has a past medical history of Allergic rhinitis, Depression, Hypertension, ABDI on CPAP, Osteoarthritis, and Vitamin D deficiency. Past Surgical History  The patient  has a past surgical history that includes Dental surgery (2002);  [place] tooth extraction; Hand surgery (Right, 05/15/2015); other surgical history (4/11/16); Colonoscopy (2016); other surgical history (06/13/2016); Hemorrhoid surgery (2016); Tonsillectomy; other surgical history (Right, 10/16/2017); arthrocentesis (Right, 10/16/2017); Nerve Surgery (Left, 12/05/2017); arthrocentesis (Left, 12/5/2017); pr arthrocentesis aspir&/inj major jt/bursa w/o us (Right, 9/17/2018); pr office/outpt visit,procedure only (N/A, 11/9/2018); Nerve Block Lumb Facet Level 1 Bilateral (Bilateral, 1/4/2019); Nerve Block Lumb Facet Level 1 Bilateral (Bilateral, 2/25/2019); Lumbar spine surgery (Right, 4/16/2019); Lumbar spine surgery (Left, 6/13/2019); arthrocentesis (Right, 7/18/2019); Nerve Surgery (Bilateral, 9/13/2019); hip surgery (Left, 2/7/2020); and Pain management procedure (Right, 7/31/2020). Family History  This patient's family history includes Heart Disease in her mother; Substance Abuse in her sister. Social History  Deborah  reports that she quit smoking about 9 years ago. Her smoking use included cigarettes and e-cigarettes. She started smoking about 15 years ago. She has a 7.00 pack-year smoking history. She has never used smokeless tobacco. She reports current alcohol use. She reports that she does not use drugs. Medications    Current Outpatient Medications:     meloxicam (MOBIC) 15 MG tablet, Take 1 tablet by mouth daily, Disp: 30 tablet, Rfl: 2    HYDROcodone-acetaminophen (NORCO) 5-325 MG per tablet, Take 1 tablet by mouth 2 times daily as needed for Pain for up to 30 days. , Disp: 60 tablet, Rfl: 0    loratadine (CLARITIN) 10 MG tablet, Take 10 mg by mouth daily, Disp: , Rfl:     Elastic Bandages & Supports (B & B SACROILIAC BELT) MISC, 1 Device by Does not apply route as needed (pain), Disp: 1 each, Rfl: 0    Cholecalciferol (VITAMIN D3) 5000 units CAPS, Take 1 capsule by mouth daily, Disp: , Rfl:     Loratadine-Pseudoephedrine (PX ALLERGY RELIEF D, LORATID, PO), Take 1 tablet by mouth daily, Disp: , Rfl:     Omega-3 Fatty Acids (FISH OIL) 1200 MG CAPS, , Disp: , Rfl:     calcium carbonate (OSCAL) 500 MG TABS tablet, Take 500 mg by mouth 2 times daily, Disp: , Rfl:     Turmeric 500 MG CAPS, Take by mouth daily, Disp: , Rfl:     cyclobenzaprine (FLEXERIL) 10 MG tablet, Take 1 tablet by mouth 3 times daily as needed for Muscle spasms WARNING: This medication may make your drowsy. Do not operate heavy machinery or motor vehicles during use., Disp: 15 tablet, Rfl: 0    lisinopril (PRINIVIL;ZESTRIL) 10 MG tablet, Take 10 mg by mouth nightly , Disp: , Rfl:     citalopram (CELEXA) 20 MG tablet, Take 20 mg by mouth daily , Disp: , Rfl:     spironolactone (ALDACTONE) 25 MG tablet, Take 1 tablet by mouth daily. , Disp: 15 tablet, Rfl: 0    Subjective:      Review of Systems   Constitutional: Negative. Negative for fatigue and fever. HENT: Positive for congestion, sinus pain and sore throat. Negative for voice change. Eyes: Negative. Respiratory: Negative. Cardiovascular: Positive for leg swelling. Gastrointestinal: Negative. Genitourinary: Negative. Musculoskeletal: Positive for arthralgias, back pain, myalgias, neck pain and neck stiffness. Skin: Negative. Neurological: Negative. Psychiatric/Behavioral: Negative. Objective: There were no vitals filed for this visit. Physical Exam  Constitutional:       Appearance: She is obese. She is not ill-appearing. HENT:      Head: Normocephalic and atraumatic. Neurological:      General: No focal deficit present. Mental Status: She is alert and oriented to person, place, and time. Psychiatric:         Mood and Affect: Mood normal.          Assessment:     1. Primary osteoarthritis of right knee    2. Spondylosis of thoracic region without myelopathy or radiculopathy    3. Spondylosis of lumbar region without myelopathy or radiculopathy    4. Mid back pain    5. Cervical spondylosis    6. Sacroiliac inflammation ([address]    7. Primary osteoarthritis of both hips    8.  Chronic pain syndrome            Plan:      · OARRS reviewed. Current MED: 10.00  · Patient was offered naloxone for home. Refused. · Discussed long term side effects of medications, tolerance, dependency and addiction. · Previous UDS reviewed  · UDS preformed today for compliance. · Patient told can not receive any pain medications from any other source. · No evidence of abuse, diversion or aberrant behavior.  Medications and/or procedures to improve function and quality of life- patient understanding with this and that may not be pain free   Discussed with patient about safe storage of medications at home   Discussed possible weaning of medication dosing dependent on treatment/procedure results.  Discussed with patient about risks with procedure including infection, reaction to medication, increased pain, or bleeding   Received about 90% relief of right knee pain for 5 full days with improvement of mobility from right knee genicular nerve block. · Plan Right knee genicular nerve RFA for longer term pain relief. Procedure and risks discussed in detail with patient. · Discussed therapies and then repeating bilateral T-facet RFA. · Medications remain effective, patient is compliant. Continue Norco 5/325 BID prn- Filled 7/25/2020- still has plenty as patient takes prn       Meds. Prescribed:   No orders of the defined types were placed in this encounter. Return for Right knee genicular nerve RFA. , follow up after procedure. Time spent with patient was 15 minutes, more than 50% was spent Counseling/coordinated the patient's care.       Electronically signed by TAMIE Heaton CNP on8/17/2020 at 9:15 AM

## 2020-08-25 ENCOUNTER — HOSPITAL ENCOUNTER (OUTPATIENT)
Age: 55
Discharge: HOME | End: 2020-08-25
Payer: COMMERCIAL

## 2020-08-25 LAB
ALBUMIN SERPL-MCNC: 4.2 G/DL (ref 3.5–5.1)
ALP BLD-CCNC: 111 U/L (ref 38–126)
ALT SERPL-CCNC: 60 U/L (ref 11–66)
ANION GAP SERPL CALCULATED.3IONS-SCNC: 8 MEQ/L (ref 8–16)
AST SERPL-CCNC: 33 U/L (ref 5–40)
BASOPHILS # BLD: 0.8 %
BASOPHILS ABSOLUTE: 0.1 THOU/MM3 (ref 0–0.1)
BILIRUB SERPL-MCNC: 0.4 MG/DL (ref 0.3–1.2)
BUN BLDV-MCNC: 14 MG/DL (ref 7–22)
CALCIUM SERPL-MCNC: 10 MG/DL (ref 8.5–10.5)
CHLORIDE BLD-SCNC: 107 MEQ/L (ref 98–111)
CHOLESTEROL, TOTAL: 178 MG/DL (ref 100–199)
CO2: 24 MEQ/L (ref 23–33)
CREAT SERPL-MCNC: 0.8 MG/DL (ref 0.4–1.2)
EOSINOPHIL # BLD: 2.8 %
EOSINOPHILS ABSOLUTE: 0.2 THOU/MM3 (ref 0–0.4)
ERYTHROCYTE [DISTWIDTH] IN BLOOD BY AUTOMATED COUNT: 13.8 % (ref 11.5–14.5)
ERYTHROCYTE [DISTWIDTH] IN BLOOD BY AUTOMATED COUNT: 45.3 FL (ref 35–45)
GFR SERPL CREATININE-BSD FRML MDRD: 74 ML/MIN/1.73M2
GLUCOSE BLD-MCNC: 98 MG/DL (ref 70–108)
HCT VFR BLD CALC: 41.7 % (ref 37–47)
HDLC SERPL-MCNC: 60 MG/DL
HEMOGLOBIN: 13.5 GM/DL (ref 12–16)
IMMATURE GRANS (ABS): 0.01 THOU/MM3 (ref 0–0.07)
IMMATURE GRANULOCYTES: 0.1 %
LDL CHOLESTEROL CALCULATED: 99 MG/DL
LYMPHOCYTES # BLD: 30.9 %
LYMPHOCYTES ABSOLUTE: 2.2 THOU/MM3 (ref 1–4.8)
MCH RBC QN AUTO: 29.2 PG (ref 26–33)
MCHC RBC AUTO-ENTMCNC: 32.4 GM/DL (ref 32.2–35.5)
MCV RBC AUTO: 90.1 FL (ref 81–99)
MONOCYTES # BLD: 9 %
MONOCYTES ABSOLUTE: 0.6 THOU/MM3 (ref 0.4–1.3)
NUCLEATED RED BLOOD CELLS: 0 /100 WBC
PLATELET # BLD: 326 THOU/MM3 (ref 130–400)
PMV BLD AUTO: 9 FL (ref 9.4–12.4)
POTASSIUM SERPL-SCNC: 4.7 MEQ/L (ref 3.5–5.2)
RBC # BLD: 4.63 MILL/MM3 (ref 4.2–5.4)
SEG NEUTROPHILS: 56.4 %
SEGMENTED NEUTROPHILS ABSOLUTE COUNT: 4.1 THOU/MM3 (ref 1.8–7.7)
SODIUM BLD-SCNC: 139 MEQ/L (ref 135–145)
TOTAL PROTEIN: 7.2 G/DL (ref 6.1–8)
TRIGL SERPL-MCNC: 93 MG/DL (ref 0–199)
TSH SERPL DL<=0.05 MIU/L-ACNC: 0.84 UIU/ML (ref 0.4–4.2)
WBC # BLD: 7.2 THOU/MM3 (ref 4.8–10.8)

## 2020-08-25 PROCEDURE — 84443 ASSAY THYROID STIM HORMONE: CPT

## 2020-08-25 PROCEDURE — 36415 COLL VENOUS BLD VENIPUNCTURE: CPT

## 2020-08-25 PROCEDURE — 80061 LIPID PANEL: CPT

## 2020-08-25 PROCEDURE — 80053 COMPREHEN METABOLIC PANEL: CPT

## 2020-08-25 PROCEDURE — 82306 VITAMIN D 25 HYDROXY: CPT

## 2020-08-25 PROCEDURE — 85025 COMPLETE CBC W/AUTO DIFF WBC: CPT

## 2020-08-28 ENCOUNTER — TELEPHONE (OUTPATIENT)
Dept: PHYSICAL MEDICINE AND REHAB | Age: 55
End: 2020-08-28

## 2020-08-31 ENCOUNTER — HOSPITAL ENCOUNTER (OUTPATIENT)
Age: 55
Setting detail: OUTPATIENT SURGERY
Discharge: HOME | End: 2020-08-31
Attending: PAIN MEDICINE | Admitting: PAIN MEDICINE
Payer: COMMERCIAL

## 2020-08-31 ENCOUNTER — APPOINTMENT (OUTPATIENT)
Dept: GENERAL RADIOLOGY | Age: 55
End: 2020-08-31
Attending: PAIN MEDICINE
Payer: COMMERCIAL

## 2020-08-31 ENCOUNTER — ANESTHESIA (OUTPATIENT)
Dept: OPERATING ROOM | Age: 55
End: 2020-08-31
Payer: COMMERCIAL

## 2020-08-31 ENCOUNTER — ANESTHESIA EVENT (OUTPATIENT)
Dept: OPERATING ROOM | Age: 55
End: 2020-08-31
Payer: COMMERCIAL

## 2020-08-31 VITALS
BODY MASS INDEX: 48.82 KG/M2 | HEIGHT: 65 IN | WEIGHT: 293 LBS | DIASTOLIC BLOOD PRESSURE: 69 MMHG | TEMPERATURE: 96.4 F | OXYGEN SATURATION: 94 % | RESPIRATION RATE: 16 BRPM | HEART RATE: 62 BPM | SYSTOLIC BLOOD PRESSURE: 117 MMHG

## 2020-08-31 VITALS
OXYGEN SATURATION: 92 % | SYSTOLIC BLOOD PRESSURE: 129 MMHG | DIASTOLIC BLOOD PRESSURE: 75 MMHG | RESPIRATION RATE: 26 BRPM

## 2020-08-31 LAB — VITAMIN D2 AND D3, TOTAL: NORMAL

## 2020-08-31 PROCEDURE — 3600000056 HC PAIN LEVEL 4 BASE: Performed by: PAIN MEDICINE

## 2020-08-31 PROCEDURE — 64640 INJECTION TREATMENT OF NERVE: CPT | Performed by: PAIN MEDICINE

## 2020-08-31 PROCEDURE — 3600000057 HC PAIN LEVEL 4 ADDL 15 MIN: Performed by: PAIN MEDICINE

## 2020-08-31 PROCEDURE — 3700000001 HC ADD 15 MINUTES (ANESTHESIA): Performed by: PAIN MEDICINE

## 2020-08-31 PROCEDURE — 2709999900 HC NON-CHARGEABLE SUPPLY: Performed by: PAIN MEDICINE

## 2020-08-31 PROCEDURE — 7100000010 HC PHASE II RECOVERY - FIRST 15 MIN: Performed by: PAIN MEDICINE

## 2020-08-31 PROCEDURE — 6360000002 HC RX W HCPCS: Performed by: NURSE ANESTHETIST, CERTIFIED REGISTERED

## 2020-08-31 PROCEDURE — 3700000000 HC ANESTHESIA ATTENDED CARE: Performed by: PAIN MEDICINE

## 2020-08-31 PROCEDURE — 7100000011 HC PHASE II RECOVERY - ADDTL 15 MIN: Performed by: PAIN MEDICINE

## 2020-08-31 PROCEDURE — 3209999900 FLUORO FOR SURGICAL PROCEDURES

## 2020-08-31 PROCEDURE — 2500000003 HC RX 250 WO HCPCS: Performed by: PAIN MEDICINE

## 2020-08-31 RX ORDER — HYDROCODONE BITARTRATE AND ACETAMINOPHEN 5; 325 MG/1; MG/1
1 TABLET ORAL 2 TIMES DAILY PRN
COMMUNITY
End: 2020-09-21 | Stop reason: SDUPTHER

## 2020-08-31 RX ORDER — PROPOFOL 10 MG/ML
INJECTION, EMULSION INTRAVENOUS PRN
Status: DISCONTINUED | OUTPATIENT
Start: 2020-08-31 | End: 2020-08-31 | Stop reason: SDUPTHER

## 2020-08-31 RX ORDER — KETOROLAC TROMETHAMINE 30 MG/ML
INJECTION, SOLUTION INTRAMUSCULAR; INTRAVENOUS PRN
Status: DISCONTINUED | OUTPATIENT
Start: 2020-08-31 | End: 2020-08-31 | Stop reason: SDUPTHER

## 2020-08-31 RX ORDER — LIDOCAINE HYDROCHLORIDE 10 MG/ML
INJECTION, SOLUTION EPIDURAL; INFILTRATION; INTRACAUDAL; PERINEURAL PRN
Status: DISCONTINUED | OUTPATIENT
Start: 2020-08-31 | End: 2020-08-31 | Stop reason: ALTCHOICE

## 2020-08-31 RX ORDER — BUPIVACAINE HYDROCHLORIDE 2.5 MG/ML
INJECTION, SOLUTION EPIDURAL; INFILTRATION; INTRACAUDAL PRN
Status: DISCONTINUED | OUTPATIENT
Start: 2020-08-31 | End: 2020-08-31 | Stop reason: ALTCHOICE

## 2020-08-31 RX ADMIN — PROPOFOL 100 MG: 10 INJECTION, EMULSION INTRAVENOUS at 08:50

## 2020-08-31 RX ADMIN — PROPOFOL 100 MG: 10 INJECTION, EMULSION INTRAVENOUS at 08:44

## 2020-08-31 RX ADMIN — PROPOFOL 100 MG: 10 INJECTION, EMULSION INTRAVENOUS at 08:58

## 2020-08-31 RX ADMIN — PROPOFOL 100 MG: 10 INJECTION, EMULSION INTRAVENOUS at 08:52

## 2020-08-31 RX ADMIN — PROPOFOL 100 MG: 10 INJECTION, EMULSION INTRAVENOUS at 08:55

## 2020-08-31 RX ADMIN — KETOROLAC TROMETHAMINE 30 MG: 30 INJECTION, SOLUTION INTRAMUSCULAR at 09:01

## 2020-08-31 ASSESSMENT — PAIN DESCRIPTION - PAIN TYPE: TYPE: CHRONIC PAIN

## 2020-08-31 ASSESSMENT — PAIN DESCRIPTION - LOCATION: LOCATION: KNEE

## 2020-08-31 ASSESSMENT — PAIN - FUNCTIONAL ASSESSMENT: PAIN_FUNCTIONAL_ASSESSMENT: 0-10

## 2020-08-31 ASSESSMENT — PAIN SCALES - GENERAL: PAINLEVEL_OUTOF10: 5

## 2020-08-31 NOTE — OP NOTE
Operative Note    Pre-Procedure Note    Patient Name: Thea Jones   YOB: 1965  Medical Record Number: [id number]  Date: 8/31/20       Indication:  Right knee pain  Consent: On file. Vital Signs:   Vitals:    08/31/20 0724   BP: 131/61   Pulse: 76   Resp: 16   Temp: 97.3 °F (36.3 °C)   SpO2: 96%       Past Medical History:   has a past medical history of Allergic rhinitis, Depression, Hypertension, ABDI on CPAP, Osteoarthritis, and Vitamin D deficiency. Past Surgical History:   has a past surgical history that includes Dental surgery (2002); [place] tooth extraction; Hand surgery (Right, 05/15/2015); other surgical history (4/11/16); Colonoscopy (2016); other surgical history (06/13/2016); Hemorrhoid surgery (2016); Tonsillectomy; other surgical history (Right, 10/16/2017); arthrocentesis (Right, 10/16/2017); Nerve Surgery (Left, 12/05/2017); arthrocentesis (Left, 12/5/2017); pr arthrocentesis aspir&/inj major jt/bursa w/o us (Right, 9/17/2018); pr office/outpt visit,procedure only (N/A, 11/9/2018); Nerve Block Lumb Facet Level 1 Bilateral (Bilateral, 1/4/2019); Nerve Block Lumb Facet Level 1 Bilateral (Bilateral, 2/25/2019); Lumbar spine surgery (Right, 4/16/2019); Lumbar spine surgery (Left, 6/13/2019); arthrocentesis (Right, 7/18/2019); Nerve Surgery (Bilateral, 9/13/2019); hip surgery (Left, 2/7/2020); and Pain management procedure (Right, 7/31/2020). Pre-Sedation Documentation and Exam:   Vital signs have been reviewed (see flow sheet for vitals). Sedation/ Anesthesia Plan:   MAC    Patient is an appropriate candidate for plan of sedation: yes    Preoperative Diagnosis: Osteoarthritis of the Right knee. Postoperative Diagnosis: Osteoarthritis of the Right knee. Procedure Performed:  Right                                           1.Superolateral genicular branch from the vastus lateralis          2. Superomedial genicular branch from the vastus medialis.            3. planning.     Electronically signed by Tanya Ponce MD on 8/31/20 at 8:44 AM EDT

## 2020-08-31 NOTE — ANESTHESIA PRE PROCEDURE
Department of Anesthesiology  Preprocedure Note       Name:  Monica Rosa   Age:  63 y.o.  :  1965                                          MRN:  [id number]         Date:  2020      Surgeon: Camron Francis):  Manjinder Devlin MD    Procedure: Procedure(s):  Right knee genicular nerve RFA    Medications prior to admission:   Prior to Admission medications    Medication Sig Start Date End Date Taking? Authorizing Provider   HYDROcodone-acetaminophen (NORCO) 5-325 MG per tablet Take 1 tablet by mouth 2 times daily as needed for Pain. Yes Historical Provider, MD   meloxicam (MOBIC) 15 MG tablet Take 1 tablet by mouth daily 20 Yes TAMIE العراقي CNP   loratadine (CLARITIN) 10 MG tablet Take 10 mg by mouth daily   Yes Historical Provider, MD   Cholecalciferol (VITAMIN D3) 5000 units CAPS Take 1 capsule by mouth daily   Yes Historical Provider, MD   Omega-3 Fatty Acids (FISH OIL) 1200 MG CAPS  18  Yes Historical Provider, MD   calcium carbonate (OSCAL) 500 MG TABS tablet Take 500 mg by mouth 2 times daily   Yes Historical Provider, MD   Turmeric 500 MG CAPS Take by mouth daily   Yes Historical Provider, MD   cyclobenzaprine (FLEXERIL) 10 MG tablet Take 1 tablet by mouth 3 times daily as needed for Muscle spasms WARNING: This medication may make your drowsy. Do not operate heavy machinery or motor vehicles during use. 16  Yes Junior Sharda PA-C   lisinopril (PRINIVIL;ZESTRIL) 10 MG tablet Take 10 mg by mouth nightly  16  Yes Historical Provider, MD   citalopram (CELEXA) 20 MG tablet Take 20 mg by mouth daily  10/2/15  Yes Historical Provider, MD   spironolactone (ALDACTONE) 25 MG tablet Take 1 tablet by mouth daily.  13  Yes Jg Muñoz MD   Elastic Bandages & Supports (B & B SACROILIAC BELT) MISC 1 Device by Does not apply route as needed (pain) 18   TAMIE Rubin CNP       Current medications:    No current facility-administered performed by Balaji Nelson MD at [facility] in [place] Left 2019    T-facet RFA @ T7-8, 8-9, 9-10 LEFT performed by Balaji Nelson MD at [facility] FACET LEVEL 1 BILATERAL Bilateral 2019    LUMBAR FACET bilateral T-facet MBB @ T7-T8, T8-T9, and T9-T10 performed by Balaji Nelson MD at [place] [address] FACET LEVEL 1 BILATERAL Bilateral 2019    Thoracic FACET bilateral T-facet MBB @ T7-T8, T8-T9, and T9-T10 MBB #2 performed by Balaji Nelson MD at [place] [address] Left 2017    HIP INJECTION     NERVE SURGERY Bilateral 2019    bilateral T-facet MBB # 1 @ T4-5, T5-6, and T6-7. performed by Balaji Nelson MD at [place] [address]  16    L4-5, L5-S1 Facet injection    OTHER SURGICAL HISTORY  2016    Right Hip Injection    OTHER SURGICAL HISTORY Right 10/16/2017    Hip Injection     PAIN MANAGEMENT PROCEDURE Right 2020    Right knee genicular nerve block performed by Balaji Nelson MD at [address] ARTHROCENTESIS ASPIR&/INJ MAJOR JT/BURSA W/O US Right 2018    RIGHT HIP LARGE JOINT STEROID INJECTION performed by Balaji Nelson MD at [address] OFFICE/OUTPT VISIT,PROCEDURE ONLY N/A 2018    Kyphoplasty T12 BILATERAL performed by Balaji Nelson MD at [place] [address]      as child    WISDOM TOOTH EXTRACTION         Social History:    Social History     Tobacco Use    Smoking status: Former Smoker     Packs/day: 1.00     Years: 7.00     Pack years: 7.00     Types: Cigarettes, E-Cigarettes     Start date: 2005     Last attempt to quit: 2011     Years since quittin.6    Smokeless tobacco: Never Used   Substance Use Topics    Alcohol use:  Yes     Alcohol/week: 0.0 standard drinks     Comment: occasionally, HEPCAB    COVID-19 Screening (If Applicable):   Lab Results   Component Value Date    COVID19 Not Detected 07/25/2020         Anesthesia Evaluation    Airway: Mallampati: II       Mouth opening: > = 3 FB Dental:          Pulmonary:   (+) sleep apnea:                             Cardiovascular:    (+) hypertension:,                   Neuro/Psych:   (+) psychiatric history:            GI/Hepatic/Renal:             Endo/Other:                     Abdominal:           Vascular:                                        Anesthesia Plan      MAC     ASA 2             Anesthetic plan and risks discussed with patient. Plan discussed with CRNA.                   Adan Larson MD   8/31/2020

## 2020-08-31 NOTE — PROGRESS NOTES
0844: Patient to phase 2 recovery room via cart. Patient is drowsy but arouses easily to name. Report received from surgical RN, Carola Borjas. Patient's vitals obtained, see charting.   5181: Patient is denying nausea and rating pain a \"5\"/10. IV is INT'd.   [id number]: Offered drink and snack provided to the patient. Bed is in the lowest position and call light is within reach. 9344: IV removed at this time- no complications and dressing applied. 3353: Patient's ride called at this time- she stated she would be here in 15 minutes. 9481: Patient ambulated to the car and discharged home in stable condition with, [facility].

## 2020-08-31 NOTE — H&P
[facility]  History and Physical Update    Pt Name: Thea Jones  MRN: [id number]  YOB: 1965  Date of evaluation: 8/31/2020      I have examined the patient and reviewed the H&P/Consult and there are no changes to the patient or plans.         Electronically signed by Zari Fonseca MD on 8/31/2020 at 8:37 AM

## 2020-08-31 NOTE — H&P
H&P    FU from right knee genicular nerve block from 7/31/2020. Received about 90% relief of right knee pain for 5 full days. Patient was able to tolerate full activity with less pain. After 3 days right knee pain returned to the level it was prior to procedure. Sharp and aching pain increased with walking.      Continues to have  pain throughout back and neck aching and dull. Still was not able to schedule PT and insurance wont cover repeat T-RFA until 6 weeks of therapy.     Norco prn remains effective   Medications reviewed. Patient denies side effects with medications. Patient states she is taking medications as prescribed. Shedenies receiving pain medications from other sources. She denies any ER visits since last visit.     Pain scale with out pain medications or at its worst is 7/10. Pain scale with pain medications or at its best is 3/10. Last dose of [place] was yesterday  Drug screen reviewed from 6/29/2020 and was appropriate  Pill count was not completed today d/t video visit   Patient does not have naloxone available at home. Patient has not required use of naloxone at home since last office visit.         The patientis allergic to walnut [macadamia nut oil]; other; saccharin; sulfa antibiotics; and sulfur.     Past Medical History  Hermann Omer  has a past medical history of Allergic rhinitis, Depression, Hypertension, ABDI on CPAP, Osteoarthritis, and Vitamin D deficiency.     Past Surgical History  The patient  has a past surgical history that includes Dental surgery (2002); [place] tooth extraction; Hand surgery (Right, 05/15/2015); other surgical history (4/11/16); Colonoscopy (2016); other surgical history (06/13/2016); Hemorrhoid surgery (2016); Tonsillectomy; other surgical history (Right, 10/16/2017); arthrocentesis (Right, 10/16/2017);  Nerve Surgery (Left, 12/05/2017); arthrocentesis (Left, 12/5/2017); pr arthrocentesis aspir&/inj major jt/bursa w/o us (Right, 9/17/2018); pr office/outpt visit,procedure only (N/A, 11/9/2018); Nerve Block Lumb Facet Level 1 Bilateral (Bilateral, 1/4/2019); Nerve Block Lumb Facet Level 1 Bilateral (Bilateral, 2/25/2019); Lumbar spine surgery (Right, 4/16/2019); Lumbar spine surgery (Left, 6/13/2019); arthrocentesis (Right, 7/18/2019); Nerve Surgery (Bilateral, 9/13/2019); hip surgery (Left, 2/7/2020); and Pain management procedure (Right, 7/31/2020).    Family History  This patient's family history includes Heart Disease in her mother; Substance Abuse in her sister.     Social History  Sina Duong  reports that she quit smoking about 9 years ago. Her smoking use included cigarettes and e-cigarettes. She started smoking about 15 years ago. She has a 7.00 pack-year smoking history. She has never used smokeless tobacco. She reports current alcohol use. She reports that she does not use drugs.     Medications  Current Medication     Current Outpatient Medications:     meloxicam (MOBIC) 15 MG tablet, Take 1 tablet by mouth daily, Disp: 30 tablet, Rfl: 2    HYDROcodone-acetaminophen (NORCO) 5-325 MG per tablet, Take 1 tablet by mouth 2 times daily as needed for Pain for up to 30 days. , Disp: 60 tablet, Rfl: 0    loratadine (CLARITIN) 10 MG tablet, Take 10 mg by mouth daily, Disp: , Rfl:     Elastic Bandages & Supports (B & B SACROILIAC BELT) MISC, 1 Device by Does not apply route as needed (pain), Disp: 1 each, Rfl: 0    Cholecalciferol (VITAMIN D3) 5000 units CAPS, Take 1 capsule by mouth daily, Disp: , Rfl:     Loratadine-Pseudoephedrine (PX ALLERGY RELIEF D, LORATID, PO), Take 1 tablet by mouth daily, Disp: , Rfl:     Omega-3 Fatty Acids (FISH OIL) 1200 MG CAPS, , Disp: , Rfl:     calcium carbonate (OSCAL) 500 MG TABS tablet, Take 500 mg by mouth 2 times daily, Disp: , Rfl:     Turmeric 500 MG CAPS, Take by mouth daily, Disp: , Rfl:     cyclobenzaprine (FLEXERIL) 10 MG tablet, Take 1 tablet by mouth 3 times daily as needed for Muscle spasms WARNING: This medication may make your drowsy. Do not operate heavy machinery or motor vehicles during use., Disp: 15 tablet, Rfl: 0    lisinopril (PRINIVIL;ZESTRIL) 10 MG tablet, Take 10 mg by mouth nightly , Disp: , Rfl:     citalopram (CELEXA) 20 MG tablet, Take 20 mg by mouth daily , Disp: , Rfl:     spironolactone (ALDACTONE) 25 MG tablet, Take 1 tablet by mouth daily. , Disp: 15 tablet, Rfl: 0        Subjective:      Review of Systems   Constitutional: Negative. Negative for fatigue and fever. HENT: Positive for congestion, sinus pain and sore throat. Negative for voice change. Eyes: Negative. Respiratory: Negative. Cardiovascular: Positive for leg swelling. Gastrointestinal: Negative. Genitourinary: Negative. Musculoskeletal: Positive for arthralgias, back pain, myalgias, neck pain and neck stiffness. Skin: Negative. Neurological: Negative. Psychiatric/Behavioral: Negative.          Objective:      Vitals   There were no vitals filed for this visit.        Physical Exam  Constitutional:       Appearance: She is obese. She is not ill-appearing. HENT:      Head: Normocephalic and atraumatic. Neurological:      General: No focal deficit present. Mental Status: She is alert and oriented to person, place, and time. Psychiatric:         Mood and Affect: Mood normal.            Assessment:      1. Primary osteoarthritis of right knee    2. Spondylosis of thoracic region without myelopathy or radiculopathy    3. Spondylosis of lumbar region without myelopathy or radiculopathy    4. Mid back pain    5. Cervical spondylosis    6. Sacroiliac inflammation ([address]    7. Primary osteoarthritis of both hips    8. Chronic pain syndrome            Plan:      · OARRS reviewed. Current MED: 10.00  · Patient was offered naloxone for home. Refused. · Discussed long term side effects of medications, tolerance, dependency and addiction. · Previous UDS reviewed  · UDS preformed today for compliance.   · Patient told can not receive any pain medications from any other source. · No evidence of abuse, diversion or aberrant behavior. · Medications and/or procedures to improve function and quality of life- patient understanding with this and that may not be pain free  · Discussed with patient about safe storage of medications at home  · Discussed possible weaning of medication dosing dependent on treatment/procedure results. · Discussed with patient about risks with procedure including infection, reaction to medication, increased pain, or bleeding  · Received about 90% relief of right knee pain for 5 full days with improvement of mobility from right knee genicular nerve block. · Plan Right knee genicular nerve RFA for longer term pain relief. Procedure and risks discussed in detail with patient. · Discussed therapies and then repeating bilateral T-facet RFA. · Medications remain effective, patient is compliant. Continue Norco 5/325 BID prn- Filled 7/25/2020- still has plenty as patient takes prn         Meds. Prescribed:     Encounter Medications    No orders of the defined types were placed in this encounter.        Return for Right knee genicular nerve RFA.  , follow up after procedure.

## 2020-09-15 ENCOUNTER — TELEMEDICINE (OUTPATIENT)
Dept: PHYSICAL MEDICINE AND REHAB | Age: 55
End: 2020-09-15
Payer: COMMERCIAL

## 2020-09-15 PROCEDURE — 99213 OFFICE O/P EST LOW 20 MIN: CPT | Performed by: NURSE PRACTITIONER

## 2020-09-15 PROCEDURE — G8427 DOCREV CUR MEDS BY ELIG CLIN: HCPCS | Performed by: NURSE PRACTITIONER

## 2020-09-15 PROCEDURE — 3017F COLORECTAL CA SCREEN DOC REV: CPT | Performed by: NURSE PRACTITIONER

## 2020-09-15 ASSESSMENT — ENCOUNTER SYMPTOMS
RESPIRATORY NEGATIVE: 1
SORE THROAT: 0
GASTROINTESTINAL NEGATIVE: 1
BACK PAIN: 1
SINUS PAIN: 0
VOICE CHANGE: 0
EYES NEGATIVE: 1

## 2020-09-15 NOTE — PROGRESS NOTES
HPI:   Monica Rosa is a 63 y.o. female is here today for    Chief Complaint: Back pain , right knee pain, neck pain     HPI   Video Visit. TELEHEALTH EVALUATION -- Audio/Visual (During GFLVI-27 public health emergency). Location of visit: patients home, providers office. FU from right knee genicular nerve RFA from 8/31/2020. Receiving 70% to 80% relief of pain in right knee at this time. Still has been up and down based on activity but overall knee pain is improved. Stabbing and aching pain. Continues to have  pain throughout back and neck aching and dull. Still was not able to schedule PT and insurance wont cover repeat T-RFA until 6 weeks of therapy. States \"I don't feel comfortable going anywhere at this time d/t virus\". Norco prn remains effective   Medications reviewed. Patient denies side effects with medications. Patient states she is taking medications as prescribed. Shedenies receiving pain medications from other sources. She denies any ER visits since last visit. Pain scale with out pain medications or at its worst is 6-8/10. Pain scale with pain medications or at its best is 3/10. Last dose of [place] was yesterday  Drug screen reviewed from 6/299/2020 and was appropriate  Pill count was not completed today d/t video visit   Patient does not have naloxone available at home. Patient has not required use of naloxone at home since last office visit.          The patientis allergic to walnut [macadamia nut oil]; other; saccharin; sulfa antibiotics; and sulfur. Past Medical History  Annmarie Michaels  has a past medical history of Allergic rhinitis, Depression, Hypertension, ABDI on CPAP, Osteoarthritis, and Vitamin D deficiency. Past Surgical History  The patient  has a past surgical history that includes Dental surgery (2002); [place] tooth extraction; Hand surgery (Right, 05/15/2015); other surgical history (4/11/16); Colonoscopy (2016); other surgical history (06/13/2016);  Hemorrhoid surgery (2016); Tonsillectomy; other surgical history (Right, 10/16/2017); arthrocentesis (Right, 10/16/2017); Nerve Surgery (Left, 12/05/2017); arthrocentesis (Left, 12/5/2017); pr arthrocentesis aspir&/inj major jt/bursa w/o us (Right, 9/17/2018); pr office/outpt visit,procedure only (N/A, 11/9/2018); Nerve Block Lumb Facet Level 1 Bilateral (Bilateral, 1/4/2019); Nerve Block Lumb Facet Level 1 Bilateral (Bilateral, 2/25/2019); Lumbar spine surgery (Right, 4/16/2019); Lumbar spine surgery (Left, 6/13/2019); arthrocentesis (Right, 7/18/2019); Nerve Surgery (Bilateral, 9/13/2019); hip surgery (Left, 2/7/2020); Pain management procedure (Right, 7/31/2020); and Pain management procedure (Right, 8/31/2020). Family History  This patient's family history includes Heart Disease in her mother; Substance Abuse in her sister. Social History  Omar Mancera  reports that she quit smoking about 9 years ago. Her smoking use included cigarettes and e-cigarettes. She started smoking about 15 years ago. She has a 7.00 pack-year smoking history. She has never used smokeless tobacco. She reports current alcohol use. She reports that she does not use drugs.     Medications    Current Outpatient Medications:     HYDROcodone-acetaminophen (NORCO) 5-325 MG per tablet, Take 1 tablet by mouth 2 times daily as needed for Pain., Disp: , Rfl:     meloxicam (MOBIC) 15 MG tablet, Take 1 tablet by mouth daily, Disp: 30 tablet, Rfl: 2    loratadine (CLARITIN) 10 MG tablet, Take 10 mg by mouth daily, Disp: , Rfl:     Elastic Bandages & Supports (B & B SACROILIAC BELT) MISC, 1 Device by Does not apply route as needed (pain), Disp: 1 each, Rfl: 0    Cholecalciferol (VITAMIN D3) 5000 units CAPS, Take 1 capsule by mouth daily, Disp: , Rfl:     Omega-3 Fatty Acids (FISH OIL) 1200 MG CAPS, , Disp: , Rfl:     calcium carbonate (OSCAL) 500 MG TABS tablet, Take 500 mg by mouth 2 times daily, Disp: , Rfl:     Turmeric 500 MG CAPS, Take by mouth daily, Disp: , Rfl:     cyclobenzaprine (FLEXERIL) 10 MG tablet, Take 1 tablet by mouth 3 times daily as needed for Muscle spasms WARNING: This medication may make your drowsy. Do not operate heavy machinery or motor vehicles during use., Disp: 15 tablet, Rfl: 0    lisinopril (PRINIVIL;ZESTRIL) 10 MG tablet, Take 10 mg by mouth nightly , Disp: , Rfl:     citalopram (CELEXA) 20 MG tablet, Take 20 mg by mouth daily , Disp: , Rfl:     spironolactone (ALDACTONE) 25 MG tablet, Take 1 tablet by mouth daily. , Disp: 15 tablet, Rfl: 0    Subjective:      Review of Systems   Constitutional: Negative. Negative for fatigue and fever. HENT: Negative for congestion, sinus pain, sore throat and voice change. Eyes: Negative. Respiratory: Negative. Cardiovascular: Positive for leg swelling. Gastrointestinal: Negative. Genitourinary: Negative. Musculoskeletal: Positive for arthralgias, back pain, myalgias, neck pain and neck stiffness. Skin: Negative. Neurological: Negative. Psychiatric/Behavioral: Negative. Objective: There were no vitals filed for this visit. Physical Exam  Constitutional:       Appearance: She is obese. She is not ill-appearing. HENT:      Head: Normocephalic and atraumatic. Neurological:      General: No focal deficit present. Mental Status: She is alert and oriented to person, place, and time. Psychiatric:         Mood and Affect: Mood normal.          Assessment:     1. Primary osteoarthritis of right knee    2. Spondylosis of thoracic region without myelopathy or radiculopathy    3. Spondylosis of lumbar region without myelopathy or radiculopathy    4. Mid back pain    5. Cervical spondylosis    6. Sacroiliac inflammation ([facility] [address]    7. Primary osteoarthritis of both hips    8. Chronic pain syndrome            Plan:      · OARRS reviewed. Current MED: 10.00  · Patient was offered naloxone for home. Refused.    · Discussed long term side effects of medications, tolerance, dependency and addiction. · Previous UDS reviewed  · UDS preformed today for compliance. · Patient told can not receive any pain medications from any other source. · No evidence of abuse, diversion or aberrant behavior.  Medications and/or procedures to improve function and quality of life- patient understanding with this and that may not be pain free   Discussed with patient about safe storage of medications at home   Discussed possible weaning of medication dosing dependent on treatment/procedure results.  Discussed with patient about risks with procedure including infection, reaction to medication, increased pain, or bleeding.  Procedure notes reviewed in detail.  Recieveing about 70% to 80% relief from rigtht knee genicular nerve RFA  · Discussed therapies and then repeating bilateral T-facet RFA. Patient does not want to do therapy at this time d/t virus  · Medications remain effective, patient is compliant. Continue Norco 5/325 BID prn- Filled 7/25/2020- still has plenty   · Will FU in 1 month in person for repeat UDS      Meds. Prescribed:   No orders of the defined types were placed in this encounter. Return in about 1 month (around 10/15/2020), or if symptoms worsen or fail to improve, for follow up  for medications. Time spent with patient was 15 minutes, more than 50% was spent Counseling/coordinated the patient's care.       Electronically signed by TAMIE Dominguez CNP on9/15/2020 at 10:06 AM

## 2020-09-18 ENCOUNTER — PATIENT MESSAGE (OUTPATIENT)
Dept: PHYSICAL MEDICINE AND REHAB | Age: 55
End: 2020-09-18

## 2020-09-21 RX ORDER — HYDROCODONE BITARTRATE AND ACETAMINOPHEN 5; 325 MG/1; MG/1
1 TABLET ORAL 2 TIMES DAILY PRN
Qty: 60 TABLET | Refills: 0 | Status: SHIPPED | OUTPATIENT
Start: 2020-09-21 | End: 2020-10-22 | Stop reason: SDUPTHER

## 2020-09-21 NOTE — TELEPHONE ENCOUNTER
From: Ed Narvaez  To: Edilberto Dawson, APRN - CNP  Sent: 9/18/2020 9:11 PM EDT  Subject: Prescription Question    The system won't let me request it in the normal way, so may have a [place] refil, Please?   Thanks,  [facility]

## 2020-09-21 NOTE — TELEPHONE ENCOUNTER
OARRS reviewed. UDS: + for  Flexeril, Norco consistent. Narcan offered: Offered  Last seen: 9/15/2020.  Follow-up: 10/13/2020

## 2020-10-22 ENCOUNTER — OFFICE VISIT (OUTPATIENT)
Dept: PHYSICAL MEDICINE AND REHAB | Age: 55
End: 2020-10-22
Payer: COMMERCIAL

## 2020-10-22 VITALS
SYSTOLIC BLOOD PRESSURE: 132 MMHG | DIASTOLIC BLOOD PRESSURE: 82 MMHG | TEMPERATURE: 97.2 F | WEIGHT: 293 LBS | HEIGHT: 65 IN | BODY MASS INDEX: 48.82 KG/M2

## 2020-10-22 PROCEDURE — G8417 CALC BMI ABV UP PARAM F/U: HCPCS | Performed by: NURSE PRACTITIONER

## 2020-10-22 PROCEDURE — G8427 DOCREV CUR MEDS BY ELIG CLIN: HCPCS | Performed by: NURSE PRACTITIONER

## 2020-10-22 PROCEDURE — 3017F COLORECTAL CA SCREEN DOC REV: CPT | Performed by: NURSE PRACTITIONER

## 2020-10-22 PROCEDURE — G8484 FLU IMMUNIZE NO ADMIN: HCPCS | Performed by: NURSE PRACTITIONER

## 2020-10-22 PROCEDURE — 99213 OFFICE O/P EST LOW 20 MIN: CPT | Performed by: NURSE PRACTITIONER

## 2020-10-22 PROCEDURE — 1036F TOBACCO NON-USER: CPT | Performed by: NURSE PRACTITIONER

## 2020-10-22 RX ORDER — HYDROCODONE BITARTRATE AND ACETAMINOPHEN 5; 325 MG/1; MG/1
1 TABLET ORAL 2 TIMES DAILY PRN
Qty: 60 TABLET | Refills: 0 | Status: SHIPPED | OUTPATIENT
Start: 2020-10-22 | End: 2020-11-19 | Stop reason: SDUPTHER

## 2020-10-22 ASSESSMENT — ENCOUNTER SYMPTOMS
EYES NEGATIVE: 1
GASTROINTESTINAL NEGATIVE: 1
SINUS PAIN: 0
VOICE CHANGE: 0
RESPIRATORY NEGATIVE: 1
BACK PAIN: 1
SORE THROAT: 0

## 2020-10-22 NOTE — PROGRESS NOTES
[facility]  [address]  Dept: [phone]  Dept Fax: [phone]: [phone]    Visit Date: 10/22/2020    Functionality Assessment/Goals Worksheet     On a scale of 0 (Does not Interfere) to 10 (Completely Interferes)     1. Which number describes how during the past week pain has interfered with       the following:  A. General Activity:  8  B. Mood: 9  C. Walking Ability:  8  D. Normal Work (Includes both work outside the home and housework):  10  E. Relations with Other People:   7  F. Sleep:   7  G. Enjoyment of Life:   9    2. Patient Prefers to Take their Pain Medications:     [x]  On a regular basis   [x]  Only when necessary    []  Does not take pain medications    3. What are the Patient's Goals/Expectations for Visiting Pain Management? []  Learn about my pain    [x]  Receive Medication   [x]  Physical Therapy     []  Treat Depression   [x]  Receive Injections    []  Treat Sleep   []  Deal with Anxiety and Stress   []  Treat Opoid Dependence/Addiction   []  Other:      HPI:   Fernando Armenta is a 63 y.o. female is here today for    Chief Complaint: Back pain, right knee pain, neck pain     HPI   1 month FU. Main complaint today remains pain throughout back and neck aching and dull. States that mid back pain is the worst and has been increasing and patient now wants to start PT  Which she needs 6 weeks before she can have a T-facet RFA which was effective in the past.     Continues to have right knee pain- aching but continues to receive good relief from right knee genicular nerve RFA. Stabbing pain is gone. Norco prn remains effective   Medications reviewed. Patient constipation side effects with medications. Patient states she is taking medications as prescribed. Shedenies receiving pain medications from other sources.  She denies any ER visits since last visit. Pain scale with out pain medications or at its worst is 8/10. Pain scale with pain medications or at its best is 5/10. Last dose of [place] was today  Drug screen reviewed from 6/29/2020 and was appropriate  Pill count was completed today and was appropriate  Patient does not have naloxone available at home. Patient has not required use of naloxone at home since last office visit. The patientis allergic to walnut [macadamia nut oil]; other; saccharin; sulfa antibiotics; and sulfur. Past Medical History  Humaira Hankins  has a past medical history of Allergic rhinitis, Depression, Hypertension, ABDI on CPAP, Osteoarthritis, and Vitamin D deficiency. Past Surgical History  The patient  has a past surgical history that includes Dental surgery (2002); [place] tooth extraction; Hand surgery (Right, 05/15/2015); other surgical history (4/11/16); Colonoscopy (2016); other surgical history (06/13/2016); Hemorrhoid surgery (2016); Tonsillectomy; other surgical history (Right, 10/16/2017); arthrocentesis (Right, 10/16/2017); Nerve Surgery (Left, 12/05/2017); arthrocentesis (Left, 12/5/2017); pr arthrocentesis aspir&/inj major jt/bursa w/o us (Right, 9/17/2018); pr office/outpt visit,procedure only (N/A, 11/9/2018); Nerve Block Lumb Facet Level 1 Bilateral (Bilateral, 1/4/2019); Nerve Block Lumb Facet Level 1 Bilateral (Bilateral, 2/25/2019); Lumbar spine surgery (Right, 4/16/2019); Lumbar spine surgery (Left, 6/13/2019); arthrocentesis (Right, 7/18/2019); Nerve Surgery (Bilateral, 9/13/2019); hip surgery (Left, 2/7/2020); Pain management procedure (Right, 7/31/2020); and Pain management procedure (Right, 8/31/2020). Family History  This patient's family history includes Heart Disease in her mother; Substance Abuse in her sister. Social History  Humaira Hankins  reports that she quit smoking about 9 years ago. Her smoking use included cigarettes and e-cigarettes. She started smoking about 15 years ago.  She has a 7.00 pack-year smoking history. She has never used smokeless tobacco. She reports current alcohol use. She reports that she does not use drugs. Medications    Current Outpatient Medications:     HYDROcodone-acetaminophen (NORCO) 5-325 MG per tablet, Take 1 tablet by mouth 2 times daily as needed for Pain for up to 30 days. , Disp: 60 tablet, Rfl: 0    meloxicam (MOBIC) 15 MG tablet, Take 1 tablet by mouth daily, Disp: 30 tablet, Rfl: 2    loratadine (CLARITIN) 10 MG tablet, Take 10 mg by mouth daily, Disp: , Rfl:     Elastic Bandages & Supports (B & B SACROILIAC BELT) MISC, 1 Device by Does not apply route as needed (pain), Disp: 1 each, Rfl: 0    Cholecalciferol (VITAMIN D3) 5000 units CAPS, Take 1 capsule by mouth daily, Disp: , Rfl:     Omega-3 Fatty Acids (FISH OIL) 1200 MG CAPS, , Disp: , Rfl:     calcium carbonate (OSCAL) 500 MG TABS tablet, Take 500 mg by mouth 2 times daily, Disp: , Rfl:     Turmeric 500 MG CAPS, Take by mouth daily, Disp: , Rfl:     cyclobenzaprine (FLEXERIL) 10 MG tablet, Take 1 tablet by mouth 3 times daily as needed for Muscle spasms WARNING: This medication may make your drowsy. Do not operate heavy machinery or motor vehicles during use., Disp: 15 tablet, Rfl: 0    lisinopril (PRINIVIL;ZESTRIL) 10 MG tablet, Take 10 mg by mouth nightly , Disp: , Rfl:     citalopram (CELEXA) 20 MG tablet, Take 20 mg by mouth daily , Disp: , Rfl:     spironolactone (ALDACTONE) 25 MG tablet, Take 1 tablet by mouth daily. , Disp: 15 tablet, Rfl: 0    Subjective:      Review of Systems   Constitutional: Negative. Negative for fatigue and fever. HENT: Negative for congestion, sinus pain, sore throat and voice change. Eyes: Negative. Respiratory: Negative. Cardiovascular: Positive for leg swelling. Gastrointestinal: Negative. Genitourinary: Negative. Musculoskeletal: Positive for arthralgias, back pain, myalgias, neck pain and neck stiffness. Skin: Negative.     Neurological: Negative. Psychiatric/Behavioral: Negative. Objective:     Vitals:    10/22/20 0756   BP: 132/82   Temp: 97.2 °F (36.2 °C)   Weight: (!) 342 lb (155.1 kg)   Height: 5' 5\" (1.651 m)       Physical Exam  Vitals signs and nursing note reviewed. Constitutional:       General: She is not in acute distress. Appearance: She is well-developed. She is not diaphoretic. HENT:      Head: Normocephalic and atraumatic. Right Ear: External ear normal.      Left Ear: External ear normal.      Nose: Nose normal.      Mouth/Throat:      Pharynx: No oropharyngeal exudate. Eyes:      General: No scleral icterus. Right eye: No discharge. Left eye: No discharge. Conjunctiva/sclera: Conjunctivae normal.      Pupils: Pupils are equal, round, and reactive to light. Neck:      Musculoskeletal: Full passive range of motion without pain, normal range of motion and neck supple. Normal range of motion. No edema, erythema, neck rigidity or muscular tenderness. Thyroid: No thyromegaly. Trachea: No tracheal deviation. Cardiovascular:      Rate and Rhythm: Normal rate and regular rhythm. Heart sounds: Normal heart sounds. No murmur. No friction rub. No gallop. Pulmonary:      Effort: Pulmonary effort is normal. No respiratory distress. Breath sounds: Normal breath sounds. No wheezing or rales. Chest:      Chest wall: No tenderness. Abdominal:      General: Bowel sounds are normal. There is no distension. Palpations: Abdomen is soft. Tenderness: There is no abdominal tenderness. There is no guarding or rebound. Musculoskeletal:         General: Tenderness present. Right hip: She exhibits tenderness. Left hip: She exhibits decreased range of motion, decreased strength and bony tenderness. Right knee: She exhibits decreased range of motion and bony tenderness. Tenderness found. Cervical back: She exhibits tenderness.       Thoracic back: She exhibits decreased range of motion, bony tenderness and pain. Lumbar back: She exhibits decreased range of motion, tenderness, pain and spasm. Back:         Legs:    Skin:     General: Skin is warm and dry. Coloration: Skin is not pale. Findings: No erythema or rash. Neurological:      Mental Status: She is alert and oriented to person, place, and time. She is not disoriented. Cranial Nerves: No cranial nerve deficit. Sensory: No sensory deficit. Motor: No atrophy or abnormal muscle tone. Coordination: Coordination normal.      Gait: Gait abnormal.      Deep Tendon Reflexes: Babinski sign absent on the right side. Comments: SLR neg. Psychiatric:         Attention and Perception: She is attentive. Mood and Affect: Mood is not anxious or depressed. Affect is not labile, blunt, angry or inappropriate. Speech: She is communicative. Speech is not rapid and pressured, delayed, slurred or tangential.         Behavior: Behavior normal. Behavior is not agitated, slowed, aggressive, withdrawn, hyperactive or combative. Thought Content: Thought content normal. Thought content is not paranoid or delusional. Thought content does not include homicidal or suicidal ideation. Thought content does not include homicidal or suicidal plan. Cognition and Memory: Memory is not impaired. She does not exhibit impaired recent memory or impaired remote memory. Judgment: Judgment normal. Judgment is not impulsive or inappropriate. SCOOTER test: + bilaterally  Yeomans test: + bilaterally  Gaenslen test: + bilaterally      Assessment:     1. Spondylosis of thoracic region without myelopathy or radiculopathy    2. Mid back pain    3. Spondylosis of lumbar region without myelopathy or radiculopathy    4. Elective surgical procedure    5. Sacroiliac inflammation ([facility] [address]    6. Primary osteoarthritis of both hips    7.  Chronic pain syndrome            Plan: · OARRS reviewed. Current MED: 10.00  · Patient was offered naloxone for home. Refused  · Discussed long term side effects of medications, tolerance, dependency and addiction. · Previous UDS reviewed  · UDS preformed today for compliance. · Patient told can not receive any pain medications from any other source. · No evidence of abuse, diversion or aberrant behavior.  Medications and/or procedures to improve function and quality of life- patient understanding with this and that may not be pain free   Discussed with patient about safe storage of medications at home   Discussed possible weaning of medication dosing dependent on treatment/procedure results.  Discussed with patient about risks with procedure including infection, reaction to medication, increased pain, or bleeding. · Procedure notes reviewed in detail. · Recieveing about 70% to 80% relief from rigtht knee genicular nerve RFA  · Discussed therapies and then repeating bilateral T-facet RFA. Ordered PT, insurance wants patient to have 6 weeks of therapy   · Medications remain effective, patient is compliant. Continue Norco 5/325 BID prn- ordered refill  · Ordered updated UDS today       Meds. Prescribed:   Orders Placed This Encounter   Medications    HYDROcodone-acetaminophen (NORCO) 5-325 MG per tablet     Sig: Take 1 tablet by mouth 2 times daily as needed for Pain for up to 30 days. Dispense:  60 tablet     Refill:  0     Reduce doses taken as pain becomes manageable       Return in about 2 months (around 12/22/2020), or if symptoms worsen or fail to improve, for FU after 6 weeks of PT.            Electronically signed by TAMIE Omalley CNP on10/22/2020 at 9:49 AM

## 2020-11-04 ENCOUNTER — HOSPITAL ENCOUNTER (OUTPATIENT)
Dept: PHYSICAL THERAPY | Age: 55
Setting detail: THERAPIES SERIES
Discharge: HOME | End: 2020-11-04
Payer: COMMERCIAL

## 2020-11-04 PROCEDURE — 97110 THERAPEUTIC EXERCISES: CPT

## 2020-11-04 PROCEDURE — 97161 PT EVAL LOW COMPLEX 20 MIN: CPT

## 2020-11-04 NOTE — PROGRESS NOTES
** PLEASE SIGN, DATE AND TIME CERTIFICATION BELOW AND RETURN TO [facility] (FAX #: [phone]). ATTEST/CO-SIGN IF ACCESSING VIA [employer]. THANK YOU.**    I certify that I have examined the patient below and determined that Physical Medicine and Rehabilitation service is necessary and that I approve the established plan of care for up to 90 days or as specifically noted.   Attestation, signature or co-signature of physician indicates approval of certification requirements.    ________________________ ____________ __________  Physician Signature   Date   Time  [address] [facility]  PHYSICAL THERAPY  [x] EVALUATION  [] DAILY NOTE (LAND) [] DAILY NOTE (AQUATIC ) [] PROGRESS NOTE [] DISCHARGE NOTE    [x] [address]   [] [place] [address]    [] [facility]   [] Glyn Dance    Date: 2020  Patient Name:  Jose Lay  : 1965  MRN: [id number]    Referring Practitioner TAMIE Middleton*   Diagnosis Spondylosis without myelopathy or radiculopathy, thoracic region [M47.814]   M54.9 (ICD-10-CM) - Mid back pain    M47.816 (ICD-10-CM) - Spondylosis of lumbar region without myelopathy or radiculopathy    M46.1 (ICD-10-CM) - Sacroiliac inflammation (HCC)    M16.0 (ICD-10-CM) - Primary osteoarthritis of both hips    G89.4 (ICD-10-CM) - Chronic pain syndrome       Treatment Diagnosis Lumbago, thoracic spine pain, B hip pain, R knee pain, B hip stiffness, R knee stiffness, spinal stiffness, muscular weakness, difficulty walking   Date of Evaluation 20    Additional Pertinent History L Hip replacement May 2018, T12 Kyphoplasty, R Hip injections for bursitis, Right knee nerve ablations and injections, lumbar and thoracic nerve blocks       Functional Outcome Measure Used Modified Oswestry LBP questionnaire   Functional Outcome Score 29/50 = 58% impairment (20)       Insurance: Primary: Payor: Dariana Schaeffer /  / / ,   Secondary:    Authorization Information: 30 visits PT/OT/ST each per asad yr. Aquatic and modalities covered, FCE covered. No hot/cold pack. Visit # 1, 1/10 for progress note   Visits Allowed: 30   Recertification Date: 8/19/1381   Physician Follow-Up: 12/24/20    Physician Orders: Back pain, Neck pain, Knee pain, Hip pain   History of Present Illness: Patient reporting to pain management Right scapular midthoracic pain flared up in Spring 2020, needs insurance approval for ablations. SUBJECTIVE: Patient presents with standard cane, also using rolling walker for longer distances. Patient c/o R knee anterior pain 8/10 with standing and walking, some pain at rest wakes her at night. Her back pain is constant regardless of position, SI joints hurt especially with standing and carrying heavy items. Limited to about 5 minutes of standing, uses rolling chairs in her home or sits frequently. Social/Functional History and Current Status:  Medications and Allergies have been reviewed and are listed on Medical History Questionnaire. Tejinder Membreno lives alone in a multiple floor home, basement she needs occasionally, with stairs and no handrail to enter. Leans heavily on doorframe to access her home. Task Previous Current   ADLs  Modified Independent  Assistance Required - difficulty showering, typically using baby wipes, difficulty caring for herself   IADL's Modified Independent Dependent/Unable - using adaptive equipment, has not swept floor in month   [address] [employer] - used to have [employer], would exercise in therapy pool  [facility] - typically using 4 wheeled walker, or cane Modified Independent using scooter at grocery store   Driving Active  Active    Work Part-Time.   Occupation: [employer], customer service assisting over phone. Required to sit long durations, pain increases during the day. 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 Working from home, works 5-8 hour shifts. OBJECTIVE:    Pain: R scapula midthoracic, R knee anterior pain    Palpation    Observation Pelvis level in standing, limited assessment by body composition and obesity   Posture        Range of Motion R shoulder ER behind head to occiput  L shoulder ER behind head to C3  B shoulder flexion WFL, IR behind back to SI     R knee flexion limited to 90 deg   Strength R knee ext 4/5, ankle DF 5/5, PF 4+/5    Coordination    Sensation R shin anterior numbness   Bed Mobility    Transfers    Ambulation 2x 50 ft with single point cane, flares up pain severely with short bouts of standing.     Stairs 4 steps ascending and descending with B railings and non reciprocal pattern   Balance    Special Tests          NECK RANGE OF MOTION   Flexion WFL Felt good pulling in midback   Extension WFL    Rotation Right 0-55    Rotation Left 0-55    Sidebending Right 0-30    Sidebending Left 0-30    Retraction     Protraction     Neck Range of Motion is [facility]  []       BACK RANGE OF MOTION   Flexion 0-45 deg    Extension Unable to reach 0 deg    Lateral Shift Right     Lateral Shift Left     Sidebending Right     Sidebending Left     Back Range of Motion is [facility]  []       TREATMENT   Precautions:    Pain: R midthoracic pain, B lumbar and SI pain, B hip pain, R knee pain    X in shaded column indicates activity completed today   Modalities Parameters/  Location  Notes                     Manual Therapy Time/Technique  Notes                     Exercise/Intervention   Notes   Seated scapular retraction, retro rolls, cervical retraction 5 each  x    Seated pec stretch - roll chair into doorway    Verbally reviewed for HEP, try in clinic                                                                    Specific Interventions Next Treatment: Patient declined aquatic therapy, she has done it in the past and needs to be in pool a couple hours in order to feel like it was a good session. Chronic pain multiple regions, cervical and scapular retraction posture, doorway stretch, seated abdominal bracing. Supine with wedge- glute sets, longitudinal hip ER/IR, quad set, SAQ, VMO strengthening for patellar stability. Hip flexor stretch supine off edge, piriformis stretch. Activity/Treatment Tolerance:  [x]  Patient tolerated treatment well  []  Patient limited by fatigue  []  Patient limited by pain   []  Patient limited by medical complications  []  Other:     Assessment: Patient presents with chronic pain of mulitple regions including R sided thoracic pain, bilateral lumbar and SI pain, bilateral hips, and R knee pain. She experiences good relief from nerve ablations but needs insurance approval for ablations at the thoracic level. Patient is extremely limited in mobility, becomes painful and winded with standing and walking more than 3-5 minutes. She also has difficulty sitting and reaching objects from floor, unable to perform housekeeping and self-care ADLs, and reports having matted hair. However she states she can use her riding lawnmower independently, lives alone, and has been working at [facility] this week. Patient would benefit from physical therapy in order to decrease pain and improve activity tolerance in order to stand and walk for household chores and safer access to basement and front entrance of her home.      Body Structures/Functions/Activity Limitations: impaired activity tolerance, impaired balance, impaired ROM, impaired sensation, impaired strength, pain and abnormal gait  Prognosis: fair - limited by chronic nature of pain and OA multiple regions    GOALS:  Patient Goal: decrease pain in back and legs    Short Term Goals:  Time Frame: 6 weeks    Patient will report decreased low back pain from baseline 8/10 to less than 4/10 during therapy exercises in order to stand greater than 5 minutes. Patient will improve BLE AROM to 0-100 deg hip flex, 0-10 deg hip ext supine, and 0-120 deg R knee flexion in order to bend and lift without straining lumbar spine. Patient will demonstrate good abdominal bracing and body mechanics during therapy session in order to preserve neutral spine during household tasks. Patient will be IND with HEP in order to meet long term goals. Long Term Goals:  Time Frame: 12 weeks    Patient will improve score of Modified Oswestry LBP questionnaire from baseline 58% impairment to at least 30% impairment in order to sleep more comfortably and tolerate more household activities. Patient will squat to lift 5 lb weight from floor to standing with good body mechanics in order to perform household tasks using her rolling walker. Patient will improve BLE strength to 4+/5 in order to squat, lunge, and navigate steps without difficulty. Patient Education:   [x]  HEP/Education Completed: Plan of Care, Goals, HEP handout given for hip stretches, patellar bracing, and scapular retraction/ cerv retraction posture, doorway stretch  Medbridge Access Code: Dawood Espinoza  []  No new Education completed  []  Reviewed Prior HEP      [x]  Patient verbalized and/or demonstrated understanding of education provided. []  Patient unable to verbalize and/or demonstrate understanding of education provided. Will continue education. []  Barriers to learning: none    PLAN:  Treatment Recommendations: Strengthening, Range of Motion, Balance Training, Functional Mobility Training, Transfer Training, Endurance Training, Gait Training, Stair Training, Manual Therapy - Soft Tissue Mobilization, Manual Therapy - Joint Manipulation, Pain Management, Home Exercise Program, Patient Education, Integrative Dry Needling, Aquatics and Modalities    [x]  Plan of care initiated.   Plan to see patient 2 times per week for 12 weeks to address the treatment planned outlined above.   []  Continue with current plan of care  []  Modify plan of care as follows:    []  Hold pending physician visit  []  Discharge    Time In 1515   Time Out 1610   Timed Code Minutes: 10 min   Total Treatment Time: 55 min       Electronically Signed by: Jonathon Lees

## 2020-11-09 ENCOUNTER — HOSPITAL ENCOUNTER (OUTPATIENT)
Dept: PHYSICAL THERAPY | Age: 55
Setting detail: THERAPIES SERIES
Discharge: HOME | End: 2020-11-09
Payer: COMMERCIAL

## 2020-11-13 ENCOUNTER — HOSPITAL ENCOUNTER (OUTPATIENT)
Dept: PHYSICAL THERAPY | Age: 55
Setting detail: THERAPIES SERIES
Discharge: HOME | End: 2020-11-13
Payer: COMMERCIAL

## 2020-11-13 PROCEDURE — 97110 THERAPEUTIC EXERCISES: CPT

## 2020-11-13 NOTE — PROGRESS NOTES
[address] [facility]   PHYSICAL THERAPY  [x] EVALUATION  [] DAILY NOTE (LAND) [] DAILY NOTE (AQUATIC ) [] PROGRESS NOTE [] DISCHARGE NOTE    [x] OUTPATIENT REHABILITATION CENTER [facility]   [] [place] [address]    [] [facility]   [] [facility]    Date: 2020  Patient Name:  Mark Martell  : 1965  MRN: [id number]    Referring Practitioner TAMIE Quintero*   Diagnosis Spondylosis without myelopathy or radiculopathy, thoracic region [M47.814]   M54.9 (ICD-10-CM) - Mid back pain    M47.816 (ICD-10-CM) - Spondylosis of lumbar region without myelopathy or radiculopathy    M46.1 (ICD-10-CM) - Sacroiliac inflammation (HCC)    M16.0 (ICD-10-CM) - Primary osteoarthritis of both hips    G89.4 (ICD-10-CM) - Chronic pain syndrome       Treatment Diagnosis Lumbago, thoracic spine pain, B hip pain, R knee pain, B hip stiffness, R knee stiffness, spinal stiffness, muscular weakness, difficulty walking   Date of Evaluation 20    Additional Pertinent History L Hip replacement May 2018, T12 Kyphoplasty, R Hip injections for bursitis, Right knee nerve ablations and injections, lumbar and thoracic nerve blocks       Functional Outcome Measure Used Modified Oswestry LBP questionnaire   Functional Outcome Score 29/50 = 58% impairment (20)       Insurance: Primary: Payor: Jefferson Marques /  /  / ,   Secondary:    Authorization Information: 30 visits PT/OT/ST each per asad yr. Aquatic and modalities covered, FCE covered. No hot/cold pack. Visit # 2, 2/10 for progress note   Visits Allowed:    Recertification Date:    Physician Follow-Up: 20    Physician Orders: Back pain, Neck pain, Knee pain, Hip pain   History of Present Illness: Patient reporting to pain management Right scapular midthoracic pain flared up in Spring 2020, needs insurance approval for ablations.       SUBJECTIVE: Patient has been performing scapular and cervical retraction HEP with good and scapular postural exercises with good tolerance. Pain improved to 6/10 at end of session. She does get fatigued and winded easily, offered multiple seated rest breaks. GOALS:  Patient Goal: decrease pain in back and legs    Short Term Goals:  Time Frame: 6 weeks    Patient will report decreased low back pain from baseline 8/10 to less than 4/10 during therapy exercises in order to stand greater than 5 minutes. Patient will improve BLE AROM to 0-100 deg hip flex, 0-10 deg hip ext supine, and 0-120 deg R knee flexion in order to bend and lift without straining lumbar spine. Patient will demonstrate good abdominal bracing and body mechanics during therapy session in order to preserve neutral spine during household tasks. Patient will be IND with HEP in order to meet long term goals. Long Term Goals:  Time Frame: 12 weeks    Patient will improve score of Modified Oswestry LBP questionnaire from baseline 58% impairment to at least 30% impairment in order to sleep more comfortably and tolerate more household activities. Patient will squat to lift 5 lb weight from floor to standing with good body mechanics in order to perform household tasks using her rolling walker. Patient will improve BLE strength to 4+/5 in order to squat, lunge, and navigate steps without difficulty. Patient Education:   [x]  HEP/Education Completed: Plan of Care, Goals, HEP handout given for hip stretches, patellar bracing, and scapular retraction/ cerv retraction posture, doorway stretch   Medbridge Access Code: [place]  []  No new Education completed  []  Reviewed Prior HEP      [x]  Patient verbalized and/or demonstrated understanding of education provided. []  Patient unable to verbalize and/or demonstrate understanding of education provided. Will continue education.   []  Barriers to learning: none    PLAN:  Treatment Recommendations: Strengthening, Range of Motion, Balance Training, Functional Mobility Training, Transfer Training, Endurance Training, Gait Training, Stair Training, Manual Therapy - Soft Tissue Mobilization, Manual Therapy - Joint Manipulation, Pain Management, Home Exercise Program, Patient Education, Integrative Dry Needling, Aquatics and Modalities    [x]  Plan of care initiated. Plan to see patient 2 times per week for 12 weeks to address the treatment planned outlined above.   []  Continue with current plan of care  []  Modify plan of care as follows:    []  Hold pending physician visit  []  Discharge    Time In [id number]   Time Out 1430   Timed Code Minutes: 45   Total Treatment Time: 45       Electronically Signed by: Anyi Holt

## 2020-11-18 ENCOUNTER — APPOINTMENT (OUTPATIENT)
Dept: PHYSICAL THERAPY | Age: 55
End: 2020-11-18
Payer: COMMERCIAL

## 2020-11-20 ENCOUNTER — HOSPITAL ENCOUNTER (OUTPATIENT)
Dept: PHYSICAL THERAPY | Age: 55
Setting detail: THERAPIES SERIES
Discharge: HOME | End: 2020-11-20
Payer: COMMERCIAL

## 2020-11-20 PROCEDURE — 97110 THERAPEUTIC EXERCISES: CPT

## 2020-11-20 RX ORDER — HYDROCODONE BITARTRATE AND ACETAMINOPHEN 5; 325 MG/1; MG/1
1 TABLET ORAL 2 TIMES DAILY PRN
Qty: 60 TABLET | Refills: 0 | Status: SHIPPED | OUTPATIENT
Start: 2020-11-21 | End: 2020-12-21 | Stop reason: SDUPTHER

## 2020-11-20 NOTE — TELEPHONE ENCOUNTER
OARRS reviewed. UDS: + for  Hydrocodone, Cyclobenzaprine -consistent.      Last seen: 10/22/2020    Follow-up: 12/21/2020

## 2020-11-20 NOTE — PROGRESS NOTES
constantly while working sitting in computer chair, difficulty falling asleep due to pain. TREATMENT   Precautions:    Pain: R midthoracic pain, B lumbar and SI pain, B hip pain, R knee pain    X in shaded column indicates activity completed today   Modalities Parameters/  Location  Notes                     Manual Therapy Time/Technique  Notes                     Exercise/Intervention   Notes   UPPER THORACIC PAIN:       Seated scapular retraction, retro rolls, cervical retraction  10x 5 sec x    Standing pec stretch doorway  3x 20 sec     Upper trap, levator scap, posterior capsule stretch  3x  10 sec x    Upper thoracic stretch at desk       Seated pec stretch with ball behind thoracic spine 3x 10 sec x           SEATED ABDOMINAL:     With lumbar towel roll   Glute set, abdominal brace  5x 5 sec x    VMO strength ball squeeze with LAQ   10x 3 sec x           HOOKLYING LUMBAR AND KNEE - Red wedge       Hip flexor stretch supine off edge 2x  20 sec x Foot resting on stool   Piriformis stretch leg crossed pulling knee 2x 20 sec x Therapist assisting, unable to cross leg fully   SAQ, longitudinal hip ER/IR  10x 3 sec  x                           Specific Interventions Next Treatment: Patient declined aquatic therapy, she has done it in the past and needs to be in pool a couple hours in order to feel like it was a good session. Chronic pain multiple regions, cervical and scapular retraction posture, doorway stretch, seated abdominal bracing. Supine with wedge- glute sets, longitudinal hip ER/IR, quad set, SAQ, VMO strengthening for patellar stability. Hip flexor stretch supine off edge, piriformis stretch.  NuStep and endurance activities as tolerated (hydrostick, gait training, standing balance tasks)    Activity/Treatment Tolerance:  [x]  Patient tolerated treatment well  []  Patient limited by fatigue  []  Patient limited by pain   []  Patient limited by medical complications  []  Other:     Assessment: Minimal progressions made today. Patient continues to have very limited endurance and needed 2 seated rest breaks to walk back into clinic area. Plan to progress to NuStep endurance tasks also. GOALS:  Patient Goal: decrease pain in back and legs    Short Term Goals:  Time Frame: 6 weeks    Patient will report decreased low back pain from baseline 8/10 to less than 4/10 during therapy exercises in order to stand greater than 5 minutes. Patient will improve BLE AROM to 0-100 deg hip flex, 0-10 deg hip ext supine, and 0-120 deg R knee flexion in order to bend and lift without straining lumbar spine. Patient will demonstrate good abdominal bracing and body mechanics during therapy session in order to preserve neutral spine during household tasks. Patient will be IND with HEP in order to meet long term goals. Long Term Goals:  Time Frame: 12 weeks    Patient will improve score of Modified Oswestry LBP questionnaire from baseline 58% impairment to at least 30% impairment in order to sleep more comfortably and tolerate more household activities. Patient will squat to lift 5 lb weight from floor to standing with good body mechanics in order to perform household tasks using her rolling walker. Patient will improve BLE strength to 4+/5 in order to squat, lunge, and navigate steps without difficulty. Patient Education:   []  HEP/Education Completed: Plan of Care, Goals, HEP handout given for hip stretches, patellar bracing, and scapular retraction/ cerv retraction posture, doorway stretch   Medbridge Access Code: Kira Daniela  []  No new Education completed  [x]  Reviewed Prior HEP      [x]  Patient verbalized and/or demonstrated understanding of education provided. []  Patient unable to verbalize and/or demonstrate understanding of education provided. Will continue education.   []  Barriers to learning: none    PLAN:  Treatment Recommendations: Strengthening, Range of Motion, Balance Training, Functional Mobility Training, Transfer Training, Endurance Training, Gait Training, Stair Training, Manual Therapy - Soft Tissue Mobilization, Manual Therapy - Joint Manipulation, Pain Management, Home Exercise Program, Patient Education, Integrative Dry Needling, Aquatics and Modalities    []  Plan of care initiated. Plan to see patient 2 times per week for 12 weeks to address the treatment planned outlined above.   [x]  Continue with current plan of care   []  Modify plan of care as follows:    []  Hold pending physician visit  []  Discharge    Time In 1550   Time Out 1630   Timed Code Minutes: 40   Total Treatment Time: 40       Electronically Signed by: Yesenia Abraham

## 2020-11-24 ENCOUNTER — HOSPITAL ENCOUNTER (OUTPATIENT)
Dept: PHYSICAL THERAPY | Age: 55
Setting detail: THERAPIES SERIES
Discharge: HOME | End: 2020-11-24
Payer: COMMERCIAL

## 2020-11-24 PROCEDURE — 97110 THERAPEUTIC EXERCISES: CPT

## 2020-11-24 NOTE — PROGRESS NOTES
today. Denies soreness in R knee today. She has been using recliner with good lumbar support curve to unload spine and decrease pain. TREATMENT   Precautions:    Pain: R midthoracic pain, B lumbar and SI pain, B hip pain, R knee pain    X in shaded column indicates activity completed today   Modalities Parameters/  Location  Notes                     Manual Therapy Time/Technique  Notes                     Exercise/Intervention   Notes   NuStep warm up Seat 10, arms 7 5 min Level 3 x           UPPER THORACIC PAIN:        Seated scapular retraction, retro rolls, cervical retraction  10x 5 sec x    Standing pec stretch doorway  3x 20 sec     Upper trap, levator scap, posterior capsule stretch  3x  10 sec x    Upper thoracic stretch seated 3x 10 sec x Using cane in front of her to stabilize   Seated pec stretch with ball behind thoracic spine 5x 10 sec x    Seated theraband Horizontal abd, Row, Extension 10x  x                  SEATED ABDOMINAL:     With lumbar towel roll   Glute set, abdominal brace  5x 5 sec     VMO strength ball squeeze with LAQ   10x 3 sec x    Seated HS curl 10x Green x           HOOKLYING LUMBAR AND KNEE - Red wedge       Hip flexor stretch supine off edge 2x  20 sec  Foot resting on stool   Piriformis stretch leg crossed pulling knee 2x 20 sec  Therapist assisting, unable to cross leg fully   SAQ, longitudinal hip ER/IR  10x 3 sec                             Specific Interventions Next Treatment: Patient declined aquatic therapy, she has done it in the past and needs to be in pool a couple hours in order to feel like it was a good session. Chronic pain multiple regions, cervical and scapular retraction posture, doorway stretch, seated abdominal bracing. Supine with wedge- glute sets, longitudinal hip ER/IR, quad set, SAQ, VMO strengthening for patellar stability. Hip flexor stretch supine off edge, piriformis stretch.  NuStep and endurance activities as tolerated (hydrostick, gait training, standing balance tasks)    Activity/Treatment Tolerance:  [x]  Patient tolerated treatment well  []  Patient limited by fatigue  []  Patient limited by pain   []  Patient limited by medical complications  []  Other:     Assessment: Made progressions today with new theraband activities for scapula and leg strengthening. Added NuStep also with no complaints, pain only increased from 6/10 to 7/10 at end of session. GOALS:  Patient Goal: decrease pain in back and legs    Short Term Goals:  Time Frame: 6 weeks    Patient will report decreased low back pain from baseline 8/10 to less than 4/10 during therapy exercises in order to stand greater than 5 minutes. Patient will improve BLE AROM to 0-100 deg hip flex, 0-10 deg hip ext supine, and 0-120 deg R knee flexion in order to bend and lift without straining lumbar spine. Patient will demonstrate good abdominal bracing and body mechanics during therapy session in order to preserve neutral spine during household tasks. Patient will be IND with HEP in order to meet long term goals. Long Term Goals:  Time Frame: 12 weeks    Patient will improve score of Modified Oswestry LBP questionnaire from baseline 58% impairment to at least 30% impairment in order to sleep more comfortably and tolerate more household activities. Patient will squat to lift 5 lb weight from floor to standing with good body mechanics in order to perform household tasks using her rolling walker. Patient will improve BLE strength to 4+/5 in order to squat, lunge, and navigate steps without difficulty. Patient Education:   [x]  HEP/Education Completed: Plan of Care, Goals, HEP handout given for scapular theraband row, ext, horiz abd GREEN  [facility]: [email or web address],   []  No new Education completed  [x]  Reviewed Prior HEP      [x]  Patient verbalized and/or demonstrated understanding of education provided.   []  Patient unable to verbalize and/or demonstrate understanding of education provided. Will continue education. []  Barriers to learning: none    PLAN:  Treatment Recommendations: Strengthening, Range of Motion, Balance Training, Functional Mobility Training, Transfer Training, Endurance Training, Gait Training, Stair Training, Manual Therapy - Soft Tissue Mobilization, Manual Therapy - Joint Manipulation, Pain Management, Home Exercise Program, Patient Education, Integrative Dry Needling, Aquatics and Modalities    []  Plan of care initiated. Plan to see patient 2 times per week for 12 weeks to address the treatment planned outlined above.   [x]  Continue with current plan of care   []  Modify plan of care as follows:    []  Hold pending physician visit  []  Discharge    Time In 1300   Time Out 1345   Timed Code Minutes: 45   Total Treatment Time: 45       Electronically Signed by: Kimberley Brittle

## 2020-12-03 ENCOUNTER — HOSPITAL ENCOUNTER (OUTPATIENT)
Dept: PHYSICAL THERAPY | Age: 55
Setting detail: THERAPIES SERIES
Discharge: HOME | End: 2020-12-03
Payer: COMMERCIAL

## 2020-12-03 PROCEDURE — 97110 THERAPEUTIC EXERCISES: CPT

## 2020-12-03 NOTE — PROGRESS NOTES
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]   M54.9 (ICD-10-CM) - Mid back pain    M47.816 (ICD-10-CM) - Spondylosis of lumbar region without myelopathy or radiculopathy    M46.1 (ICD-10-CM) - Sacroiliac inflammation (HCC)    M16.0 (ICD-10-CM) - Primary osteoarthritis of both hips    G89.4 (ICD-10-CM) - Chronic pain syndrome       Treatment Diagnosis Lumbago, thoracic spine pain, B hip pain, R knee pain, B hip stiffness, R knee stiffness, spinal stiffness, muscular weakness, difficulty walking   Date of Evaluation 20    Additional Pertinent History L Hip replacement May 2018, T12 Kyphoplasty, R Hip injections for bursitis, Right knee nerve ablations and injections, lumbar and thoracic nerve blocks       Functional Outcome Measure Used Modified Oswestry LBP questionnaire   Functional Outcome Score 29/50 = 58% impairment (20)       Insurance: Primary: Payor: Kati Montesinos /  /  / ,   Secondary:    Authorization Information: 30 visits PT/OT/ST each per asad yr. Aquatic and modalities covered, FCE covered. No hot/cold pack. Visit # 5, 5/10 for progress note   Visits Allowed: 30   Recertification Date:    Physician Follow-Up: 20    Physician Orders: Back pain, Neck pain, Knee pain, Hip pain   History of Present Illness: Patient reporting to pain management Right scapular midthoracic pain flared up in Spring 2020, needs insurance approval for ablations.       SUBJECTIVE: Patient reporting pain not too bad today and rating pain overall 6/10 TREATMENT   Precautions:    Pain: R midthoracic pain, B lumbar and SI pain, B hip pain, R knee pain. 6/10    X in shaded column indicates activity completed today   Modalities Parameters/  Location  Notes                     Manual Therapy Time/Technique  Notes                     Exercise/Intervention   Notes   NuStep warm up Seat 10, arms 7 5 min Level 3            UPPER THORACIC PAIN:        Seated scapular retraction, retro rolls, cervical retraction  15 5 sec x    Standing pec stretch doorway  3x 20 sec     Upper trap, levator scap, posterior capsule stretch  3x  10 sec x    Upper thoracic stretch seated 3x 10 sec x Using cane in front of her to stabilize   Seated pec stretch with ball behind thoracic spine 5x 10 sec x    Seated theraband Horizontal abd, Extension 15  x Performed without band due to pt forgetting band                 SEATED ABDOMINAL:     With lumbar towel roll   Glute set, abdominal brace  10 5 sec x    VMO strength ball squeeze with LAQ   10x 3 sec x    Seated HS curl 10x Green     Marching  10  x    HOOKLYING LUMBAR AND KNEE - Red wedge       Hip flexor stretch supine off edge 2x  20 sec  Foot resting on stool   Piriformis stretch leg crossed pulling knee 2x 20 sec  Therapist assisting, unable to cross leg fully   SAQ, longitudinal hip ER/IR  10x 3 sec                             Specific Interventions Next Treatment: Patient declined aquatic therapy, she has done it in the past and needs to be in pool a couple hours in order to feel like it was a good session. Chronic pain multiple regions, cervical and scapular retraction posture, doorway stretch, seated abdominal bracing. Supine with wedge- glute sets, longitudinal hip ER/IR, quad set, SAQ, VMO strengthening for patellar stability. Hip flexor stretch supine off edge, piriformis stretch.  NuStep and endurance activities as tolerated (hydro stick, gait training, standing balance tasks)    Activity/Treatment Tolerance:  [x]  Patient tolerated treatment well  []  Patient limited by fatigue  []  Patient limited by pain   []  Patient limited by medical complications  []  Other:     Assessment: Continued with exercises as noted with patient overall tolerating well. Patient did need multiple cues to relax upper traps during exercises. Patient reporting having a little more pain at end of session but having no other complains. GOALS:  Patient Goal: decrease pain in back and legs    Short Term Goals:  Time Frame: 6 weeks    Patient will report decreased low back pain from baseline 8/10 to less than 4/10 during therapy exercises in order to stand greater than 5 minutes. Patient will improve BLE AROM to 0-100 deg hip flex, 0-10 deg hip ext supine, and 0-120 deg R knee flexion in order to bend and lift without straining lumbar spine. Patient will demonstrate good abdominal bracing and body mechanics during therapy session in order to preserve neutral spine during household tasks. Patient will be IND with HEP in order to meet long term goals. Long Term Goals:  Time Frame: 12 weeks    Patient will improve score of Modified Oswestry LBP questionnaire from baseline 58% impairment to at least 30% impairment in order to sleep more comfortably and tolerate more household activities. Patient will squat to lift 5 lb weight from floor to standing with good body mechanics in order to perform household tasks using her rolling walker. Patient will improve BLE strength to 4+/5 in order to squat, lunge, and navigate steps without difficulty. Patient Education:   [x]  HEP/Education Completed: Keeping upper traps relaxed during session  [employer] Access Code:   []  No new Education completed  [x]  Reviewed Prior HEP      [x]  Patient verbalized and/or demonstrated understanding of education provided. []  Patient unable to verbalize and/or demonstrate understanding of education provided. Will continue education.   []  Barriers to learning: none    PLAN:  Treatment Recommendations: Strengthening, Range of Motion, Balance Training, Functional Mobility Training, Transfer Training, Endurance Training, Gait Training, Stair Training, Manual Therapy - Soft Tissue Mobilization, Manual Therapy - Joint Manipulation, Pain Management, Home Exercise Program, Patient Education, Integrative Dry Needling, Aquatics and Modalities       [x]  Continue with current plan of care. 2 times per week for 12 weeks.   []  Modify plan of care as follows:    []  Hold pending physician visit  []  Discharge    Time In 1258   Time Out 1334   Timed Code Minutes: 36   Total Treatment Time: 36       Electronically Signed by: Ann-Marie Garcia

## 2020-12-04 ENCOUNTER — HOSPITAL ENCOUNTER (OUTPATIENT)
Dept: PHYSICAL THERAPY | Age: 55
Setting detail: THERAPIES SERIES
Discharge: HOME | End: 2020-12-04
Payer: COMMERCIAL

## 2020-12-04 PROCEDURE — 97110 THERAPEUTIC EXERCISES: CPT

## 2020-12-04 NOTE — PROGRESS NOTES
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]   M54.9 (ICD-10-CM) - Mid back pain    M47.816 (ICD-10-CM) - Spondylosis of lumbar region without myelopathy or radiculopathy    M46.1 (ICD-10-CM) - Sacroiliac inflammation (HCC)    M16.0 (ICD-10-CM) - Primary osteoarthritis of both hips    G89.4 (ICD-10-CM) - Chronic pain syndrome       Treatment Diagnosis Lumbago, thoracic spine pain, B hip pain, R knee pain, B hip stiffness, R knee stiffness, spinal stiffness, muscular weakness, difficulty walking   Date of Evaluation 20    Additional Pertinent History L Hip replacement May 2018, T12 Kyphoplasty, R Hip injections for bursitis, Right knee nerve ablations and injections, lumbar and thoracic nerve blocks       Functional Outcome Measure Used Modified Oswestry LBP questionnaire   Functional Outcome Score 29/50 = 58% impairment (20)       Insurance: Primary: Payor: [employer] /  /  / ,   Secondary:    Authorization Information: 30 visits PT/OT/ST each per asad yr. Aquatic and modalities covered, FCE covered. No hot/cold pack. Visit # 6, 6/10 for progress note   Visits Allowed: 30   Recertification Date:    Physician Follow-Up: 20    Physician Orders: Back pain, Neck pain, Knee pain, Hip pain   History of Present Illness: Patient reporting to pain management Right scapular midthoracic pain flared up in Spring 2020, needs insurance approval for ablations.       SUBJECTIVE:  Patient had to drive down to [place] this morning and back, so her back is stiff and she is fatigued. TREATMENT   Precautions:    Pain: R midthoracic pain, B lumbar and SI pain, B hip pain, R knee pain. 6/10    X in shaded column indicates activity completed today   Modalities Parameters/  Location  Notes                     Manual Therapy Time/Technique  Notes                     Exercise/Intervention   Notes   NuStep warm up Seat 10, arms 7 5 min Level 3            UPPER THORACIC PAIN:        Seated scapular retraction, retro rolls, cervical retraction  15 5 sec x    Upper trap, levator scap, posterior capsule stretch  3x  10 sec x    Upper thoracic stretch seated  3x 10 sec  Using cane in front of her to stabilize    Seated pec stretch with ball behind thoracic spine  3x 10 sec x Patient has purchased a ball for home   Seated theraband Horizontal abd, Extension 15                    SEATED ABDOMINAL:     With lumbar towel roll   Glute set, abdominal brace  10 5 sec     VMO strength ball squeeze with LAQ   10x 3 sec     Seated HS curl 10x Green     Marching  10             HOOKLYING LUMBAR AND KNEE - Red wedge       Hip flexor stretch supine off edge 2x  20 sec x Foot resting on stool   Piriformis stretch and hamstring stretch passive 2x 20 sec x Therapist assisting, unable to cross leg fully   SAQ, longitudinal hip ER/IR  10x 3 sec  x    Hip add set with ball squeeze  10x  5 sec x                    Specific Interventions Next Treatment: Patient declined aquatic therapy, she has done it in the past and needs to be in pool a couple hours in order to feel like it was a good session. Chronic pain multiple regions, cervical and scapular retraction posture, doorway stretch, seated abdominal bracing. Supine with wedge- glute sets, longitudinal hip ER/IR, quad set, SAQ, VMO strengthening for patellar stability. Hip flexor stretch supine off edge, piriformis stretch.  NuStep and endurance activities as tolerated (hydro stick, gait training, standing balance tasks)    Activity/Treatment Tolerance:  [x]  Patient tolerated treatment well  []  Patient limited by fatigue  []  Patient limited by pain   []  Patient limited by medical complications  []  Other:     Assessment: Held seated strengthening in order to address flexibility exercises on the mat. Patient felt good during stretches, no increased pain. GOALS:  Patient Goal: decrease pain in back and legs    Short Term Goals:  Time Frame: 6 weeks    Patient will report decreased low back pain from baseline 8/10 to less than 4/10 during therapy exercises in order to stand greater than 5 minutes. Patient will improve BLE AROM to 0-100 deg hip flex, 0-10 deg hip ext supine, and 0-120 deg R knee flexion in order to bend and lift without straining lumbar spine. Patient will demonstrate good abdominal bracing and body mechanics during therapy session in order to preserve neutral spine during household tasks. Patient will be IND with HEP in order to meet long term goals. Long Term Goals:  Time Frame: 12 weeks    Patient will improve score of Modified Oswestry LBP questionnaire from baseline 58% impairment to at least 30% impairment in order to sleep more comfortably and tolerate more household activities. Patient will squat to lift 5 lb weight from floor to standing with good body mechanics in order to perform household tasks using her rolling walker. Patient will improve BLE strength to 4+/5 in order to squat, lunge, and navigate steps without difficulty. Patient Education:   [x]  HEP/Education Completed: Keeping upper traps relaxed during session  [employer] Access Code:   []  No new Education completed  [x]  Reviewed Prior HEP      [x]  Patient verbalized and/or demonstrated understanding of education provided. []  Patient unable to verbalize and/or demonstrate understanding of education provided. Will continue education.   []  Barriers to learning: none    PLAN:  Treatment Recommendations: Strengthening, Range of Motion, Balance Training, Functional Mobility Training, Transfer Training, Endurance Training, Gait Training, Stair Training, Manual Therapy - Soft Tissue Mobilization, Manual Therapy - Joint Manipulation, Pain Management, Home Exercise Program, Patient Education, Integrative Dry Needling, Aquatics and Modalities       [x]  Continue with current plan of care. 2 times per week for 12 weeks.   []  Modify plan of care as follows:    []  Hold pending physician visit  []  Discharge    Time In [address] [place] [address]   Time Out 1218   Timed Code Minutes: 43   Total Treatment Time: 43       Electronically Signed by: Guerita Mcbride

## 2020-12-08 ENCOUNTER — HOSPITAL ENCOUNTER (OUTPATIENT)
Dept: PHYSICAL THERAPY | Age: 55
Setting detail: THERAPIES SERIES
Discharge: HOME | End: 2020-12-08
Payer: COMMERCIAL

## 2020-12-08 PROCEDURE — 97110 THERAPEUTIC EXERCISES: CPT

## 2020-12-08 NOTE — PROGRESS NOTES
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]   M54.9 (ICD-10-CM) - Mid back pain    M47.816 (ICD-10-CM) - Spondylosis of lumbar region without myelopathy or radiculopathy    M46.1 (ICD-10-CM) - Sacroiliac inflammation (HCC)    M16.0 (ICD-10-CM) - Primary osteoarthritis of both hips    G89.4 (ICD-10-CM) - Chronic pain syndrome       Treatment Diagnosis Lumbago, thoracic spine pain, B hip pain, R knee pain, B hip stiffness, R knee stiffness, spinal stiffness, muscular weakness, difficulty walking   Date of Evaluation 20    Additional Pertinent History L Hip replacement May 2018, T12 Kyphoplasty, R Hip injections for bursitis, Right knee nerve ablations and injections, lumbar and thoracic nerve blocks       Functional Outcome Measure Used Modified Oswestry LBP questionnaire   Functional Outcome Score 29/50 = 58% impairment (20)       Insurance: Primary: Payor: [facility] /  /  / ,   Secondary:    Authorization Information: 30 visits PT/OT/ST each per asad yr. Aquatic and modalities covered, FCE covered. No hot/cold pack. Visit # 7, 7/10 for progress note   Visits Allowed: 30   Recertification Date:    Physician Follow-Up: 20    Physician Orders: Back pain, Neck pain, Knee pain, Hip pain   History of Present Illness: Patient reporting to pain management Right scapular midthoracic pain flared up in Spring 2020, needs insurance approval for ablations.       SUBJECTIVE:  Patient reporting that her house was a little colder this AM and stating that she is having more pain because of this. TREATMENT   Precautions:    Pain: Neck, shoulders, back, hips right knee 6/10    X in shaded column indicates activity completed today   Modalities Parameters/  Location  Notes                     Manual Therapy Time/Technique  Notes                     Exercise/Intervention   Notes   NuStep warm up Seat 10, arms 9 5 min Level 3 x           UPPER THORACIC PAIN:        Seated scapular retraction, retro rolls, cervical retraction  15 5 sec x    Upper trap, levator scap, posterior capsule stretch  3x  10 sec x    Upper thoracic stretch seated  3x 10 sec  Using cane in front of her to stabilize    Seated pec stretch with ball behind thoracic spine  3x 10 sec  Patient has purchased a ball for home   Seated theraband Horizontal abd, Extension 15                    SEATED ABDOMINAL:        abdominal brace  15 5 sec x    VMO strength ball squeeze with LAQ   10x 3 sec x    Seated HS curl 10x Green     Marching  10             HOOKLYING LUMBAR AND KNEE - Red wedge       Hip flexor stretch supine off edge 2x  20 sec x Foot resting on stool   Piriformis stretch and hamstring stretch passive 2x 20 sec x Therapist assisting, unable to cross leg fully   SAQ, longitudinal hip ER/IR  15x 3 sec  x    Hip add set with ball squeeze  10x  5 sec x           STANDING:        Heel/toe raises, HS curl and marching  10  x 1 UE for support                   Specific Interventions Next Treatment: Patient declined aquatic therapy, she has done it in the past and needs to be in pool a couple hours in order to feel like it was a good session. Chronic pain multiple regions, cervical and scapular retraction posture, doorway stretch, seated abdominal bracing. Supine with wedge- glute sets, longitudinal hip ER/IR, quad set, SAQ, VMO strengthening for patellar stability. Hip flexor stretch supine off edge, piriformis stretch.  NuStep and endurance activities as tolerated (hydro stick, gait training, standing balance tasks)    Activity/Treatment Tolerance:  [x]  Patient tolerated treatment well  []  Patient limited by fatigue  []  Patient limited by pain   []  Patient limited by medical complications   []  Other:     Assessment: Added reps as noted and adding standing exorcizes with patient tolerating well but limited with lack of endurance. Patient reporting right knee bothering her a little more at end of session but reporting no other complains at this time. GOALS:  Patient Goal: decrease pain in back and legs    Short Term Goals:  Time Frame: 6 weeks    Patient will report decreased low back pain from baseline 8/10 to less than 4/10 during therapy exercises in order to stand greater than 5 minutes. Patient will improve BLE AROM to 0-100 deg hip flex, 0-10 deg hip ext supine, and 0-120 deg R knee flexion in order to bend and lift without straining lumbar spine. Patient will demonstrate good abdominal bracing and body mechanics during therapy session in order to preserve neutral spine during household tasks. Patient will be IND with HEP in order to meet long term goals. Long Term Goals:  Time Frame: 12 weeks    Patient will improve score of Modified Oswestry LBP questionnaire from baseline 58% impairment to at least 30% impairment in order to sleep more comfortably and tolerate more household activities. Patient will squat to lift 5 lb weight from floor to standing with good body mechanics in order to perform household tasks using her rolling walker. Patient will improve BLE strength to 4+/5 in order to squat, lunge, and navigate steps without difficulty. Patient Education:   [x]  HEP/Education Completed: Keeping upper traps relaxed during session  [employer] Access Code:   []  No new Education completed  [x]  Reviewed Prior HEP      [x]  Patient verbalized and/or demonstrated understanding of education provided.   []  Patient unable to verbalize and/or demonstrate understanding of education provided. Will continue education. []  Barriers to learning: none    PLAN:  Treatment Recommendations: Strengthening, Range of Motion, Balance Training, Functional Mobility Training, Transfer Training, Endurance Training, Gait Training, Stair Training, Manual Therapy - Soft Tissue Mobilization, Manual Therapy - Joint Manipulation, Pain Management, Home Exercise Program, Patient Education, Integrative Dry Needling, Aquatics and Modalities       [x]  Continue with current plan of care. 2 times per week for 12 weeks.   []  Modify plan of care as follows:    []  Hold pending physician visit  []  Discharge    Time In 1141   Time Out 1233   Timed Code Minutes: 52   Total Treatment Time: 52       Electronically Signed by: Edinson Rouse

## 2020-12-11 ENCOUNTER — HOSPITAL ENCOUNTER (OUTPATIENT)
Dept: PHYSICAL THERAPY | Age: 55
Setting detail: THERAPIES SERIES
Discharge: HOME | End: 2020-12-11
Payer: COMMERCIAL

## 2020-12-11 PROCEDURE — 97110 THERAPEUTIC EXERCISES: CPT

## 2020-12-11 NOTE — PROGRESS NOTES
[address] [facility]   PHYSICAL THERAPY  [x] DAILY NOTE (LAND) [] DAILY NOTE (AQUATIC ) [] PROGRESS NOTE [] DISCHARGE NOTE    [x] [facility] [place]   [] [place] [address]    [] [facility]   [] Jadyn Betancur     Date: 2020  Patient Name:  Henry Thibodeaux  : 1965  MRN: [id number]    Referring Practitioner TAMIE Kennedy*   Diagnosis Spondylosis without myelopathy or radiculopathy, thoracic region [M47.814]   M54.9 (ICD-10-CM) - Mid back pain    M47.816 (ICD-10-CM) - Spondylosis of lumbar region without myelopathy or radiculopathy    M46.1 (ICD-10-CM) - Sacroiliac inflammation (HCC)    M16.0 (ICD-10-CM) - Primary osteoarthritis of both hips    G89.4 (ICD-10-CM) - Chronic pain syndrome       Treatment Diagnosis Lumbago, thoracic spine pain, B hip pain, R knee pain, B hip stiffness, R knee stiffness, spinal stiffness, muscular weakness, difficulty walking   Date of Evaluation 20    Additional Pertinent History L Hip replacement May 2018, T12 Kyphoplasty, R Hip injections for bursitis, Right knee nerve ablations and injections, lumbar and thoracic nerve blocks       Functional Outcome Measure Used Modified Oswestry LBP questionnaire   Functional Outcome Score 29/50 = 58% impairment (20)       Insurance: Primary: Payor: [employer] /  /  / ,   Secondary:    Authorization Information: 30 visits PT/OT/ST each per asda yr. Aquatic and modalities covered, FCE covered. No hot/cold pack. Visit # 8, 8/10 for progress note   Visits Allowed: 30   Recertification Date: [id number]   Physician Follow-Up: 20    Physician Orders: Back pain, Neck pain, Knee pain, Hip pain   History of Present Illness: Patient reporting to pain management Right scapular midthoracic pain flared up in Spring 2020, needs insurance approval for ablations.       SUBJECTIVE:  Patient c/o 7/10 pain in back today, she drove to [employer] yesterday so her back stiffness was elevated. She feels since starting therapy she is noticing increased endurance, performing HEP. TREATMENT   Precautions:    Pain: Neck, shoulders, back, hips right knee    X in shaded column indicates activity completed today   Modalities Parameters/  Location  Notes                     Manual Therapy Time/Technique  Notes                     Exercise/Intervention   Notes   NuStep warm up Seat 9, Arms 10 5 min Level 3 x           UPPER THORACIC PAIN:        Seated scapular retraction, retro rolls, cervical retraction  15 5 sec     Upper trap, levator scap, posterior capsule stretch  3x  10 sec     Upper thoracic stretch seated  3x 10 sec  Using cane in front of her to stabilize    Seated pec stretch with ball behind thoracic spine  3x 10 sec  Patient has purchased a ball for home   Seated theraband Horizontal abd, Extension 15                    SEATED ABDOMINAL:        abdominal brace  15 5 sec     VMO strength ball squeeze with LAQ   10x 3 sec     Seated HS curl 10x Green     Marching  10             HOOKLYING LUMBAR AND KNEE - Red wedge        Hip flexor stretch supine off edge  2x  20 sec x Foot resting on stool    Piriformis stretch and hamstring stretch with strap over shoe 2x 20 sec x Cues for technique    SAQ, longitudinal hip ER/IR  15x  3 sec  x Towel squeeze between knees during SAQ   Hip add set with ball squeeze and abd brace 10x  5 sec x    Hooklying glute set mini bridge 5x 5 sec x Fatigued and pain          STANDING:        Heel/toe raises, HS curl, mini squat  10x  x BUE support, seated rest needed 2 times   3 way hip                Specific Interventions Next Treatment: Patient declined aquatic therapy, she has done it in the past and needs to be in pool a couple hours in order to feel like it was a good session. Chronic pain multiple regions, cervical and scapular retraction posture, doorway stretch, seated abdominal bracing.  Supine with wedge- glute sets, longitudinal hip ER/IR, quad set, SAQ, VMO strengthening for patellar stability. Hip flexor stretch supine off edge, piriformis stretch. NuStep and endurance activities as tolerated (hydro stick, gait training, standing balance tasks)    Activity/Treatment Tolerance:  [x]  Patient tolerated treatment well  []  Patient limited by fatigue  []  Patient limited by pain   []  Patient limited by medical complications   []  Other:     Assessment: Patient able to progress standing activities this week but still very limited strength and endurance. Cues needed for body mechanics with squat technique and abdominal bracing. Patient is planning to start using SI support belt again. GOALS:  Patient Goal: decrease pain in back and legs    Short Term Goals:  Time Frame: 6 weeks    Patient will report decreased low back pain from baseline 8/10 to less than 4/10 during therapy exercises in order to stand greater than 5 minutes. Patient will improve BLE AROM to 0-100 deg hip flex, 0-10 deg hip ext supine, and 0-120 deg R knee flexion in order to bend and lift without straining lumbar spine. Patient will demonstrate good abdominal bracing and body mechanics during therapy session in order to preserve neutral spine during household tasks. Patient will be IND with HEP in order to meet long term goals. Long Term Goals:  Time Frame: 12 weeks    Patient will improve score of Modified Oswestry LBP questionnaire from baseline 58% impairment to at least 30% impairment in order to sleep more comfortably and tolerate more household activities. Patient will squat to lift 5 lb weight from floor to standing with good body mechanics in order to perform household tasks using her rolling walker. Patient will improve BLE strength to 4+/5 in order to squat, lunge, and navigate steps without difficulty.        Patient Education:   [x]  HEP/Education Completed: Keeping upper traps relaxed during session  Original Access Code:   []  No new Education completed  [x]  Reviewed Prior HEP      [x]  Patient verbalized and/or demonstrated understanding of education provided. []  Patient unable to verbalize and/or demonstrate understanding of education provided. Will continue education. []  Barriers to learning: none    PLAN:  Treatment Recommendations: Strengthening, Range of Motion, Balance Training, Functional Mobility Training, Transfer Training, Endurance Training, Gait Training, Stair Training, Manual Therapy - Soft Tissue Mobilization, Manual Therapy - Joint Manipulation, Pain Management, Home Exercise Program, Patient Education, Integrative Dry Needling, Aquatics and Modalities       [x]  Continue with current plan of care. 2 times per week for 12 weeks.   []  Modify plan of care as follows:    []  Hold pending physician visit  []  Discharge    Time In 1130   Time Out 1219   Timed Code Minutes: 49   Total Treatment Time: 49       Electronically Signed by: Abdi Devlin

## 2020-12-14 ENCOUNTER — HOSPITAL ENCOUNTER (OUTPATIENT)
Dept: PHYSICAL THERAPY | Age: 55
Setting detail: THERAPIES SERIES
End: 2020-12-14
Payer: COMMERCIAL

## 2020-12-17 ENCOUNTER — PATIENT MESSAGE (OUTPATIENT)
Dept: PHYSICAL MEDICINE AND REHAB | Age: 55
End: 2020-12-17

## 2020-12-21 ENCOUNTER — OFFICE VISIT (OUTPATIENT)
Dept: PHYSICAL MEDICINE AND REHAB | Age: 55
End: 2020-12-21
Payer: COMMERCIAL

## 2020-12-21 VITALS
HEIGHT: 65 IN | SYSTOLIC BLOOD PRESSURE: 130 MMHG | BODY MASS INDEX: 48.82 KG/M2 | WEIGHT: 293 LBS | TEMPERATURE: 97.1 F | DIASTOLIC BLOOD PRESSURE: 72 MMHG

## 2020-12-21 PROCEDURE — 1036F TOBACCO NON-USER: CPT | Performed by: NURSE PRACTITIONER

## 2020-12-21 PROCEDURE — G8484 FLU IMMUNIZE NO ADMIN: HCPCS | Performed by: NURSE PRACTITIONER

## 2020-12-21 PROCEDURE — G8427 DOCREV CUR MEDS BY ELIG CLIN: HCPCS | Performed by: NURSE PRACTITIONER

## 2020-12-21 PROCEDURE — 99213 OFFICE O/P EST LOW 20 MIN: CPT | Performed by: NURSE PRACTITIONER

## 2020-12-21 PROCEDURE — G8417 CALC BMI ABV UP PARAM F/U: HCPCS | Performed by: NURSE PRACTITIONER

## 2020-12-21 PROCEDURE — 3017F COLORECTAL CA SCREEN DOC REV: CPT | Performed by: NURSE PRACTITIONER

## 2020-12-21 RX ORDER — HYDROCODONE BITARTRATE AND ACETAMINOPHEN 5; 325 MG/1; MG/1
1 TABLET ORAL 2 TIMES DAILY PRN
Qty: 60 TABLET | Refills: 0 | Status: SHIPPED | OUTPATIENT
Start: 2020-12-21 | End: 2021-01-27 | Stop reason: SDUPTHER

## 2020-12-21 ASSESSMENT — ENCOUNTER SYMPTOMS
SINUS PAIN: 0
EYES NEGATIVE: 1
BACK PAIN: 1
SORE THROAT: 0
GASTROINTESTINAL NEGATIVE: 1
VOICE CHANGE: 0
RESPIRATORY NEGATIVE: 1

## 2020-12-21 NOTE — TELEPHONE ENCOUNTER
From: Jakob Callejas  To: TAMIE Morton - CNP  Sent: 12/17/2020 6:16 PM EST  Subject: Visit Follow-Up Question    I have an appointment on Monday. Is this a virtual visit or in person?     Many Thanks,  Thien Camejo

## 2020-12-21 NOTE — PROGRESS NOTES
[facility]  [address]  Dept: [phone]  Dept Fax: [phone]: [phone]    Visit Date: 12/21/2020    Functionality Assessment/Goals Worksheet     On a scale of 0 (Does not Interfere) to 10 (Completely Interferes)     1. Which number describes how during the past week pain has interfered with       the following:  A. General Activity:  9  B. Mood: 8  C. Walking Ability:  7  D. Normal Work (Includes both work outside the home and housework):  9  E. Relations with Other People:   6  F. Sleep:   8  G. Enjoyment of Life:   8    2. Patient Prefers to Take their Pain Medications:     [x]  On a regular basis - Meloxicam   [x]  Only when necessary - flexeril and norco    []  Does not take pain medications    3. What are the Patient's Goals/Expectations for Visiting Pain Management? []  Learn about my pain    [x]  Receive Medication   [x]  Physical Therapy     []  Treat Depression   [x]  Receive Injections - nerve burn    []  Treat Sleep   []  Deal with Anxiety and Stress   []  Treat Opoid Dependence/Addiction   []  Other:      HPI:   Hayden Thompson is a 63 y.o. female is here today for    Chief Complaint: Back pain, right knee pain, neck pain     HPI   2 month FU. Continues to have complaints of generalized pain all over especially with weather changes. Main complaint today remains pain throughout back and neck aching and dull. Mid back pain remains the worst- sharp and aching pain. Patient finished 6 weeks of physical therapy and has last appointment this week- states that it helped improve strength but pain is still the same. Continues to have right knee pain- has been slowly increasing- aching   [place] prn continues to help. Medications reviewed. Patient has constipation side effects with medications. Patient states she is taking medications as prescribed.  Carolyn receiving pain medications from other sources. She denies any ER visits since last visit. Pain scale with out pain medications or at its worst is 7-10/10. Pain scale with pain medications or at its best is 3-5/10. Last dose of Norco was today  Drug screen reviewed from 10/22/2020 and was appropriate  Pill count was completed today and was appropriate  Patient does not have naloxone available at home. Patient has not required use of naloxone at home since last office visit. The patientis allergic to walnut [macadamia nut oil]; other; saccharin; sulfa antibiotics; and sulfur. Past Medical History  Deborah  has a past medical history of Allergic rhinitis, Depression, Hypertension, ABDI on CPAP, Osteoarthritis, and Vitamin D deficiency. Past Surgical History  The patient  has a past surgical history that includes Dental surgery (2002); [place] tooth extraction; Hand surgery (Right, 05/15/2015); other surgical history (4/11/16); Colonoscopy (2016); other surgical history (06/13/2016); Hemorrhoid surgery (2016); Tonsillectomy; other surgical history (Right, 10/16/2017); arthrocentesis (Right, 10/16/2017); Nerve Surgery (Left, 12/05/2017); arthrocentesis (Left, 12/5/2017); pr arthrocentesis aspir&/inj major jt/bursa w/o us (Right, 9/17/2018); pr office/outpt visit,procedure only (N/A, 11/9/2018); Nerve Block Lumb Facet Level 1 Bilateral (Bilateral, 1/4/2019); Nerve Block Lumb Facet Level 1 Bilateral (Bilateral, 2/25/2019); Lumbar spine surgery (Right, 4/16/2019); Lumbar spine surgery (Left, 6/13/2019); arthrocentesis (Right, 7/18/2019); Nerve Surgery (Bilateral, 9/13/2019); hip surgery (Left, 2/7/2020); Pain management procedure (Right, 7/31/2020); and Pain management procedure (Right, 8/31/2020). Family History  This patient's family history includes Heart Disease in her mother; Substance Abuse in her sister. Social History  Deborah  reports that she quit smoking about 9 years ago.  Her smoking use included cigarettes and e-cigarettes. She started smoking about 15 years ago. She has a 7.00 pack-year smoking history. She has never used smokeless tobacco. She reports current alcohol use. She reports that she does not use drugs. Medications    Current Outpatient Medications:     HYDROcodone-acetaminophen (NORCO) 5-325 MG per tablet, Take 1 tablet by mouth 2 times daily as needed for Pain for up to 30 days. , Disp: 60 tablet, Rfl: 0    loratadine (CLARITIN) 10 MG tablet, Take 10 mg by mouth daily, Disp: , Rfl:     Elastic Bandages & Supports (B & B SACROILIAC BELT) MISC, 1 Device by Does not apply route as needed (pain), Disp: 1 each, Rfl: 0    Cholecalciferol (VITAMIN D3) 5000 units CAPS, Take 1 capsule by mouth daily, Disp: , Rfl:     Omega-3 Fatty Acids (FISH OIL) 1200 MG CAPS, , Disp: , Rfl:     calcium carbonate (OSCAL) 500 MG TABS tablet, Take 500 mg by mouth 2 times daily, Disp: , Rfl:     Turmeric 500 MG CAPS, Take by mouth daily, Disp: , Rfl:     cyclobenzaprine (FLEXERIL) 10 MG tablet, Take 1 tablet by mouth 3 times daily as needed for Muscle spasms WARNING: This medication may make your drowsy. Do not operate heavy machinery or motor vehicles during use., Disp: 15 tablet, Rfl: 0    lisinopril (PRINIVIL;ZESTRIL) 10 MG tablet, Take 10 mg by mouth nightly , Disp: , Rfl:     citalopram (CELEXA) 20 MG tablet, Take 20 mg by mouth daily , Disp: , Rfl:     spironolactone (ALDACTONE) 25 MG tablet, Take 1 tablet by mouth daily. , Disp: 15 tablet, Rfl: 0    meloxicam (MOBIC) 15 MG tablet, Take 1 tablet by mouth daily, Disp: 30 tablet, Rfl: 2    Subjective:      Review of Systems   Constitutional: Negative. Negative for fatigue and fever. HENT: Negative for congestion, sinus pain, sore throat and voice change. Eyes: Negative. Respiratory: Negative. Cardiovascular: Positive for leg swelling. Gastrointestinal: Negative. Genitourinary: Negative.     Musculoskeletal: Positive for arthralgias, back pain, myalgias, neck pain and neck stiffness. Skin: Negative. Neurological: Negative. Psychiatric/Behavioral: Negative. Objective:     Vitals:    12/21/20 1154   BP: 130/72   Site: Left Upper Arm   Position: Sitting   Cuff Size: Large Adult   Temp: 97.1 °F (36.2 °C)   Weight: (!) 342 lb (155.1 kg)   Height: 5' 5\" (1.651 m)       Physical Exam  Vitals signs and nursing note reviewed. Constitutional:       General: She is not in acute distress. Appearance: She is well-developed. She is not diaphoretic. HENT:      Head: Normocephalic and atraumatic. Right Ear: External ear normal.      Left Ear: External ear normal.      Nose: Nose normal.      Mouth/Throat:      Pharynx: No oropharyngeal exudate. Eyes:      General: No scleral icterus. Right eye: No discharge. Left eye: No discharge. Conjunctiva/sclera: Conjunctivae normal.      Pupils: Pupils are equal, round, and reactive to light. Neck:      Musculoskeletal: Full passive range of motion without pain, normal range of motion and neck supple. Normal range of motion. No edema, erythema, neck rigidity or muscular tenderness. Thyroid: No thyromegaly. Trachea: No tracheal deviation. Cardiovascular:      Rate and Rhythm: Normal rate and regular rhythm. Heart sounds: Normal heart sounds. No murmur. No friction rub. No gallop. Pulmonary:      Effort: Pulmonary effort is normal. No respiratory distress. Breath sounds: Normal breath sounds. No wheezing or rales. Chest:      Chest wall: No tenderness. Abdominal:      General: Bowel sounds are normal. There is no distension. Palpations: Abdomen is soft. Tenderness: There is no abdominal tenderness. There is no guarding or rebound. Musculoskeletal:         General: Tenderness present. Right hip: She exhibits tenderness. Left hip: She exhibits decreased range of motion, decreased strength and bony tenderness. Right knee: She exhibits decreased range of motion and bony tenderness. Tenderness found. Cervical back: She exhibits tenderness. Thoracic back: She exhibits decreased range of motion, bony tenderness and pain. Lumbar back: She exhibits decreased range of motion, tenderness, pain and spasm. Back:         Legs:    Skin:     General: Skin is warm and dry. Coloration: Skin is not pale. Findings: No erythema or rash. Neurological:      Mental Status: She is alert and oriented to person, place, and time. She is not disoriented. Cranial Nerves: No cranial nerve deficit. Sensory: No sensory deficit. Motor: No atrophy or abnormal muscle tone. Coordination: Coordination normal.      Gait: Gait abnormal.      Deep Tendon Reflexes: Babinski sign absent on the right side. Comments: SLR neg. Psychiatric:         Attention and Perception: She is attentive. Mood and Affect: Mood is not anxious or depressed. Affect is not labile, blunt, angry or inappropriate. Speech: She is communicative. Speech is not rapid and pressured, delayed, slurred or tangential.         Behavior: Behavior normal. Behavior is not agitated, slowed, aggressive, withdrawn, hyperactive or combative. Thought Content: Thought content normal. Thought content is not paranoid or delusional. Thought content does not include homicidal or suicidal ideation. Thought content does not include homicidal or suicidal plan. Cognition and Memory: Memory is not impaired. She does not exhibit impaired recent memory or impaired remote memory. Judgment: Judgment normal. Judgment is not impulsive or inappropriate. SCOOTER test: + bilaterally   Yeomans test: + bilaterally   Gaenslen test: + bilaterally      Assessment:     1. Spondylosis of thoracic region without myelopathy or radiculopathy    2. Mid back pain    3.  Spondylosis of lumbar region without myelopathy or radiculopathy    4. Sacroiliac inflammation ([facility])    5. Primary osteoarthritis of both hips    6. Cervical spondylosis    7. Primary osteoarthritis of right knee    8. Chronic pain syndrome            Plan:      · OARRS reviewed. Current MED: 10.00  · Patient was offered naloxone for home. Refused. · Discussed long term side effects of medications, tolerance, dependency and addiction. · Previous UDS reviewed  · UDS preformed today for compliance. · Patient told can not receive any pain medications from any other source. · No evidence of abuse, diversion or aberrant behavior.  Medications and/or procedures to improve function and quality of life- patient understanding with this and that may not be pain free   Discussed with patient about safe storage of medications at home   Discussed possible weaning of medication dosing dependent on treatment/procedure results.  Discussed with patient about risks with procedure including infection, reaction to medication, increased pain, or bleeding. · Procedure notes reviewed in detail. · Recieveing about 60% relief from rigtht knee genicular nerve RFA  · Reviewed therapy notes for mid back pain- helped strength but not pain. Completed 6 weeks of PT  · Encouraged home exercise program   · Plan to repeat Bilateral T-facet RFA @ T7-8, T8-9, and T9-T10 RIGHT SIDE FIRST for longer term pain relief. Procedure and risks discussed in detail with patient.   · Medications remain effective, patient is compliant. Continue Norco 5/325 BID prn- ordered refill      Meds. Prescribed:   Orders Placed This Encounter   Medications    HYDROcodone-acetaminophen (NORCO) 5-325 MG per tablet     Sig: Take 1 tablet by mouth 2 times daily as needed for Pain for up to 30 days. Dispense:  60 tablet     Refill:  0     Reduce doses taken as pain becomes manageable       Return for  Bilateral T-facet RFA @ T7-8, T8-9, and T9-T10 RIGHT SIDE FIRST. , follow up after procedure. Electronically signed by TAMIE Evans CNP on12/21/2020 at 12:13 PM

## 2020-12-24 ENCOUNTER — HOSPITAL ENCOUNTER (OUTPATIENT)
Dept: PHYSICAL THERAPY | Age: 55
Setting detail: THERAPIES SERIES
Discharge: HOME | End: 2020-12-24
Payer: COMMERCIAL

## 2020-12-24 PROCEDURE — 97110 THERAPEUTIC EXERCISES: CPT

## 2020-12-24 NOTE — PROGRESS NOTES
interested in continuing therapy in order to improve her balance and stair climbing. She fell going up a couple steps into her house a couple weeks ago. Still presents using a cane for community distances, but inside the home she walks without device because she can take seated rest. She also uses rollator in the community if she expects she will have to stand for long durations. Since starting therapy she has not felt much relief of the back pain but she is able to tolerate more activity before needing seated break.      TREATMENT   Precautions:    Pain: Neck, shoulders, back, hips right knee    X in shaded column indicates activity completed today   Modalities Parameters/  Location  Notes                     Manual Therapy Time/Technique  Notes                     Exercise/Intervention   Notes   NuStep warm up Seat 9, Arms 10 5 min Level 3            UPPER THORACIC PAIN:        Seated scapular retraction, retro rolls, cervical retraction  15 5 sec     Upper trap, levator scap, posterior capsule stretch  3x  10 sec     Upper thoracic stretch seated  3x 10 sec  Using cane in front of her to stabilize    Seated pec stretch with ball behind thoracic spine  3x 10 sec  Patient has purchased a ball for home   Seated theraband Horizontal abd, Extension 15                    SEATED ABDOMINAL:        abdominal brace  15 5 sec     VMO strength ball squeeze with LAQ   10x 3 sec     Seated HS curl 10x Green     Marching  10             HOOKLYING LUMBAR AND KNEE - Red wedge        Hip flexor stretch supine off edge  2x  20 sec x Foot resting on stool    Piriformis stretch and hamstring stretch with strap over shoe 2x 20 sec x Cues for technique    SAQ, longitudinal hip ER/IR  15x  3 sec   Towel squeeze between knees during SAQ   Hip add set with ball squeeze and abd brace 10x  5 sec     Hooklying glute set mini bridge 5x 5 sec  Fatigued and pain          STANDING:        Heel/toe raises, HS curl, mini squat  10x   BUE support, seated rest needed 2 times   3 way hip              Reassessment - lifting mechanics 10 lb box off floor 1x  x Good technique with squat    Reassessment - see goals for ROM and strength assess   x      Specific Interventions Next Treatment: Aquatic therapy - patient requesting 60 minute session to feel more effective therapy visit. Reminded of attendance policy - if she misses a session we will shorten duration or possibly discharge. Verbalized understanding. Ambulation in pool, standing balance, leg strengthening, stair climbing, deep water unloading and bicycling for endurance. Seated stretches on bench for piriformis, hamstring. Activity/Treatment Tolerance:  [x]  Patient tolerated treatment well  []  Patient limited by fatigue  []  Patient limited by pain   []  Patient limited by medical complications   []  Other:     Assessment: Patient has made significant progress towards therapy goals. Her back pain still becomes elevated but she has more hip flexibility, 5/5 hip strength and good abdominal bracing techniques. Performing HEP consistently with no cues needed for recall. She still is limited in mobility due to knee arthritis and core abdominal weakness, and becomes very painful and out of breath with standing longer than 5 minutes. Patient would benefit from continued PT now switching to aquatic therapy to improve her muscular endurance, core abdominal strength for upright posture, and leg strengthening in order to walk without a cane and climb stairs more safely without falling. GOALS:  Patient Goal: decrease pain in back and legs    Short Term Goals:  Time Frame: 6 weeks    Patient will report decreased low back pain from baseline 8/10 to less than 4/10 during therapy exercises in order to stand greater than 5 minutes.    [] Goal Met [x] Goal Not Met [x] Continue Goal [] Discontinue Goal  [] Revise Goal  Goal Assessment: Pain still rates elevated in her back 7/10 and she still needs seated breaks about every 5 minutes. Patient will improve BLE AROM to 0-100 deg hip flex, 0-10 deg hip ext supine, and 0-120 deg R knee flexion in order to bend and lift without straining lumbar spine. [] Goal Met [x] Goal Not Met [x] Continue Goal [] Discontinue Goal  [] Revise Goal  Goal Assessment:  Progress made - Hip flex 0-100 deg bilateral, Knee flexion L 0-120 deg, Knee flexion R 0-115 deg,  0-90 deg piriformis stretch bilateral, Hamstring 0-65 deg R, 0-70 deg L, and hip extension 0-10 deg bilateral    Patient will demonstrate good abdominal bracing and body mechanics during therapy session in order to preserve neutral spine during household tasks. [] Goal Met [x] Goal Not Met [x] Continue Goal [] Discontinue Goal  [] Revise Goal  Goal Assessment:  Progress made, able to perform bracing but needs verbal cues as reminder of technique during exercises. Patient will be IND with HEP in order to meet long term goals. [x] Goal Met [] Goal Not Met [] Continue Goal [x] Discontinue Goal  [] Revise Goal  Goal Assessment:  Performing HEP exercises from handout consistently, even created her own strap for hip stretches     Long Term Goals:  Time Frame: 12 weeks    Patient will improve score of Modified Oswestry LBP questionnaire from baseline 58% impairment to at least 30% impairment in order to sleep more comfortably and tolerate more household activities. [] Goal Met [x] Goal Not Met [x] Continue Goal [] Discontinue Goal  [] Revise Goal  Goal Assessment: Progress made, score 34% impairment today improved from baseline. Patient will squat to lift 5 lb weight from floor to standing with good body mechanics in order to perform household tasks using her rolling walker. [x] Goal Met [] Goal Not Met [] Continue Goal [x] Discontinue Goal  [] Revise Goal  Goal Assessment:   Good squat technique with lifting items off the floor, but c/o knee pain. 10 lb box able to lift.      Patient will improve BLE strength to 4+/5 in order to squat, lunge, and navigate steps without difficulty. [] Goal Met [x] Goal Not Met [x] Continue Goal [] Discontinue Goal  [] Revise Goal  Goal Assessment: Progress made. Hip flex 5/5, hip abd 5/5, hip add 5/5, bridge 5/5. Knee ext 4+/5 limited by knee pain. She was able to navigate steps into basement by retro stepping for organizing her home to move. New Goal  12/24/20 - Patient will ascend/descend 12 steps with reciprocal pattern and one railing in order to access her basement. Patient Education:   [x]  HEP/Education Completed: Keeping upper traps relaxed during session  [employer] Access Code:   []  No new Education completed  [x]  Reviewed Prior HEP      [x]  Patient verbalized and/or demonstrated understanding of education provided. []  Patient unable to verbalize and/or demonstrate understanding of education provided. Will continue education. []  Barriers to learning: none    PLAN:  Treatment Recommendations: Strengthening, Range of Motion, Balance Training, Functional Mobility Training, Transfer Training, Endurance Training, Gait Training, Stair Training, Manual Therapy - Soft Tissue Mobilization, Manual Therapy - Joint Manipulation, Pain Management, Home Exercise Program, Patient Education, Integrative Dry Needling, Aquatics and Modalities       [x]  Continue with current plan of care. 2 times per week for 12 weeks.   [x]  Modify plan of care as follows: Aquatic therapy to progress endurance, balance, and closed chain strengthening   []  Hold pending physician visit  []  Discharge    Time In 1030   Time Out 1118   Timed Code Minutes: 48   Total Treatment Time: 48       Electronically Signed by: Eli Inman

## 2020-12-30 ENCOUNTER — HOSPITAL ENCOUNTER (OUTPATIENT)
Dept: PHYSICAL THERAPY | Age: 55
Setting detail: THERAPIES SERIES
Discharge: HOME | End: 2020-12-30
Payer: COMMERCIAL

## 2020-12-30 PROCEDURE — 97113 AQUATIC THERAPY/EXERCISES: CPT

## 2020-12-30 NOTE — PROGRESS NOTES
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]   M54.9 (ICD-10-CM) - Mid back pain    M47.816 (ICD-10-CM) - Spondylosis of lumbar region without myelopathy or radiculopathy    M46.1 (ICD-10-CM) - Sacroiliac inflammation (HCC)    M16.0 (ICD-10-CM) - Primary osteoarthritis of both hips    G89.4 (ICD-10-CM) - Chronic pain syndrome       Treatment Diagnosis Lumbago, thoracic spine pain, B hip pain, R knee pain, B hip stiffness, R knee stiffness, spinal stiffness, muscular weakness, difficulty walking   Date of Evaluation 20    Additional Pertinent History L Hip replacement May 2018, T12 Kyphoplasty, R Hip injections for bursitis, Right knee nerve ablations and injections, lumbar and thoracic nerve blocks       Functional Outcome Measure Used Modified Oswestry LBP questionnaire   Functional Outcome Score 29/50 = 58% impairment (20)   17/50 = 34% impairment (20)      Insurance: Primary: Payor: Emilio Lloyd /  /  / ,   Secondary:    Authorization Information: 30 visits PT/OT/ST each per asad yr. Aquatic and modalities covered, FCE covered. No hot/cold pack. Visit # 10, 1/10 for progress note   Visits Allowed: 30   Recertification Date: 3/61/5170   Physician Follow-Up: 20    Physician Orders: Back pain, Neck pain, Knee pain, Hip pain   History of Present Illness: Patient reporting to pain management Right scapular midthoracic pain flared up in Spring 2020, needs insurance approval for ablations.       SUBJECTIVE:  Patient followed up with physician and is scheduled for ablation treatment in January. First pool session today reports that her knees hurt last night while sleeping, this morning she was having 10/10 pain while walking. AQUATICS TREATMENT   Precautions:    Pain: Neck, shoulders, back, hips, R knee    X in shaded column indicates activity completed today   Exercise/Intervention Sets/Sec  Notes   Walk Forward 3 laps  x 1/2 laps   Walk Backward 3 laps  x    Walk Sideways 3 laps  x           Lower Extremity Exercises:    Progress to ankle weights as able   Heel/Toe Raises 20  x    Marches 20  x    Squats 20  x    3 Way Hip 20  x    Hamstring Curls 20  x    Lunges       Step-Ups forward, lateral 20  x 4 ft depth   Foot on noodle - submerging up/down 20  x    Foot on noodle - hip adduction 20  x           Lower Extremity Stretches:       Seated hamstring, piriformis              Seated Exercises:              Upper Extremity Exercises:    Progress to paddles   Shoulder Flexion 20  x    Shoulder ABD/ADD 20  x    Shoulder Horizontal ABD/ADD 20  x    Shoulder IR/ER       Shoulder Circles 20  x    Shoulder Shrugs       Rows 20  x    Bicep Curls              Upper Extremity Stretches:       Pec stretch in corner       Upper trap, levator, posterior capsule 5x 10 sec x    Upper thoracic stretch              Balance:       Single leg       Tandem       Kickboard push/pull              Dynamic Gait:       5 ft jogging in place       5 ft Star jumps              Deep Water:       Hang 2 min  x    Bicycle - noodle between legs 5 min  x    Hip ABD/ADD 20  x    Double leg raise \"[place] jump\" 20  x    Hip Flex/Ext           Specific Interventions Next Treatment: Aquatic therapy - patient requesting 60 minute session to feel more effective therapy visit. Reminded of attendance policy - if she misses a session we will shorten duration or possibly discharge. Verbalized understanding.  Ambulation in pool, standing balance, leg strengthening, stair climbing, deep water unloading and bicycling for endurance. Seated stretches on bench for piriformis, hamstring. Activity/Treatment Tolerance:  [x]  Patient tolerated treatment well  []  Patient limited by fatigue  []  Patient limited by pain   []  Patient limited by medical complications   []  Other:     Assessment:  Initiated aquatic therapy. Patient had good recall of some pool exercises from therapy past, but cues needed for technique and abdominal bracing, especially in deep water floating. Pain in midback and low back 4/10 during deep water activities. GOALS:  Patient Goal: decrease pain in back and legs    Short Term Goals:  Time Frame: 6 weeks    Patient will report decreased low back pain from baseline 8/10 to less than 4/10 during therapy exercises in order to stand greater than 5 minutes. Patient will improve BLE AROM to 0-100 deg hip flex, 0-10 deg hip ext supine, and 0-120 deg R knee flexion in order to bend and lift without straining lumbar spine. Patient will demonstrate good abdominal bracing and body mechanics during therapy session in order to preserve neutral spine during household tasks. Long Term Goals:  Time Frame: 12 weeks    Patient will improve score of Modified Oswestry LBP questionnaire from baseline 58% impairment to at least 30% impairment in order to sleep more comfortably and tolerate more household activities. New Goal  12/24/20 - Patient will ascend/descend 12 steps with reciprocal pattern and one railing in order to access her basement. Patient Education:   [x]  HEP/Education Completed: Monitor response to aquatic session  Medbridge Access Code:   []  No new Education completed  [x]  Reviewed Prior HEP      [x]  Patient verbalized and/or demonstrated understanding of education provided. []  Patient unable to verbalize and/or demonstrate understanding of education provided. Will continue education.   []  Barriers to learning: none    PLAN:  Treatment Recommendations: Strengthening, Range of Motion, Balance Training, Functional Mobility Training, Transfer Training, Endurance Training, Gait Training, Stair Training, Manual Therapy - Soft Tissue Mobilization, Manual Therapy - Joint Manipulation, Pain Management, Home Exercise Program, Patient Education, Integrative Dry Needling, Aquatics and Modalities       [x]  Continue with current plan of care. 2 times per week for 12 weeks.   [x]  Modify plan of care as follows: Aquatic therapy to progress endurance, balance, and closed chain strengthening   []  Hold pending physician visit  []  Discharge    Time In 1115   Time Out 1215   Timed Code Minutes: 60   Total Treatment Time: 60       Electronically Signed by: Espinoza Salazar

## 2021-01-05 ENCOUNTER — HOSPITAL ENCOUNTER (OUTPATIENT)
Dept: PHYSICAL THERAPY | Age: 56
Setting detail: THERAPIES SERIES
Discharge: HOME | End: 2021-01-05
Payer: COMMERCIAL

## 2021-01-05 PROCEDURE — 97113 AQUATIC THERAPY/EXERCISES: CPT

## 2021-01-05 NOTE — PROGRESS NOTES
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]   M54.9 (ICD-10-CM) - Mid back pain    M47.816 (ICD-10-CM) - Spondylosis of lumbar region without myelopathy or radiculopathy    M46.1 (ICD-10-CM) - Sacroiliac inflammation (HCC)    M16.0 (ICD-10-CM) - Primary osteoarthritis of both hips    G89.4 (ICD-10-CM) - Chronic pain syndrome       Treatment Diagnosis Lumbago, thoracic spine pain, B hip pain, R knee pain, B hip stiffness, R knee stiffness, spinal stiffness, muscular weakness, difficulty walking   Date of Evaluation 20    Additional Pertinent History L Hip replacement May 2018, T12 Kyphoplasty, R Hip injections for bursitis, Right knee nerve ablations and injections, lumbar and thoracic nerve blocks       Functional Outcome Measure Used Modified Oswestry LBP questionnaire   Functional Outcome Score 29/50 = 58% impairment (20)   17/50 = 34% impairment (20)      Insurance: Primary: Payor: Nicole Schneider /  /  / ,   Secondary:    Authorization Information: 30 visits PT/OT/ST each per asad yr. Aquatic and modalities covered, FCE covered. No hot/cold pack. Visit # 6, 2/10 for progress note   Visits Allowed: 30   Recertification Date:    Physician Follow-Up: 20    Physician Orders: Back pain, Neck pain, Knee pain, Hip pain   History of Present Illness: Patient reporting to pain management Right scapular midthoracic pain flared up in Spring 2020, needs insurance approval for ablations.       SUBJECTIVE:  Patient reporting feeling sore following last session but currently reporting pain level 9/10. AQUATICS TREATMENT   Precautions:    Pain: Neck, shoulders, back, hips, R knee    X in shaded column indicates activity completed today   Exercise/Intervention Sets/Sec  Notes   Walk Forward 3 laps  x 1/2 laps   Walk Backward 3 laps  x    Walk Sideways 3 laps  x           Lower Extremity Exercises:    Progress to ankle weights as able   Heel/Toe Raises 20  x    Marches 20  x    Squats 20  x    3 Way Hip 20  x    Hamstring Curls 20  x    Lunges       Step-Ups forward, lateral 20  x 4 ft depth   Foot on noodle - submerging up/down 20  x    Foot on noodle - hip adduction 20  x           Lower Extremity Stretches:       Seated hamstring 3 15 sec x           Seated Exercises:              Upper Extremity Exercises:    Progress to paddles   Shoulder Flexion 20  x    Shoulder ABD/ADD 20  x    Shoulder Horizontal ABD/ADD 20  x    Shoulder IR/ER       Shoulder Circles 20  x    Shoulder Shrugs       Rows 20  x    Bicep Curls       Trunk Rotation  10  x Pt reporting this feels good   Upper Extremity Stretches:       Pec stretch in corner 5 x  10 se     Upper trap, levator, posterior capsule 5x 10 sec x    Upper thoracic stretch 5x 10 sec x           Balance:       Single leg       Tandem       Kickboard push/pull              Dynamic Gait:       5 ft jogging in place       5 ft Star jumps              Deep Water:       Hang 2 min      Bicycle - noodle between legs 5 min  x    Big leg circles  15  x Both directions   Hip ABD/ADD 5 min  x    Double leg raise \"[place] jump\" 20  x    Hip Flex/Ext 5 min          Specific Interventions Next Treatment: Aquatic therapy - patient requesting 60 minute session to feel more effective therapy visit. Reminded of attendance policy - if she misses a session we will shorten duration or possibly discharge. Verbalized understanding.  Ambulation in pool, standing balance, leg strengthening, stair climbing, deep water unloading and bicycling for endurance. Seated stretches on bench for piriformis, hamstring. Activity/Treatment Tolerance:  [x]  Patient tolerated treatment well  []  Patient limited by fatigue  []  Patient limited by pain   []  Patient limited by medical complications   []  Other:     Assessment:  Made progressions as noted with patient tolerating well without complaints. Patient reporting pain level 4/10 at end of session. GOALS:  Patient Goal: decrease pain in back and legs    Short Term Goals:  Time Frame: 6 weeks    Patient will report decreased low back pain from baseline 8/10 to less than 4/10 during therapy exercises in order to stand greater than 5 minutes. Patient will improve BLE AROM to 0-100 deg hip flex, 0-10 deg hip ext supine, and 0-120 deg R knee flexion in order to bend and lift without straining lumbar spine. Patient will demonstrate good abdominal bracing and body mechanics during therapy session in order to preserve neutral spine during household tasks. Long Term Goals:  Time Frame: 12 weeks    Patient will improve score of Modified Oswestry LBP questionnaire from baseline 58% impairment to at least 30% impairment in order to sleep more comfortably and tolerate more household activities. New Goal  12/24/20 - Patient will ascend/descend 12 steps with reciprocal pattern and one railing in order to access her basement. Patient Education:   [x]  HEP/Education Completed: Monitor response to progressions. [employer] Access Code:   []  No new Education completed  []  Reviewed Prior HEP      [x]  Patient verbalized and/or demonstrated understanding of education provided. []  Patient unable to verbalize and/or demonstrate understanding of education provided. Will continue education.   []  Barriers to learning: none    PLAN:  Treatment Recommendations: Strengthening, Range of Motion, Balance Training, Functional Mobility Training, Transfer Training, Endurance Training, Gait Training, Stair Training, Manual Therapy - Soft Tissue Mobilization, Manual Therapy - Joint Manipulation, Pain Management, Home Exercise Program, Patient Education, Integrative Dry Needling, Aquatics and Modalities       [x]  Continue with current plan of care. 2 times per week for 12 weeks.   [x]  Modify plan of care as follows: Aquatic therapy to progress endurance, balance, and closed chain strengthening   []  Hold pending physician visit  []  Discharge    Time In 1054   Time Out 1159   Timed Code Minutes: 65   Total Treatment Time: 65       Electronically Signed by: Pablo Lee

## 2021-01-07 ENCOUNTER — TELEPHONE (OUTPATIENT)
Dept: PHYSICAL MEDICINE AND REHAB | Age: 56
End: 2021-01-07

## 2021-01-07 ENCOUNTER — HOSPITAL ENCOUNTER (OUTPATIENT)
Dept: PHYSICAL THERAPY | Age: 56
Setting detail: THERAPIES SERIES
Discharge: HOME | End: 2021-01-07
Payer: COMMERCIAL

## 2021-01-07 PROCEDURE — 97113 AQUATIC THERAPY/EXERCISES: CPT

## 2021-01-07 NOTE — PROGRESS NOTES
[address] [facility]   PHYSICAL THERAPY  [] DAILY NOTE (LAND) [x] DAILY NOTE (AQUATIC ) [] PROGRESS NOTE [] DISCHARGE NOTE    [x] [facility] [place]   [] [facility]     [] [facility]   [] Javsir Romero     Date: 2021  Patient Name:  Marci Schmid  : 1965  MRN: [id number]    Referring Practitioner TAMIE Walton*   Diagnosis Spondylosis without myelopathy or radiculopathy, thoracic region [M47.814]   M54.9 (ICD-10-CM) - Mid back pain    M47.816 (ICD-10-CM) - Spondylosis of lumbar region without myelopathy or radiculopathy    M46.1 (ICD-10-CM) - Sacroiliac inflammation (HCC)    M16.0 (ICD-10-CM) - Primary osteoarthritis of both hips    G89.4 (ICD-10-CM) - Chronic pain syndrome       Treatment Diagnosis Lumbago, thoracic spine pain, B hip pain, R knee pain, B hip stiffness, R knee stiffness, spinal stiffness, muscular weakness, difficulty walking   Date of Evaluation 20    Additional Pertinent History L Hip replacement May 2018, T12 Kyphoplasty, R Hip injections for bursitis, Right knee nerve ablations and injections, lumbar and thoracic nerve blocks       Functional Outcome Measure Used Modified Oswestry LBP questionnaire   Functional Outcome Score 29/50 = 58% impairment (20)   17/50 = 34% impairment (20)      Insurance: Primary: Payor: Akhil Abbott /  /  / ,   Secondary:    Authorization Information: 30 visits PT/OT/ST each per asad yr. Aquatic and modalities covered, FCE covered. No hot/cold pack. Visit # 12, 3/10 for progress note   Visits Allowed: 30   Recertification Date:    Physician Follow-Up: 20    Physician Orders: Back pain, Neck pain, Knee pain, Hip pain   History of Present Illness: Patient reporting to pain management Right scapular midthoracic pain flared up in Spring 2020, needs insurance approval for ablations.       SUBJECTIVE:  Patient reporting waking up with pain 10/10 but has since taken pain medication and pain dropped from 7/10 and then 4/10 currently. AQUATICS TREATMENT   Precautions:    Pain: Neck, shoulders, back, hips, R knee    X in shaded column indicates activity completed today   Exercise/Intervention Sets/Sec  Notes   Walk Forward 3 laps  x 1/2 laps   Walk Backward 3 laps  x    Walk Sideways 3 laps  x           Lower Extremity Exercises:    Progress to ankle weights as able   Heel/Toe Raises 20  x    Marches 20  x    Squats 20  x    3 Way Hip 20  x    Hamstring Curls 20  x    Lunges       Step-Ups forward, lateral 20  x 4 ft depth   Foot on noodle - submerging up/down 20  x    Foot on noodle - hip adduction 20  x           Lower Extremity Stretches:       Seated hamstring 3 15 sec            Seated Exercises:              Upper Extremity Exercises:    Progress to paddles   Shoulder Flexion 20  x    Shoulder ABD/ADD 20  x    Shoulder Horizontal ABD/ADD 20  x    Shoulder IR/ER       Shoulder Punch down with noodle 20  x    Shoulder Shrugs       Rows with noodle 20  x    Bicep Curls       Trunk Rotation  10   Pt reporting this feels good   Upper Extremity Stretches:       Pec stretch in corner 5 x  10 sec     Upper trap, levator, posterior capsule 5x 10 sec x    Upper thoracic stretch 3x 10 sec x           Balance:       Single leg       Tandem       Kickboard push/pull              Dynamic Gait:       5 ft jogging in place       5 ft Star jumps              Deep Water:       Hang 2 min      Bicycle - noodle between legs 5 min  x    Big leg circles  15   Both directions   Hip ABD/ADD 5 min  x    Double leg raise \"[place] jump\" 20  x    Hip Flex/Ext 5 min  x        Specific Interventions Next Treatment: Aquatic therapy - patient requesting 60 minute session to feel more effective therapy visit. Reminded of attendance policy - if she misses a session we will shorten duration or possibly discharge. Verbalized understanding.  Ambulation in pool, standing balance, leg strengthening, stair climbing, deep water unloading and bicycling for endurance. Seated stretches on bench for piriformis, hamstring. Activity/Treatment Tolerance:  [x]  Patient tolerated treatment well  []  Patient limited by fatigue  []  Patient limited by pain   []  Patient limited by medical complications   []  Other:     Assessment:  Continued with program as noted with patient tolerating well. Did add noodle with a few of UE exercises with no complains from patient. Patient reporting pain level 3/10 upon getting out of pool. GOALS:  Patient Goal: decrease pain in back and legs    Short Term Goals:  Time Frame: 6 weeks    Patient will report decreased low back pain from baseline 8/10 to less than 4/10 during therapy exercises in order to stand greater than 5 minutes. Patient will improve BLE AROM to 0-100 deg hip flex, 0-10 deg hip ext supine, and 0-120 deg R knee flexion in order to bend and lift without straining lumbar spine. Patient will demonstrate good abdominal bracing and body mechanics during therapy session in order to preserve neutral spine during household tasks. Long Term Goals:  Time Frame: 12 weeks    Patient will improve score of Modified Oswestry LBP questionnaire from baseline 58% impairment to at least 30% impairment in order to sleep more comfortably and tolerate more household activities. New Goal  12/24/20 - Patient will ascend/descend 12 steps with reciprocal pattern and one railing in order to access her basement. Patient Education:   []  HEP/Education Completed: Monitor response to progressions. [employer] Access Code:   [x]  No new Education completed  []  Reviewed Prior HEP      [x]  Patient verbalized and/or demonstrated understanding of education provided. []  Patient unable to verbalize and/or demonstrate understanding of education provided. Will continue education.   []  Barriers to learning: none    PLAN:  Treatment Recommendations: Strengthening, Range of Motion, Balance Training, Functional Mobility Training, Transfer Training, Endurance Training, Gait Training, Stair Training, Manual Therapy - Soft Tissue Mobilization, Manual Therapy - Joint Manipulation, Pain Management, Home Exercise Program, Patient Education, Integrative Dry Needling, Aquatics and Modalities       [x]  Continue with current plan of care. 2 times per week for 12 weeks.   [x]  Modify plan of care as follows: Aquatic therapy to progress endurance, balance, and closed chain strengthening   []  Hold pending physician visit  []  Discharge    Time In 1557   Time Out 1703   Timed Code Minutes: 66   Total Treatment Time: 66       Electronically Signed by: Karol Medina

## 2021-01-11 ENCOUNTER — HOSPITAL ENCOUNTER (OUTPATIENT)
Dept: PHYSICAL THERAPY | Age: 56
Setting detail: THERAPIES SERIES
Discharge: HOME | End: 2021-01-11
Payer: COMMERCIAL

## 2021-01-11 PROCEDURE — 97113 AQUATIC THERAPY/EXERCISES: CPT

## 2021-01-11 NOTE — PROGRESS NOTES
[address] [facility]   PHYSICAL THERAPY  [] DAILY NOTE (LAND) [x] DAILY NOTE (AQUATIC ) [] PROGRESS NOTE [] DISCHARGE NOTE    [x] [facility] [place]   [] [place] [address]    [] [facility]   [] Miguelina Belsano     Date: 2021  Patient Name:  Kelechi Dunham  : 1965  MRN: [id number]    Referring Practitioner TAMIE Marc*   Diagnosis Spondylosis without myelopathy or radiculopathy, thoracic region [M47.814]   M54.9 (ICD-10-CM) - Mid back pain    M47.816 (ICD-10-CM) - Spondylosis of lumbar region without myelopathy or radiculopathy    M46.1 (ICD-10-CM) - Sacroiliac inflammation (HCC)    M16.0 (ICD-10-CM) - Primary osteoarthritis of both hips    G89.4 (ICD-10-CM) - Chronic pain syndrome       Treatment Diagnosis Lumbago, thoracic spine pain, B hip pain, R knee pain, B hip stiffness, R knee stiffness, spinal stiffness, muscular weakness, difficulty walking   Date of Evaluation 20    Additional Pertinent History L Hip replacement May 2018, T12 Kyphoplasty, R Hip injections for bursitis, Right knee nerve ablations and injections, lumbar and thoracic nerve blocks       Functional Outcome Measure Used Modified Oswestry LBP questionnaire   Functional Outcome Score 29/50 = 58% impairment (20)   17/50 = 34% impairment (20)      Insurance: Primary: Payor: Hawk Shearer /  /  / ,   Secondary:    Authorization Information: 30 visits PT/OT/ST each per asad yr. Aquatic and modalities covered, FCE covered. No hot/cold pack. Visit # 12, 3/10 for progress note   Visits Allowed: 30   Recertification Date: 3/53/5302   Physician Follow-Up: 20    Physician Orders: Back pain, Neck pain, Knee pain, Hip pain   History of Present Illness: Patient reporting to pain management Right scapular midthoracic pain flared up in Spring 2020, needs insurance approval for ablations.       SUBJECTIVE:  Patient reporting pain currently /10 but did have 10/10 pain upon getting up this AM.    AQUATICS TREATMENT   Precautions:    Pain: Neck, shoulders, back, hips, R knee    X in shaded column indicates activity completed today   Exercise/Intervention Sets/Sec  Notes   Walk Forward 3 laps  x 1/2 laps   Walk Backward 3 laps  x    Walk Sideways 3 laps  x           Lower Extremity Exercises:    Progress to ankle weights as able   Heel/Toe Raises 20  x    Marches 20  x    Squats 20  x    3 Way Hip 20  x    Hamstring Curls 20  x    Lunges       Step-Ups forward, lateral 20  x 4 ft depth   Foot on noodle - submerging up/down 20  x    Foot on noodle - hip adduction 20  x           Lower Extremity Stretches:       Seated hamstring 3 15 sec            Seated Exercises:              Upper Extremity Exercises:       Shoulder Flexion 20  x Blue dumbbells    Shoulder ABD/ADD 20  x Blue dumbbells   Shoulder Horizontal ABD/ADD 20  x Blue dumbbells   Shoulder Punch down at sides 20  x Blue dumbbells   Rows  20  x Blue dumbells   Bicep Curls 20  x    Trunk Rotation  10  x Pt reporting this feels good   Upper Extremity Stretches:       Pec stretch in corner 5 x  10 sec     Upper trap, levator, posterior capsule 5x 10 sec     Upper thoracic stretch 3x 10 sec            Balance:       Single leg       Tandem       Kickboard push/pull              Dynamic Gait:       5 ft jogging in place       5 ft Star jumps              Deep Water:       Hang 2 min      Bicycle - noodle between legs 5 min  x    Hip ABD/ADD 5 min  x    Double leg raise \"[place] jump\" 20  x    Hip Flex/Ext 5 min  x        Specific Interventions Next Treatment: Aquatic therapy - patient requesting 60 minute session to feel more effective therapy visit. Reminded of attendance policy - if she misses a session we will shorten duration or possibly discharge. Verbalized understanding. Ambulation in pool, standing balance, leg strengthening, stair climbing, deep water unloading and bicycling for endurance.  Seated stretches on bench for piriformis, hamstring. Activity/Treatment Tolerance:  [x]  Patient tolerated treatment well  []  Patient limited by fatigue  []  Patient limited by pain   []  Patient limited by medical complications   []  Other:     Assessment:  Added dumbbells to upper extremity exercises for added resistance with patient reporting back a little sore following but tolerating well. Patient reporting pain level 3/10 at end of session. GOALS:  Patient Goal: decrease pain in back and legs    Short Term Goals:  Time Frame: 6 weeks    Patient will report decreased low back pain from baseline 8/10 to less than 4/10 during therapy exercises in order to stand greater than 5 minutes. Patient will improve BLE AROM to 0-100 deg hip flex, 0-10 deg hip ext supine, and 0-120 deg R knee flexion in order to bend and lift without straining lumbar spine. Patient will demonstrate good abdominal bracing and body mechanics during therapy session in order to preserve neutral spine during household tasks. Long Term Goals:  Time Frame: 12 weeks    Patient will improve score of Modified Oswestry LBP questionnaire from baseline 58% impairment to at least 30% impairment in order to sleep more comfortably and tolerate more household activities. New Goal  12/24/20 - Patient will ascend/descend 12 steps with reciprocal pattern and one railing in order to access her basement. Patient Education:   []  HEP/Education Completed: Monitor response to progressions. [employer] Access Code:   [x]  No new Education completed  []  Reviewed Prior HEP      [x]  Patient verbalized and/or demonstrated understanding of education provided. []  Patient unable to verbalize and/or demonstrate understanding of education provided. Will continue education.   []  Barriers to learning: none    PLAN:  Treatment Recommendations: Strengthening, Range of Motion, Balance Training, Functional Mobility Training, Transfer Training, Endurance Training, Gait Training, Stair Training, Manual Therapy - Soft Tissue Mobilization, Manual Therapy - Joint Manipulation, Pain Management, Home Exercise Program, Patient Education, Integrative Dry Needling, Aquatics and Modalities       [x]  Continue with current plan of care. 2 times per week for 12 weeks.   [x]  Modify plan of care as follows: Aquatic therapy to progress endurance, balance, and closed chain strengthening   []  Hold pending physician visit  []  Discharge    Time In [facility]   Time Out 1531   Timed Code Minutes: 52   Total Treatment Time: 52       Electronically Signed by: Marcos Dixon

## 2021-01-12 ENCOUNTER — ANESTHESIA EVENT (OUTPATIENT)
Dept: OPERATING ROOM | Age: 56
End: 2021-01-12
Payer: COMMERCIAL

## 2021-01-12 ENCOUNTER — APPOINTMENT (OUTPATIENT)
Dept: GENERAL RADIOLOGY | Age: 56
End: 2021-01-12
Attending: PAIN MEDICINE
Payer: COMMERCIAL

## 2021-01-12 ENCOUNTER — HOSPITAL ENCOUNTER (OUTPATIENT)
Age: 56
Setting detail: OUTPATIENT SURGERY
Discharge: HOME | End: 2021-01-12
Attending: PAIN MEDICINE | Admitting: PAIN MEDICINE
Payer: COMMERCIAL

## 2021-01-12 ENCOUNTER — ANESTHESIA (OUTPATIENT)
Dept: OPERATING ROOM | Age: 56
End: 2021-01-12
Payer: COMMERCIAL

## 2021-01-12 VITALS
OXYGEN SATURATION: 92 % | RESPIRATION RATE: 21 BRPM | SYSTOLIC BLOOD PRESSURE: 109 MMHG | DIASTOLIC BLOOD PRESSURE: 67 MMHG

## 2021-01-12 VITALS
WEIGHT: 293 LBS | BODY MASS INDEX: 48.82 KG/M2 | HEART RATE: 77 BPM | TEMPERATURE: 97.5 F | DIASTOLIC BLOOD PRESSURE: 55 MMHG | RESPIRATION RATE: 14 BRPM | HEIGHT: 65 IN | OXYGEN SATURATION: 94 % | SYSTOLIC BLOOD PRESSURE: 114 MMHG

## 2021-01-12 PROCEDURE — 3600000058 HC PAIN LEVEL 5 BASE: Performed by: PAIN MEDICINE

## 2021-01-12 PROCEDURE — 64634 DESTROY C/TH FACET JNT ADDL: CPT | Performed by: PAIN MEDICINE

## 2021-01-12 PROCEDURE — 2500000003 HC RX 250 WO HCPCS: Performed by: NURSE ANESTHETIST, CERTIFIED REGISTERED

## 2021-01-12 PROCEDURE — 7100000010 HC PHASE II RECOVERY - FIRST 15 MIN: Performed by: PAIN MEDICINE

## 2021-01-12 PROCEDURE — 3700000000 HC ANESTHESIA ATTENDED CARE: Performed by: PAIN MEDICINE

## 2021-01-12 PROCEDURE — 2500000003 HC RX 250 WO HCPCS: Performed by: PAIN MEDICINE

## 2021-01-12 PROCEDURE — 3700000001 HC ADD 15 MINUTES (ANESTHESIA): Performed by: PAIN MEDICINE

## 2021-01-12 PROCEDURE — 3209999900 FLUORO FOR SURGICAL PROCEDURES

## 2021-01-12 PROCEDURE — 64633 DESTROY CERV/THOR FACET JNT: CPT | Performed by: PAIN MEDICINE

## 2021-01-12 PROCEDURE — 6360000002 HC RX W HCPCS: Performed by: NURSE ANESTHETIST, CERTIFIED REGISTERED

## 2021-01-12 PROCEDURE — 6360000002 HC RX W HCPCS: Performed by: PAIN MEDICINE

## 2021-01-12 PROCEDURE — 2580000003 HC RX 258: Performed by: NURSE ANESTHETIST, CERTIFIED REGISTERED

## 2021-01-12 PROCEDURE — 2709999900 HC NON-CHARGEABLE SUPPLY: Performed by: PAIN MEDICINE

## 2021-01-12 PROCEDURE — 3600000059 HC PAIN LEVEL 5 ADDL 15 MIN: Performed by: PAIN MEDICINE

## 2021-01-12 RX ORDER — FENTANYL CITRATE 50 UG/ML
INJECTION, SOLUTION INTRAMUSCULAR; INTRAVENOUS PRN
Status: DISCONTINUED | OUTPATIENT
Start: 2021-01-12 | End: 2021-01-12 | Stop reason: SDUPTHER

## 2021-01-12 RX ORDER — BUPIVACAINE HYDROCHLORIDE 2.5 MG/ML
INJECTION, SOLUTION EPIDURAL; INFILTRATION; INTRACAUDAL PRN
Status: DISCONTINUED | OUTPATIENT
Start: 2021-01-12 | End: 2021-01-12 | Stop reason: ALTCHOICE

## 2021-01-12 RX ORDER — LIDOCAINE HYDROCHLORIDE 10 MG/ML
INJECTION, SOLUTION EPIDURAL; INFILTRATION; INTRACAUDAL; PERINEURAL PRN
Status: DISCONTINUED | OUTPATIENT
Start: 2021-01-12 | End: 2021-01-12 | Stop reason: ALTCHOICE

## 2021-01-12 RX ORDER — METHYLPREDNISOLONE ACETATE 80 MG/ML
INJECTION, SUSPENSION INTRA-ARTICULAR; INTRALESIONAL; INTRAMUSCULAR; SOFT TISSUE PRN
Status: DISCONTINUED | OUTPATIENT
Start: 2021-01-12 | End: 2021-01-12 | Stop reason: ALTCHOICE

## 2021-01-12 RX ORDER — SODIUM CHLORIDE 9 MG/ML
INJECTION INTRAVENOUS PRN
Status: DISCONTINUED | OUTPATIENT
Start: 2021-01-12 | End: 2021-01-12 | Stop reason: SDUPTHER

## 2021-01-12 RX ORDER — PROPOFOL 10 MG/ML
INJECTION, EMULSION INTRAVENOUS PRN
Status: DISCONTINUED | OUTPATIENT
Start: 2021-01-12 | End: 2021-01-12 | Stop reason: SDUPTHER

## 2021-01-12 RX ORDER — LIDOCAINE HYDROCHLORIDE 20 MG/ML
INJECTION, SOLUTION EPIDURAL; INFILTRATION; INTRACAUDAL; PERINEURAL PRN
Status: DISCONTINUED | OUTPATIENT
Start: 2021-01-12 | End: 2021-01-12 | Stop reason: SDUPTHER

## 2021-01-12 RX ADMIN — PROPOFOL 60 MG: 10 INJECTION, EMULSION INTRAVENOUS at 08:23

## 2021-01-12 RX ADMIN — SODIUM CHLORIDE 5 ML: 9 INJECTION, SOLUTION INTRAMUSCULAR; INTRAVENOUS; SUBCUTANEOUS at 08:27

## 2021-01-12 RX ADMIN — FENTANYL CITRATE 50 MCG: 50 INJECTION, SOLUTION INTRAMUSCULAR; INTRAVENOUS at 08:08

## 2021-01-12 RX ADMIN — SODIUM CHLORIDE 3 ML: 9 INJECTION, SOLUTION INTRAMUSCULAR; INTRAVENOUS; SUBCUTANEOUS at 08:11

## 2021-01-12 RX ADMIN — LIDOCAINE HYDROCHLORIDE 60 MG: 20 INJECTION, SOLUTION EPIDURAL; INFILTRATION; INTRACAUDAL; PERINEURAL at 08:12

## 2021-01-12 RX ADMIN — FENTANYL CITRATE 50 MCG: 50 INJECTION, SOLUTION INTRAMUSCULAR; INTRAVENOUS at 08:10

## 2021-01-12 RX ADMIN — PROPOFOL 20 MG: 10 INJECTION, EMULSION INTRAVENOUS at 08:19

## 2021-01-12 ASSESSMENT — PULMONARY FUNCTION TESTS
PIF_VALUE: 0

## 2021-01-12 ASSESSMENT — PAIN DESCRIPTION - LOCATION
LOCATION: BACK
LOCATION: BACK

## 2021-01-12 ASSESSMENT — PAIN DESCRIPTION - ORIENTATION: ORIENTATION: MID

## 2021-01-12 ASSESSMENT — PAIN SCALES - GENERAL: PAINLEVEL_OUTOF10: 7

## 2021-01-12 ASSESSMENT — PAIN DESCRIPTION - DESCRIPTORS
DESCRIPTORS: ACHING
DESCRIPTORS: ACHING

## 2021-01-12 ASSESSMENT — PAIN DESCRIPTION - PAIN TYPE: TYPE: SURGICAL PAIN

## 2021-01-12 ASSESSMENT — PAIN DESCRIPTION - FREQUENCY: FREQUENCY: INTERMITTENT

## 2021-01-12 NOTE — OP NOTE
Operative Note    Pre-Procedure Note    Patient Name: Janice Kirkpatrick   YOB: 1965  Medical Record Number: [id number]  Date: 1/12/21    Indication:  Thoracic pain  Consent: On file. Vital Signs:   Vitals:    01/12/21 0656   BP: (!) 148/85   Pulse: 94   Resp: 16   Temp: 97.1 °F (36.2 °C)   SpO2: 97%       Past Medical History:   has a past medical history of Allergic rhinitis, Depression, Hypertension, ABDI on CPAP, Osteoarthritis, and Vitamin D deficiency. Past Surgical History:   has a past surgical history that includes Dental surgery (2002); Micro tooth extraction; Hand surgery (Right, 05/15/2015); other surgical history (4/11/16); Colonoscopy (2016); other surgical history (06/13/2016); Hemorrhoid surgery (2016); Tonsillectomy; other surgical history (Right, 10/16/2017); arthrocentesis (Right, 10/16/2017); Nerve Surgery (Left, 12/05/2017); arthrocentesis (Left, 12/5/2017); pr arthrocentesis aspir&/inj major jt/bursa w/o us (Right, 9/17/2018); pr office/outpt visit,procedure only (N/A, 11/9/2018); Nerve Block Lumb Facet Level 1 Bilateral (Bilateral, 1/4/2019); Nerve Block Lumb Facet Level 1 Bilateral (Bilateral, 2/25/2019); Lumbar spine surgery (Right, 4/16/2019); Lumbar spine surgery (Left, 6/13/2019); arthrocentesis (Right, 7/18/2019); Nerve Surgery (Bilateral, 9/13/2019); hip surgery (Left, 2/7/2020); Pain management procedure (Right, 7/31/2020); and Pain management procedure (Right, 8/31/2020). Pre-Sedation Documentation and Exam:   Vital signs have been reviewed (see flow sheet for vitals). Sedation/ Anesthesia Plan:   MAC    Patient is an appropriate candidate for plan of sedation: yes    Preoperative Diagnosis:  T-spondylosis    Post-Op Dx: as above    Procedure Performed:  :Radiofrequency ablation of median branches at the levels of T4-5,T5-6 and T6-7 right under fluoroscopic guidance     Indication for the Procedure:   The patient has ahistory of chronic low back pain that is not responding well to the conservative treatment. Patient's pain is mostly axial in nature. Pain is interfering with the activities of daily living. Physical examination revealed facet tenderness and facet loading is positive. Patient had undergone lumbar facet joint injections with pain relief that lasted for only a short period of time and had greater than 70% pain relief with confirmatory median branch blocks. Hence we decided to do radiofrequency abalation of median branches for long term pain releif. The procedure and risks  were discussed with the patient and an informed consent was obtained. Procedure:  Right   A meaningful communication was kept up with the patient throughout the procedure. The patient is placed in prone position and skin over the back was prepped and draped in sterile manner. Then using fluoroscopy the junction of the transverse process of the vertebra with the superior process of the facet joint was observed and the view was optimized. The skin and deep tissues posterior were infiltrated with 9 ml of 1% xylocaine. The RF canula with the 10 mm active tip was introduced through the skin wheal under fluoroscopy guidance such that the tip of the needle lies in the groove of the transverse process with the superior processes of the facet joint. Then a lateral view of the lumbar spine was obtained to make sure the tip of needle is not in the neural foramen. Then electric impedence was checked to make sure it is acceptable. Then a sensory stimulus was applied at 50 Hz up to 1 volt and concordant pain symptoms were reproduced. Then a motor stimulus was applied at 2 Hz up to 2 volts and no motor stimulation was seen in lower extremities. Some multifidus stimulus was seen. Then after negative aspiration a mixture of depomedrol 80 mg  and 0.25%  Marcaine 1.5 cc was injected through the needle in divided doses. Then a  lesion was done at 80 degrees centigrade for 90 seconds. EBL-0  Patient's vital signs and neurological status remained stable throughout the procedure and post procedural period. The patient tolerated the procedure well and was discharged home in stable condition.     Electronically signed by Jordy Wolff MD on 1/12/21 at 8:15 AM EST

## 2021-01-12 NOTE — H&P
H&P    Continues to have complaints of generalized pain all over especially with weather changes. Main complaint today remains pain throughout back and neck aching and dull. Mid back pain remains the worst- sharp and aching pain. Patient finished 6 weeks of physical therapy and has last appointment this week- states that it helped improve strength but pain is still the same.      Continues to have right knee pain- has been slowly increasing- aching   [place] prn continues to help. Medications reviewed. Patient has constipation side effects with medications. Patient states she is taking medications as prescribed. Shedenies receiving pain medications from other sources. She denies any ER visits since last visit.     Pain scale with out pain medications or at its worst is 7-10/10. Pain scale with pain medications or at its best is 3-5/10. Last dose of Norco was today  Drug screen reviewed from 10/22/2020 and was appropriate  Pill count was completed today and was appropriate  Patient does not have naloxone available at home. Patient has not required use of naloxone at home since last office visit.         The patientis allergic to walnut [macadamia nut oil]; other; saccharin; sulfa antibiotics; and sulfur.     Past Medical History  Brittni Raymond  has a past medical history of Allergic rhinitis, Depression, Hypertension, ABDI on CPAP, Osteoarthritis, and Vitamin D deficiency.     Past Surgical History  The patient  has a past surgical history that includes Dental surgery (2002); [place] tooth extraction; Hand surgery (Right, 05/15/2015); other surgical history (4/11/16); Colonoscopy (2016); other surgical history (06/13/2016); Hemorrhoid surgery (2016); Tonsillectomy; other surgical history (Right, 10/16/2017); arthrocentesis (Right, 10/16/2017);  Nerve Surgery (Left, 12/05/2017); arthrocentesis (Left, 12/5/2017); pr arthrocentesis aspir&/inj major jt/bursa w/o us (Right, 9/17/2018); pr office/outpt visit,procedure only (N/A, 11/9/2018); Nerve Block Lumb Facet Level 1 Bilateral (Bilateral, 1/4/2019); Nerve Block Lumb Facet Level 1 Bilateral (Bilateral, 2/25/2019); Lumbar spine surgery (Right, 4/16/2019); Lumbar spine surgery (Left, 6/13/2019); arthrocentesis (Right, 7/18/2019); Nerve Surgery (Bilateral, 9/13/2019); hip surgery (Left, 2/7/2020); Pain management procedure (Right, 7/31/2020); and Pain management procedure (Right, 8/31/2020).    Family History  This patient's family history includes Heart Disease in her mother; Substance Abuse in her sister.     Social History  Indio Schneider  reports that she quit smoking about 9 years ago. Her smoking use included cigarettes and e-cigarettes. She started smoking about 15 years ago. She has a 7.00 pack-year smoking history. She has never used smokeless tobacco. She reports current alcohol use. She reports that she does not use drugs.     Medications    Current Medication      Current Outpatient Medications:     HYDROcodone-acetaminophen (NORCO) 5-325 MG per tablet, Take 1 tablet by mouth 2 times daily as needed for Pain for up to 30 days. , Disp: 60 tablet, Rfl: 0    loratadine (CLARITIN) 10 MG tablet, Take 10 mg by mouth daily, Disp: , Rfl:     Elastic Bandages & Supports (B & B SACROILIAC BELT) MISC, 1 Device by Does not apply route as needed (pain), Disp: 1 each, Rfl: 0    Cholecalciferol (VITAMIN D3) 5000 units CAPS, Take 1 capsule by mouth daily, Disp: , Rfl:     Omega-3 Fatty Acids (FISH OIL) 1200 MG CAPS, , Disp: , Rfl:     calcium carbonate (OSCAL) 500 MG TABS tablet, Take 500 mg by mouth 2 times daily, Disp: , Rfl:     Turmeric 500 MG CAPS, Take by mouth daily, Disp: , Rfl:     cyclobenzaprine (FLEXERIL) 10 MG tablet, Take 1 tablet by mouth 3 times daily as needed for Muscle spasms WARNING: This medication may make your drowsy.  Do not operate heavy machinery or motor vehicles during use., Disp: 15 tablet, Rfl: 0    lisinopril (PRINIVIL;ZESTRIL) 10 MG tablet, Take 10 mg by mouth nightly , Disp: , Rfl:     citalopram (CELEXA) 20 MG tablet, Take 20 mg by mouth daily , Disp: , Rfl:     spironolactone (ALDACTONE) 25 MG tablet, Take 1 tablet by mouth daily. , Disp: 15 tablet, Rfl: 0    meloxicam (MOBIC) 15 MG tablet, Take 1 tablet by mouth daily, Disp: 30 tablet, Rfl: 2        Subjective:      Review of Systems   Constitutional: Negative. Negative for fatigue and fever. HENT: Negative for congestion, sinus pain, sore throat and voice change. Eyes: Negative. Respiratory: Negative. Cardiovascular: Positive for leg swelling. Gastrointestinal: Negative. Genitourinary: Negative. Musculoskeletal: Positive for arthralgias, back pain, myalgias, neck pain and neck stiffness. Skin: Negative. Neurological: Negative. Psychiatric/Behavioral: Negative.          Objective:      Vitals       Vitals:     12/21/20 1154   BP: 130/72   Site: Left Upper Arm   Position: Sitting   Cuff Size: Large Adult   Temp: 97.1 °F (36.2 °C)   Weight: (!) 342 lb (155.1 kg)   Height: 5' 5\" (1.651 m)            Physical Exam  Vitals signs and nursing note reviewed. Constitutional:       General: She is not in acute distress. Appearance: She is well-developed. She is not diaphoretic. HENT:      Head: Normocephalic and atraumatic. Right Ear: External ear normal.      Left Ear: External ear normal.      Nose: Nose normal.      Mouth/Throat:      Pharynx: No oropharyngeal exudate. Eyes:      General: No scleral icterus. Right eye: No discharge. Left eye: No discharge. Conjunctiva/sclera: Conjunctivae normal.      Pupils: Pupils are equal, round, and reactive to light. Neck:      Musculoskeletal: Full passive range of motion without pain, normal range of motion and neck supple. Normal range of motion. No edema, erythema, neck rigidity or muscular tenderness. Thyroid: No thyromegaly. Trachea: No tracheal deviation.    Cardiovascular:      Rate and Rhythm: Normal rate and regular rhythm. Heart sounds: Normal heart sounds. No murmur. No friction rub. No gallop. Pulmonary:      Effort: Pulmonary effort is normal. No respiratory distress. Breath sounds: Normal breath sounds. No wheezing or rales. Chest:      Chest wall: No tenderness. Abdominal:      General: Bowel sounds are normal. There is no distension. Palpations: Abdomen is soft. Tenderness: There is no abdominal tenderness. There is no guarding or rebound. Musculoskeletal:         General: Tenderness present. Right hip: She exhibits tenderness. Left hip: She exhibits decreased range of motion, decreased strength and bony tenderness. Right knee: She exhibits decreased range of motion and bony tenderness. Tenderness found. Cervical back: She exhibits tenderness. Thoracic back: She exhibits decreased range of motion, bony tenderness and pain. Lumbar back: She exhibits decreased range of motion, tenderness, pain and spasm. Back:         Legs:    Skin:     General: Skin is warm and dry. Coloration: Skin is not pale. Findings: No erythema or rash. Neurological:      Mental Status: She is alert and oriented to person, place, and time. She is not disoriented. Cranial Nerves: No cranial nerve deficit. Sensory: No sensory deficit. Motor: No atrophy or abnormal muscle tone. Coordination: Coordination normal.      Gait: Gait abnormal.      Deep Tendon Reflexes: Babinski sign absent on the right side. Comments: SLR neg. Psychiatric:         Attention and Perception: She is attentive. Mood and Affect: Mood is not anxious or depressed. Affect is not labile, blunt, angry or inappropriate. Speech: She is communicative. Speech is not rapid and pressured, delayed, slurred or tangential.         Behavior: Behavior normal. Behavior is not agitated, slowed, aggressive, withdrawn, hyperactive or combative.          Thought Bilateral T-facet RFA @ T7-8, T8-9, and T9-T10 RIGHT SIDE FIRST for longer term pain relief. Procedure and risks discussed in detail with patient.   · Medications remain effective, patient is compliant.  Continue Norco 5/325 BID prn- ordered refill                Return for  Bilateral T-facet RFA @ T7-8, T8-9, and T9-T10 RIGHT SIDE FIRST. , follow up after procedure.

## 2021-01-12 NOTE — H&P
[address] [place] [address]  History and Physical Update    Pt Name: Will Colbert  MRN: [id number]  YOB: 1965  Date of evaluation: 1/12/2021      I have examined the patient and reviewed the H&P/Consult and there are no changes to the patient or plans.         Electronically signed by Jordy Wolff MD on 1/12/2021 at 8:10 AM

## 2021-01-12 NOTE — PROGRESS NOTES
Pt is ready to go called is sister said will be 15 mins. Pt up in room  Steady on feet. Pt states she can dress herself.

## 2021-01-12 NOTE — ANESTHESIA PRE PROCEDURE
Department of Anesthesiology  Preprocedure Note       Name:  Blanche Pak   Age:  63 y.o.  :  1965                                          MRN:  [id number]         Date:  2021      Surgeon: Davida Singh):  Ngozi Herrera MD    Procedure: Procedure(s):  Bilateral T-facet RFA @ T7-8, T8-9, and T9-T10 RIGHT SIDE FIRST    Medications prior to admission:   Prior to Admission medications    Medication Sig Start Date End Date Taking? Authorizing Provider   HYDROcodone-acetaminophen (NORCO) 5-325 MG per tablet Take 1 tablet by mouth 2 times daily as needed for Pain for up to 30 days. 20 Yes TAMIE Slater CNP   loratadine (CLARITIN) 10 MG tablet Take 10 mg by mouth daily   Yes Historical Provider, MD   cyclobenzaprine (FLEXERIL) 10 MG tablet Take 1 tablet by mouth 3 times daily as needed for Muscle spasms WARNING: This medication may make your drowsy. Do not operate heavy machinery or motor vehicles during use. 16  Yes Eloisa Herron PA-C   lisinopril (PRINIVIL;ZESTRIL) 10 MG tablet Take 10 mg by mouth nightly  16  Yes Historical Provider, MD   citalopram (CELEXA) 20 MG tablet Take 20 mg by mouth daily  10/2/15  Yes Historical Provider, MD   spironolactone (ALDACTONE) 25 MG tablet Take 1 tablet by mouth daily.  13  Yes Brina Red MD   meloxicam (MOBIC) 15 MG tablet Take 1 tablet by mouth daily 20  TAMIE Slater CNP   Elastic Bandages & Supports (B & B SACROILIAC BELT) MISC 1 Device by Does not apply route as needed (pain) 18   TAMIE Rubin CNP   Cholecalciferol (VITAMIN D3) 5000 units CAPS Take 1 capsule by mouth daily    Historical Provider, MD   Omega-3 Fatty Acids (FISH OIL) 1200 MG CAPS  18   Historical Provider, MD   calcium carbonate (OSCAL) 500 MG TABS tablet Take 500 mg by mouth 2 times daily    Historical Provider, MD   Turmeric 500 MG CAPS Take by mouth daily    Historical Provider, MD       Current medications:    No current facility-administered medications for this encounter. Allergies: Allergies   Allergen Reactions    Pecos [Macadamia Nut Oil] Other (See Comments)     Numbness and Tingling in mouth    Other Other (See Comments)     Artificial sweeteners - migraines    Saccharin     Sulfa Antibiotics Hives    Sulfur        Problem List:    Patient Active Problem List   Diagnosis Code    Occult blood in stools R19.5    Second degree hemorrhoids K64.1    ABDI on CPAP G47.33, Z99.89    Trochanteric bursitis of right hip M70.61    Primary osteoarthritis of right hip M16.11    Pathologic thoracic fracture M84.48XA    Localized osteoporosis with current pathological fracture M80.80XA    Spondylosis of thoracic region without myelopathy or radiculopathy M47.814    Morbid obesity with BMI of 50.0-59.9, adult ([facility] [address] E66.01, Z68.43    Primary osteoarthritis of right knee M17.11       Past Medical History:        Diagnosis Date    Allergic rhinitis     Depression     Hypertension     ABDI on CPAP     Osteoarthritis     Vitamin D deficiency        Past Surgical History:        Procedure Laterality Date    ARTHROCENTESIS Right 10/16/2017    RIGHT HIP LARGE JOINT INJECTION performed by Ricki Walker MD at [address] [place] [address] ARTHROCENTESIS Left 12/5/2017    LEFT HIP LARGE JOINT INJECTION performed by Ricki Walker MD at [address] [place] [address] ARTHROCENTESIS Right 7/18/2019    Rt.  Knee Steroid Injection performed by Ricki Walker MD at [address] [place] [address]  2016   [address] [place] [address]  2002     right front upper implant    HAND SURGERY Right 05/15/2015    index finger cleaned out D/T infected cat bite    HEMORRHOID SURGERY  2016    series of 3 bandings for internal hemorrhoids, Dr. Smita Tellez Left 2/7/2020    Right knee steroid injection performed by Ricki Walker MD at [facility] 4/16/2019    T-facet RFA @ T7-8, 8-9, 9-10 performed by Chastity Gregory MD at [address] [place] [address] Left 6/13/2019    T-facet RFA @ T7-8, 8-9, 9-10 LEFT performed by Chastity Gregory MD at [place] [address] FACET LEVEL 1 BILATERAL Bilateral 1/4/2019    LUMBAR FACET bilateral T-facet MBB @ T7-T8, T8-T9, and T9-T10 performed by Chastity Gregory MD at [place] [address] FACET LEVEL 1 BILATERAL Bilateral 2/25/2019    Thoracic FACET bilateral T-facet MBB @ T7-T8, T8-T9, and T9-T10 MBB #2 performed by Chastity Gregory MD at [place] [address] Left 12/05/2017    HIP INJECTION     NERVE SURGERY Bilateral 9/13/2019    bilateral T-facet MBB # 1 @ T4-5, T5-6, and T6-7. performed by Chastity Gregory MD at [place] [address]  4/11/16    L4-5, L5-S1 Facet injection    OTHER SURGICAL HISTORY  06/13/2016    Right Hip Injection    OTHER SURGICAL HISTORY Right 10/16/2017    Hip Injection     PAIN MANAGEMENT PROCEDURE Right 7/31/2020    Right knee genicular nerve block performed by Chastity Gregory MD at [place] [address] Right 8/31/2020    Right knee genicular nerve RFA performed by Chastity Gregory MD at [address] ARTHROCENTESIS ASPIR&/INJ MAJOR JT/BURSA W/O US Right 9/17/2018    RIGHT HIP LARGE JOINT STEROID INJECTION performed by Chastity Gregory MD at [address] OFFICE/OUTPT VISIT,PROCEDURE ONLY N/A 11/9/2018    Kyphoplasty T12 BILATERAL performed by Chastity Gregory MD at [place] [address]      as child    WISDOM TOOTH EXTRACTION         Social History:    Social History     Tobacco Use    Smoking status: Former Smoker     Packs/day: 1.00     Years: 7.00     Pack years: 7.00     Types: Cigarettes, E-Cigarettes     Start date: 5/1/2005     Quit date: 1/1/2011     Years since quitting: 10.0    Smokeless tobacco: Never Used   Substance Use Topics    Alcohol use: Yes     Alcohol/week: 0.0 standard drinks     Comment: occasionally, 1-3 times a month                                Counseling given: Not Answered      Vital Signs (Current):   Vitals:    01/12/21 0656   BP: (!) 148/85   Pulse: 94   Resp: 16   Temp: 97.1 °F (36.2 °C)   TempSrc: Temporal   SpO2: 97%   Weight: (!) 355 lb (161 kg)   Height: 5' 5\" (1.651 m)                                              BP Readings from Last 3 Encounters:   01/12/21 (!) 148/85   12/21/20 130/72   10/22/20 132/82       NPO Status: Time of last liquid consumption: 0530                        Time of last solid consumption: 2330                        Date of last liquid consumption: 01/12/21                        Date of last solid food consumption: 01/11/21    BMI:   Wt Readings from Last 3 Encounters:   01/12/21 (!) 355 lb (161 kg)   12/21/20 (!) 342 lb (155.1 kg)   10/22/20 (!) 342 lb (155.1 kg)     Body mass index is 59.08 kg/m². CBC:   Lab Results   Component Value Date    WBC 7.2 08/25/2020    RBC 4.63 08/25/2020    HGB 13.5 08/25/2020    HCT 41.7 08/25/2020    MCV 90.1 08/25/2020    RDW 14.0 07/11/2017     08/25/2020       CMP:   Lab Results   Component Value Date     08/25/2020    K 4.7 08/25/2020     08/25/2020    CO2 24 08/25/2020    BUN 14 08/25/2020    CREATININE 0.8 08/25/2020    LABGLOM 74 08/25/2020    GLUCOSE 98 08/25/2020    PROT 7.2 08/25/2020    CALCIUM 10.0 08/25/2020    BILITOT 0.4 08/25/2020    ALKPHOS 111 08/25/2020    AST 33 08/25/2020    ALT 60 08/25/2020       POC Tests: No results for input(s): POCGLU, POCNA, POCK, POCCL, POCBUN, POCHEMO, POCHCT in the last 72 hours.     Coags: No results found for: PROTIME, INR, APTT    HCG (If Applicable):   Lab Results   Component Value Date    PREGSERUM NEGATIVE 04/19/2013        ABGs: No results found for: PHART, PO2ART, DAL1DWF, GSR8QVB, BEART, T7EVYOWQ     Type & Screen (If Applicable):  No results found for: LABABO, LABRH    Drug/Infectious Status (If Applicable):  No results found for: HIV, HEPCAB    COVID-19 Screening (If Applicable):   Lab Results   Component Value Date    COVID19 Not Detected 07/25/2020         Anesthesia Evaluation    Airway: Mallampati: III  TM distance: >3 FB   Neck ROM: full  Mouth opening: > = 3 FB Dental:          Pulmonary: breath sounds clear to auscultation  (+) sleep apnea:                             Cardiovascular:    (+) hypertension:,         Rhythm: irregular                      Neuro/Psych:   (+) psychiatric history:            GI/Hepatic/Renal:   (+) morbid obesity          Endo/Other:                     Abdominal:   (+) obese,         Vascular:                                        Anesthesia Plan      MAC     ASA 3       Induction: intravenous. Anesthetic plan and risks discussed with patient. Plan discussed with CRNA.                   Kenneth Randle MD   1/12/2021

## 2021-01-12 NOTE — ANESTHESIA POSTPROCEDURE EVALUATION
Department of Anesthesiology  Postprocedure Note    Patient: Angelia Dill  MRN: [id number]  YOB: 1965  Date of evaluation: 1/12/2021  Time:  11:04 AM     Procedure Summary     Date: 01/12/21 Room / Location: [address] [place] [address] 03 / [id number]    Anesthesia Start: 2621 Anesthesia Stop: 0830    Procedure: Bilateral T-facet RFA @ T7-8, T8-9, and T9-T10 RIGHT SIDE FIRST (Right Neck) Diagnosis: (Thoracic spondylosis)    Surgeons: Pina Yepez MD Responsible Provider: Saranya Anderson MD    Anesthesia Type: MAC ASA Status: 3          Anesthesia Type: MAC    Jose Phase I:      Jose Phase II: Jose Score: 10    Last vitals: Reviewed and per EMR flowsheets.        Anesthesia Post Evaluation    Patient location during evaluation: PACU  Patient participation: complete - patient participated  Level of consciousness: awake  Airway patency: patent  Nausea & Vomiting: no vomiting and no nausea  Complications: no  Cardiovascular status: hemodynamically stable  Respiratory status: acceptable  Hydration status: stable

## 2021-01-14 ENCOUNTER — HOSPITAL ENCOUNTER (OUTPATIENT)
Dept: PHYSICAL THERAPY | Age: 56
Setting detail: THERAPIES SERIES
Discharge: HOME | End: 2021-01-14
Payer: COMMERCIAL

## 2021-01-14 PROCEDURE — 97113 AQUATIC THERAPY/EXERCISES: CPT

## 2021-01-14 NOTE — PROGRESS NOTES
[facility]   PHYSICAL THERAPY  [] DAILY NOTE (LAND) [x] DAILY NOTE (AQUATIC ) [] PROGRESS NOTE [] DISCHARGE NOTE    [x] [facility] [place]   [] [place] [address]    [] [facility]   [] May Apley     Date: 2021  Patient Name:  Jelena Miner  : 1965  MRN: [id number]    Referring Practitioner TAMIE Oliver*   Diagnosis Spondylosis without myelopathy or radiculopathy, thoracic region [M47.814]   M54.9 (ICD-10-CM) - Mid back pain    M47.816 (ICD-10-CM) - Spondylosis of lumbar region without myelopathy or radiculopathy    M46.1 (ICD-10-CM) - Sacroiliac inflammation (HCC)    M16.0 (ICD-10-CM) - Primary osteoarthritis of both hips    G89.4 (ICD-10-CM) - Chronic pain syndrome       Treatment Diagnosis Lumbago, thoracic spine pain, B hip pain, R knee pain, B hip stiffness, R knee stiffness, spinal stiffness, muscular weakness, difficulty walking   Date of Evaluation 20    Additional Pertinent History L Hip replacement May 2018, T12 Kyphoplasty, R Hip injections for bursitis, Right knee nerve ablations and injections, lumbar and thoracic nerve blocks       Functional Outcome Measure Used Modified Oswestry LBP questionnaire   Functional Outcome Score 29/50 = 58% impairment (20)   17/50 = 34% impairment (20)      Insurance: Primary: Payor: Elian Quintero /  /  / ,   Secondary:    Authorization Information: 30 visits PT/OT/ST each per asad yr. Aquatic and modalities covered, FCE covered. No hot/cold pack. Visit # 13, 4/10 for progress note   Visits Allowed: 30   Recertification Date:    Physician Follow-Up: 20    Physician Orders: Back pain, Neck pain, Knee pain, Hip pain   History of Present Illness: Patient reporting to pain management Right scapular midthoracic pain flared up in Spring 2020, needs insurance approval for ablations.       SUBJECTIVE:  Patient reporting getting nerve burn in her back on Tuesday and reporting that her right side low back is bothering her more today and rating that pain  At 7/10. AQUATICS TREATMENT   Precautions:    Pain: Right side low back. X in shaded column indicates activity completed today   Blue ankle weights put on before getting in water. Exercise/Intervention Sets/Sec  Notes   Walk Forward 3 laps  x 1/2 laps. Walk Backward 3 laps  x    Walk Sideways 3 laps  x           Lower Extremity Exercises: With ankle weights   Heel/Toe Raises 20  x    Marches 20  x    Squats 20  x    3 Way Hip 20  x    Hamstring Curls 20  x    Lunges       Step-Ups forward, lateral 20  x 4 ft depth   Foot on noodle - submerging up/down 20   Without ankle weights   Foot on noodle - hip adduction 20   Without ankle weights          Lower Extremity Stretches:       Seated hamstring 3 15 sec x    Hip flexor stretch at step  3 15 x    Seated Exercises:              Upper Extremity Exercises:       Shoulder Flexion 20  x Blue dumbbells    Shoulder ABD/ADD 20  x Blue dumbbells   Shoulder Horizontal ABD/ADD 20  x Blue dumbbells   Shoulder Punch down at sides 20  x Blue dumbbells   Rows  20  x Blue dumbbells   Bicep Curls 20  x    Trunk Rotation  10   Pt reporting this feels good   Upper Extremity Stretches:       Pec stretch in corner 5 x  10 sec     Upper trap, levator, posterior capsule 5x 10 sec     Upper thoracic stretch 3x 10 sec            Balance:       Single leg 15 seconds  x each   Tandem 30 seconds  x Each leg leading once   Kickboard push/pull              Dynamic Gait:       5 ft jogging in place       5 ft Star jumps              Deep Water:       Bicycle - noodle between legs 5 min  x    Hip ABD/ADD 5 min  x    Double leg raise \"Boyle jump\" 20  x    Hip Flex/Ext 5 min  x        Specific Interventions Next Treatment: Aquatic therapy - patient requesting 60 minute session to feel more effective therapy visit.  Reminded of attendance policy - if she misses a session we will shorten duration or possibly discharge. Verbalized understanding. Ambulation in pool, standing balance, leg strengthening, stair climbing, deep water unloading and bicycling for endurance. Seated stretches on bench for piriformis, hamstring. Activity/Treatment Tolerance:  [x]  Patient tolerated treatment well  []  Patient limited by fatigue  []  Patient limited by pain   []  Patient limited by medical complications   []  Other:     Assessment:  Added ankle weights to walking, step ups, and LE exercises with good tolerance. Patient able to get through most of exercises on noodle until ankle weights came off. Patient reporting pain level 3/10 at end of session and no complains with added weights. GOALS:  Patient Goal: decrease pain in back and legs    Short Term Goals:  Time Frame: 6 weeks    Patient will report decreased low back pain from baseline 8/10 to less than 4/10 during therapy exercises in order to stand greater than 5 minutes. Patient will improve BLE AROM to 0-100 deg hip flex, 0-10 deg hip ext supine, and 0-120 deg R knee flexion in order to bend and lift without straining lumbar spine. Patient will demonstrate good abdominal bracing and body mechanics during therapy session in order to preserve neutral spine during household tasks. Long Term Goals:  Time Frame: 12 weeks    Patient will improve score of Modified Oswestry LBP questionnaire from baseline 58% impairment to at least 30% impairment in order to sleep more comfortably and tolerate more household activities. New Goal  12/24/20 - Patient will ascend/descend 12 steps with reciprocal pattern and one railing in order to access her basement. Patient Education:   []  HEP/Education Completed: Monitor response to progressions. YellowBrck Access Code:   [x]  No new Education completed  []  Reviewed Prior HEP      [x]  Patient verbalized and/or demonstrated understanding of education provided.   []  Patient unable to verbalize

## 2021-01-18 ENCOUNTER — HOSPITAL ENCOUNTER (OUTPATIENT)
Dept: PHYSICAL THERAPY | Age: 56
Setting detail: THERAPIES SERIES
Discharge: HOME | End: 2021-01-18
Payer: COMMERCIAL

## 2021-01-18 PROCEDURE — 97113 AQUATIC THERAPY/EXERCISES: CPT

## 2021-01-18 NOTE — PROGRESS NOTES
[address] [facility]   PHYSICAL THERAPY  [] DAILY NOTE (LAND) [x] DAILY NOTE (AQUATIC ) [] PROGRESS NOTE [] DISCHARGE NOTE    [x] [facility] [place]   [] [place] [address]    [] [facility]   [] [facility]     Date: 2021  Patient Name:  Lamin Murphy  : 1965  MRN: [id number]    Referring Practitioner TAMIE Peters*   Diagnosis Spondylosis without myelopathy or radiculopathy, thoracic region [M47.814]   M54.9 (ICD-10-CM) - Mid back pain    M47.816 (ICD-10-CM) - Spondylosis of lumbar region without myelopathy or radiculopathy    M46.1 (ICD-10-CM) - Sacroiliac inflammation (HCC)    M16.0 (ICD-10-CM) - Primary osteoarthritis of both hips    G89.4 (ICD-10-CM) - Chronic pain syndrome       Treatment Diagnosis Lumbago, thoracic spine pain, B hip pain, R knee pain, B hip stiffness, R knee stiffness, spinal stiffness, muscular weakness, difficulty walking   Date of Evaluation 20    Additional Pertinent History L Hip replacement May 2018, T12 Kyphoplasty, R Hip injections for bursitis, Right knee nerve ablations and injections, lumbar and thoracic nerve blocks       Functional Outcome Measure Used Modified Oswestry LBP questionnaire   Functional Outcome Score 29/50 = 58% impairment (20)   17/50 = 34% impairment (20)      Insurance: Primary: Payor: Lilo Hill /  /  / ,   Secondary:    Authorization Information: 30 visits PT/OT/ST each per asad yr. Aquatic and modalities covered, FCE covered. No hot/cold pack. Visit # 14, 5/10 for progress note   Visits Allowed: 30   Recertification Date:    Physician Follow-Up: 20    Physician Orders: Back pain, Neck pain, Knee pain, Hip pain   History of Present Illness: Patient reporting to pain management Right scapular midthoracic pain flared up in Spring 2020, needs insurance approval for ablations. SUBJECTIVE:  Patient reporting pain level 7/10 today.  Did have patient try to put the ankle weights on herself and patient able to get left ankle weight on but unable to get the right ankle weight on by herself. Patient reporting back still feeling a little tender from nerve burn. AQUATICS TREATMENT   Precautions:    Pain: Right side low back. 7/10 today. X in shaded column indicates activity completed today   Blue ankle weights put on before getting in water. Exercise/Intervention Sets/Sec  Notes   Walk Forward 3 laps  x 1/2 laps. Walk Backward 3 laps  x    Walk Sideways 3 laps  x           Lower Extremity Exercises:     With ankle weights   Heel/Toe Raises 20  x    Marches 20  x    Squats 20  x    3 Way Hip 20  x    Hamstring Curls 20  x    Lunges       Step-Ups forward, lateral 20  x 4 ft depth   Foot on noodle - submerging up/down 20   Without ankle weights   Foot on noodle - hip adduction 20   Without ankle weights          Lower Extremity Stretches:       Seated hamstring 3 15 sec     Hip flexor stretch at step  3 15     Seated Exercises:              Upper Extremity Exercises:       Shoulder Flexion 20  x Blue dumbbells    Shoulder ABD/ADD 20  x Blue dumbbells   Shoulder Horizontal ABD/ADD 20  x Blue dumbbells   Shoulder Punch down at sides/forward 20  x Blue dumbbells   Rows  20  x Blue dumbbells   Bicep Curls 20  x    Trunk Rotation  10   Pt reporting this feels good   Upper Extremity Stretches:       Pec stretch in corner 5 x  10 sec     Upper trap, levator, posterior capsule 5x 10 sec     Upper thoracic stretch 3x 10 sec            Balance:       Single leg 15 seconds   each   Tandem 30 seconds   Each leg leading once   Kickboard push/pull              Dynamic Gait:       5 ft jogging in place       5 ft Star jumps              Deep Water:       Bicycle - noodle between legs 5 min  x    Hip ABD/ADD 5 min  x    Double leg raise \"[place] jump\" 20  x    Hip Flex/Ext 5 min  x        Specific Interventions Next Treatment: Aquatic therapy - patient requesting 60 minute session to feel more effective therapy visit. Reminded of attendance policy - if she misses a session we will shorten duration or possibly discharge. Verbalized understanding. Ambulation in pool, standing balance, leg strengthening, stair climbing, deep water unloading and bicycling for endurance. Seated stretches on bench for piriformis, hamstring. Activity/Treatment Tolerance:  [x]  Patient tolerated treatment well  []  Patient limited by fatigue  []  Patient limited by pain   []  Patient limited by medical complications   []  Other:     Assessment:  No progressions made today due to patient needing increased time trying to ankle weights on. Patient reporting pain level was 2-3/10 while in the water and is 4/10 upon getting out of water. GOALS:  Patient Goal: decrease pain in back and legs    Short Term Goals:  Time Frame: 6 weeks    Patient will report decreased low back pain from baseline 8/10 to less than 4/10 during therapy exercises in order to stand greater than 5 minutes. Patient will improve BLE AROM to 0-100 deg hip flex, 0-10 deg hip ext supine, and 0-120 deg R knee flexion in order to bend and lift without straining lumbar spine. Patient will demonstrate good abdominal bracing and body mechanics during therapy session in order to preserve neutral spine during household tasks. Long Term Goals:  Time Frame: 12 weeks    Patient will improve score of Modified Oswestry LBP questionnaire from baseline 58% impairment to at least 30% impairment in order to sleep more comfortably and tolerate more household activities. New Goal  12/24/20 - Patient will ascend/descend 12 steps with reciprocal pattern and one railing in order to access her basement. Patient Education:   []  HEP/Education Completed: Monitor response to progressions.   [employer] Access Code:   [x]  No new Education completed  []  Reviewed Prior HEP      [x]  Patient verbalized and/or demonstrated understanding of

## 2021-01-21 ENCOUNTER — HOSPITAL ENCOUNTER (OUTPATIENT)
Dept: PHYSICAL THERAPY | Age: 56
Setting detail: THERAPIES SERIES
Discharge: HOME | End: 2021-01-21
Payer: COMMERCIAL

## 2021-01-21 PROCEDURE — 97110 THERAPEUTIC EXERCISES: CPT

## 2021-01-21 NOTE — DISCHARGE SUMMARY
[address] [facility]   PHYSICAL THERAPY  [] DAILY NOTE (LAND) [] DAILY NOTE (AQUATIC ) [] PROGRESS NOTE [x] DISCHARGE NOTE    [x] [facility] [place]   [] [place] [address]    [] [facility]   [] Jessie Wooier     Date: 2021  Patient Name:  Lucrecia Kincaid  : 1965  MRN: [id number]    Referring Practitioner TAMIE Gutierrez*   Diagnosis Spondylosis without myelopathy or radiculopathy, thoracic region [M47.814]   M54.9 (ICD-10-CM) - Mid back pain    M47.816 (ICD-10-CM) - Spondylosis of lumbar region without myelopathy or radiculopathy    M46.1 (ICD-10-CM) - Sacroiliac inflammation (HCC)    M16.0 (ICD-10-CM) - Primary osteoarthritis of both hips    G89.4 (ICD-10-CM) - Chronic pain syndrome       Treatment Diagnosis Lumbago, thoracic spine pain, B hip pain, R knee pain, B hip stiffness, R knee stiffness, spinal stiffness, muscular weakness, difficulty walking   Date of Evaluation 20    Additional Pertinent History L Hip replacement May 2018, T12 Kyphoplasty, R Hip injections for bursitis, Right knee nerve ablations and injections, lumbar and thoracic nerve blocks       Functional Outcome Measure Used Modified Oswestry LBP questionnaire   Functional Outcome Score 29/50 = 58% impairment (20)   17/50 = 34% impairment (20)  19/50 = 38% impairment (21)      Insurance: Primary: Payor: Liane Geronimo /  /  / ,   Secondary:    Authorization Information: 30 visits PT/OT/ST each per asad yr. Aquatic and modalities covered, FCE covered. No hot/cold pack. Visit # 15, 6/10 for progress note   Visits Allowed: 30, 6 completed in 60   Recertification Date:    Physician Follow-Up: 20    Physician Orders: Back pain, Neck pain, Knee pain, Hip pain   History of Present Illness: Patient reporting to pain management Right scapular midthoracic pain flared up in Spring 2020, needs insurance approval for ablations. SUBJECTIVE:  Patient continues to experience 7/10 back pain especially as she has been more active in the past couple weeks. She feels that she has more energy and getting less winded. She can tolerate about 2 minutes of static standing activity before needing to sit again in her home, at baseline this was much worse, just a couple seconds of standing. Walking tolerance is improved to about 4-5 minutes walking. She has been in the process of packing up her home with the intention to move and this involves more cleaning and organizing than she could typically tolerate. She gets almost full relief of back pain while in the water doing aquatic therapy. She is planning to continue HEP and progress her stationary bike work outs for cardiovascular activity. OBJECTIVE:    NuStep warm up Level 4 5 min x    Reassessment - stair navigation 12 steps  x    Reviewed stretching HEP - hip flexor, piriformis, knee flexion 3x 10 sec x    Bridge, hip flexion with abd brace 5x 5 sec x           Reassessment - see goals for ROM and strength assess     x         Activity/Treatment Tolerance:  [x]  Patient tolerated treatment well  []  Patient limited by fatigue  []  Patient limited by pain   []  Patient limited by medical complications   []  Other:     Assessment:  Patient has made good progress toward therapy goals. She demonstrates improved hip flexibility and good core strength of hips and abdominals. She was much more confident with gait and stair navigation today, less SOB and more upright posture noted. She is agreeable to continue HEP independently and discharge from physical therapy at this time. GOALS:  Patient Goal: decrease pain in back and legs    Short Term Goals:  Time Frame: 6 weeks    Patient will report decreased low back pain from baseline 8/10 to less than 4/10 during therapy exercises in order to stand greater than 5 minutes.     [] Goal Met [x] Goal Not Met [] Continue Goal [x] Discontinue Goal  [] Revise Goal  Goal Assessment: pain still elevated 7/10 and her standing and walking tolerance remains less than 5 mins, but much improved     Patient will improve BLE AROM to 0-100 deg hip flex, 0-10 deg hip ext supine, and 0-120 deg R knee flexion in order to bend and lift without straining lumbar spine. [x] Goal Met [] Goal Not Met [] Continue Goal [] Discontinue Goal  [] Revise Goal  Goal Assessment: R knee flexion MET 0-120 deg, hip flex 0-115 deg piriformis, 0-15 deg hip extension supine    Patient will demonstrate good abdominal bracing and body mechanics during therapy session in order to preserve neutral spine during household tasks. [x] Goal Met [] Goal Not Met [] Continue Goal [x] Discontinue Goal  [] Revise Goal  Goal Assessment: good abd bracing with bridge, marching, and squatting to lift heavy items at home. Long Term Goals:  Time Frame: 12 weeks    Patient will improve score of Modified Oswestry LBP questionnaire from baseline 58% impairment to at least 30% impairment in order to sleep more comfortably and tolerate more household activities. [] Goal Met [x] Goal Not Met [] Continue Goal [x] Discontinue Goal  [] Revise Goal  Goal Assessment:  Score 38% impairment today, improved from baseline    New Goal  12/24/20 - Patient will ascend/descend 12 steps with reciprocal pattern and one railing in order to access her basement. [x] Goal Met [] Goal Not Met [] Continue Goal [x] Discontinue Goal  [] Revise Goal  Goal Assessment: one railing up/down with reciprocal pattern, no LOB and good leg strength    Patient Education:   []  HEP/Education Completed:   Cassandra Quinteros Access Code:   []  No new Education completed  [x]  Reviewed Prior HEP      [x]  Patient verbalized and/or demonstrated understanding of education provided. []  Patient unable to verbalize and/or demonstrate understanding of education provided. Will continue education.   []  Barriers to learning: none    PLAN:    [x]  Discharge    Time In 1500   Time Out 1540   Timed Code Minutes: 40   Total Treatment Time: 40       Electronically Signed by: Eric Trevino

## 2021-01-27 ENCOUNTER — VIRTUAL VISIT (OUTPATIENT)
Dept: PHYSICAL MEDICINE AND REHAB | Age: 56
End: 2021-01-27
Payer: COMMERCIAL

## 2021-01-27 DIAGNOSIS — M16.0 PRIMARY OSTEOARTHRITIS OF BOTH HIPS: ICD-10-CM

## 2021-01-27 DIAGNOSIS — M54.9 MID BACK PAIN: ICD-10-CM

## 2021-01-27 DIAGNOSIS — M47.812 CERVICAL SPONDYLOSIS: ICD-10-CM

## 2021-01-27 DIAGNOSIS — M17.11 PRIMARY OSTEOARTHRITIS OF RIGHT KNEE: ICD-10-CM

## 2021-01-27 DIAGNOSIS — M47.814 SPONDYLOSIS OF THORACIC REGION WITHOUT MYELOPATHY OR RADICULOPATHY: Primary | ICD-10-CM

## 2021-01-27 DIAGNOSIS — M46.1 SACROILIAC INFLAMMATION (HCC): ICD-10-CM

## 2021-01-27 DIAGNOSIS — G89.4 CHRONIC PAIN SYNDROME: ICD-10-CM

## 2021-01-27 DIAGNOSIS — M47.816 SPONDYLOSIS OF LUMBAR REGION WITHOUT MYELOPATHY OR RADICULOPATHY: ICD-10-CM

## 2021-01-27 PROCEDURE — 3017F COLORECTAL CA SCREEN DOC REV: CPT | Performed by: NURSE PRACTITIONER

## 2021-01-27 PROCEDURE — 99213 OFFICE O/P EST LOW 20 MIN: CPT | Performed by: NURSE PRACTITIONER

## 2021-01-27 PROCEDURE — G8427 DOCREV CUR MEDS BY ELIG CLIN: HCPCS | Performed by: NURSE PRACTITIONER

## 2021-01-27 RX ORDER — HYDROCODONE BITARTRATE AND ACETAMINOPHEN 5; 325 MG/1; MG/1
1 TABLET ORAL 2 TIMES DAILY PRN
Qty: 60 TABLET | Refills: 0 | Status: SHIPPED | OUTPATIENT
Start: 2021-01-27 | End: 2021-02-25 | Stop reason: SDUPTHER

## 2021-01-27 ASSESSMENT — ENCOUNTER SYMPTOMS: BACK PAIN: 1

## 2021-01-27 NOTE — PROGRESS NOTES
HPI:   Lucrecia Kincaid is a 63 y.o. female is here today for    Chief Complaint: Back pain, right knee, neck pain    HPI   Video Visit. TELEHEALTH EVALUATION -- Audio/Visual (During [facility] public [facility] emergency). This visit was completed virtually using doxy. me. Location of visit: patients home, providers office. FU from Right T-facet RFA from 1/12/2021. Receiving over 90% relief of mid back apin from right T-facet RFA, pain is much better and activity has improved. Now noticing pain more in left side mid back- sharp but more intermittent. Pain is much better     Continues to have right knee pain- therapy has helped pain   Norco prn remains effective     Medications reviewed. Patient denies side effects with medications. Patient states she is taking medications as prescribed. Shedenies receiving pain medications from other sources. She denies any ER visits since last visit. Pain scale with out pain medications or at its worst is 6-7/10. Pain scale with pain medications or at its best is 3/10. Last dose of [place] was yesterday  Drug screen reviewed from 12/21/2020 and was appropriate  Pill count was not completed today d/t video visit  Patient does not have naloxone available at home. Patient has not required use of naloxone at home since last office visit. The patientis allergic to [place] [macadamia nut oil]; other; saccharin; sulfa antibiotics; and sulfur. Past Medical History  Alberta Garza  has a past medical history of Allergic rhinitis, Depression, Hypertension, ABDI on CPAP, Osteoarthritis, and Vitamin D deficiency. Past Surgical History  The patient  has a past surgical history that includes Dental surgery (2002); [place] tooth extraction; Hand surgery (Right, 05/15/2015); other surgical history (4/11/16); Colonoscopy (2016); other surgical history (06/13/2016); Hemorrhoid surgery (2016); Tonsillectomy; other surgical history (Right, 10/16/2017); arthrocentesis (Right, 10/16/2017);  Nerve Surgery (Left, 12/05/2017); arthrocentesis (Left, 12/5/2017); pr arthrocentesis aspir&/inj major jt/bursa w/o us (Right, 9/17/2018); pr office/outpt visit,procedure only (N/A, 11/9/2018); Nerve Block Lumb Facet Level 1 Bilateral (Bilateral, 1/4/2019); Nerve Block Lumb Facet Level 1 Bilateral (Bilateral, 2/25/2019); Lumbar spine surgery (Right, 4/16/2019); Lumbar spine surgery (Left, 6/13/2019); arthrocentesis (Right, 7/18/2019); Nerve Surgery (Bilateral, 9/13/2019); hip surgery (Left, 2/7/2020); Pain management procedure (Right, 7/31/2020); Pain management procedure (Right, 8/31/2020); and Pain management procedure (Right, 1/12/2021). Family History  This patient's family history includes Heart Disease in her mother; Substance Abuse in her sister. Social History  Brittni Raymond  reports that she quit smoking about 10 years ago. Her smoking use included cigarettes and e-cigarettes. She started smoking about 15 years ago. She has a 7.00 pack-year smoking history. She has never used smokeless tobacco. She reports current alcohol use. She reports that she does not use drugs. Medications    Current Outpatient Medications:     HYDROcodone-acetaminophen (NORCO) 5-325 MG per tablet, Take 1 tablet by mouth 2 times daily as needed for Pain for up to 30 days. , Disp: 60 tablet, Rfl: 0    meloxicam (MOBIC) 15 MG tablet, Take 1 tablet by mouth daily, Disp: 30 tablet, Rfl: 2    loratadine (CLARITIN) 10 MG tablet, Take 10 mg by mouth daily, Disp: , Rfl:     Elastic Bandages & Supports (B & B SACROILIAC BELT) MISC, 1 Device by Does not apply route as needed (pain), Disp: 1 each, Rfl: 0    Cholecalciferol (VITAMIN D3) 5000 units CAPS, Take 1 capsule by mouth daily, Disp: , Rfl:     Omega-3 Fatty Acids (FISH OIL) 1200 MG CAPS, , Disp: , Rfl:     calcium carbonate (OSCAL) 500 MG TABS tablet, Take 500 mg by mouth 2 times daily, Disp: , Rfl:     Turmeric 500 MG CAPS, Take by mouth daily, Disp: , Rfl:     cyclobenzaprine (FLEXERIL) 10 MG tablet, Take 1 tablet by mouth 3 times daily as needed for Muscle spasms WARNING: This medication may make your drowsy. Do not operate heavy machinery or motor vehicles during use., Disp: 15 tablet, Rfl: 0    lisinopril (PRINIVIL;ZESTRIL) 10 MG tablet, Take 10 mg by mouth nightly , Disp: , Rfl:     citalopram (CELEXA) 20 MG tablet, Take 20 mg by mouth daily , Disp: , Rfl:     spironolactone (ALDACTONE) 25 MG tablet, Take 1 tablet by mouth daily. , Disp: 15 tablet, Rfl: 0    Subjective:      Review of Systems   Constitutional: Negative. Cardiovascular: Positive for leg swelling. Musculoskeletal: Positive for arthralgias, back pain, myalgias, neck pain and neck stiffness. Neurological: Negative for weakness and numbness. Psychiatric/Behavioral: Negative. Objective: There were no vitals filed for this visit. Physical Exam  Constitutional:       Appearance: She is obese. HENT:      Head: Normocephalic and atraumatic. Neurological:      General: No focal deficit present. Mental Status: She is alert and oriented to person, place, and time. Psychiatric:         Mood and Affect: Mood normal.            Assessment:     1. Spondylosis of thoracic region without myelopathy or radiculopathy    2. Mid back pain    3. Spondylosis of lumbar region without myelopathy or radiculopathy    4. Sacroiliac inflammation ([facility] [address]    5. Primary osteoarthritis of both hips    6. Cervical spondylosis    7. Primary osteoarthritis of right knee    8. Chronic pain syndrome            Plan:      · OARRS reviewed. Current MED: 10.00  · Patient was offered naloxone for home. Refused  · Discussed long term side effects of medications, tolerance, dependency and addiction. · Previous UDS reviewed  · UDS preformed today for compliance. · Patient told can not receive any pain medications from any other source. · No evidence of abuse, diversion or aberrant behavior.    Medications and/or procedures to improve function and quality of life- patient understanding with this and that may not be pain free   Discussed with patient about safe storage of medications at home   Discussed possible weaning of medication dosing dependent on treatment/procedure results.  Discussed with patient about risks with procedure including infection, reaction to medication, increased pain, or bleeding.  Procedure notes reviewed in detail   Receiving over 90% relief of right mid back pain from right T-facet RFA, left mi back pain is now main complaint.  Plan Left T-facet RFA @ T7-8, T8-9, and T9-T10 for longer term pain relief. Procedure and risks discussed in detail with patient.    Continue home exercises   · Medications remain effective, patient is compliant. Continue Norco 5/325 BID prn- ordered refill         Meds. Prescribed:   Orders Placed This Encounter   Medications    HYDROcodone-acetaminophen (NORCO) 5-325 MG per tablet     Sig: Take 1 tablet by mouth 2 times daily as needed for Pain for up to 30 days. Dispense:  60 tablet     Refill:  0     Reduce doses taken as pain becomes manageable       Return for Left T-facet RFA @ T7-8, T8-9, and T9-T10. , follow up after procedure.                Electronically signed by TAMIE Borden CNP on1/27/2021 at 1:24 PM

## 2021-02-07 ENCOUNTER — APPOINTMENT (OUTPATIENT)
Dept: GENERAL RADIOLOGY | Age: 56
End: 2021-02-07
Payer: COMMERCIAL

## 2021-02-07 ENCOUNTER — HOSPITAL ENCOUNTER (EMERGENCY)
Age: 56
Discharge: HOME | End: 2021-02-07
Attending: EMERGENCY MEDICINE
Payer: COMMERCIAL

## 2021-02-07 VITALS
WEIGHT: 293 LBS | RESPIRATION RATE: 18 BRPM | TEMPERATURE: 98.2 F | SYSTOLIC BLOOD PRESSURE: 142 MMHG | DIASTOLIC BLOOD PRESSURE: 80 MMHG | HEART RATE: 108 BPM | BODY MASS INDEX: 48.82 KG/M2 | OXYGEN SATURATION: 98 % | HEIGHT: 65 IN

## 2021-02-07 DIAGNOSIS — S80.02XA CONTUSION OF LEFT KNEE, INITIAL ENCOUNTER: Primary | ICD-10-CM

## 2021-02-07 DIAGNOSIS — S63.633A SPRAIN OF INTERPHALANGEAL JOINT OF LEFT MIDDLE FINGER, INITIAL ENCOUNTER: ICD-10-CM

## 2021-02-07 PROCEDURE — 99282 EMERGENCY DEPT VISIT SF MDM: CPT

## 2021-02-07 PROCEDURE — 73140 X-RAY EXAM OF FINGER(S): CPT

## 2021-02-07 PROCEDURE — 73562 X-RAY EXAM OF KNEE 3: CPT

## 2021-02-07 ASSESSMENT — PAIN DESCRIPTION - PAIN TYPE: TYPE: ACUTE PAIN

## 2021-02-07 ASSESSMENT — PAIN DESCRIPTION - LOCATION: LOCATION: FINGER (COMMENT WHICH ONE);KNEE

## 2021-02-08 NOTE — ED PROVIDER NOTES
upper implant    HAND SURGERY Right 05/15/2015    index finger cleaned out D/T infected cat bite    HEMORRHOID SURGERY  2016    series of 3 bandings for internal hemorrhoids, Dr. Sugar Davidson Left 2/7/2020    Right knee steroid injection performed by Pina Yepez MD at [address] Right 4/16/2019    T-facet RFA @ T7-8, 8-9, 9-10 performed by Pina Yepez MD at [address] [facility] Left 6/13/2019    T-facet RFA @ T7-8, 8-9, 9-10 LEFT performed by Pina Yepez MD at [place] [address] FACET LEVEL 1 BILATERAL Bilateral 1/4/2019    LUMBAR FACET bilateral T-facet MBB @ T7-T8, T8-T9, and T9-T10 performed by Pina Yepez MD at [place] [address] FACET LEVEL 1 BILATERAL Bilateral 2/25/2019    Thoracic FACET bilateral T-facet MBB @ T7-T8, T8-T9, and T9-T10 MBB #2 performed by Pina Yepez MD at [place] [address] Left 12/05/2017    HIP INJECTION     NERVE SURGERY Bilateral 9/13/2019    bilateral T-facet MBB # 1 @ T4-5, T5-6, and T6-7. performed by Pina Yepez MD at [place] [address]  4/11/16    L4-5, L5-S1 Facet injection    OTHER SURGICAL HISTORY  06/13/2016    Right Hip Injection    OTHER SURGICAL HISTORY Right 10/16/2017    Hip Injection     PAIN MANAGEMENT PROCEDURE Right 7/31/2020    Right knee genicular nerve block performed by Pina Yepez MD at [facility] Right 8/31/2020    Right knee genicular nerve RFA performed by Pina Yepez MD at [facility] [address] Right 1/12/2021    Bilateral T-facet RFA @ T7-8, T8-9, and T9-T10 RIGHT SIDE FIRST performed by Pina Yepez MD at [address] [place] [address] Era ARTHROCENTESIS ASPIR&/INJ MAJOR JT/BURSA W/O US Right 9/17/2018    RIGHT HIP LARGE JOINT STEROID INJECTION performed by Emily Muñoz MD at [address] [facility] OFFICE/OUTPT VISIT,PROCEDURE ONLY N/A 11/9/2018    Kyphoplasty T12 BILATERAL performed by Emily Muñoz MD at [facility] 1998      as child    [facility]   No current facility-administered medications for this encounter. Current Outpatient Medications:     HYDROcodone-acetaminophen (NORCO) 5-325 MG per tablet, Take 1 tablet by mouth 2 times daily as needed for Pain for up to 30 days. , Disp: 60 tablet, Rfl: 0    meloxicam (MOBIC) 15 MG tablet, Take 1 tablet by mouth daily, Disp: 30 tablet, Rfl: 2    loratadine (CLARITIN) 10 MG tablet, Take 10 mg by mouth daily, Disp: , Rfl:     Elastic Bandages & Supports (B & B SACROILIAC BELT) MISC, 1 Device by Does not apply route as needed (pain), Disp: 1 each, Rfl: 0    Cholecalciferol (VITAMIN D3) 5000 units CAPS, Take 1 capsule by mouth daily, Disp: , Rfl:     Omega-3 Fatty Acids (FISH OIL) 1200 MG CAPS, , Disp: , Rfl:     calcium carbonate (OSCAL) 500 MG TABS tablet, Take 500 mg by mouth 2 times daily, Disp: , Rfl:     Turmeric 500 MG CAPS, Take by mouth daily, Disp: , Rfl:     cyclobenzaprine (FLEXERIL) 10 MG tablet, Take 1 tablet by mouth 3 times daily as needed for Muscle spasms WARNING: This medication may make your drowsy. Do not operate heavy machinery or motor vehicles during use., Disp: 15 tablet, Rfl: 0    lisinopril (PRINIVIL;ZESTRIL) 10 MG tablet, Take 10 mg by mouth nightly , Disp: , Rfl:     citalopram (CELEXA) 20 MG tablet, Take 20 mg by mouth daily , Disp: , Rfl:     spironolactone (ALDACTONE) 25 MG tablet, Take 1 tablet by mouth daily. , Disp: 15 tablet, Rfl: 0      SOCIAL HISTORY     Social History     Social History Narrative    Not on file     Social History     Tobacco Use    Smoking status: Former Smoker     Packs/day: 1.00     Years: 7.00     Pack years: 7.00     Types: Cigarettes, E-Cigarettes     Start date: 5/1/2005     Quit date: 1/1/2011     Years since quitting: 10.1    Smokeless tobacco: Never Used   Substance Use Topics    Alcohol use: Yes     Alcohol/week: 0.0 standard drinks     Comment: occasionally, 1-3 times a month    Drug use: No         ALLERGIES     Allergies   Allergen Reactions    [place] [Macadamia Nut Oil] Other (See Comments)     Numbness and Tingling in mouth    Other Other (See Comments)     Artificial sweeteners - migraines    Saccharin     Sulfa Antibiotics Hives    Sulfur          FAMILY HISTORY     Family History   Problem Relation Age of Onset    Substance Abuse Sister     Heart Disease Mother         stent    Cancer Neg Hx     Diabetes Neg Hx     High Blood Pressure Neg Hx     Stroke Neg Hx          PHYSICAL EXAM     ED Triage Vitals   BP Temp Temp Source Pulse Resp SpO2 Height Weight   02/07/21 1931 02/07/21 1926 02/07/21 1926 02/07/21 1926 02/07/21 1926 02/07/21 1926 02/07/21 1926 02/07/21 1926   (!) 142/80 98.2 °F (36.8 °C) Oral 108 18 98 % 5' 5\" (1.651 m) (!) 330 lb (149.7 kg)         Additional Vital Signs:  Vitals:    02/07/21 1931   BP: (!) 142/80   Pulse:    Resp:    Temp:    SpO2:        Physical Exam  Vitals signs and nursing note reviewed. Constitutional:       General: She is not in acute distress. Appearance: She is not ill-appearing. HENT:      Head: Normocephalic and atraumatic. Nose: No congestion or rhinorrhea. Mouth/Throat:      Mouth: Mucous membranes are moist.   Cardiovascular:      Rate and Rhythm: Normal rate and regular rhythm. Pulses: Normal pulses. Heart sounds: Normal heart sounds. Pulmonary:      Effort: Pulmonary effort is normal.      Breath sounds: Normal breath sounds. Musculoskeletal:      Left hip: She exhibits normal strength, no tenderness and no bony tenderness. Right knee: She exhibits normal range of motion. No tenderness found. Left knee: She exhibits ecchymosis.  She exhibits normal range of motion, no swelling, no effusion and no bony tenderness. Tenderness found. No medial joint line, no lateral joint line, no MCL and no LCL tenderness noted. Left ankle: She exhibits normal range of motion and no swelling. Left hand: She exhibits tenderness (DIP right middle finger). She exhibits no bony tenderness. Comments: Suprapatellar contusion with abrasion. No joint effusion. Joint stable. Neurological:      Mental Status: She is alert. Psychiatric:         Attention and Perception: Attention normal.         Mood and Affect: Affect normal.         Initial vital signs and nursing assessment reviewed and abnormal from tachycardia. Related to pain. with elevtated BP reading. Pulsoximetry is normal per my interpretation. MEDICAL DECISION MAKING   Initial Assessment: Given the patient's above chief complaint and findings on history and physical examination, I thought it was appropriate to consider the following emergency medical conditions: Contusion, sprain/strain/fracture/internal derangement, joint injury although some of these diagnoses are unlikely they were considered in my medical decision making. Plan: X-ray affected areas symptomatic treatment with patient declined pain medication. we will splint and reassess         ED RESULTS   Laboratory results:  Labs Reviewed - No data to display    Radiologic studies results:  XR FINGER RIGHT (MIN 2 VIEWS)   Final Result   Impression: Mild soft tissue swelling middle finger. No acute fracture. **This report has been created using voice recognition software. It may contain minor errors which are inherent in voice recognition technology. **      Final report electronically signed by Dr. Giovani Thorpe on 2/7/2021 8:09 PM      XR KNEE LEFT (3 VIEWS)   Final Result      Mild degenerative changes. No acute findings. **This report has been created using voice recognition software.   It may contain minor errors which are inherent in voice recognition technology. **      Final report electronically signed by Dr. Rick Sewell on 2/7/2021 8:08 PM          ED Medications administered this visit: Medications - No data to display      ED COURSE        I estimate there is LOW risk for COMPARTMENT SYNDROME, OCCULT FRACTURE,  DEEP VENOUS THROMBOSIS, SEPTIC ARTHRITIS, TENDON OR NEUROVASCULAR INJURY, thus I consider the discharge disposition reasonable. The patient and/or family and I have discussed the diagnosis and risks, and we agree with discharging home to follow-up with their primary doctor or the referral orthopedist. We also discussed returning to the Emergency Department immediately if new or worsening symptoms occur. We have discussed the symptoms which are most concerning (e.g., changing or worsening pain, numbness, weakness) that necessitate immediate return. The diagnosis, extensive differential diagnosis, laboratory and imaging findings were discussed at the bedside. The patient was an active participant in their care. They are agreeable to the plan of care. All questions and concerns were addressed at the time of the encounter. MEDICATION CHANGES     DISCHARGE MEDICATIONS:  Discharge Medication List as of 2/7/2021  8:20 PM               FINAL DISPOSITION     Final diagnoses:   Contusion of left knee, initial encounter   Sprain of interphalangeal joint of left middle finger, initial encounter     Condition: condition: good  Dispo: Discharge to home    PATIENT REFERRED TO:  TAMIE Jesus CNP  [address] [place] [address]  [phone]    Schedule an appointment as soon as possible for a visit in 3 days        Critical Care Time   None      This transcription was electronically signed.  Parts of this transcriptions may have been dictated by use of voice recognition software and electronically transcribed, and parts may have been transcribed with the assistance of an ED scribe. The transcription may contain errors not detected in proofreading.     Electronically Signed: Gabriella Mahajan, 02/08/21, 3:24 PM      Gabriella Mahajan DO  02/08/21 1533

## 2021-02-08 NOTE — ED NOTES
Discharge instructions  discussed and explained with Pt. Pt. Verbalized understanding and has no further questions or needs at this time.         Latanya Cordova RN  02/07/21 2025

## 2021-02-08 NOTE — ED NOTES
Pt arrives tot he ED for right middle finger injury and left knee injury after she slipped on ice yesterday. Pt states she came in because she wanted to make sure nothing was broken. Pt states her pain is a 9/10. Pt states she does not want pain medication just x-rays.  Pt has an abrasion to the left knee      Le Soares RN  02/07/21 1934

## 2021-02-16 ENCOUNTER — TELEPHONE (OUTPATIENT)
Dept: PHYSICAL MEDICINE AND REHAB | Age: 56
End: 2021-02-16

## 2021-02-16 NOTE — TELEPHONE ENCOUNTER
Received call from pre-service. States pt. called and wanted to cancel her procedure. She did not want to reschedule, she will call back later. Procedure and follow up cancelled. LVM for pt. that they had been cancelled.

## 2021-02-25 ENCOUNTER — TELEPHONE (OUTPATIENT)
Dept: PHYSICAL MEDICINE AND REHAB | Age: 56
End: 2021-02-25

## 2021-02-25 DIAGNOSIS — G89.4 CHRONIC PAIN SYNDROME: ICD-10-CM

## 2021-02-25 NOTE — TELEPHONE ENCOUNTER
Pt. denies taking any blood thinners prior to procedure scheduling  Procedure and follow up scheduled. Educated on pre-procedure instructions, also mailed. Verbalized understanding.

## 2021-02-26 RX ORDER — HYDROCODONE BITARTRATE AND ACETAMINOPHEN 5; 325 MG/1; MG/1
1 TABLET ORAL 2 TIMES DAILY PRN
Qty: 60 TABLET | Refills: 0 | Status: SHIPPED | OUTPATIENT
Start: 2021-02-26 | End: 2021-05-13 | Stop reason: SDUPTHER

## 2021-02-26 NOTE — TELEPHONE ENCOUNTER
OARRS reviewed. UDS: + for  Flexeril hydrocoosne consistent. Last seen: 1/27/2021.  Follow-up:   Future Appointments   Date Time Provider Christine Branch   4/5/2021 12:00 PM TAMIE Urias - CNP [facility]   5/4/2021  1:15 PM Rylie Summers

## 2021-03-18 ENCOUNTER — TELEPHONE (OUTPATIENT)
Dept: PHYSICAL MEDICINE AND REHAB | Age: 56
End: 2021-03-18

## 2021-03-19 ENCOUNTER — APPOINTMENT (OUTPATIENT)
Dept: GENERAL RADIOLOGY | Age: 56
End: 2021-03-19
Attending: PAIN MEDICINE
Payer: COMMERCIAL

## 2021-03-19 ENCOUNTER — ANESTHESIA EVENT (OUTPATIENT)
Dept: OPERATING ROOM | Age: 56
End: 2021-03-19
Payer: COMMERCIAL

## 2021-03-19 ENCOUNTER — HOSPITAL ENCOUNTER (OUTPATIENT)
Age: 56
Setting detail: OUTPATIENT SURGERY
Discharge: HOME | End: 2021-03-19
Attending: PAIN MEDICINE | Admitting: PAIN MEDICINE
Payer: COMMERCIAL

## 2021-03-19 ENCOUNTER — ANESTHESIA (OUTPATIENT)
Dept: OPERATING ROOM | Age: 56
End: 2021-03-19
Payer: COMMERCIAL

## 2021-03-19 VITALS
DIASTOLIC BLOOD PRESSURE: 90 MMHG | RESPIRATION RATE: 17 BRPM | OXYGEN SATURATION: 95 % | SYSTOLIC BLOOD PRESSURE: 174 MMHG

## 2021-03-19 VITALS
SYSTOLIC BLOOD PRESSURE: 142 MMHG | HEIGHT: 65 IN | BODY MASS INDEX: 48.82 KG/M2 | HEART RATE: 78 BPM | TEMPERATURE: 97.5 F | OXYGEN SATURATION: 92 % | RESPIRATION RATE: 12 BRPM | DIASTOLIC BLOOD PRESSURE: 89 MMHG | WEIGHT: 293 LBS

## 2021-03-19 PROCEDURE — 64633 DESTROY CERV/THOR FACET JNT: CPT | Performed by: PAIN MEDICINE

## 2021-03-19 PROCEDURE — 3209999900 FLUORO FOR SURGICAL PROCEDURES

## 2021-03-19 PROCEDURE — 2500000003 HC RX 250 WO HCPCS: Performed by: NURSE ANESTHETIST, CERTIFIED REGISTERED

## 2021-03-19 PROCEDURE — 7100000011 HC PHASE II RECOVERY - ADDTL 15 MIN: Performed by: PAIN MEDICINE

## 2021-03-19 PROCEDURE — 2709999900 HC NON-CHARGEABLE SUPPLY: Performed by: PAIN MEDICINE

## 2021-03-19 PROCEDURE — 64634 DESTROY C/TH FACET JNT ADDL: CPT | Performed by: PAIN MEDICINE

## 2021-03-19 PROCEDURE — 3700000001 HC ADD 15 MINUTES (ANESTHESIA): Performed by: PAIN MEDICINE

## 2021-03-19 PROCEDURE — 3600000056 HC PAIN LEVEL 4 BASE: Performed by: PAIN MEDICINE

## 2021-03-19 PROCEDURE — 2500000003 HC RX 250 WO HCPCS: Performed by: PAIN MEDICINE

## 2021-03-19 PROCEDURE — 6360000002 HC RX W HCPCS: Performed by: PAIN MEDICINE

## 2021-03-19 PROCEDURE — 7100000010 HC PHASE II RECOVERY - FIRST 15 MIN: Performed by: PAIN MEDICINE

## 2021-03-19 PROCEDURE — 3600000057 HC PAIN LEVEL 4 ADDL 15 MIN: Performed by: PAIN MEDICINE

## 2021-03-19 PROCEDURE — 3700000000 HC ANESTHESIA ATTENDED CARE: Performed by: PAIN MEDICINE

## 2021-03-19 PROCEDURE — 6360000002 HC RX W HCPCS: Performed by: NURSE ANESTHETIST, CERTIFIED REGISTERED

## 2021-03-19 RX ORDER — FENTANYL CITRATE 50 UG/ML
INJECTION, SOLUTION INTRAMUSCULAR; INTRAVENOUS PRN
Status: DISCONTINUED | OUTPATIENT
Start: 2021-03-19 | End: 2021-03-19 | Stop reason: SDUPTHER

## 2021-03-19 RX ORDER — LIDOCAINE HYDROCHLORIDE 10 MG/ML
INJECTION, SOLUTION EPIDURAL; INFILTRATION; INTRACAUDAL; PERINEURAL PRN
Status: DISCONTINUED | OUTPATIENT
Start: 2021-03-19 | End: 2021-03-19 | Stop reason: ALTCHOICE

## 2021-03-19 RX ORDER — LIDOCAINE HYDROCHLORIDE 20 MG/ML
INJECTION, SOLUTION EPIDURAL; INFILTRATION; INTRACAUDAL; PERINEURAL PRN
Status: DISCONTINUED | OUTPATIENT
Start: 2021-03-19 | End: 2021-03-19 | Stop reason: SDUPTHER

## 2021-03-19 RX ORDER — PROPOFOL 10 MG/ML
INJECTION, EMULSION INTRAVENOUS PRN
Status: DISCONTINUED | OUTPATIENT
Start: 2021-03-19 | End: 2021-03-19 | Stop reason: SDUPTHER

## 2021-03-19 RX ORDER — BUPIVACAINE HYDROCHLORIDE 2.5 MG/ML
INJECTION, SOLUTION EPIDURAL; INFILTRATION; INTRACAUDAL PRN
Status: DISCONTINUED | OUTPATIENT
Start: 2021-03-19 | End: 2021-03-19 | Stop reason: ALTCHOICE

## 2021-03-19 RX ORDER — METHYLPREDNISOLONE ACETATE 80 MG/ML
INJECTION, SUSPENSION INTRA-ARTICULAR; INTRALESIONAL; INTRAMUSCULAR; SOFT TISSUE PRN
Status: DISCONTINUED | OUTPATIENT
Start: 2021-03-19 | End: 2021-03-19 | Stop reason: ALTCHOICE

## 2021-03-19 RX ADMIN — PROPOFOL 85 MG: 10 INJECTION, EMULSION INTRAVENOUS at 09:48

## 2021-03-19 RX ADMIN — LIDOCAINE HYDROCHLORIDE 50 MG: 20 INJECTION, SOLUTION EPIDURAL; INFILTRATION; INTRACAUDAL; PERINEURAL at 09:48

## 2021-03-19 RX ADMIN — FENTANYL CITRATE 100 MCG: 50 INJECTION, SOLUTION INTRAMUSCULAR; INTRAVENOUS at 09:41

## 2021-03-19 ASSESSMENT — PULMONARY FUNCTION TESTS
PIF_VALUE: 0

## 2021-03-19 NOTE — OP NOTE
Operative Note  Pre-Procedure Note    Patient Name: Reford Chanel   YOB: 1965  Medical Record Number: [id number]  Date: 3/19/21    Indication:  Thoracic pain  Consent: On file. Vital Signs:   Vitals:    03/19/21 0855   BP: (!) 150/71   Pulse: 97   Resp: 18   Temp: 96.9 °F (36.1 °C)   SpO2: 95%       Past Medical History:   has a past medical history of Allergic rhinitis, Depression, Hypertension, ABDI on CPAP, Osteoarthritis, and Vitamin D deficiency. Past Surgical History:   has a past surgical history that includes Dental surgery (2002); [place] tooth extraction; Hand surgery (Right, 05/15/2015); other surgical history (4/11/16); Colonoscopy (2016); other surgical history (06/13/2016); Hemorrhoid surgery (2016); Tonsillectomy; other surgical history (Right, 10/16/2017); arthrocentesis (Right, 10/16/2017); Nerve Surgery (Left, 12/05/2017); arthrocentesis (Left, 12/5/2017); pr arthrocentesis aspir&/inj major jt/bursa w/o us (Right, 9/17/2018); pr office/outpt visit,procedure only (N/A, 11/9/2018); Nerve Block Lumb Facet Level 1 Bilateral (Bilateral, 1/4/2019); Nerve Block Lumb Facet Level 1 Bilateral (Bilateral, 2/25/2019); Lumbar spine surgery (Right, 4/16/2019); Lumbar spine surgery (Left, 6/13/2019); arthrocentesis (Right, 7/18/2019); Nerve Surgery (Bilateral, 9/13/2019); hip surgery (Left, 2/7/2020); Pain management procedure (Right, 7/31/2020); Pain management procedure (Right, 8/31/2020); and Pain management procedure (Right, 1/12/2021). Pre-Sedation Documentation and Exam:   Vital signs have been reviewed (see flow sheet for vitals). Sedation/ Anesthesia Plan:   MAC    Patient is an appropriate candidate for plan of sedation: yes    Preoperative Diagnosis:  T-spondylosis     Post-Op Dx: as above     Procedure Performed:  :Radiofrequency ablation of median branches at the levels of T7-8,T8-9 and T9-10 left under fluoroscopic guidance      Indication for the Procedure:   The patient has ahistory of chronic low back pain that is not responding well to the conservative treatment. Patient's pain is mostly axial in nature. Pain is interfering with the activities of daily living. Physical examination revealed facet tenderness and facet loading is positive. Patient had undergone lumbar facet joint injections with pain relief that lasted for only a short period of time and had greater than 70% pain relief with confirmatory median branch blocks. Hence we decided to do radiofrequency abalation of median branches for long term pain releif. The procedure and risks  were discussed with the patient and an informed consent was obtained.     Procedure:  Left   A meaningful communication was kept up with the patient throughout the procedure. The patient is placed in prone position and skin over the back was prepped and draped in sterile manner. Then using fluoroscopy the junction of the transverse process of the vertebra with the superior process of the facet joint was observed and the view was optimized. The skin and deep tissues posterior were infiltrated with 9 ml of 1% xylocaine. The RF canula with the 10 mm active tip was introduced through the skin wheal under fluoroscopy guidance such that the tip of the needle lies in the groove of the transverse process with the superior processes of the facet joint. Then a lateral view of the lumbar spine was obtained to make sure the tip of needle is not in the neural foramen. Then electric impedence was checked to make sure it is acceptable. Then a sensory stimulus was applied at 50 Hz up to 1 volt and concordant pain symptoms were reproduced. Then a motor stimulus was applied at 2 Hz up to 2 volts and no motor stimulation was seen in lower extremities. Some multifidus stimulus was seen. Then after negative aspiration a mixture of depomedrol 80 mg  and 0.25%  Marcaine 1.5 cc was injected through the needle in divided doses.  Then a  lesion was done at 80 degrees centigrade for 90 seconds.      EBL-0  Patient's vital signs and neurological status remained stable throughout the procedure and post procedural period. The patient tolerated the procedure well and was discharged home in stable condition.     Electronically signed by Jennifer Jordan MD on 3/19/21 at 9:42 AM EDT

## 2021-03-19 NOTE — PROGRESS NOTES
7511: Patient arrived to Phase II recovery via cart. Eyes closed, but responding to voice. 2778: VSS, patient rates pain to injection site 2/10. HOB elevated and snack and drink provided. Call light placed within reach. 1015: Patient tolerating PO fluids and food. IV removed without complications. Band aid applied to site. Sat edge of bed without difficulty. Denies dizziness or lightheadedness. Dressed independently at bedside. 1031: Patient discharged home with sister.

## 2021-03-19 NOTE — ANESTHESIA PRE PROCEDURE
Department of Anesthesiology  Preprocedure Note       Name:  Ken Mcadams   Age:  63 y.o.  :  1965                                          MRN:  [id number]         Date:  3/19/2021      Surgeon: Latanya Graham):  Jennifer Jordan MD    Procedure: Procedure(s):  Left T-facet RFA @ T7-8, T8-9, and T9-T10. Medications prior to admission:   Prior to Admission medications    Medication Sig Start Date End Date Taking? Authorizing Provider   HYDROcodone-acetaminophen (NORCO) 5-325 MG per tablet Take 1 tablet by mouth 2 times daily as needed for Pain for up to 30 days. 2/26/21 3/28/21  TAMIE Boothe CNP   meloxicam [facility]) 15 MG tablet Take 1 tablet by mouth daily 20  TAMIE Boothe CNP   loratadine (CLARITIN) 10 MG tablet Take 10 mg by mouth daily    Historical Provider, MD   Elastic Bandages & Supports (B & B SACROILIAC BELT) MISC 1 Device by Does not apply route as needed (pain) 18   Christiana L TAMIE Moise CNP   Cholecalciferol (VITAMIN D3) 5000 units CAPS Take 1 capsule by mouth daily    Historical Provider, MD   Omega-3 Fatty Acids (FISH OIL) 1200 MG CAPS  18   Historical Provider, MD   calcium carbonate (OSCAL) 500 MG TABS tablet Take 500 mg by mouth 2 times daily    Historical Provider, MD   Turmeric 500 MG CAPS Take by mouth daily    Historical Provider, MD   cyclobenzaprine (FLEXERIL) 10 MG tablet Take 1 tablet by mouth 3 times daily as needed for Muscle spasms WARNING: This medication may make your drowsy. Do not operate heavy machinery or motor vehicles during use. 16   [place] PA-C   lisinopril (PRINIVIL;ZESTRIL) 10 MG tablet Take 10 mg by mouth nightly  16   Historical Provider, MD   citalopram (CELEXA) 20 MG tablet Take 20 mg by mouth daily  10/2/15   Historical Provider, MD   spironolactone (ALDACTONE) 25 MG tablet Take 1 tablet by mouth daily.  13   Hugo López MD       Current medications:    No current outpatient medications on file. No current facility-administered medications for this visit. Allergies: Allergies   Allergen Reactions    [place] [Macadamia Nut Oil] Other (See Comments)     Numbness and Tingling in mouth    Other Other (See Comments)     Artificial sweeteners - migraines    Saccharin     Sulfa Antibiotics Hives    Sulfur        Problem List:    Patient Active Problem List   Diagnosis Code    Occult blood in stools R19.5    Second degree hemorrhoids K64.1    ABDI on CPAP G47.33, Z99.89    Trochanteric bursitis of right hip M70.61    Primary osteoarthritis of right hip M16.11    Pathologic thoracic fracture M84.48XA    Localized osteoporosis with current pathological fracture M80.80XA    Thoracic spondylosis M47.814    Morbid obesity with BMI of 50.0-59.9, adult ([facility]) E66.01, Z68.43    Primary osteoarthritis of right knee M17.11       Past Medical History:        Diagnosis Date    Allergic rhinitis     Depression     Hypertension     ABDI on CPAP     Osteoarthritis     Vitamin D deficiency        Past Surgical History:        Procedure Laterality Date    ARTHROCENTESIS Right 10/16/2017    RIGHT HIP LARGE JOINT INJECTION performed by Inga Mena MD at [address] [place] [address] ARTHROCENTESIS Left 12/5/2017    LEFT HIP LARGE JOINT INJECTION performed by Inga Mena MD at [address] [place] [address] ARTHROCENTESIS Right 7/18/2019    Rt.  Knee Steroid Injection performed by Inga Mena MD at [address] [place] [address]  2016   [address] [place] [address]  2002     right front upper implant    HAND SURGERY Right 05/15/2015    index finger cleaned out D/T infected cat bite    HEMORRHOID SURGERY  2016    series of 3 bandings for internal hemorrhoids, Dr. Virgen Huerta Left 2/7/2020    Right knee steroid injection performed by Inga Mena MD at [facility] Right 4/16/2019    T-facet RFA @ T7-8, 8-9, Packs/day: 1.00     Years: 7.00     Pack years: 7.00     Types: Cigarettes, E-Cigarettes     Start date: 5/1/2005     Quit date: 1/1/2011     Years since quitting: 10.2    Smokeless tobacco: Never Used   Substance Use Topics    Alcohol use: Yes     Alcohol/week: 0.0 standard drinks     Comment: occasionally, 1-3 times a month                                Counseling given: Not Answered      Vital Signs (Current): There were no vitals filed for this visit. BP Readings from Last 3 Encounters:   03/19/21 (!) 150/71   02/07/21 (!) 142/80   01/12/21 (!) 114/55       NPO Status:                                                                                 BMI:   Wt Readings from Last 3 Encounters:   03/19/21 (!) 355 lb 12.8 oz (161.4 kg)   02/07/21 (!) 330 lb (149.7 kg)   01/12/21 (!) 355 lb (161 kg)     There is no height or weight on file to calculate BMI.    CBC:   Lab Results   Component Value Date    WBC 7.2 08/25/2020    RBC 4.63 08/25/2020    HGB 13.5 08/25/2020    HCT 41.7 08/25/2020    MCV 90.1 08/25/2020    RDW 14.0 07/11/2017     08/25/2020       CMP:   Lab Results   Component Value Date     08/25/2020    K 4.7 08/25/2020     08/25/2020    CO2 24 08/25/2020    BUN 14 08/25/2020    CREATININE 0.8 08/25/2020    LABGLOM 74 08/25/2020    GLUCOSE 98 08/25/2020    PROT 7.2 08/25/2020    CALCIUM 10.0 08/25/2020    BILITOT 0.4 08/25/2020    ALKPHOS 111 08/25/2020    AST 33 08/25/2020    ALT 60 08/25/2020       POC Tests: No results for input(s): POCGLU, POCNA, POCK, POCCL, POCBUN, POCHEMO, POCHCT in the last 72 hours.     Coags: No results found for: PROTIME, INR, APTT    HCG (If Applicable):   Lab Results   Component Value Date    PREGSERUM NEGATIVE 04/19/2013        ABGs: No results found for: PHART, PO2ART, CRV9SMR, OON8XHQ, BEART, U6UEZSSI     Type & Screen (If Applicable):  No results found for: LABABO, LABRH    Drug/Infectious Status (If Applicable):  No results found for: HIV, HEPCAB    COVID-19 Screening (If Applicable):   Lab Results   Component Value Date    COVID19 Not Detected 07/25/2020         Anesthesia Evaluation    Airway: Mallampati: III  TM distance: >3 FB   Neck ROM: full  Mouth opening: > = 3 FB Dental:          Pulmonary: breath sounds clear to auscultation  (+) sleep apnea:                             Cardiovascular:    (+) hypertension:,         Rhythm: irregular                      Neuro/Psych:   (+) psychiatric history:            GI/Hepatic/Renal:   (+) morbid obesity          Endo/Other:                     Abdominal:   (+) obese,         Vascular:                                          Anesthesia Plan      MAC     ASA 3       Induction: intravenous. Anesthetic plan and risks discussed with patient. Plan discussed with CRNA.                   Steven Bell MD   3/19/2021

## 2021-03-19 NOTE — H&P
, Disp: , Rfl:     citalopram (CELEXA) 20 MG tablet, Take 20 mg by mouth daily , Disp: , Rfl:     spironolactone (ALDACTONE) 25 MG tablet, Take 1 tablet by mouth daily. , Disp: 15 tablet, Rfl: 0        Subjective:      Review of Systems   Constitutional: Negative. Cardiovascular: Positive for leg swelling. Musculoskeletal: Positive for arthralgias, back pain, myalgias, neck pain and neck stiffness. Neurological: Negative for weakness and numbness. Psychiatric/Behavioral: Negative.          Objective:      Vitals   There were no vitals filed for this visit.        Physical Exam  Constitutional:       Appearance: She is obese. HENT:      Head: Normocephalic and atraumatic. Neurological:      General: No focal deficit present. Mental Status: She is alert and oriented to person, place, and time. Psychiatric:         Mood and Affect: Mood normal.            Assessment:      1. Spondylosis of thoracic region without myelopathy or radiculopathy    2. Mid back pain    3. Spondylosis of lumbar region without myelopathy or radiculopathy    4. Sacroiliac inflammation ([address]    5. Primary osteoarthritis of both hips    6. Cervical spondylosis    7. Primary osteoarthritis of right knee    8. Chronic pain syndrome       Plan:      · OARRS reviewed. Current MED: 10.00  · Patient was offered naloxone for home. Refused  · Discussed long term side effects of medications, tolerance, dependency and addiction. · Previous UDS reviewed  · UDS preformed today for compliance. · Patient told can not receive any pain medications from any other source. · No evidence of abuse, diversion or aberrant behavior. · Medications and/or procedures to improve function and quality of life- patient understanding with this and that may not be pain free  · Discussed with patient about safe storage of medications at home  · Discussed possible weaning of medication dosing dependent on treatment/procedure results.    · Discussed with patient about risks with procedure including infection, reaction to medication, increased pain, or bleeding. · Procedure notes reviewed in detail  · Receiving over 90% relief of right mid back pain from right T-facet RFA, left mi back pain is now main complaint. · Plan Left T-facet RFA @ T7-8, T8-9, and T9-T10 for longer term pain relief. Procedure and risks discussed in detail with patient.   · Continue home exercises   · Medications remain effective, patient is compliant.  Continue Norco 5/325 BID prn- ordered refill                   Return for Left T-facet RFA @ T7-8, T8-9, and T9-T10. , follow up after procedure.

## 2021-03-19 NOTE — H&P
[facility]  History and Physical Update    Pt Name: Ting James  MRN: [id number]  YOB: 1965  Date of evaluation: 3/19/2021      I have examined the patient and reviewed the H&P/Consult and there are no changes to the patient or plans.         Electronically signed by Kwan Terrell MD on 3/19/2021 at 9:40 AM

## 2021-03-19 NOTE — ANESTHESIA POSTPROCEDURE EVALUATION
Department of Anesthesiology  Postprocedure Note    Patient: Marci Schmid  MRN: [id number]  YOB: 1965  Date of evaluation: 3/19/2021  Time:  11:50 AM     Procedure Summary     Date: 03/19/21 Room / Location: [address] [place] [address] 03 / [facility]    Anesthesia Start: 9422 Anesthesia Stop: 9263    Procedure: Left T-facet RFA @ T7-8, T8-9, and T9-T10. (Left Neck) Diagnosis: (Thoracic spondylosis)    Surgeons: Marie Griffiths MD Responsible Provider: Humphrey Costa MD    Anesthesia Type: MAC ASA Status: 3          Anesthesia Type: MAC    Jose Phase I: Jose Score: 10    Jose Phase II: Jose Score: 10    Last vitals: Reviewed and per EMR flowsheets.        Anesthesia Post Evaluation    Patient location during evaluation: PACU  Patient participation: complete - patient participated  Level of consciousness: awake and alert  Airway patency: patent  Nausea & Vomiting: no nausea  Complications: no  Cardiovascular status: blood pressure returned to baseline and hemodynamically stable  Respiratory status: acceptable and spontaneous ventilation  Hydration status: euvolemic

## 2021-05-06 ENCOUNTER — PATIENT MESSAGE (OUTPATIENT)
Dept: PHYSICAL MEDICINE AND REHAB | Age: 56
End: 2021-05-06

## 2021-05-06 DIAGNOSIS — G89.4 CHRONIC PAIN SYNDROME: Primary | ICD-10-CM

## 2021-05-07 RX ORDER — HYDROCODONE BITARTRATE AND ACETAMINOPHEN 5; 325 MG/1; MG/1
1 TABLET ORAL 2 TIMES DAILY
Qty: 8 TABLET | Refills: 0 | Status: SHIPPED | OUTPATIENT
Start: 2021-05-07 | End: 2021-05-13

## 2021-05-07 NOTE — TELEPHONE ENCOUNTER
OARRS reviewed. UDS: + for  Cyclobenzaprine, Hydrocodone -consistent. Last seen: 1/27/2021.  Follow-up: 5/11/2021

## 2021-05-07 NOTE — TELEPHONE ENCOUNTER
From: Hesham Christianson  To: TAMIE Olmedo CNP  Sent: 5/6/2021 7:57 PM EDT  Subject: Prescription Question    Didn't see Elisha Kanner available for prescription request.    May I have a refill please?

## 2021-05-13 ENCOUNTER — OFFICE VISIT (OUTPATIENT)
Dept: PHYSICAL MEDICINE AND REHAB | Age: 56
End: 2021-05-13
Payer: COMMERCIAL

## 2021-05-13 VITALS
BODY MASS INDEX: 48.82 KG/M2 | HEIGHT: 65 IN | SYSTOLIC BLOOD PRESSURE: 132 MMHG | WEIGHT: 293 LBS | DIASTOLIC BLOOD PRESSURE: 82 MMHG

## 2021-05-13 DIAGNOSIS — F11.90 CHRONIC, CONTINUOUS USE OF OPIOIDS: ICD-10-CM

## 2021-05-13 DIAGNOSIS — G89.4 CHRONIC PAIN SYNDROME: ICD-10-CM

## 2021-05-13 DIAGNOSIS — M47.816 SPONDYLOSIS OF LUMBAR REGION WITHOUT MYELOPATHY OR RADICULOPATHY: ICD-10-CM

## 2021-05-13 DIAGNOSIS — M47.814 SPONDYLOSIS OF THORACIC REGION WITHOUT MYELOPATHY OR RADICULOPATHY: ICD-10-CM

## 2021-05-13 DIAGNOSIS — G89.29 CHRONIC PAIN OF RIGHT KNEE: ICD-10-CM

## 2021-05-13 DIAGNOSIS — M17.11 PRIMARY OSTEOARTHRITIS OF RIGHT KNEE: Primary | ICD-10-CM

## 2021-05-13 DIAGNOSIS — M54.9 MID BACK PAIN: ICD-10-CM

## 2021-05-13 DIAGNOSIS — M25.561 CHRONIC PAIN OF RIGHT KNEE: ICD-10-CM

## 2021-05-13 PROCEDURE — 1036F TOBACCO NON-USER: CPT | Performed by: NURSE PRACTITIONER

## 2021-05-13 PROCEDURE — 99214 OFFICE O/P EST MOD 30 MIN: CPT | Performed by: NURSE PRACTITIONER

## 2021-05-13 PROCEDURE — G8417 CALC BMI ABV UP PARAM F/U: HCPCS | Performed by: NURSE PRACTITIONER

## 2021-05-13 PROCEDURE — 3017F COLORECTAL CA SCREEN DOC REV: CPT | Performed by: NURSE PRACTITIONER

## 2021-05-13 PROCEDURE — G8427 DOCREV CUR MEDS BY ELIG CLIN: HCPCS | Performed by: NURSE PRACTITIONER

## 2021-05-13 RX ORDER — HYDROCODONE BITARTRATE AND ACETAMINOPHEN 5; 325 MG/1; MG/1
1 TABLET ORAL 2 TIMES DAILY PRN
Qty: 60 TABLET | Refills: 0 | Status: SHIPPED | OUTPATIENT
Start: 2021-05-13 | End: 2021-06-24 | Stop reason: SDUPTHER

## 2021-05-13 ASSESSMENT — ENCOUNTER SYMPTOMS
SORE THROAT: 0
GASTROINTESTINAL NEGATIVE: 1
BACK PAIN: 1
EYES NEGATIVE: 1
RESPIRATORY NEGATIVE: 1
VOICE CHANGE: 0
SINUS PAIN: 0

## 2021-05-13 NOTE — LETTER
[address] [facility]  [address] [facility] [address] [facility]  Phone: [phone]  Fax: [phone] [facility], APRN - ALEJANDRINA        May 13, 2021     Patient: Chad Carbone   YOB: 1965   Date of Visit: 5/13/2021       To Whom It May Concern: It is my medical opinion that the use of a message chair also referred to as seated massage would be beneficial to Yimi Yan BECKLashawn Verma in helping to reduce her overall pain levels. If you have any questions or concerns, please don't hesitate to call.     Sincerely,        Hi Langley, TAMIE - CNP

## 2021-05-13 NOTE — PROGRESS NOTES
[address] [facility] [address]  Dept: [phone]  Dept Fax: [phone]: [phone]    Visit Date: 5/13/2021    Functionality Assessment/Goals Worksheet     On a scale of 0 (Does not Interfere) to 10 (Completely Interferes)     1. Which number describes how during the past week pain has interfered with       the following:  A. General Activity:  8  B. Mood: 7  C. Walking Ability:  9  D. Normal Work (Includes both work outside the home and housework):  8  E. Relations with Other People:   5  F. Sleep:   6  G. Enjoyment of Life:   8    2. Patient Prefers to Take their Pain Medications:     [x]  On a regular basis   []  Only when necessary    []  Does not take pain medications    3. What are the Patient's Goals/Expectations for Visiting Pain Management? []  Learn about my pain    [x]  Receive Medication   []  Physical Therapy     []  Treat Depression   [x]  Receive Injections    []  Treat Sleep   []  Deal with Anxiety and Stress   []  Treat Opoid Dependence/Addiction   []  Other:      HPI:   Yash Morales is a 61 y.o. female is here today for    Chief Complaint: Back pain, right knee pain, neck pain     HPI   FU from left T-facet RFA from 3/19/2021. Receiving \"at least 90% relief of mid back pain from T-facet RFA. Pain is greatly improved and pain is much better and tolerable in mid back. Right knee pain is the main complaint- sharp, aching and increased with walking and when on feet more. Norco prn continues to help   Medications reviewed. Patient denies side effects with medications. Patient states she is taking medications as prescribed. Shedenies receiving pain medications from other sources. She had 1 ER visit since last visit     Pain scale with out pain medications or at its worst is 8/10. Pain scale with pain medications or at its best is 5/10.   Last dose of Bonnie Silva was yesterday  Drug screen reviewed from 12/21/2020 and was appropriate  Pill count was not completed today and patient instructed to bring at visists  Patient does not have naloxone available at home. Patient has not required use of naloxone at home since last office visit. The patientis allergic to walnut [macadamia nut oil]; other; saccharin; sulfa antibiotics; and sulfur. Subjective:      Review of Systems   Constitutional: Negative. Negative for fatigue and fever. HENT: Negative for congestion, sinus pain, sore throat and voice change. Eyes: Negative. Respiratory: Negative. Cardiovascular: Positive for leg swelling. Gastrointestinal: Negative. Genitourinary: Negative. Musculoskeletal: Positive for arthralgias, back pain, gait problem, myalgias, neck pain and neck stiffness. Ambulating with cane   Skin: Negative. Psychiatric/Behavioral: Negative. Objective:     Vitals:    05/13/21 1104   BP: 132/82   Weight: (!) 355 lb (161 kg)   Height: 5' 5\" (1.651 m)       Physical Exam  Vitals signs and nursing note reviewed. Constitutional:       General: She is not in acute distress. Appearance: She is well-developed. She is not diaphoretic. HENT:      Head: Normocephalic and atraumatic. Right Ear: External ear normal.      Left Ear: External ear normal.      Nose: Nose normal.      Mouth/Throat:      Pharynx: No oropharyngeal exudate. Eyes:      General: No scleral icterus. Right eye: No discharge. Left eye: No discharge. Conjunctiva/sclera: Conjunctivae normal.      Pupils: Pupils are equal, round, and reactive to light. Neck:      Musculoskeletal: Full passive range of motion without pain, normal range of motion and neck supple. Normal range of motion. No edema, erythema, neck rigidity or muscular tenderness. Thyroid: No thyromegaly. Trachea: No tracheal deviation.    Cardiovascular:      Rate and Rhythm: Normal rate and discussed in detail with patient.  Short term relief from knee steroid injection and minimal relief from genicular RFA. · Medications remain effective, patient is compliant. Continue Norco 5/325 BID prn- ordered refill  · Updated UDS ordered today   · Patient wanted order for massage chair- wrote a note that it would help her overall pain. Meds. Prescribed:   Orders Placed This Encounter   Medications    HYDROcodone-acetaminophen (NORCO) 5-325 MG per tablet     Sig: Take 1 tablet by mouth 2 times daily as needed for Pain for up to 30 days. Dispense:  60 tablet     Refill:  0     Reduce doses taken as pain becomes manageable       Return for  Right knee Synvisc injection. , follow up after procedure.                Electronically signed by TAMIE Montgomery CNP on5/13/2021 at 5:22 PM

## 2021-06-04 ENCOUNTER — APPOINTMENT (OUTPATIENT)
Dept: GENERAL RADIOLOGY | Age: 56
End: 2021-06-04
Attending: PAIN MEDICINE
Payer: COMMERCIAL

## 2021-06-04 ENCOUNTER — ANESTHESIA EVENT (OUTPATIENT)
Dept: OPERATING ROOM | Age: 56
End: 2021-06-04
Payer: COMMERCIAL

## 2021-06-04 ENCOUNTER — ANESTHESIA (OUTPATIENT)
Dept: OPERATING ROOM | Age: 56
End: 2021-06-04
Payer: COMMERCIAL

## 2021-06-04 ENCOUNTER — HOSPITAL ENCOUNTER (OUTPATIENT)
Age: 56
Setting detail: OUTPATIENT SURGERY
Discharge: HOME | End: 2021-06-04
Attending: PAIN MEDICINE | Admitting: PAIN MEDICINE
Payer: COMMERCIAL

## 2021-06-04 VITALS
WEIGHT: 293 LBS | RESPIRATION RATE: 16 BRPM | SYSTOLIC BLOOD PRESSURE: 129 MMHG | OXYGEN SATURATION: 94 % | TEMPERATURE: 96.8 F | HEART RATE: 88 BPM | BODY MASS INDEX: 48.82 KG/M2 | DIASTOLIC BLOOD PRESSURE: 54 MMHG | HEIGHT: 65 IN

## 2021-06-04 VITALS
DIASTOLIC BLOOD PRESSURE: 53 MMHG | OXYGEN SATURATION: 97 % | TEMPERATURE: 98.6 F | RESPIRATION RATE: 19 BRPM | SYSTOLIC BLOOD PRESSURE: 122 MMHG

## 2021-06-04 PROCEDURE — 20610 DRAIN/INJ JOINT/BURSA W/O US: CPT | Performed by: PAIN MEDICINE

## 2021-06-04 PROCEDURE — 2709999900 HC NON-CHARGEABLE SUPPLY: Performed by: PAIN MEDICINE

## 2021-06-04 PROCEDURE — 2500000003 HC RX 250 WO HCPCS: Performed by: PAIN MEDICINE

## 2021-06-04 PROCEDURE — 3209999900 FLUORO FOR SURGICAL PROCEDURES

## 2021-06-04 PROCEDURE — 7100000011 HC PHASE II RECOVERY - ADDTL 15 MIN: Performed by: PAIN MEDICINE

## 2021-06-04 PROCEDURE — 6360000002 HC RX W HCPCS: Performed by: NURSE ANESTHETIST, CERTIFIED REGISTERED

## 2021-06-04 PROCEDURE — 3700000000 HC ANESTHESIA ATTENDED CARE: Performed by: PAIN MEDICINE

## 2021-06-04 PROCEDURE — 6360000002 HC RX W HCPCS: Performed by: PAIN MEDICINE

## 2021-06-04 PROCEDURE — 2580000003 HC RX 258: Performed by: NURSE ANESTHETIST, CERTIFIED REGISTERED

## 2021-06-04 PROCEDURE — 77002 NEEDLE LOCALIZATION BY XRAY: CPT | Performed by: PAIN MEDICINE

## 2021-06-04 PROCEDURE — 7100000010 HC PHASE II RECOVERY - FIRST 15 MIN: Performed by: PAIN MEDICINE

## 2021-06-04 PROCEDURE — 3600000002 HC SURGERY LEVEL 2 BASE: Performed by: PAIN MEDICINE

## 2021-06-04 RX ORDER — SODIUM CHLORIDE 9 MG/ML
INJECTION INTRAVENOUS PRN
Status: DISCONTINUED | OUTPATIENT
Start: 2021-06-04 | End: 2021-06-04 | Stop reason: SDUPTHER

## 2021-06-04 RX ORDER — LIDOCAINE HYDROCHLORIDE 10 MG/ML
INJECTION, SOLUTION INFILTRATION; PERINEURAL PRN
Status: DISCONTINUED | OUTPATIENT
Start: 2021-06-04 | End: 2021-06-04 | Stop reason: ALTCHOICE

## 2021-06-04 RX ORDER — PROPOFOL 10 MG/ML
INJECTION, EMULSION INTRAVENOUS PRN
Status: DISCONTINUED | OUTPATIENT
Start: 2021-06-04 | End: 2021-06-04 | Stop reason: SDUPTHER

## 2021-06-04 RX ADMIN — LIDOCAINE HYDROCHLORIDE 50 ML: 10 INJECTION, SOLUTION INFILTRATION; PERINEURAL at 12:15

## 2021-06-04 RX ADMIN — PROPOFOL 120 MG: 10 INJECTION, EMULSION INTRAVENOUS at 12:15

## 2021-06-04 RX ADMIN — SODIUM CHLORIDE 8 ML: 9 INJECTION, SOLUTION INTRAMUSCULAR; INTRAVENOUS; SUBCUTANEOUS at 12:19

## 2021-06-04 ASSESSMENT — PULMONARY FUNCTION TESTS
PIF_VALUE: 0

## 2021-06-04 ASSESSMENT — PAIN SCALES - GENERAL: PAINLEVEL_OUTOF10: 3

## 2021-06-04 ASSESSMENT — PAIN - FUNCTIONAL ASSESSMENT: PAIN_FUNCTIONAL_ASSESSMENT: 0-10

## 2021-06-04 NOTE — ANESTHESIA PRE PROCEDURE
Department of Anesthesiology  Preprocedure Note       Name:  Boo Landrum   Age:  61 y.o.  :  1965                                          MRN:  [id number]         Date:  2021      Surgeon: Roni New):  Carmen Grimaldo MD    Procedure: Procedure(s):  Right knee Synvisc injection    Medications prior to admission:   Prior to Admission medications    Medication Sig Start Date End Date Taking? Authorizing Provider   HYDROcodone-acetaminophen (NORCO) 5-325 MG per tablet Take 1 tablet by mouth 2 times daily as needed for Pain for up to 30 days. 21 Yes TAMIE Hampton CNP   loratadine (CLARITIN) 10 MG tablet Take 10 mg by mouth daily   Yes Historical Provider, MD   cyclobenzaprine (FLEXERIL) 10 MG tablet Take 1 tablet by mouth 3 times daily as needed for Muscle spasms WARNING: This medication may make your drowsy. Do not operate heavy machinery or motor vehicles during use. 16  Yes Josy Samuel PA-C   lisinopril (PRINIVIL;ZESTRIL) 10 MG tablet Take 10 mg by mouth nightly  16  Yes Historical Provider, MD   citalopram (CELEXA) 20 MG tablet Take 20 mg by mouth daily  10/2/15  Yes Historical Provider, MD   spironolactone (ALDACTONE) 25 MG tablet Take 1 tablet by mouth daily.  13  Yes Denice Willis MD   CPAP Machine MISC by Does not apply route Please change CPAP pressure to 16 cm H20. 21   Surjit Lira PA-C   meloxicam [facility]) 15 MG tablet Take 1 tablet by mouth daily 20  TAMIE Hampton CNP   Elastic Bandages & Supports (B & B SACROILIAC BELT) MISC 1 Device by Does not apply route as needed (pain) 18   TAMIE Rubin CNP   Cholecalciferol (VITAMIN D3) 5000 units CAPS Take 1 capsule by mouth daily    Historical Provider, MD   Omega-3 Fatty Acids (FISH OIL) 1200 MG CAPS  18   Historical Provider, MD   calcium carbonate (OSCAL) 500 MG TABS tablet Take 500 mg by mouth 2 times daily    Historical Provider, MD Turmeric 500 MG CAPS Take by mouth daily    Historical Provider, MD       Current medications:    No current facility-administered medications for this encounter. Allergies: Allergies   Allergen Reactions    Ivel [Macadamia Nut Oil] Other (See Comments)     Numbness and Tingling in mouth    Other Other (See Comments)     Artificial sweeteners - migraines    Saccharin     Sulfa Antibiotics Hives    Sulfur        Problem List:    Patient Active Problem List   Diagnosis Code    Occult blood in stools R19.5    Second degree hemorrhoids K64.1    ABDI on CPAP G47.33, Z99.89    Trochanteric bursitis of right hip M70.61    Primary osteoarthritis of right hip M16.11    Pathologic thoracic fracture M84.48XA    Localized osteoporosis with current pathological fracture M80.80XA    Thoracic spondylosis M47.814    Morbid obesity with BMI of 50.0-59.9, adult ([facility]) E66.01, Z68.43    Primary osteoarthritis of right knee M17.11       Past Medical History:        Diagnosis Date    Allergic rhinitis     Depression     Hypertension     ABDI on CPAP     Osteoarthritis     Vitamin D deficiency        Past Surgical History:        Procedure Laterality Date    ARTHROCENTESIS Right 10/16/2017    RIGHT HIP LARGE JOINT INJECTION performed by Desi Garcia MD at [address] [place] [address] ARTHROCENTESIS Left 12/5/2017    LEFT HIP LARGE JOINT INJECTION performed by Desi Garcia MD at [address] [place] [address] ARTHROCENTESIS Right 7/18/2019    Rt.  Knee Steroid Injection performed by Desi Garcia MD at [address] [place] [address]  2002     right front upper implant    HAND SURGERY Right 05/15/2015    index finger cleaned out D/T infected cat bite    HEMORRHOID SURGERY  2016    series of 3 bandings for internal hemorrhoids, Dr. Glover Officer Left 2/7/2020    Right knee steroid injection performed by Desi Garcia MD at [place] [facility]    LUMBAR SPINE SURGERY Right 4/16/2019    T-facet RFA @ T7-8, 8-9, 9-10 performed by Jonn Erickson MD at [facility] [place] Left 6/13/2019    T-facet RFA @ T7-8, 8-9, 9-10 LEFT performed by Jonn Erickson MD at [place] [address] FACET LEVEL 1 BILATERAL Bilateral 1/4/2019    LUMBAR FACET bilateral T-facet MBB @ T7-T8, T8-T9, and T9-T10 performed by Jonn Erickson MD at [place] [address] FACET LEVEL 1 BILATERAL Bilateral 2/25/2019    Thoracic FACET bilateral T-facet MBB @ T7-T8, T8-T9, and T9-T10 MBB #2 performed by Jonn Erickson MD at [place] [address] Left 12/05/2017    HIP INJECTION     NERVE SURGERY Bilateral 9/13/2019    bilateral T-facet MBB # 1 @ T4-5, T5-6, and T6-7. performed by Jonn Erickson MD at [place] [address]  4/11/16    L4-5, L5-S1 Facet injection    OTHER SURGICAL HISTORY  06/13/2016    Right Hip Injection    OTHER SURGICAL HISTORY Right 10/16/2017    Hip Injection     PAIN MANAGEMENT PROCEDURE Right 7/31/2020    Right knee genicular nerve block performed by Jonn Erickson MD at [place] [address] Right 8/31/2020    Right knee genicular nerve RFA performed by Jonn Erickson MD at [facility] Right 1/12/2021    Bilateral T-facet RFA @ T7-8, T8-9, and T9-T10 RIGHT SIDE FIRST performed by Jonn rEickson MD at [facility] Left 3/19/2021    Left T-facet RFA @ T7-8, T8-9, and T9-T10. performed by Jonn Erickson MD at [address] ARTHROCENTESIS ASPIR&/INJ MAJOR JT/BURSA W/O US Right 9/17/2018    RIGHT HIP LARGE JOINT STEROID INJECTION performed by Jonn Erickson MD at [address] OFFICE/OUTPT VISIT,PROCEDURE ONLY N/A 11/9/2018    Kyphoplasty T12 BILATERAL performed by Kyree Mariano MD at [place] [address]      as child    WISDOM TOOTH EXTRACTION         Social History:    Social History     Tobacco Use    Smoking status: Former Smoker     Packs/day: 1.00     Years: 7.00     Pack years: 7.00     Types: Cigarettes, E-Cigarettes     Start date: 5/1/2005     Quit date: 1/1/2011     Years since quitting: 10.4    Smokeless tobacco: Never Used   Substance Use Topics    Alcohol use: Yes     Alcohol/week: 0.0 standard drinks     Comment: occasionally, 1-3 times a month                                Counseling given: Not Answered      Vital Signs (Current):   Vitals:    06/04/21 1021   BP: (!) 149/68   Pulse: 89   Resp: 20   Temp: 97.1 °F (36.2 °C)   TempSrc: Temporal   SpO2: 95%   Weight: (!) 371 lb (168.3 kg)   Height: 5' 5\" (1.651 m)                                              BP Readings from Last 3 Encounters:   06/04/21 (!) 149/68   05/13/21 132/82   03/19/21 (!) 142/89       NPO Status: Time of last liquid consumption: 2330                        Time of last solid consumption: 2330                        Date of last liquid consumption: 06/03/21                        Date of last solid food consumption: 06/03/21    BMI:   Wt Readings from Last 3 Encounters:   06/04/21 (!) 371 lb (168.3 kg)   05/13/21 (!) 355 lb (161 kg)   03/19/21 (!) 355 lb 12.8 oz (161.4 kg)     Body mass index is 61.74 kg/m².     CBC:   Lab Results   Component Value Date    WBC 7.2 08/25/2020    RBC 4.63 08/25/2020    HGB 13.5 08/25/2020    HCT 41.7 08/25/2020    MCV 90.1 08/25/2020    RDW 14.0 07/11/2017     08/25/2020       CMP:   Lab Results   Component Value Date     08/25/2020    K 4.7 08/25/2020     08/25/2020    CO2 24 08/25/2020    BUN 14 08/25/2020    CREATININE 0.8 08/25/2020    LABGLOM 74 08/25/2020    GLUCOSE 98 08/25/2020    PROT 7.2 08/25/2020    CALCIUM 10.0 08/25/2020    BILITOT 0.4 08/25/2020    ALKPHOS 111 08/25/2020    AST 33 08/25/2020    ALT 60 08/25/2020       POC Tests: No results for input(s): POCGLU, POCNA, POCK, POCCL, POCBUN, POCHEMO, POCHCT in the last 72 hours. Coags: No results found for: PROTIME, INR, APTT    HCG (If Applicable):   Lab Results   Component Value Date    PREGSERUM NEGATIVE 04/19/2013        ABGs: No results found for: PHART, PO2ART, VVY1EGU, GIR1IXH, BEART, T2QLQMVM     Type & Screen (If Applicable):  No results found for: LABABO, LABRH    Drug/Infectious Status (If Applicable):  No results found for: HIV, HEPCAB    COVID-19 Screening (If Applicable):   Lab Results   Component Value Date    COVID19 Not Detected 07/25/2020           Anesthesia Evaluation    Airway: Mallampati: III        Dental:          Pulmonary:   (+) sleep apnea:                             Cardiovascular:    (+) hypertension:,                   Neuro/Psych:   (+) psychiatric history:            GI/Hepatic/Renal:   (+) morbid obesity          Endo/Other:                     Abdominal:   (+) obese,         Vascular:                                        Anesthesia Plan      MAC     ASA 3             Anesthetic plan and risks discussed with patient.                       Naveen Valentin MD   6/4/2021

## 2021-06-04 NOTE — H&P
Normocephalic and atraumatic. Right Ear: External ear normal.      Left Ear: External ear normal.      Nose: Nose normal.      Mouth/Throat:      Pharynx: No oropharyngeal exudate. Eyes:      General: No scleral icterus. Right eye: No discharge. Left eye: No discharge. Conjunctiva/sclera: Conjunctivae normal.      Pupils: Pupils are equal, round, and reactive to light. Neck:      Musculoskeletal: Full passive range of motion without pain, normal range of motion and neck supple. Normal range of motion. No edema, erythema, neck rigidity or muscular tenderness. Thyroid: No thyromegaly. Trachea: No tracheal deviation. Cardiovascular:      Rate and Rhythm: Normal rate and regular rhythm. Heart sounds: Normal heart sounds. No murmur. No friction rub. No gallop. Pulmonary:      Effort: Pulmonary effort is normal. No respiratory distress. Breath sounds: Normal breath sounds. No wheezing or rales. Chest:      Chest wall: No tenderness. Abdominal:      General: Bowel sounds are normal. There is no distension. Palpations: Abdomen is soft. Tenderness: There is no abdominal tenderness. There is no guarding or rebound. Musculoskeletal:         General: Swelling and tenderness present. Right hip: She exhibits tenderness. Left hip: She exhibits decreased range of motion, decreased strength and bony tenderness. Right knee: She exhibits decreased range of motion and bony tenderness. Tenderness found. Cervical back: She exhibits tenderness. Thoracic back: She exhibits decreased range of motion, bony tenderness and pain. Lumbar back: She exhibits decreased range of motion, tenderness, pain and spasm. Back:       Right lower leg: Edema present. Left lower leg: Edema present. Legs:    Skin:     General: Skin is warm and dry. Coloration: Skin is not pale. Findings: No erythema or rash.    Neurological: General: No focal deficit present. Mental Status: She is alert and oriented to person, place, and time. She is not disoriented. Cranial Nerves: No cranial nerve deficit. Sensory: No sensory deficit. Motor: No atrophy or abnormal muscle tone. Coordination: Coordination normal.      Gait: Gait abnormal.      Deep Tendon Reflexes: Babinski sign absent on the right side. Comments: SLR neg. Psychiatric:         Attention and Perception: She is attentive. Mood and Affect: Mood is not anxious or depressed. Affect is not labile, blunt, angry or inappropriate. Speech: She is communicative. Speech is not rapid and pressured, delayed, slurred or tangential.         Behavior: Behavior normal. Behavior is not agitated, slowed, aggressive, withdrawn, hyperactive or combative. Thought Content: Thought content normal. Thought content is not paranoid or delusional. Thought content does not include homicidal or suicidal ideation. Thought content does not include homicidal or suicidal plan. Cognition and Memory: Memory is not impaired. She does not exhibit impaired recent memory or impaired remote memory. Judgment: Judgment normal. Judgment is not impulsive or inappropriate.         SCOOTER test: +   Yeomans test: +   Gaenslen test: +  Assessment:      1. Primary osteoarthritis of right knee    2. Chronic pain of right knee    3. Spondylosis of thoracic region without myelopathy or radiculopathy    4. Mid back pain    5. Spondylosis of lumbar region without myelopathy or radiculopathy    6. Chronic pain syndrome    7. Chronic, continuous use of opioids       Plan:      · OARRS reviewed. Current MED: 10.00  · Patient was offered naloxone for home. · Discussed long term side effects of medications, tolerance, dependency and addiction. · Previous UDS reviewed  · UDS preformed today for compliance.   · Patient told can not receive any pain medications from any other source. · No evidence of abuse, diversion or aberrant behavior. · Medications and/or procedures to improve function and quality of life- patient understanding with this and that may not be pain free  · Discussed with patient about safe storage of medications at home  · Discussed possible weaning of medication dosing dependent on treatment/procedure results. · Discussed with patient about risks with procedure including infection, reaction to medication, increased pain, or bleeding. · Procedure notes reviewed in detail   · Receiving \"at least 90% relief of mid back pain from T-facet RFA with improvement in mobility. Pain in mid back is tolerable. · Plan Right knee Synvisc injection. Procedure and risks discussed in detail with patient. · Short term relief from knee steroid injection and minimal relief from genicular RFA. · Medications remain effective, patient is compliant. Continue Norco 5/325 BID prn- ordered refill  · Updated UDS ordered today   · Patient wanted order for massage chair- wrote a note that it would help her overall pain.                 Return for  Right knee Synvisc injection.  , follow up after procedure.

## 2021-06-04 NOTE — H&P
[facility]  History and Physical Update    Pt Name: Justina Bryant  MRN: [id number]  YOB: 1965  Date of evaluation: 6/4/2021      I have examined the patient and reviewed the H&P/Consult and there are no changes to the patient or plans.         Electronically signed by Lux Clemente MD on 6/4/2021 at 10:46 AM

## 2021-06-04 NOTE — ANESTHESIA POSTPROCEDURE EVALUATION
Department of Anesthesiology  Postprocedure Note    Patient: Justina Bryant  MRN: [id number]  YOB: 1965  Date of evaluation: 6/4/2021  Time:  1:08 PM     Procedure Summary     Date: 06/04/21 Room / Location: [address] [place] [address] 03 / [facility]    Anesthesia Start: 3774 Anesthesia Stop: 1889    Procedure: Right knee Synvisc injection (Right ) Diagnosis: (Primary osteoarthritis of right knee (M17.11))    Surgeons: Lux Clemente MD Responsible Provider: Gracie Petersen MD    Anesthesia Type: MAC ASA Status: 3          Anesthesia Type: MAC    Jose Phase I: Jose Score: 10    Jose Phase II: Jose Score: 9    Last vitals: Reviewed and per EMR flowsheets.        Anesthesia Post Evaluation

## 2021-06-04 NOTE — PROGRESS NOTES
1224-Patient to Phase II via cart. Report received from [facility]. Patient drowsy but responsive. Vitals obtained and stable. Respirations even and unlabored on room air. Patient denies pain, nausea, numbness and tingling. Patient able to move all extremities. No drainage noted at injection sites. Band aid in place. Patient instructed to stay in bed. Instructed on call light use. 1228-Patient provided with snack and drink. Denies needs. Call light remains in reach. 1240-IV removed with no complications. Patient getting dressed at bedside. 1300-Patient discharged in stable condition. Patient brought to car via wheelchair with assistance from RN. Patient tolerated well. All belongings sent with patient.

## 2021-06-24 ENCOUNTER — OFFICE VISIT (OUTPATIENT)
Dept: PHYSICAL MEDICINE AND REHAB | Age: 56
End: 2021-06-24
Payer: COMMERCIAL

## 2021-06-24 VITALS
SYSTOLIC BLOOD PRESSURE: 142 MMHG | HEIGHT: 65 IN | BODY MASS INDEX: 48.82 KG/M2 | WEIGHT: 293 LBS | DIASTOLIC BLOOD PRESSURE: 80 MMHG

## 2021-06-24 DIAGNOSIS — F11.90 CHRONIC, CONTINUOUS USE OF OPIOIDS: ICD-10-CM

## 2021-06-24 DIAGNOSIS — M25.561 CHRONIC PAIN OF RIGHT KNEE: ICD-10-CM

## 2021-06-24 DIAGNOSIS — M47.812 CERVICAL SPONDYLOSIS: ICD-10-CM

## 2021-06-24 DIAGNOSIS — M54.9 MID BACK PAIN: ICD-10-CM

## 2021-06-24 DIAGNOSIS — M16.0 PRIMARY OSTEOARTHRITIS OF BOTH HIPS: ICD-10-CM

## 2021-06-24 DIAGNOSIS — G89.4 CHRONIC PAIN SYNDROME: ICD-10-CM

## 2021-06-24 DIAGNOSIS — G89.29 CHRONIC PAIN OF RIGHT KNEE: ICD-10-CM

## 2021-06-24 DIAGNOSIS — M17.11 PRIMARY OSTEOARTHRITIS OF RIGHT KNEE: ICD-10-CM

## 2021-06-24 DIAGNOSIS — M46.1 SACROILIAC INFLAMMATION (HCC): Primary | ICD-10-CM

## 2021-06-24 DIAGNOSIS — M47.816 SPONDYLOSIS OF LUMBAR REGION WITHOUT MYELOPATHY OR RADICULOPATHY: ICD-10-CM

## 2021-06-24 DIAGNOSIS — M47.814 SPONDYLOSIS OF THORACIC REGION WITHOUT MYELOPATHY OR RADICULOPATHY: ICD-10-CM

## 2021-06-24 PROCEDURE — 99214 OFFICE O/P EST MOD 30 MIN: CPT | Performed by: NURSE PRACTITIONER

## 2021-06-24 PROCEDURE — 1036F TOBACCO NON-USER: CPT | Performed by: NURSE PRACTITIONER

## 2021-06-24 PROCEDURE — 3017F COLORECTAL CA SCREEN DOC REV: CPT | Performed by: NURSE PRACTITIONER

## 2021-06-24 PROCEDURE — G8417 CALC BMI ABV UP PARAM F/U: HCPCS | Performed by: NURSE PRACTITIONER

## 2021-06-24 PROCEDURE — G8427 DOCREV CUR MEDS BY ELIG CLIN: HCPCS | Performed by: NURSE PRACTITIONER

## 2021-06-24 RX ORDER — HYDROCODONE BITARTRATE AND ACETAMINOPHEN 5; 325 MG/1; MG/1
1 TABLET ORAL 2 TIMES DAILY PRN
Qty: 60 TABLET | Refills: 0 | Status: SHIPPED | OUTPATIENT
Start: 2021-06-24 | End: 2021-08-03 | Stop reason: SDUPTHER

## 2021-06-24 RX ORDER — MELOXICAM 15 MG/1
15 TABLET ORAL DAILY
Qty: 30 TABLET | Refills: 2 | Status: SHIPPED | OUTPATIENT
Start: 2021-06-24 | End: 2022-06-14

## 2021-06-24 ASSESSMENT — ENCOUNTER SYMPTOMS
EYES NEGATIVE: 1
RESPIRATORY NEGATIVE: 1
VOICE CHANGE: 0
GASTROINTESTINAL NEGATIVE: 1
SORE THROAT: 0
SINUS PAIN: 0
BACK PAIN: 1

## 2021-06-24 NOTE — PROGRESS NOTES
since last visit. Pain scale with out pain medications or at its worst is 7/10. Pain scale with pain medications or at its best is 4/10. Last dose of Norco was yesterday  Drug screen reviewed from 5/13/2021 and was appropriate  Pill count was completed today and was appropriate  Patient does not have naloxone available at home. Patient has not required use of naloxone at home since last office visit. The patientis allergic to walnut [macadamia nut oil], other, saccharin, sulfa antibiotics, and sulfur. Subjective:      Review of Systems   Constitutional: Negative. Negative for fatigue and fever. HENT: Negative for congestion, sinus pain, sore throat and voice change. Eyes: Negative. Respiratory: Negative. Cardiovascular: Positive for leg swelling. Gastrointestinal: Negative. Genitourinary: Negative. Musculoskeletal: Positive for arthralgias, back pain, gait problem, myalgias, neck pain and neck stiffness. Ambulating with cane   Skin: Negative. Psychiatric/Behavioral: Negative. Objective:     Vitals:    06/24/21 1225   BP: (!) 142/80   Site: Left Lower Arm   Position: Sitting   Weight: (!) 371 lb (168.3 kg)   Height: 5' 5\" (1.651 m)       Physical Exam  Vitals and nursing note reviewed. Constitutional:       General: She is not in acute distress. Appearance: She is well-developed. She is not diaphoretic. HENT:      Head: Normocephalic and atraumatic. Right Ear: External ear normal.      Left Ear: External ear normal.      Nose: Nose normal.      Mouth/Throat:      Pharynx: No oropharyngeal exudate. Eyes:      General: No scleral icterus. Right eye: No discharge. Left eye: No discharge. Conjunctiva/sclera: Conjunctivae normal.      Pupils: Pupils are equal, round, and reactive to light. Neck:      Thyroid: No thyromegaly. Trachea: No tracheal deviation. Cardiovascular:      Rate and Rhythm: Normal rate and regular rhythm. angry or inappropriate. Speech: She is communicative. Speech is not rapid and pressured, delayed, slurred or tangential.         Behavior: Behavior normal. Behavior is not agitated, slowed, aggressive, withdrawn, hyperactive or combative. Thought Content: Thought content normal. Thought content is not paranoid or delusional. Thought content does not include homicidal or suicidal ideation. Thought content does not include homicidal or suicidal plan. Cognition and Memory: Memory is not impaired. She does not exhibit impaired recent memory or impaired remote memory. Judgment: Judgment normal. Judgment is not impulsive or inappropriate. SCOOTER test: + bilaterally  Yeomans test: + bilaterally  Gaenslen test: + bilaterally      Assessment:     1. Sacroiliac inflammation ([address]    2. Primary osteoarthritis of right knee    3. Chronic pain of right knee    4. Spondylosis of thoracic region without myelopathy or radiculopathy    5. Mid back pain    6. Spondylosis of lumbar region without myelopathy or radiculopathy    7. Chronic pain syndrome    8. Primary osteoarthritis of both hips    9. Cervical spondylosis    10. Chronic, continuous use of opioids            Plan:      · OARRS reviewed. Current MED: 10.00  · Patient was offered naloxone for home. · Discussed long term side effects of medications, tolerance, dependency and addiction. · Previous UDS reviewed  · UDS preformed today for compliance. · Patient told can not receive any pain medications from any other source. · No evidence of abuse, diversion or aberrant behavior.  Medications and/or procedures to improve function and quality of life- patient understanding with this and that may not be pain free   Discussed with patient about safe storage of medications at home   Discussed possible weaning of medication dosing dependent on treatment/procedure results.     Discussed with patient about risks with procedure including infection, reaction to medication, increased pain, or bleeding.  Procedure notes reviewed in detail    Receiving over 80% relief from right knee Synvisc injection    Mid back pain remains tolerable from bilateral T-facet RFA   L-facet RFA had minimal relief in past.    Hip pain is tolerable from injections    Plan bilateral SI MBB # 1 for diagnostic purpose. Procedure and risks discussed in detail with patient. · Medications remain effective, patient is compliant. Continue Norco 5/325 BID prn- ordered refill   Patient is compliant will get next UDS at [facility]. Prescribed:   Orders Placed This Encounter   Medications    meloxicam (MOBIC) 15 MG tablet     Sig: Take 1 tablet by mouth daily     Dispense:  30 tablet     Refill:  2    HYDROcodone-acetaminophen (NORCO) 5-325 MG per tablet     Sig: Take 1 tablet by mouth 2 times daily as needed for Pain for up to 30 days. Dispense:  60 tablet     Refill:  0     Reduce doses taken as pain becomes manageable       Return for bilateral SI MBB # 1. , follow up after procedure.                Electronically signed by TAMIE Santa CNP on6/24/2021 at 1:03 PM

## 2021-06-25 NOTE — PROGRESS NOTES
Genitourinary: Negative. Musculoskeletal: Positive for arthralgias. Negative for back pain. Skin: Negative. Allergic/Immunologic: Negative. Neurological: Negative. Negative for dizziness and light-headedness. Psychiatric/Behavioral: Negative. All other systems reviewed and are negative. Physical Exam:    BMI:  Body mass index is 61.97 kg/m². Wt Readings from Last 3 Encounters:   06/28/21 (!) 372 lb 6.4 oz (168.9 kg)   06/24/21 (!) 371 lb (168.3 kg)   06/04/21 (!) 371 lb (168.3 kg)     Weight up 35 lbs from 1 year  Vitals: /82 (Site: Right Upper Arm, Position: Sitting, Cuff Size: Large Adult)   Pulse 102   Temp 97.6 °F (36.4 °C) (Temporal)   Ht 5' 5\" (1.651 m)   Wt (!) 372 lb 6.4 oz (168.9 kg)   LMP 10/06/2014   SpO2 97% Comment: Room air at rest  BMI 61.97 kg/m²       Physical Exam  Constitutional:       Appearance: She is well-developed. HENT:      Head: Normocephalic and atraumatic. Right Ear: External ear normal.      Left Ear: External ear normal.   Eyes:      Conjunctiva/sclera: Conjunctivae normal.      Pupils: Pupils are equal, round, and reactive to light. Cardiovascular:      Rate and Rhythm: Normal rate and regular rhythm. Heart sounds: Normal heart sounds. Pulmonary:      Effort: Pulmonary effort is normal.      Breath sounds: Normal breath sounds. Musculoskeletal:         General: Normal range of motion. Cervical back: Normal range of motion and neck supple. Skin:     General: Skin is warm and dry. Neurological:      Mental Status: She is alert and oriented to person, place, and time. Psychiatric:         Behavior: Behavior normal.         Thought Content: Thought content normal.         Judgment: Judgment normal.           ASSESSMENT/DIAGNOSIS     Diagnosis Orders   1. Obstructive sleep apnea     2. Morbid obesity with body mass index of 60.0-69.9 in [facility])            Plan   Do you need any equipment today?  Yes update supplies  -

## 2021-06-28 ENCOUNTER — OFFICE VISIT (OUTPATIENT)
Dept: PULMONOLOGY | Age: 56
End: 2021-06-28
Payer: COMMERCIAL

## 2021-06-28 VITALS
OXYGEN SATURATION: 97 % | SYSTOLIC BLOOD PRESSURE: 138 MMHG | TEMPERATURE: 97.6 F | WEIGHT: 293 LBS | BODY MASS INDEX: 48.82 KG/M2 | HEIGHT: 65 IN | DIASTOLIC BLOOD PRESSURE: 82 MMHG | HEART RATE: 102 BPM

## 2021-06-28 DIAGNOSIS — E66.01 MORBID OBESITY WITH BODY MASS INDEX OF 60.0-69.9 IN ADULT (HCC): ICD-10-CM

## 2021-06-28 DIAGNOSIS — G47.33 OBSTRUCTIVE SLEEP APNEA: Primary | ICD-10-CM

## 2021-06-28 PROCEDURE — G8427 DOCREV CUR MEDS BY ELIG CLIN: HCPCS | Performed by: PHYSICIAN ASSISTANT

## 2021-06-28 PROCEDURE — 1036F TOBACCO NON-USER: CPT | Performed by: PHYSICIAN ASSISTANT

## 2021-06-28 PROCEDURE — G8417 CALC BMI ABV UP PARAM F/U: HCPCS | Performed by: PHYSICIAN ASSISTANT

## 2021-06-28 PROCEDURE — 3017F COLORECTAL CA SCREEN DOC REV: CPT | Performed by: PHYSICIAN ASSISTANT

## 2021-06-28 PROCEDURE — 99214 OFFICE O/P EST MOD 30 MIN: CPT | Performed by: PHYSICIAN ASSISTANT

## 2021-06-28 RX ORDER — TRIAMTERENE CAPSULES 50 MG/1
CAPSULE ORAL
COMMUNITY
Start: 2021-05-04

## 2021-06-28 ASSESSMENT — ENCOUNTER SYMPTOMS
BACK PAIN: 0
STRIDOR: 0
CHEST TIGHTNESS: 0
ALLERGIC/IMMUNOLOGIC NEGATIVE: 1
SHORTNESS OF BREATH: 0
WHEEZING: 0
COUGH: 0
NAUSEA: 0
EYES NEGATIVE: 1
DIARRHEA: 0

## 2021-07-19 ENCOUNTER — ANESTHESIA (OUTPATIENT)
Dept: OPERATING ROOM | Age: 56
End: 2021-07-19
Payer: COMMERCIAL

## 2021-07-19 ENCOUNTER — APPOINTMENT (OUTPATIENT)
Dept: GENERAL RADIOLOGY | Age: 56
End: 2021-07-19
Attending: PAIN MEDICINE
Payer: COMMERCIAL

## 2021-07-19 ENCOUNTER — HOSPITAL ENCOUNTER (OUTPATIENT)
Age: 56
Setting detail: OUTPATIENT SURGERY
Discharge: HOME | End: 2021-07-19
Attending: PAIN MEDICINE | Admitting: PAIN MEDICINE
Payer: COMMERCIAL

## 2021-07-19 ENCOUNTER — ANESTHESIA EVENT (OUTPATIENT)
Dept: OPERATING ROOM | Age: 56
End: 2021-07-19
Payer: COMMERCIAL

## 2021-07-19 VITALS
OXYGEN SATURATION: 95 % | RESPIRATION RATE: 15 BRPM | SYSTOLIC BLOOD PRESSURE: 119 MMHG | DIASTOLIC BLOOD PRESSURE: 56 MMHG

## 2021-07-19 VITALS
BODY MASS INDEX: 48.82 KG/M2 | DIASTOLIC BLOOD PRESSURE: 51 MMHG | WEIGHT: 293 LBS | HEART RATE: 89 BPM | SYSTOLIC BLOOD PRESSURE: 94 MMHG | TEMPERATURE: 96.7 F | OXYGEN SATURATION: 95 % | RESPIRATION RATE: 16 BRPM | HEIGHT: 65 IN

## 2021-07-19 PROCEDURE — 6360000004 HC RX CONTRAST MEDICATION: Performed by: PAIN MEDICINE

## 2021-07-19 PROCEDURE — 3700000000 HC ANESTHESIA ATTENDED CARE: Performed by: PAIN MEDICINE

## 2021-07-19 PROCEDURE — 7100000010 HC PHASE II RECOVERY - FIRST 15 MIN: Performed by: PAIN MEDICINE

## 2021-07-19 PROCEDURE — 2709999900 HC NON-CHARGEABLE SUPPLY: Performed by: PAIN MEDICINE

## 2021-07-19 PROCEDURE — 2500000003 HC RX 250 WO HCPCS: Performed by: PAIN MEDICINE

## 2021-07-19 PROCEDURE — 7100000011 HC PHASE II RECOVERY - ADDTL 15 MIN: Performed by: PAIN MEDICINE

## 2021-07-19 PROCEDURE — 3209999900 FLUORO FOR SURGICAL PROCEDURES

## 2021-07-19 PROCEDURE — 27096 INJECT SACROILIAC JOINT: CPT | Performed by: PAIN MEDICINE

## 2021-07-19 PROCEDURE — 6360000002 HC RX W HCPCS: Performed by: NURSE ANESTHETIST, CERTIFIED REGISTERED

## 2021-07-19 PROCEDURE — 3600000054 HC PAIN LEVEL 3 BASE: Performed by: PAIN MEDICINE

## 2021-07-19 PROCEDURE — 6360000002 HC RX W HCPCS: Performed by: PAIN MEDICINE

## 2021-07-19 RX ORDER — METHYLPREDNISOLONE ACETATE 80 MG/ML
INJECTION, SUSPENSION INTRA-ARTICULAR; INTRALESIONAL; INTRAMUSCULAR; SOFT TISSUE PRN
Status: DISCONTINUED | OUTPATIENT
Start: 2021-07-19 | End: 2021-07-19 | Stop reason: ALTCHOICE

## 2021-07-19 RX ORDER — LIDOCAINE HYDROCHLORIDE 10 MG/ML
INJECTION, SOLUTION INFILTRATION; PERINEURAL PRN
Status: DISCONTINUED | OUTPATIENT
Start: 2021-07-19 | End: 2021-07-19 | Stop reason: ALTCHOICE

## 2021-07-19 RX ORDER — ACETAMINOPHEN 500 MG
1000 TABLET ORAL EVERY 6 HOURS PRN
Status: DISCONTINUED | OUTPATIENT
Start: 2021-07-19 | End: 2021-07-19 | Stop reason: HOSPADM

## 2021-07-19 RX ORDER — BUPIVACAINE HYDROCHLORIDE 2.5 MG/ML
INJECTION, SOLUTION EPIDURAL; INFILTRATION; INTRACAUDAL PRN
Status: DISCONTINUED | OUTPATIENT
Start: 2021-07-19 | End: 2021-07-19 | Stop reason: ALTCHOICE

## 2021-07-19 RX ORDER — PROPOFOL 10 MG/ML
INJECTION, EMULSION INTRAVENOUS PRN
Status: DISCONTINUED | OUTPATIENT
Start: 2021-07-19 | End: 2021-07-19 | Stop reason: SDUPTHER

## 2021-07-19 RX ADMIN — PROPOFOL 50 MG: 10 INJECTION, EMULSION INTRAVENOUS at 08:30

## 2021-07-19 RX ADMIN — PROPOFOL 30 MG: 10 INJECTION, EMULSION INTRAVENOUS at 08:33

## 2021-07-19 ASSESSMENT — PULMONARY FUNCTION TESTS
PIF_VALUE: 0

## 2021-07-19 ASSESSMENT — PAIN - FUNCTIONAL ASSESSMENT
PAIN_FUNCTIONAL_ASSESSMENT: 0-10
PAIN_FUNCTIONAL_ASSESSMENT: PREVENTS OR INTERFERES SOME ACTIVE ACTIVITIES AND ADLS

## 2021-07-19 ASSESSMENT — PAIN DESCRIPTION - DESCRIPTORS: DESCRIPTORS: CONSTANT

## 2021-07-19 NOTE — ANESTHESIA POSTPROCEDURE EVALUATION
Department of Anesthesiology  Postprocedure Note    Patient: Deven Hatch  MRN: [id number]  YOB: 1965  Date of evaluation: 7/19/2021  Time:  11:15 AM     Procedure Summary     Date: 07/19/21 Room / Location: [address] [place] [address] 03 / [facility]    Anesthesia Start: 8532 Anesthesia Stop: 5679    Procedure: bilateral SI MBB # 1 (Bilateral ) Diagnosis: (Sacroiliac inflammation)    Surgeons: Miroslava Monsivais MD Responsible Provider: Karla Skaggs MD    Anesthesia Type: MAC ASA Status: 2          Anesthesia Type: MAC    Jose Phase I:      Jose Phase II: Jose Score: 10    Last vitals: Reviewed and per EMR flowsheets.        Anesthesia Post Evaluation    Patient location during evaluation: PACU  Complications: no  Cardiovascular status: hemodynamically stable  Respiratory status: acceptable

## 2021-07-19 NOTE — OP NOTE
Pre-Procedure Note    Patient Name: Navjot Herbert   YOB: 1965  Medical Record Number: [id number]  Date: 7/19/21     Indication: SI pain  Consent: On file. Vital Signs:   Vitals:    07/19/21 0708   BP: (!) 140/71   Pulse: 106   Resp: 18   Temp: 97 °F (36.1 °C)   SpO2: 92%       Past Medical History:   has a past medical history of Allergic rhinitis, Depression, Hypertension, ABDI on CPAP, Osteoarthritis, and Vitamin D deficiency. Past Surgical History:   has a past surgical history that includes Dental surgery (2002); Buhl tooth extraction; Hand surgery (Right, 05/15/2015); other surgical history (4/11/16); Colonoscopy (2016); other surgical history (06/13/2016); Hemorrhoid surgery (2016); Tonsillectomy; other surgical history (Right, 10/16/2017); arthrocentesis (Right, 10/16/2017); Nerve Surgery (Left, 12/05/2017); arthrocentesis (Left, 12/5/2017); pr arthrocentesis aspir&/inj major jt/bursa w/o us (Right, 9/17/2018); pr office/outpt visit,procedure only (N/A, 11/9/2018); Nerve Block Lumb Facet Level 1 Bilateral (Bilateral, 1/4/2019); Nerve Block Lumb Facet Level 1 Bilateral (Bilateral, 2/25/2019); Lumbar spine surgery (Right, 4/16/2019); Lumbar spine surgery (Left, 6/13/2019); arthrocentesis (Right, 7/18/2019); Nerve Surgery (Bilateral, 9/13/2019); hip surgery (Left, 2/7/2020); Pain management procedure (Right, 7/31/2020); Pain management procedure (Right, 8/31/2020); Pain management procedure (Right, 1/12/2021); Pain management procedure (Left, 3/19/2021); and hip surgery (Right, 6/4/2021). Pre-Sedation Documentation and Exam:   Vital signs have been reviewed (see flow sheet for vitals). Sedation/ Anesthesia Plan: MAC    Patient is an appropriate candidate for plan of sedation: yes    Preoperative Diagnosis:  Bilateral sacroiliitis. Postoperative Diagnosis: Bilateral  Sacroiliitis.     Procedure Performed:  Bilateral sacroiliac joint injection under fluoroscopy guidance .      Indication for the Procedure: The patient failed conservative management  for pain in low back. The patient is tender over the Bilateral SI joint. Leo's test is positive on the Bilateral side. As patient is not responding to conservative management and pain is interfering with activities of daily living we decided to proceed with SI joint injection. The procedure and risks were discussed with the patient and an informed consent was obtained. Procedure:     A meaningful communication was kept up with the patient throughout the procedure. The patient is placed in prone position. Skin over the back was prepped and draped in sterile manner. Then using fluoroscopy the Bilateral sacroiliac joint was identified. Then the angle of the fluoroscopy was adjusted such that the view of the caudal aspect of the joint space was optimized. Then skin and deep tissues over the caudal aspect of the joint were infiltrated with 10 ml of 1% lidocaine. The #22-gauge, 3-1/2 inch spinal needle was introduced through the skin wheal and directed such that the tip of the needle lies in the joint space. This was confirmed by injecting 0.5 ml of Omnipaque-180 through the needle and observing the spread of the contrast along the joint space. Then after negative aspiration a total of 12 mg of celestone with 2 ml of  0.25% Marcaine was injected through the needle in divided doses. The needle is removed and a Band-Aid was placed over the needle insertion site. EBL-0  The patient tolerated the procedure well and vital signs remained stable. The patient was discharged home in stable condition and will be followed in the pain clinic in the next few weeks or further planning.     Electronically signed by Heraclio Rossi MD on 7/19/21 at 8:36 AM EDT

## 2021-07-19 NOTE — H&P
H&P    Fu from right knee Synvisc injection from 6/4/2021. Receiving about 80% relief of right knee pian from this injection- this pain is much more tolerable in right able to get around better with less pain.       Mid back pain remains tolerable from T-facet RFA- dull aching pain   SI pain has been main complaint today- pressure with standing   Norco prn remains effective   Pain increases with bending, lifting, twisting , walking, standing and getting up and down.        Medications reviewed. Patient denies side effects with medications. Patient states she is taking medications as prescribed. Shedenies receiving pain medications from other sources. She denies any ER visits since last visit.     Pain scale with out pain medications or at its worst is 7/10. Pain scale with pain medications or at its best is 4/10. Last dose of Norco was yesterday  Drug screen reviewed from 5/13/2021 and was appropriate  Pill count was completed today and was appropriate  Patient does not have naloxone available at home. Patient has not required use of naloxone at home since last office visit.         The patientis allergic to walnut [macadamia nut oil], other, saccharin, sulfa antibiotics, and sulfur.        Subjective:      Review of Systems   Constitutional: Negative. Negative for fatigue and fever. HENT: Negative for congestion, sinus pain, sore throat and voice change. Eyes: Negative. Respiratory: Negative. Cardiovascular: Positive for leg swelling. Gastrointestinal: Negative. Genitourinary: Negative. Musculoskeletal: Positive for arthralgias, back pain, gait problem, myalgias, neck pain and neck stiffness. Ambulating with cane   Skin: Negative.     Psychiatric/Behavioral: Negative.          Objective:      Vitals   Vitals:     06/24/21 1225   BP: (!) 142/80   Site: Left Lower Arm   Position: Sitting   Weight: (!) 371 lb (168.3 kg)   Height: 5' 5\" (1.651 m)            Physical Exam  Vitals and nursing note reviewed. Constitutional:       General: She is not in acute distress. Appearance: She is well-developed. She is not diaphoretic. HENT:      Head: Normocephalic and atraumatic. Right Ear: External ear normal.      Left Ear: External ear normal.      Nose: Nose normal.      Mouth/Throat:      Pharynx: No oropharyngeal exudate. Eyes:      General: No scleral icterus. Right eye: No discharge. Left eye: No discharge. Conjunctiva/sclera: Conjunctivae normal.      Pupils: Pupils are equal, round, and reactive to light. Neck:      Thyroid: No thyromegaly. Trachea: No tracheal deviation. Cardiovascular:      Rate and Rhythm: Normal rate and regular rhythm. Heart sounds: Normal heart sounds. No murmur heard. No friction rub. No gallop. Pulmonary:      Effort: Pulmonary effort is normal. No respiratory distress. Breath sounds: Normal breath sounds. No wheezing or rales. Chest:      Chest wall: No tenderness. Abdominal:      General: Bowel sounds are normal. There is no distension. Palpations: Abdomen is soft. Tenderness: There is no abdominal tenderness. There is no guarding or rebound. Musculoskeletal:         General: Swelling and tenderness present. Cervical back: Full passive range of motion without pain, normal range of motion and neck supple. Tenderness and bony tenderness present. No edema, erythema or rigidity. No muscular tenderness. Normal range of motion. Thoracic back: Bony tenderness present. Decreased range of motion. Lumbar back: Spasms, tenderness and bony tenderness present. Decreased range of motion. Back:       Right hip: Tenderness present. Left hip: Bony tenderness present. Decreased range of motion. Decreased strength. Right knee: Bony tenderness present. Decreased range of motion. Tenderness present. Right lower leg: Edema present. Left lower leg: Edema present. Legs:    Skin:     General: Skin is warm and dry. Coloration: Skin is not pale. Findings: No erythema or rash. Neurological:      General: No focal deficit present. Mental Status: She is alert and oriented to person, place, and time. She is not disoriented. Cranial Nerves: No cranial nerve deficit. Sensory: No sensory deficit. Motor: No atrophy or abnormal muscle tone. Coordination: Coordination normal.      Gait: Gait abnormal.      Deep Tendon Reflexes: Babinski sign absent on the right side. Comments: SLR neg. Psychiatric:         Attention and Perception: She is attentive. Mood and Affect: Mood is not anxious or depressed. Affect is not labile, blunt, angry or inappropriate. Speech: She is communicative. Speech is not rapid and pressured, delayed, slurred or tangential.         Behavior: Behavior normal. Behavior is not agitated, slowed, aggressive, withdrawn, hyperactive or combative. Thought Content: Thought content normal. Thought content is not paranoid or delusional. Thought content does not include homicidal or suicidal ideation. Thought content does not include homicidal or suicidal plan. Cognition and Memory: Memory is not impaired. She does not exhibit impaired recent memory or impaired remote memory. Judgment: Judgment normal. Judgment is not impulsive or inappropriate.         SCOOTER test: + bilaterally  Yeomans test: + bilaterally  Gaenslen test: + bilaterally   Assessment:      1. Sacroiliac inflammation ([facility] [address]    2. Primary osteoarthritis of right knee    3. Chronic pain of right knee    4. Spondylosis of thoracic region without myelopathy or radiculopathy    5. Mid back pain    6. Spondylosis of lumbar region without myelopathy or radiculopathy    7. Chronic pain syndrome    8. Primary osteoarthritis of both hips    9. Cervical spondylosis    10. Chronic, continuous use of opioids       Plan:      · OARRS reviewed.

## 2021-07-19 NOTE — PROGRESS NOTES
0827: Patient arrived to Phase II recovery via cart. Awake and alert. Report received from [place], [address] [place] [address]: VSS, patient denies pain at this time. HOB elevated. Snack and drink provided. Call light placed within reach. 4640: IV removed without complications. Band aid applied to site. Patient sat edge of bed without difficulty and dressed independently in room. 0920: Patient discharged home in stable condition with sister.

## 2021-07-19 NOTE — ANESTHESIA PRE PROCEDURE
Department of Anesthesiology  Preprocedure Note       Name:  Mj Husbands   Age:  61 y.o.  :  1965                                          MRN:  [id number]         Date:  2021      Surgeon: Sydnee Alberts):  Morgan Gutierrez MD    Procedure: Procedure(s):  bilateral SI MBB # 1    Medications prior to admission:   Prior to Admission medications    Medication Sig Start Date End Date Taking? Authorizing Provider   triamterene (DYRENIUM) 50 MG capsule take 1 capsule by mouth once daily if needed for edema 21  Yes Historical Provider, MD   CPAP Machine MISC by Does not apply route Please change CPAP pressure to 17 cm H20. 21  Yes Bulmaro Estrada PA-C   HYDROcodone-acetaminophen (NORCO) 5-325 MG per tablet Take 1 tablet by mouth 2 times daily as needed for Pain for up to 30 days. 21 Yes TAMIE Abraham CNP   loratadine (CLARITIN) 10 MG tablet Take 10 mg by mouth daily   Yes Historical Provider, MD   cyclobenzaprine (FLEXERIL) 10 MG tablet Take 1 tablet by mouth 3 times daily as needed for Muscle spasms WARNING: This medication may make your drowsy. Do not operate heavy machinery or motor vehicles during use. 16  Yes Melinda Graham PA-C   lisinopril (PRINIVIL;ZESTRIL) 10 MG tablet Take 10 mg by mouth nightly  16  Yes Historical Provider, MD   citalopram (CELEXA) 20 MG tablet Take 20 mg by mouth daily  10/2/15  Yes Historical Provider, MD   spironolactone (ALDACTONE) 25 MG tablet Take 1 tablet by mouth daily.  13  Yes Braulio Vargas MD   meloxicam (MOBIC) 15 MG tablet Take 1 tablet by mouth daily 21  TAMIE Abraham CNP   Elastic Bandages & Supports (B & B SACROILIAC BELT) MISC 1 Device by Does not apply route as needed (pain) 18   TAMIE Rubin CNP   Cholecalciferol (VITAMIN D3) 5000 units CAPS Take 1 capsule by mouth daily    Historical Provider, MD   Omega-3 Fatty Acids (FISH OIL) 1200 MG CAPS  18 Historical Provider, MD   calcium carbonate (OSCAL) 500 MG TABS tablet Take 500 mg by mouth 2 times daily    Historical Provider, MD   Turmeric 500 MG CAPS Take by mouth daily    Historical Provider, MD       Current medications:    No current facility-administered medications for this encounter. Allergies: Allergies   Allergen Reactions    Beulah [Macadamia Nut Oil] Other (See Comments)     Numbness and Tingling in mouth    Other Other (See Comments)     Artificial sweeteners - migraines    Saccharin     Sulfa Antibiotics Hives    Sulfur        Problem List:    Patient Active Problem List   Diagnosis Code    Occult blood in stools R19.5    Second degree hemorrhoids K64.1    ABDI on CPAP G47.33, Z99.89    Trochanteric bursitis of right hip M70.61    Primary osteoarthritis of right hip M16.11    Pathologic thoracic fracture M84.48XA    Localized osteoporosis with current pathological fracture M80.80XA    Thoracic spondylosis M47.814    Morbid obesity with BMI of 50.0-59.9, adult (HCC) E66.01, Z68.43    Primary osteoarthritis of right knee M17.11       Past Medical History:        Diagnosis Date    Allergic rhinitis     Depression     Hypertension     ABDI on CPAP     Osteoarthritis     Vitamin D deficiency        Past Surgical History:        Procedure Laterality Date    ARTHROCENTESIS Right 10/16/2017    RIGHT HIP LARGE JOINT INJECTION performed by Renaldo Mike MD at [address] [place] [address] ARTHROCENTESIS Left 12/5/2017    LEFT HIP LARGE JOINT INJECTION performed by Renaldo Mike MD at [address] [place] [address] ARTHROCENTESIS Right 7/18/2019    Rt.  Knee Steroid Injection performed by Renaldo Mike MD at [address] [place] [address]  2016   [facility]  2002     right front upper implant    HAND SURGERY Right 05/15/2015    index finger cleaned out D/T infected cat bite    HEMORRHOID SURGERY  2016    series of 3 bandings for internal hemorrhoids, Dr. Elver White Left 2/7/2020    Right knee steroid injection performed by Paul Avery MD at [address] Right 6/4/2021    Right knee Synvisc injection performed by Paul Avery MD at [address] Right 4/16/2019    T-facet RFA @ T7-8, 8-9, 9-10 performed by Paul Avery MD at [address] Left 6/13/2019    T-facet RFA @ T7-8, 8-9, 9-10 LEFT performed by Paul Avery MD at [place] [address] FACET LEVEL 1 BILATERAL Bilateral 1/4/2019    LUMBAR FACET bilateral T-facet MBB @ T7-T8, T8-T9, and T9-T10 performed by Paul Avery MD at [place] [address] FACET LEVEL 1 BILATERAL Bilateral 2/25/2019    Thoracic FACET bilateral T-facet MBB @ T7-T8, T8-T9, and T9-T10 MBB #2 performed by Paul Avery MD at [place] [address] Left 12/05/2017    HIP INJECTION     NERVE SURGERY Bilateral 9/13/2019    bilateral T-facet MBB # 1 @ T4-5, T5-6, and T6-7. performed by Paul Avery MD at [place] [address]  4/11/16    L4-5, L5-S1 Facet injection    OTHER SURGICAL HISTORY  06/13/2016    Right Hip Injection    OTHER SURGICAL HISTORY Right 10/16/2017    Hip Injection     PAIN MANAGEMENT PROCEDURE Right 7/31/2020    Right knee genicular nerve block performed by Paul Avery MD at [facility] Right 8/31/2020    Right knee genicular nerve RFA performed by Paul Avery MD at [facility] Right 1/12/2021    Bilateral T-facet RFA @ T7-8, T8-9, and T9-T10 RIGHT SIDE FIRST performed by Paul Avery MD at [facility] Left 3/19/2021    Left T-facet RFA @ T7-8, T8-9, and T9-T10. performed by Paul Avery MD at [facility] OR    TX ARTHROCENTESIS ASPIR&/INJ MAJOR JT/BURSA W/O US Right 9/17/2018    RIGHT HIP LARGE JOINT STEROID INJECTION performed by Holli Haley MD at [address] [place] [address] OFFICE/OUTPT VISIT,PROCEDURE ONLY N/A 11/9/2018    Kyphoplasty T12 BILATERAL performed by Holli Haley MD at [facility] 1998      as child    WISDOM TOOTH EXTRACTION         Social History:    Social History     Tobacco Use    Smoking status: Former Smoker     Packs/day: 1.00     Years: 7.00     Pack years: 7.00     Types: Cigarettes, E-Cigarettes     Start date: 5/1/2005     Quit date: 1/1/2011     Years since quitting: 10.5    Smokeless tobacco: Never Used   Substance Use Topics    Alcohol use: Yes     Alcohol/week: 0.0 standard drinks     Comment: occasionally, 1-3 times a month                                Counseling given: Not Answered      Vital Signs (Current):   Vitals:    07/19/21 0708   BP: (!) 140/71   Pulse: 106   Resp: 18   Temp: 97 °F (36.1 °C)   TempSrc: Skin   SpO2: 92%   Weight: (!) 373 lb 12.8 oz (169.6 kg)   Height: 5' 5\" (1.651 m)                                              BP Readings from Last 3 Encounters:   07/19/21 (!) 140/71   06/28/21 138/82   06/24/21 (!) 142/80       NPO Status: Time of last liquid consumption: 0600                        Time of last solid consumption: 2330                        Date of last liquid consumption: 07/19/21                        Date of last solid food consumption: 07/18/21    BMI:   Wt Readings from Last 3 Encounters:   07/19/21 (!) 373 lb 12.8 oz (169.6 kg)   06/28/21 (!) 372 lb 6.4 oz (168.9 kg)   06/24/21 (!) 371 lb (168.3 kg)     Body mass index is 62.2 kg/m².     CBC:   Lab Results   Component Value Date    WBC 7.2 08/25/2020    RBC 4.63 08/25/2020    HGB 13.5 08/25/2020    HCT 41.7 08/25/2020    MCV 90.1 08/25/2020    RDW 14.0 07/11/2017     08/25/2020       CMP:   Lab Results   Component Value Date     08/25/2020    K 4.7 08/25/2020     08/25/2020    CO2 24 08/25/2020    BUN 14 08/25/2020    CREATININE 0.8 08/25/2020    LABGLOM 74 08/25/2020    GLUCOSE 98 08/25/2020    PROT 7.2 08/25/2020    CALCIUM 10.0 08/25/2020    BILITOT 0.4 08/25/2020    ALKPHOS 111 08/25/2020    AST 33 08/25/2020    ALT 60 08/25/2020       POC Tests: No results for input(s): POCGLU, POCNA, POCK, POCCL, POCBUN, POCHEMO, POCHCT in the last 72 hours. Coags: No results found for: PROTIME, INR, APTT    HCG (If Applicable):   Lab Results   Component Value Date    PREGSERUM NEGATIVE 04/19/2013        ABGs: No results found for: PHART, PO2ART, HPO0VVE, KVK0BGE, BEART, L8DHOMEI     Type & Screen (If Applicable):  No results found for: LABABO, LABRH    Drug/Infectious Status (If Applicable):  No results found for: HIV, HEPCAB    COVID-19 Screening (If Applicable):   Lab Results   Component Value Date    COVID19 Not Detected 07/25/2020           Anesthesia Evaluation    Airway: Mallampati: II        Dental:          Pulmonary:   (+) sleep apnea:                             Cardiovascular:    (+) hypertension:,                   Neuro/Psych:               GI/Hepatic/Renal:   (+) morbid obesity          Endo/Other:                     Abdominal:   (+) obese,           Vascular: Other Findings:             Anesthesia Plan      MAC     ASA 2             Anesthetic plan and risks discussed with patient. Plan discussed with CRNA.                   Anyi Raya MD   7/19/2021

## 2021-08-03 DIAGNOSIS — G89.4 CHRONIC PAIN SYNDROME: ICD-10-CM

## 2021-08-03 RX ORDER — HYDROCODONE BITARTRATE AND ACETAMINOPHEN 5; 325 MG/1; MG/1
1 TABLET ORAL 2 TIMES DAILY PRN
Qty: 60 TABLET | Refills: 0 | Status: SHIPPED | OUTPATIENT
Start: 2021-08-03 | End: 2021-09-08 | Stop reason: SDUPTHER

## 2021-08-11 ENCOUNTER — OFFICE VISIT (OUTPATIENT)
Dept: PHYSICAL MEDICINE AND REHAB | Age: 56
End: 2021-08-11
Payer: COMMERCIAL

## 2021-08-11 VITALS
WEIGHT: 293 LBS | BODY MASS INDEX: 48.82 KG/M2 | SYSTOLIC BLOOD PRESSURE: 118 MMHG | DIASTOLIC BLOOD PRESSURE: 70 MMHG | HEIGHT: 65 IN

## 2021-08-11 DIAGNOSIS — M17.11 PRIMARY OSTEOARTHRITIS OF RIGHT KNEE: ICD-10-CM

## 2021-08-11 DIAGNOSIS — G89.29 CHRONIC PAIN OF RIGHT KNEE: ICD-10-CM

## 2021-08-11 DIAGNOSIS — M25.561 CHRONIC PAIN OF RIGHT KNEE: ICD-10-CM

## 2021-08-11 DIAGNOSIS — G89.4 CHRONIC PAIN SYNDROME: ICD-10-CM

## 2021-08-11 DIAGNOSIS — M54.9 MID BACK PAIN: ICD-10-CM

## 2021-08-11 DIAGNOSIS — M47.816 SPONDYLOSIS OF LUMBAR REGION WITHOUT MYELOPATHY OR RADICULOPATHY: ICD-10-CM

## 2021-08-11 DIAGNOSIS — F11.90 CHRONIC, CONTINUOUS USE OF OPIOIDS: ICD-10-CM

## 2021-08-11 DIAGNOSIS — G89.29 CHRONIC MYOFASCIAL PAIN: Primary | ICD-10-CM

## 2021-08-11 DIAGNOSIS — M79.18 CHRONIC MYOFASCIAL PAIN: Primary | ICD-10-CM

## 2021-08-11 DIAGNOSIS — M46.1 SACROILIAC INFLAMMATION (HCC): ICD-10-CM

## 2021-08-11 DIAGNOSIS — M16.0 PRIMARY OSTEOARTHRITIS OF BOTH HIPS: ICD-10-CM

## 2021-08-11 DIAGNOSIS — M47.814 SPONDYLOSIS OF THORACIC REGION WITHOUT MYELOPATHY OR RADICULOPATHY: ICD-10-CM

## 2021-08-11 PROCEDURE — G8427 DOCREV CUR MEDS BY ELIG CLIN: HCPCS | Performed by: NURSE PRACTITIONER

## 2021-08-11 PROCEDURE — 99214 OFFICE O/P EST MOD 30 MIN: CPT | Performed by: NURSE PRACTITIONER

## 2021-08-11 PROCEDURE — G8417 CALC BMI ABV UP PARAM F/U: HCPCS | Performed by: NURSE PRACTITIONER

## 2021-08-11 PROCEDURE — 3017F COLORECTAL CA SCREEN DOC REV: CPT | Performed by: NURSE PRACTITIONER

## 2021-08-11 PROCEDURE — 1036F TOBACCO NON-USER: CPT | Performed by: NURSE PRACTITIONER

## 2021-08-11 ASSESSMENT — ENCOUNTER SYMPTOMS
BACK PAIN: 1
SORE THROAT: 0
SINUS PAIN: 0
VOICE CHANGE: 0
EYES NEGATIVE: 1
GASTROINTESTINAL NEGATIVE: 1
RESPIRATORY NEGATIVE: 1

## 2021-08-11 NOTE — PROGRESS NOTES
[facility] [address]  Dept: [phone]  Dept Fax: [phone]: [phone]    Visit Date: 8/11/2021    Functionality Assessment/Goals Worksheet     On a scale of 0 (Does not Interfere) to 10 (Completely Interferes)     1. Which number describes how during the past week pain has interfered with       the following:  A. General Activity:  8  B. Mood: 7  C. Walking Ability:  8  D. Normal Work (Includes both work outside the home and housework):  7  E. Relations with Other People:   5  F. Sleep:   4  G. Enjoyment of Life:   5    2. Patient Prefers to Take their Pain Medications:     [x]  On a regular basis   [x]  Only when necessary    []  Does not take pain medications    3. What are the Patient's Goals/Expectations for Visiting Pain Management? []  Learn about my pain    [x]  Receive Medication   []  Physical Therapy     []  Treat Depression   [x]  Receive Injections    []  Treat Sleep   []  Deal with Anxiety and Stress   []  Treat Opoid Dependence/Addiction   []  Other:      HPI:   Isabel Trujillo is a 61 y.o. female is here today for    Chief Complaint: Back pain, neck pain, Si pain right knee    HPI   Fu from bilateral SI MBB # 1 from 7/19/20221. No relief from this injection at all. Continues to have pain in back, neck shoulders and generalized pain- muscle pain and cramping and  soreness and generalized achiness  Right knee pain remains tolerable  From Synvisc injections   Still good relief from T-facet RFA. [place] prn remains effective in making pain tolerable and patient is yaron to get ariound   Pain increases with bending, lifting, twisting , walking, standing, stairs and getting up and down    Medications reviewed. Patient denies side effects with medications. Patient states she is taking medications as prescribed.  Shedenies receiving pain medications from other sources. She denies any ER visits since last visit. Pain scale with out pain medications or at its worst is 8/10. Pain scale with pain medications or at its best is 4/10. Last dose of [place] was today   Drug screen reviewed from 5/13/2021 and was appropriate  Pill count was completed today and was appropriate  Patient does not have naloxone available at home. Patient has not required use of naloxone at home since last office visit. The patientis allergic to walnut [macadamia nut oil], other, saccharin, sulfa antibiotics, and sulfur. Subjective:      Review of Systems   Constitutional: Negative. Negative for fatigue and fever. HENT: Negative for congestion, sinus pain, sore throat and voice change. Eyes: Negative. Respiratory: Negative. Cardiovascular: Positive for leg swelling. Gastrointestinal: Negative. Genitourinary: Negative. Musculoskeletal: Positive for arthralgias, back pain, gait problem, joint swelling, myalgias, neck pain and neck stiffness. Ambulating with cane   Skin: Negative. Neurological: Positive for weakness and numbness. Psychiatric/Behavioral: Negative. Objective:     Vitals:    08/11/21 1217   BP: 118/70   Weight: (!) 373 lb (169.2 kg)   Height: 5' 5\" (1.651 m)       Physical Exam  Vitals and nursing note reviewed. Constitutional:       General: She is not in acute distress. Appearance: She is well-developed. She is not diaphoretic. HENT:      Head: Normocephalic and atraumatic. Right Ear: External ear normal.      Left Ear: External ear normal.      Nose: Nose normal.      Mouth/Throat:      Pharynx: No oropharyngeal exudate. Eyes:      General: No scleral icterus. Right eye: No discharge. Left eye: No discharge. Conjunctiva/sclera: Conjunctivae normal.      Pupils: Pupils are equal, round, and reactive to light. Neck:      Thyroid: No thyromegaly. Trachea: No tracheal deviation. Cardiovascular:      Rate and Rhythm: Normal rate and regular rhythm. Heart sounds: Normal heart sounds. No murmur heard. No friction rub. No gallop. Pulmonary:      Effort: Pulmonary effort is normal. No respiratory distress. Breath sounds: Normal breath sounds. No wheezing or rales. Chest:      Chest wall: No tenderness. Abdominal:      General: Bowel sounds are normal. There is no distension. Palpations: Abdomen is soft. Tenderness: There is no abdominal tenderness. There is no guarding or rebound. Musculoskeletal:         General: Swelling and tenderness present. Cervical back: Full passive range of motion without pain, normal range of motion and neck supple. Tenderness and bony tenderness present. No edema, erythema or rigidity. No muscular tenderness. Normal range of motion. Thoracic back: Bony tenderness present. Decreased range of motion. Lumbar back: Spasms, tenderness and bony tenderness present. Decreased range of motion. Back:       Right hip: Tenderness present. Left hip: Bony tenderness present. Decreased range of motion. Decreased strength. Right knee: Bony tenderness present. Decreased range of motion. Tenderness present. Right lower leg: Edema present. Left lower leg: Edema present. Legs:    Skin:     General: Skin is warm and dry. Coloration: Skin is not pale. Findings: No erythema or rash. Neurological:      General: No focal deficit present. Mental Status: She is alert and oriented to person, place, and time. She is not disoriented. Cranial Nerves: No cranial nerve deficit. Sensory: No sensory deficit. Motor: No atrophy or abnormal muscle tone. Coordination: Coordination normal.      Gait: Gait abnormal.      Deep Tendon Reflexes: Babinski sign absent on the right side. Comments: SLR neg. Psychiatric:         Attention and Perception: She is attentive.          Mood and Affect: Mood is not anxious or depressed. Affect is not labile, blunt, angry or inappropriate. Speech: She is communicative. Speech is not rapid and pressured, delayed, slurred or tangential.         Behavior: Behavior normal. Behavior is not agitated, slowed, aggressive, withdrawn, hyperactive or combative. Thought Content: Thought content normal. Thought content is not paranoid or delusional. Thought content does not include homicidal or suicidal ideation. Thought content does not include homicidal or suicidal plan. Cognition and Memory: Memory is not impaired. She does not exhibit impaired recent memory or impaired remote memory. Judgment: Judgment normal. Judgment is not impulsive or inappropriate. SCOOTER test: + bilaterally   Yeomans test: + bilateral   Gaenslen test: + bilaterally      Assessment:     1. Chronic myofascial pain    2. Spondylosis of thoracic region without myelopathy or radiculopathy    3. Mid back pain    4. 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 Primary osteoarthritis of right knee    6. Sacroiliac inflammation ([address]    7. Primary osteoarthritis of both hips    8. Chronic pain of right knee    9. Chronic pain syndrome    10. Chronic, continuous use of opioids            Plan:      · OARRS reviewed. Current MED: 10.00  · Patient was offered naloxone for home. · Discussed long term side effects of medications, tolerance, dependency and addiction. · Previous UDS reviewed  · UDS preformed today for compliance. · Patient told can not receive any pain medications from any other source. · No evidence of abuse, diversion or aberrant behavior.    Medications and/or procedures to improve function and quality of life- patient understanding with this and that may not be pain free   Discussed with patient about safe storage of medications at home   Discussed possible weaning of medication dosing dependent on treatment/procedure results.  Discussed with patient about risks with procedure including infection, reaction to medication, increased pain, or bleeding.  Procedure notes reveiwed in detail    No relief from SI MBB, L-facet RFA had minimal relief in past.   · Receiving over 80% relief from right knee Synvisc injection   · Mid back pain remains tolerable from bilateral T-facet RFA  · Discussed possible trigger point injections in future and discussed possible therapy with dry needle therapy she wants to wait   · Current medications remain effective in decreasing pain to a tolerable level. · Continue Norco 5/325 BID prn- filled 8/3/2021  · Continue Flexeril prn from pcp   · Updated UDS ordered today. Patient is compliant   · Continue home exercises and encouraged         Meds. Prescribed:   No orders of the defined types were placed in this encounter. Return in about 3 months (around 11/11/2021), or if symptoms worsen or fail to improve, for follow up  for medications.                Electronically signed by TAMIE Albert CNP on8/11/2021 at 12:47 PM

## 2021-09-08 DIAGNOSIS — G89.4 CHRONIC PAIN SYNDROME: ICD-10-CM

## 2021-09-08 RX ORDER — HYDROCODONE BITARTRATE AND ACETAMINOPHEN 5; 325 MG/1; MG/1
1 TABLET ORAL 2 TIMES DAILY PRN
Qty: 60 TABLET | Refills: 0 | Status: SHIPPED | OUTPATIENT
Start: 2021-09-08 | End: 2021-10-08 | Stop reason: SDUPTHER

## 2021-09-08 NOTE — TELEPHONE ENCOUNTER
OARRS reviewed. UDS: + for  Hydrocodone flexeril consistent. Last seen: 8/11/2021.  Follow-up:   Future Appointments   Date Time Provider Christine Sarina   11/17/2021 12:30 PM TAMIE Friedman - CNP [facility] [place]   6/27/2022  1:00 PM Rylie Prasad

## 2021-10-08 DIAGNOSIS — G89.4 CHRONIC PAIN SYNDROME: ICD-10-CM

## 2021-10-08 RX ORDER — HYDROCODONE BITARTRATE AND ACETAMINOPHEN 5; 325 MG/1; MG/1
1 TABLET ORAL 2 TIMES DAILY PRN
Qty: 60 TABLET | Refills: 0 | Status: SHIPPED | OUTPATIENT
Start: 2021-10-08 | End: 2021-11-07 | Stop reason: SDUPTHER

## 2021-11-07 DIAGNOSIS — G89.4 CHRONIC PAIN SYNDROME: ICD-10-CM

## 2021-11-08 RX ORDER — HYDROCODONE BITARTRATE AND ACETAMINOPHEN 5; 325 MG/1; MG/1
1 TABLET ORAL 2 TIMES DAILY PRN
Qty: 60 TABLET | Refills: 0 | Status: SHIPPED | OUTPATIENT
Start: 2021-11-08 | End: 2021-12-08

## 2021-11-08 NOTE — TELEPHONE ENCOUNTER
OARRS reviewed. UDS: + for Cyclobenzaprine, Hydrocodone   Last seen: 8/11/2021.  Follow-up: 11/17/21

## 2021-12-09 ENCOUNTER — PATIENT MESSAGE (OUTPATIENT)
Dept: PHYSICAL MEDICINE AND REHAB | Age: 56
End: 2021-12-09

## 2021-12-09 DIAGNOSIS — M47.814 THORACIC SPONDYLOSIS: Primary | ICD-10-CM

## 2021-12-10 RX ORDER — HYDROCODONE BITARTRATE AND ACETAMINOPHEN 5; 325 MG/1; MG/1
1 TABLET ORAL 2 TIMES DAILY PRN
Qty: 14 TABLET | Refills: 0 | Status: SHIPPED | OUTPATIENT
Start: 2021-12-10 | End: 2021-12-17

## 2021-12-10 NOTE — TELEPHONE ENCOUNTER
From: Prem Barrera  To: Corrie Baez  Sent: 12/9/2021 3:47 PM EST  Subject: Pascale Sanchez     Since I didnt make it in to ask directly, may I have a [place] refill please? Thanks!

## 2021-12-10 NOTE — TELEPHONE ENCOUNTER
OARRS reviewed. UDS: + for  Cyclobenzaprine, Hydrocodone -consistent. Last seen: 8/11/2021.  Follow-up: 12/16/2021    7 day refill pending for Norco

## 2021-12-16 ENCOUNTER — OFFICE VISIT (OUTPATIENT)
Dept: PHYSICAL MEDICINE AND REHAB | Age: 56
End: 2021-12-16
Payer: COMMERCIAL

## 2021-12-16 VITALS
DIASTOLIC BLOOD PRESSURE: 80 MMHG | WEIGHT: 293 LBS | BODY MASS INDEX: 48.82 KG/M2 | SYSTOLIC BLOOD PRESSURE: 124 MMHG | HEIGHT: 65 IN

## 2021-12-16 DIAGNOSIS — M46.1 SACROILIAC INFLAMMATION (HCC): ICD-10-CM

## 2021-12-16 DIAGNOSIS — G89.4 CHRONIC PAIN SYNDROME: ICD-10-CM

## 2021-12-16 DIAGNOSIS — F11.90 CHRONIC, CONTINUOUS USE OF OPIOIDS: ICD-10-CM

## 2021-12-16 DIAGNOSIS — M47.814 SPONDYLOSIS OF THORACIC REGION WITHOUT MYELOPATHY OR RADICULOPATHY: ICD-10-CM

## 2021-12-16 DIAGNOSIS — M47.816 SPONDYLOSIS OF LUMBAR REGION WITHOUT MYELOPATHY OR RADICULOPATHY: ICD-10-CM

## 2021-12-16 DIAGNOSIS — M17.11 PRIMARY OSTEOARTHRITIS OF RIGHT KNEE: Primary | ICD-10-CM

## 2021-12-16 DIAGNOSIS — M16.0 PRIMARY OSTEOARTHRITIS OF BOTH HIPS: ICD-10-CM

## 2021-12-16 DIAGNOSIS — M47.812 CERVICAL SPONDYLOSIS: ICD-10-CM

## 2021-12-16 PROCEDURE — 3017F COLORECTAL CA SCREEN DOC REV: CPT | Performed by: PAIN MEDICINE

## 2021-12-16 PROCEDURE — 99214 OFFICE O/P EST MOD 30 MIN: CPT | Performed by: PAIN MEDICINE

## 2021-12-16 PROCEDURE — G8427 DOCREV CUR MEDS BY ELIG CLIN: HCPCS | Performed by: PAIN MEDICINE

## 2021-12-16 PROCEDURE — G8484 FLU IMMUNIZE NO ADMIN: HCPCS | Performed by: PAIN MEDICINE

## 2021-12-16 PROCEDURE — 1036F TOBACCO NON-USER: CPT | Performed by: PAIN MEDICINE

## 2021-12-16 PROCEDURE — G8417 CALC BMI ABV UP PARAM F/U: HCPCS | Performed by: PAIN MEDICINE

## 2021-12-16 RX ORDER — HYDROCODONE BITARTRATE AND ACETAMINOPHEN 5; 325 MG/1; MG/1
1 TABLET ORAL 2 TIMES DAILY PRN
Qty: 60 TABLET | Refills: 0 | Status: SHIPPED | OUTPATIENT
Start: 2021-12-16 | End: 2022-01-15

## 2021-12-16 ASSESSMENT — ENCOUNTER SYMPTOMS
GASTROINTESTINAL NEGATIVE: 1
RESPIRATORY NEGATIVE: 1
SORE THROAT: 0
VOICE CHANGE: 0
BACK PAIN: 1
EYES NEGATIVE: 1
SINUS PAIN: 0

## 2021-12-16 NOTE — PROGRESS NOTES
[facility] [address]  Dept: [phone]  Dept Fax: [phone]: [phone]    Visit Date: 12/16/2021    Functionality Assessment/Goals Worksheet     On a scale of 0 (Does not Interfere) to 10 (Completely Interferes)     1. Which number describes how during the past week pain has interfered with       the following:  A. General Activity:  9  B. Mood: 7  C. Walking Ability:  6  D. Normal Work (Includes both work outside the home and housework):  8  E. Relations with Other People:   6  F. Sleep:   3  G. Enjoyment of Life:   3    2. Patient Prefers to Take their Pain Medications:     []  On a regular basis   []  Only when necessary    []  Does not take pain medications    3. What are the Patient's Goals/Expectations for Visiting Pain Management? []  Learn about my pain    []  Receive Medication   []  Physical Therapy     []  Treat Depression   []  Receive Injections    []  Treat Sleep   []  Deal with Anxiety and Stress   []  Treat Opoid Dependence/Addiction   []  Other:      HPI:   Margo Uribe is a 61 y.o. female is here today for      Chief Complaint: Low back pain, Neck pain, Knee pain  and SI pain    HPI       States right Synvisc One injection preformed 6/4/21 has worn off. States Thoracic facet RFA left T7-8,T8-9 and T9-10  Preformed 3/19/21 and right side preformed on 01/12/21 have worn off. Pain increases with bending, lifting, twisting , walking, standing, stairs and getting up and down. Treatments Tried NSAIDS, narcotics and injections  Pain Description sharp, stabbing, throbbing and aching    Medications reviewed. Patient denies side effects with medications. Patient states she is taking medications as prescribed. Shedenies receiving pain medications from other sources. She denies any ER visits since last visit.     Pain scale with out pain medications or at its worst is 10/10. Pain scale with pain medications or at its best is 5/10. Last dose of Norco was yesterday   Drug screen reviewed from 8/11/21 and was appropriate  Pill count completed  today and WNL yes or no:  yes        The patientis allergic to walnut [macadamia nut oil], other, saccharin, sulfa antibiotics, and sulfur. Subjective:      Review of Systems   Constitutional: Negative. Negative for fatigue and fever. HENT: Negative for congestion, sinus pain, sore throat and voice change. Eyes: Negative. Respiratory: Negative. Cardiovascular: Positive for leg swelling. Gastrointestinal: Negative. Genitourinary: Negative. Musculoskeletal: Positive for arthralgias, back pain, gait problem, joint swelling, myalgias, neck pain and neck stiffness. Ambulating with cane   Skin: Negative. Neurological: Positive for weakness and numbness. Psychiatric/Behavioral: Negative. Objective:     Vitals:    12/16/21 1534   BP: 124/80   Weight: (!) 373 lb (169.2 kg)   Height: 5' 5\" (1.651 m)       Physical Exam  Vitals and nursing note reviewed. Constitutional:       General: She is not in acute distress. Appearance: She is well-developed. She is obese. She is not diaphoretic. HENT:      Head: Normocephalic and atraumatic. Right Ear: External ear normal.      Left Ear: External ear normal.      Nose: Nose normal.      Mouth/Throat:      Pharynx: No oropharyngeal exudate. Eyes:      General: No scleral icterus. Right eye: No discharge. Left eye: No discharge. Conjunctiva/sclera: Conjunctivae normal.      Pupils: Pupils are equal, round, and reactive to light. Neck:      Thyroid: No thyromegaly. Trachea: No tracheal deviation. Cardiovascular:      Rate and Rhythm: Normal rate and regular rhythm. Heart sounds: Normal heart sounds. No murmur heard. No friction rub. No gallop.     Pulmonary:      Effort: Pulmonary effort is normal. No respiratory distress. Breath sounds: Normal breath sounds. No wheezing or rales. Chest:      Chest wall: No tenderness. Abdominal:      General: Bowel sounds are normal. There is no distension. Palpations: Abdomen is soft. Tenderness: There is no abdominal tenderness. There is no guarding or rebound. Musculoskeletal:         General: Swelling and tenderness present. Cervical back: Full passive range of motion without pain, normal range of motion and neck supple. Tenderness and bony tenderness present. No edema, erythema or rigidity. No muscular tenderness. Normal range of motion. Thoracic back: Bony tenderness present. Decreased range of motion. Lumbar back: Spasms, tenderness and bony tenderness present. Decreased range of motion. Back:       Right hip: Tenderness present. Left hip: Bony tenderness present. Decreased range of motion. Decreased strength. Right knee: Bony tenderness present. Decreased range of motion. Tenderness present. Right lower leg: Edema present. Left lower leg: Edema present. Legs:    Skin:     General: Skin is warm and dry. Coloration: Skin is not pale. Findings: No erythema or rash. Neurological:      General: No focal deficit present. Mental Status: She is alert and oriented to person, place, and time. She is not disoriented. Cranial Nerves: No cranial nerve deficit. Sensory: No sensory deficit. Motor: No atrophy or abnormal muscle tone. Coordination: Coordination normal.      Gait: Gait abnormal.      Deep Tendon Reflexes: Babinski sign absent on the right side. Comments: SLR neg. Psychiatric:         Attention and Perception: She is attentive. Mood and Affect: Mood is not anxious or depressed. Affect is not labile, blunt, angry or inappropriate. Speech: She is communicative.  Speech is not rapid and pressured, delayed, slurred or tangential. Behavior: Behavior normal. Behavior is not agitated, slowed, aggressive, withdrawn, hyperactive or combative. Thought Content: Thought content normal. Thought content is not paranoid or delusional. Thought content does not include homicidal or suicidal ideation. Thought content does not include homicidal or suicidal plan. Cognition and Memory: Memory is not impaired. She does not exhibit impaired recent memory or impaired remote memory. Judgment: Judgment normal. Judgment is not impulsive or inappropriate. SCOOTER  Patricks test  positive  Yeoman's  positive  Gaenslen's  positive       Assessment:     1. Primary osteoarthritis of right knee    2. Spondylosis of thoracic region without myelopathy or radiculopathy    3. Spondylosis of lumbar region without myelopathy or radiculopathy    4. [address]roiliac inflammation [address] [address]    5. Primary osteoarthritis of both hips    6. Cervical spondylosis    7. Chronic, continuous use of opioids    8. Chronic pain syndrome            Plan:      · OARRS reviewed. Current MED: 10  · Patient was offered naloxone for home. · Discussed long term side effects of medications, tolerance, dependency and addiction. · Previous UDS reviewed  · UDS preformed today for compliance. · Patient told can not receive any pain medications from any other source. · No evidence of abuse, diversion or aberrant behavior.  Medications and/or procedures to improve function and quality of life- patient understanding with this and that may not be pain free   Discussed with patient about safe storage of medications at home   Discussed possible weaning of medication dosing dependent on treatment/procedure results.  Testing: Reviewed.  Procedures: Right knee Synvisc On injection in ASC. Repeat Thoracic RFA in the future.  Discussed with patient about risks with procedure including infection, reaction to medication, increased pain, or bleeding.    Medications:Ordered   If patient is on blood thinners will need approval to hold yes or no:      Meds. Prescribed:   Orders Placed This Encounter   Medications    HYDROcodone-acetaminophen (NORCO) 5-325 MG per tablet     Sig: Take 1 tablet by mouth 2 times daily as needed for Pain for up to 30 days. Take lowest dose possible to manage pain     Dispense:  60 tablet     Refill:  0     Reduce doses taken as pain becomes manageable       Return Right Synvisc One injection in ASC.                Electronically signed by John Freeman MD on12/16/2021 at 3:56 PM

## 2021-12-29 ENCOUNTER — PREP FOR PROCEDURE (OUTPATIENT)
Dept: PHYSICAL MEDICINE AND REHAB | Age: 56
End: 2021-12-29

## 2021-12-30 ENCOUNTER — PATIENT MESSAGE (OUTPATIENT)
Dept: PHYSICAL MEDICINE AND REHAB | Age: 56
End: 2021-12-30

## 2021-12-30 NOTE — TELEPHONE ENCOUNTER
From: Teresa Means  To: Dr. Ramy Contreras  Sent: 12/30/2021 3:26 AM EST  Subject: SI fusion    These people do a minimally invasive fusion of the SI joints. Since my SI issue isn't getting better with what we've been trying, I'd like to try this. They are in [place]. I'm hoping there might be someone doing this in [place].    [email or web address]

## 2022-01-04 ENCOUNTER — APPOINTMENT (OUTPATIENT)
Dept: GENERAL RADIOLOGY | Age: 57
End: 2022-01-04
Attending: PAIN MEDICINE
Payer: COMMERCIAL

## 2022-01-04 ENCOUNTER — ANESTHESIA (OUTPATIENT)
Dept: OPERATING ROOM | Age: 57
End: 2022-01-04
Payer: COMMERCIAL

## 2022-01-04 ENCOUNTER — ANESTHESIA EVENT (OUTPATIENT)
Dept: OPERATING ROOM | Age: 57
End: 2022-01-04
Payer: COMMERCIAL

## 2022-01-04 ENCOUNTER — HOSPITAL ENCOUNTER (OUTPATIENT)
Age: 57
Setting detail: OUTPATIENT SURGERY
Discharge: HOME | End: 2022-01-04
Attending: PAIN MEDICINE | Admitting: PAIN MEDICINE
Payer: COMMERCIAL

## 2022-01-04 VITALS
WEIGHT: 293 LBS | HEART RATE: 92 BPM | SYSTOLIC BLOOD PRESSURE: 104 MMHG | BODY MASS INDEX: 48.82 KG/M2 | HEIGHT: 65 IN | TEMPERATURE: 97.8 F | RESPIRATION RATE: 16 BRPM | OXYGEN SATURATION: 93 % | DIASTOLIC BLOOD PRESSURE: 54 MMHG

## 2022-01-04 VITALS
SYSTOLIC BLOOD PRESSURE: 108 MMHG | DIASTOLIC BLOOD PRESSURE: 56 MMHG | RESPIRATION RATE: 30 BRPM | OXYGEN SATURATION: 94 %

## 2022-01-04 PROCEDURE — 3600000052 HC PAIN LEVEL 2 BASE: Performed by: PAIN MEDICINE

## 2022-01-04 PROCEDURE — 3209999900 FLUORO FOR SURGICAL PROCEDURES

## 2022-01-04 PROCEDURE — 2500000003 HC RX 250 WO HCPCS: Performed by: PAIN MEDICINE

## 2022-01-04 PROCEDURE — 6360000002 HC RX W HCPCS: Performed by: NURSE ANESTHETIST, CERTIFIED REGISTERED

## 2022-01-04 PROCEDURE — 3700000000 HC ANESTHESIA ATTENDED CARE: Performed by: PAIN MEDICINE

## 2022-01-04 PROCEDURE — 77002 NEEDLE LOCALIZATION BY XRAY: CPT | Performed by: PAIN MEDICINE

## 2022-01-04 PROCEDURE — 6360000002 HC RX W HCPCS: Performed by: PAIN MEDICINE

## 2022-01-04 PROCEDURE — 7100000011 HC PHASE II RECOVERY - ADDTL 15 MIN: Performed by: PAIN MEDICINE

## 2022-01-04 PROCEDURE — 7100000010 HC PHASE II RECOVERY - FIRST 15 MIN: Performed by: PAIN MEDICINE

## 2022-01-04 PROCEDURE — 2709999900 HC NON-CHARGEABLE SUPPLY: Performed by: PAIN MEDICINE

## 2022-01-04 PROCEDURE — 20610 DRAIN/INJ JOINT/BURSA W/O US: CPT | Performed by: PAIN MEDICINE

## 2022-01-04 RX ORDER — MIDAZOLAM HYDROCHLORIDE 1 MG/ML
INJECTION INTRAMUSCULAR; INTRAVENOUS PRN
Status: DISCONTINUED | OUTPATIENT
Start: 2022-01-04 | End: 2022-01-04 | Stop reason: SDUPTHER

## 2022-01-04 RX ORDER — PROPOFOL 10 MG/ML
INJECTION, EMULSION INTRAVENOUS PRN
Status: DISCONTINUED | OUTPATIENT
Start: 2022-01-04 | End: 2022-01-04 | Stop reason: SDUPTHER

## 2022-01-04 RX ORDER — LIDOCAINE HYDROCHLORIDE 10 MG/ML
INJECTION, SOLUTION INFILTRATION; PERINEURAL PRN
Status: DISCONTINUED | OUTPATIENT
Start: 2022-01-04 | End: 2022-01-04 | Stop reason: ALTCHOICE

## 2022-01-04 RX ADMIN — MIDAZOLAM 2 MG: 1 INJECTION INTRAMUSCULAR; INTRAVENOUS at 11:46

## 2022-01-04 RX ADMIN — PROPOFOL 50 MG: 10 INJECTION, EMULSION INTRAVENOUS at 11:49

## 2022-01-04 RX ADMIN — PROPOFOL 50 MG: 10 INJECTION, EMULSION INTRAVENOUS at 11:52

## 2022-01-04 ASSESSMENT — PULMONARY FUNCTION TESTS
PIF_VALUE: 0

## 2022-01-04 ASSESSMENT — PAIN DESCRIPTION - DESCRIPTORS: DESCRIPTORS: CONSTANT;THROBBING

## 2022-01-04 ASSESSMENT — PAIN SCALES - GENERAL: PAINLEVEL_OUTOF10: 0

## 2022-01-04 ASSESSMENT — PAIN - FUNCTIONAL ASSESSMENT: PAIN_FUNCTIONAL_ASSESSMENT: 0-10

## 2022-01-04 NOTE — PROGRESS NOTES
1153: Patient to phase 2 recovery room via cart. Patient arouses easily to name. Report received from surgical RN, Yadira Boss. Patient's vitals obtained, see charting. 1155: Patient is denying pain and nausea at this time. IV is INT'd in her left hand and band aid is in place to her right knee. 1157: Offered drink and snack provided to the patient. Bed is in the lowest position and call light is within reach. 1214: IV removed at this time- no complications and dressing applied. Patient stating she understood her discharge instructions given in pre op. 1216: Patient dressing and calling her ride. 1220: Patient is resting in bed and waiting for her ride to come. 1235: Patient ambulated to the car and discharged home in stable condition with her friend, Rosa Zelaya.

## 2022-01-04 NOTE — H&P
[address] [address]  History and Physical Update    Pt Name: Angel Gann  MRN: [id number]  YOB: 1965  Date of evaluation: 1/4/2022      I have examined the patient and reviewed the H&P/Consult and there are no changes to the patient or plans.         Electronically signed by Achilles Bosworth, MD on 1/4/2022 at 11:09 AM

## 2022-01-04 NOTE — ANESTHESIA PRE PROCEDURE
Department of Anesthesiology  Preprocedure Note       Name:  Boo Landrum   Age:  61 y.o.  :  1965                                          MRN:  [id number]         Date:  2022      Surgeon: Roni New):  Carmen Grimaldo MD    Procedure: Procedure(s):  Right Synvisc One injection    Medications prior to admission:   Prior to Admission medications    Medication Sig Start Date End Date Taking? Authorizing Provider   HYDROcodone-acetaminophen (NORCO) 5-325 MG per tablet Take 1 tablet by mouth 2 times daily as needed for Pain for up to 30 days. Take lowest dose possible to manage pain 12/16/21 1/15/22  Carmen Grimaldo MD   [facility]) 50 MG capsule take 1 capsule by mouth once daily if needed for edema 21   Historical Provider, MD   CPAP Machine [address] [facility] by Does not apply route Please change CPAP pressure to 17 cm H20. 21   Surjit Lira PA-C   meloxicam [facility]IAN[facility] DEAL [facility]) 15 MG tablet Take 1 tablet by mouth daily 21  TAMIE Hampton CNP   loratadine (CLARITIN) 10 MG tablet Take 10 mg by mouth daily    Historical Provider, MD   Elastic Bandages & Supports (B & B SACROILIAC BELT) MISC 1 Device by Does not apply route as needed (pain) 18   TAMIE Rubin CNP   Cholecalciferol (VITAMIN D3) 5000 units CAPS Take 1 capsule by mouth daily    Historical Provider, MD   Omega-3 Fatty Acids (FISH OIL) 1200 MG CAPS  18   Historical Provider, MD   calcium carbonate (OSCAL) 500 MG TABS tablet Take 500 mg by mouth 2 times daily    Historical Provider, MD   Turmeric 500 MG CAPS Take by mouth daily    Historical Provider, MD   cyclobenzaprine (FLEXERIL) 10 MG tablet Take 1 tablet by mouth 3 times daily as needed for Muscle spasms WARNING: This medication may make your drowsy. Do not operate heavy machinery or motor vehicles during use.  16   Josy Samuel PA-C   lisinopril (PRINIVIL;ZESTRIL) 10 MG tablet Take 10 mg by mouth nightly  16   Historical Provider, MD   citalopram (CELEXA) 20 MG tablet Take 20 mg by mouth daily  10/2/15   Historical Provider, MD   spironolactone (ALDACTONE) 25 MG tablet Take 1 tablet by mouth daily. 4/19/13   Elio Hoyos MD       Current medications:    No current facility-administered medications for this encounter. Allergies: Allergies   Allergen Reactions    [place] [Macadamia Nut Oil] Other (See Comments)     Numbness and Tingling in mouth    Other Other (See Comments)     Artificial sweeteners - migraines    Saccharin     Sulfa Antibiotics Hives    Sulfur        Problem List:    Patient Active Problem List   Diagnosis Code    Occult blood in stools R19.5    Second degree hemorrhoids K64.1    ABDI on CPAP G47.33, Z99.89    Trochanteric bursitis of right hip M70.61    Primary osteoarthritis of right hip M16.11    Pathologic thoracic fracture M84.48XA    Localized osteoporosis with current pathological fracture M80.80XA    Thoracic spondylosis M47.814    Morbid obesity with BMI of 50.0-59.9, adult ([facility]) E66.01, Z68.43    Primary osteoarthritis of right knee M17.11    Sacroiliac inflammation ([facility]) M46.1       Past Medical History:        Diagnosis Date    Allergic rhinitis     Depression     Hypertension     ABDI on CPAP     Osteoarthritis     Vitamin D deficiency        Past Surgical History:        Procedure Laterality Date    ARTHROCENTESIS Right 10/16/2017    RIGHT HIP LARGE JOINT INJECTION performed by Dominga Leblanc MD at [address] [place] [address] ARTHROCENTESIS Left 12/5/2017    LEFT HIP LARGE JOINT INJECTION performed by Dominga Leblanc MD at [address] [place] [address] ARTHROCENTESIS Right 7/18/2019    Rt.  Knee Steroid Injection performed by Dominga Leblanc MD at [address] Bilateral 7/19/2021    bilateral SI MBB # 1 performed by Dominga Leblanc MD at [address] [facility]  2016   [facility]  2002     right front upper implant    HAND SURGERY Right 05/15/2015    index finger cleaned out D/T infected cat bite    HEMORRHOID SURGERY  2016    series of 3 bandings for internal hemorrhoids, Dr. Sagrario Sherman Left 2/7/2020    Right knee steroid injection performed by David Mcfarlane MD at [address] [facility] Right 6/4/2021    Right knee Synvisc injection performed by David Mcfarlane MD at [address] Right 4/16/2019    T-facet RFA @ T7-8, 8-9, 9-10 performed by David Mcfarlane MD at [address] Left 6/13/2019    T-facet RFA @ T7-8, 8-9, 9-10 LEFT performed by David Mcfarlane MD at [place] [address] FACET LEVEL 1 BILATERAL Bilateral 1/4/2019    LUMBAR FACET bilateral T-facet MBB @ T7-T8, T8-T9, and T9-T10 performed by David Mcfarlane MD at [place] [address] FACET LEVEL 1 BILATERAL Bilateral 2/25/2019    Thoracic FACET bilateral T-facet MBB @ T7-T8, T8-T9, and T9-T10 MBB #2 performed by David Mcfarlane MD at [place] [address] Left 12/05/2017    HIP INJECTION     NERVE SURGERY Bilateral 9/13/2019    bilateral T-facet MBB # 1 @ T4-5, T5-6, and T6-7. performed by David Mcfarlane MD at [facility]  4/11/16    L4-5, L5-S1 Facet injection    OTHER SURGICAL HISTORY  06/13/2016    Right Hip Injection    OTHER SURGICAL HISTORY Right 10/16/2017    Hip Injection     PAIN MANAGEMENT PROCEDURE Right 7/31/2020    Right knee genicular nerve block performed by David Mcfarlane MD at [facility] [address] Right 8/31/2020    Right knee genicular nerve RFA performed by David Mcfarlane MD at [facility] [address] Right 1/12/2021    Bilateral T-facet RFA @ T7-8, T8-9, and T9-T10 RIGHT SIDE FIRST performed by Yash Christy MD Deepa at [facility] Left 3/19/2021    Left T-facet RFA @ T7-8, T8-9, and T9-T10. performed by Jhonathan Zamudio MD at [address] ARTHROCENTESIS ASPIR&/INJ MAJOR JT/BURSA W/O US Right 2018    RIGHT HIP LARGE JOINT STEROID INJECTION performed by Jhonathan Zamudio MD at [address] OFFICE/OUTPT VISIT,PROCEDURE ONLY N/A 2018    Kyphoplasty T12 BILATERAL performed by Jhonathan Zamudio MD at [facility]       as child    WISDOM TOOTH EXTRACTION         Social History:    Social History     Tobacco Use    Smoking status: Former Smoker     Packs/day: 1.00     Years: 7.00     Pack years: 7.00     Types: Cigarettes, E-Cigarettes     Start date: 2005     Quit date: 2011     Years since quittin.0    Smokeless tobacco: Never Used   Substance Use Topics    Alcohol use: Yes     Alcohol/week: 0.0 standard drinks     Comment: occasionally, 1-3 times a month                                Counseling given: Not Answered      Vital Signs (Current): There were no vitals filed for this visit.                                            BP Readings from Last 3 Encounters:   21 124/80   21 118/70   21 (!) 94/51       NPO Status:                                                                                 BMI:   Wt Readings from Last 3 Encounters:   21 (!) 373 lb (169.2 kg)   21 (!) 373 lb (169.2 kg)   21 (!) 373 lb 12.8 oz (169.6 kg)     There is no height or weight on file to calculate BMI.    CBC:   Lab Results   Component Value Date    WBC 7.2 2020    RBC 4.63 2020    HGB 13.5 2020    HCT 41.7 2020    MCV 90.1 2020    RDW 14.0 2017     2020       CMP:   Lab Results   Component Value Date     2020    K 4.7 2020     2020    CO2 24 2020    BUN 14 2020 CREATININE 0.8 08/25/2020    LABGLOM 74 08/25/2020    GLUCOSE 98 08/25/2020    PROT 7.2 08/25/2020    CALCIUM 10.0 08/25/2020    BILITOT 0.4 08/25/2020    ALKPHOS 111 08/25/2020    AST 33 08/25/2020    ALT 60 08/25/2020       POC Tests: No results for input(s): POCGLU, POCNA, POCK, POCCL, POCBUN, POCHEMO, POCHCT in the last 72 hours. Coags: No results found for: PROTIME, INR, APTT    HCG (If Applicable):   Lab Results   Component Value Date    PREGSERUM NEGATIVE 04/19/2013        ABGs: No results found for: PHART, PO2ART, ADN8DNB, TXF8ARV, BEART, V5RXZZXY     Type & Screen (If Applicable):  No results found for: LABABO, LABRH    Drug/Infectious Status (If Applicable):  No results found for: HIV, HEPCAB    COVID-19 Screening (If Applicable):   Lab Results   Component Value Date    COVID19 Not Detected 07/25/2020           Anesthesia Evaluation  Patient summary reviewed and Nursing notes reviewed  Airway: Mallampati: III        Dental:          Pulmonary:   (+) sleep apnea:  decreased breath sounds,                             Cardiovascular:  Exercise tolerance: poor (<4 METS),   (+) hypertension:,         Rhythm: regular  Rate: normal                    Neuro/Psych:   (+) psychiatric history:            GI/Hepatic/Renal:   (+) morbid obesity          Endo/Other:    (+) : arthritis:., .                 Abdominal:   (+) obese,           Vascular: Other Findings:             Anesthesia Plan      MAC     ASA 4       Induction: intravenous. Anesthetic plan and risks discussed with patient. Plan discussed with CRNA.                   Placido Goff DO   1/4/2022

## 2022-01-04 NOTE — H&P
H&P    States right Synvisc One injection preformed 6/4/21 has worn off.     States Thoracic facet RFA left T7-8,T8-9 and T9-10  Preformed 3/19/21 and right side preformed on 01/12/21 have worn off.     Pain increases with bending, lifting, twisting , walking, standing, stairs and getting up and down. Treatments Tried NSAIDS, narcotics and injections  Pain Description sharp, stabbing, throbbing and aching     Medications reviewed. Patient denies side effects with medications. Patient states she is taking medications as prescribed. Shedenies receiving pain medications from other sources. She denies any ER visits since last visit.     Pain scale with out pain medications or at its worst is 10/10. Pain scale with pain medications or at its best is 5/10. Last dose of Norco was yesterday   Drug screen reviewed from 8/11/21 and was appropriate  Pill count completed  today and WNL yes or no:  yes           The patientis allergic to [place] [macadamia nut oil], other, saccharin, sulfa antibiotics, and sulfur.        Subjective:      Review of Systems   Constitutional: Negative. Negative for fatigue and fever. HENT: Negative for congestion, sinus pain, sore throat and voice change. Eyes: Negative. Respiratory: Negative. Cardiovascular: Positive for leg swelling. Gastrointestinal: Negative. Genitourinary: Negative. Musculoskeletal: Positive for arthralgias, back pain, gait problem, joint swelling, myalgias, neck pain and neck stiffness. Ambulating with cane   Skin: Negative. Neurological: Positive for weakness and numbness. Psychiatric/Behavioral: Negative.          Objective:      Vitals       Vitals:     12/16/21 1534   BP: 124/80   Weight: (!) 373 lb (169.2 kg)   Height: 5' 5\" (1.651 m)            Physical Exam  Vitals and nursing note reviewed. Constitutional:       General: She is not in acute distress. Appearance: She is well-developed. She is obese. She is not diaphoretic. HENT:      Head: Normocephalic and atraumatic. Right Ear: External ear normal.      Left Ear: External ear normal.      Nose: Nose normal.      Mouth/Throat:      Pharynx: No oropharyngeal exudate. Eyes:      General: No scleral icterus. Right eye: No discharge. Left eye: No discharge. Conjunctiva/sclera: Conjunctivae normal.      Pupils: Pupils are equal, round, and reactive to light. Neck:      Thyroid: No thyromegaly. Trachea: No tracheal deviation. Cardiovascular:      Rate and Rhythm: Normal rate and regular rhythm. Heart sounds: Normal heart sounds. No murmur heard. No friction rub. No gallop. Pulmonary:      Effort: Pulmonary effort is normal. No respiratory distress. Breath sounds: Normal breath sounds. No wheezing or rales. Chest:      Chest wall: No tenderness. Abdominal:      General: Bowel sounds are normal. There is no distension. Palpations: Abdomen is soft. Tenderness: There is no abdominal tenderness. There is no guarding or rebound. Musculoskeletal:         General: Swelling and tenderness present. Cervical back: Full passive range of motion without pain, normal range of motion and neck supple. Tenderness and bony tenderness present. No edema, erythema or rigidity. No muscular tenderness. Normal range of motion. Thoracic back: Bony tenderness present. Decreased range of motion. Lumbar back: Spasms, tenderness and bony tenderness present. Decreased range of motion. Back:       Right hip: Tenderness present. Left hip: Bony tenderness present. Decreased range of motion. Decreased strength. Right knee: Bony tenderness present. Decreased range of motion. Tenderness present. Right lower leg: Edema present. Left lower leg: Edema present. Legs:    Skin:     General: Skin is warm and dry. Coloration: Skin is not pale. Findings: No erythema or rash.    Neurological:      General: No focal deficit present. Mental Status: She is alert and oriented to person, place, and time. She is not disoriented. Cranial Nerves: No cranial nerve deficit. Sensory: No sensory deficit. Motor: No atrophy or abnormal muscle tone. Coordination: Coordination normal.      Gait: Gait abnormal.      Deep Tendon Reflexes: Babinski sign absent on the right side. Comments: SLR neg. Psychiatric:         Attention and Perception: She is attentive. Mood and Affect: Mood is not anxious or depressed. Affect is not labile, blunt, angry or inappropriate. Speech: She is communicative. Speech is not rapid and pressured, delayed, slurred or tangential.         Behavior: Behavior normal. Behavior is not agitated, slowed, aggressive, withdrawn, hyperactive or combative. Thought Content: Thought content normal. Thought content is not paranoid or delusional. Thought content does not include homicidal or suicidal ideation. Thought content does not include homicidal or suicidal plan. Cognition and Memory: Memory is not impaired. She does not exhibit impaired recent memory or impaired remote memory. Judgment: Judgment normal. Judgment is not impulsive or inappropriate.         SCOOTER  Patricks test  positive  Yeoman's  positive  Gaenslen's  positive     Assessment:      1. Primary osteoarthritis of right knee    2. Spondylosis of thoracic region without myelopathy or radiculopathy    3. Spondylosis of lumbar region without myelopathy or radiculopathy    4. Sacroiliac inflammation ([address]    5. Primary osteoarthritis of both hips    6. Cervical spondylosis    7. Chronic, continuous use of opioids    8. Chronic pain syndrome       Plan:      · OARRS reviewed. Current MED: 10  · Patient was offered naloxone for home. · Discussed long term side effects of medications, tolerance, dependency and addiction.   · Previous UDS reviewed  · UDS preformed today for compliance. · Patient told can not receive any pain medications from any other source. · No evidence of abuse, diversion or aberrant behavior. · Medications and/or procedures to improve function and quality of life- patient understanding with this and that may not be pain free  · Discussed with patient about safe storage of medications at home  · Discussed possible weaning of medication dosing dependent on treatment/procedure results. · Testing: Reviewed. · Procedures: Right knee Synvisc On injection in ASC. Repeat Thoracic RFA in the future. · Discussed with patient about risks with procedure including infection, reaction to medication, increased pain, or bleeding. · Medications:Ordered  · If patient is on blood thinners will need approval to hold yes or no:             Return Right Synvisc One injection in [address].

## 2022-01-04 NOTE — OP NOTE
Pre-Procedure Note    Patient Name: Ahsan Bocanegra   YOB: 1965  Medical Record Number: [id number]  Date: 1/4/22       Indication:  No diagnosis found. Consent: On file. Vital Signs:   Vitals:    01/04/22 1045   BP: (!) 159/68   Pulse: 103   Resp: 18   Temp: 99 °F (37.2 °C)   SpO2: 93%       Past Medical History:   has a past medical history of Allergic rhinitis, Depression, Hypertension, ABDI on CPAP, Osteoarthritis, and Vitamin D deficiency. Past Surgical History:   has a past surgical history that includes Dental surgery (2002); [place] tooth extraction; Hand surgery (Right, 05/15/2015); other surgical history (4/11/16); Colonoscopy (2016); other surgical history (06/13/2016); Hemorrhoid surgery (2016); Tonsillectomy; other surgical history (Right, 10/16/2017); arthrocentesis (Right, 10/16/2017); Nerve Surgery (Left, 12/05/2017); arthrocentesis (Left, 12/5/2017); pr arthrocentesis aspir&/inj major jt/bursa w/o us (Right, 9/17/2018); pr office/outpt visit,procedure only (N/A, 11/9/2018); Nerve Block Lumb Facet Level 1 Bilateral (Bilateral, 1/4/2019); Nerve Block Lumb Facet Level 1 Bilateral (Bilateral, 2/25/2019); Lumbar spine surgery (Right, 4/16/2019); Lumbar spine surgery (Left, 6/13/2019); arthrocentesis (Right, 7/18/2019); Nerve Surgery (Bilateral, 9/13/2019); hip surgery (Left, 2/7/2020); Pain management procedure (Right, 7/31/2020); Pain management procedure (Right, 8/31/2020); Pain management procedure (Right, 1/12/2021); Pain management procedure (Left, 3/19/2021); hip surgery (Right, 6/4/2021); and Back Injection (Bilateral, 7/19/2021). Pre-Sedation Documentation and Exam:   Vital signs have been reviewed (see flow sheet for vitals). Sedation/ Anesthesia Plan:   intravenous sedation   as needed.     Patient is an appropriate candidate for plan of sedation: yes    Preoperative Diagnosis: Osteoarthritis of the Right knee.       Postoperative Diagnosis: Osteoarthritis of the Right knee.     Procedure Performed:  Injection of Synvisc One Right knee under fluoroscopy     Indication for the Procedure: The patient has Right knee pain not responding to conservative treatment. Examination showed decreased range of motion, swelling and effusion. Tenderness was found. Medial joint line and lateral joint line tenderness was noted. Hence we decided to inject the Right knee joint for the pain relief. The procedure and the risks were discussed with the patient and an informed consent was obtained.     Procedure: The patient is placed in supine/sitting position. The Right knee was flexed to about 90 degrees. Landmarks under fluoroscopy identified. Skin over the Right knee was prepped with chloro-prep and draped in sterile manner. Then skin and deep tissues medial to the patellar tendon just above the tibial plateau were infiltrated with 5  ml of 1% xylocaine. The #22-gauge 3-1/2 inch spinal needle was introduced through the skin wheal. Then the needle is directed parallel to the tibial plateau and slightly medial direction into the knee joint. Then after negative aspiration a total of 6 cc of Hylan  was  injected into the joint. The needle is removed and a Band-Aid was placed over the needle insertion site. EBL-0  Patient tolerated the procedure well and the vital signs remained stable.  Patient will be seen in the pain clinic in two weeks for follow up and further planning.         Electronically signed by Yadi Jimenez MD on 1/4/22 at 11:41 AM EST

## 2022-01-04 NOTE — ANESTHESIA POSTPROCEDURE EVALUATION
Department of Anesthesiology  Postprocedure Note    Patient: Kavin Allred  MRN: [id number]  YOB: 1965  Date of evaluation: 1/4/2022  Time:  1:14 PM     Procedure Summary     Date: 01/04/22 Room / Location: [address] [place] [address] 03 / [address] [facility]    Anesthesia Start: 3344 Anesthesia Stop: 0200    Procedure: Right Synvisc One injection (Right ) Diagnosis: (Primary osteoarthritis of right knee)    Surgeons: Kirsten Martinez MD Responsible Provider: Janis Ospina MD    Anesthesia Type: MAC ASA Status: 4          Anesthesia Type: MAC    Jose Phase I:      Jose Phase II: Jose Score: 9    Last vitals: Reviewed and per EMR flowsheets.        Anesthesia Post Evaluation    Patient location during evaluation: bedside  Patient participation: complete - patient participated  Level of consciousness: awake  Airway patency: patent  Nausea & Vomiting: no vomiting and no nausea  Complications: no  Cardiovascular status: hemodynamically stable  Respiratory status: acceptable  Hydration status: stable

## 2022-01-18 ENCOUNTER — PATIENT MESSAGE (OUTPATIENT)
Dept: PHYSICAL MEDICINE AND REHAB | Age: 57
End: 2022-01-18

## 2022-01-18 DIAGNOSIS — G89.4 CHRONIC PAIN SYNDROME: Primary | ICD-10-CM

## 2022-01-19 RX ORDER — HYDROCODONE BITARTRATE AND ACETAMINOPHEN 5; 325 MG/1; MG/1
1 TABLET ORAL 2 TIMES DAILY PRN
Qty: 60 TABLET | Refills: 0 | Status: SHIPPED | OUTPATIENT
Start: 2022-01-19 | End: 2022-02-21 | Stop reason: SDUPTHER

## 2022-01-19 NOTE — TELEPHONE ENCOUNTER
OARRS reviewed. UDS: + for  Cyclobenzaprine, Hydrocodone -consistent. Last seen: 8/11/2021.  Follow-up: 2/7/2022

## 2022-01-31 ENCOUNTER — HOSPITAL ENCOUNTER (OUTPATIENT)
Age: 57
Discharge: HOME | End: 2022-01-31
Payer: COMMERCIAL

## 2022-01-31 LAB
ALBUMIN SERPL-MCNC: 4.2 G/DL (ref 3.5–5.1)
ALP BLD-CCNC: 113 U/L (ref 38–126)
ALT SERPL-CCNC: 90 U/L (ref 11–66)
ANION GAP SERPL CALCULATED.3IONS-SCNC: 14 MEQ/L (ref 8–16)
AST SERPL-CCNC: 47 U/L (ref 5–40)
BASOPHILS # BLD: 0.8 %
BASOPHILS ABSOLUTE: 0.1 THOU/MM3 (ref 0–0.1)
BILIRUB SERPL-MCNC: 0.5 MG/DL (ref 0.3–1.2)
BUN BLDV-MCNC: 14 MG/DL (ref 7–22)
CALCIUM SERPL-MCNC: 9.2 MG/DL (ref 8.5–10.5)
CHLORIDE BLD-SCNC: 102 MEQ/L (ref 98–111)
CHOLESTEROL, TOTAL: 197 MG/DL (ref 100–199)
CO2: 24 MEQ/L (ref 23–33)
CREAT SERPL-MCNC: 0.8 MG/DL (ref 0.4–1.2)
EOSINOPHIL # BLD: 3.1 %
EOSINOPHILS ABSOLUTE: 0.2 THOU/MM3 (ref 0–0.4)
ERYTHROCYTE [DISTWIDTH] IN BLOOD BY AUTOMATED COUNT: 13.6 % (ref 11.5–14.5)
ERYTHROCYTE [DISTWIDTH] IN BLOOD BY AUTOMATED COUNT: 46.1 FL (ref 35–45)
GFR SERPL CREATININE-BSD FRML MDRD: 74 ML/MIN/1.73M2
GLUCOSE BLD-MCNC: 101 MG/DL (ref 70–108)
HCT VFR BLD CALC: 43.3 % (ref 37–47)
HDLC SERPL-MCNC: 61 MG/DL
HEMOGLOBIN: 14.1 GM/DL (ref 12–16)
IMMATURE GRANS (ABS): 0.01 THOU/MM3 (ref 0–0.07)
IMMATURE GRANULOCYTES: 0.1 %
LDL CHOLESTEROL CALCULATED: 110 MG/DL
LYMPHOCYTES # BLD: 36.5 %
LYMPHOCYTES ABSOLUTE: 2.6 THOU/MM3 (ref 1–4.8)
MCH RBC QN AUTO: 29.8 PG (ref 26–33)
MCHC RBC AUTO-ENTMCNC: 32.6 GM/DL (ref 32.2–35.5)
MCV RBC AUTO: 91.5 FL (ref 81–99)
MONOCYTES # BLD: 8.7 %
MONOCYTES ABSOLUTE: 0.6 THOU/MM3 (ref 0.4–1.3)
NUCLEATED RED BLOOD CELLS: 0 /100 WBC
PLATELET # BLD: 308 THOU/MM3 (ref 130–400)
PMV BLD AUTO: 8.9 FL (ref 9.4–12.4)
POTASSIUM SERPL-SCNC: 4.4 MEQ/L (ref 3.5–5.2)
RBC # BLD: 4.73 MILL/MM3 (ref 4.2–5.4)
SEG NEUTROPHILS: 50.8 %
SEGMENTED NEUTROPHILS ABSOLUTE COUNT: 3.6 THOU/MM3 (ref 1.8–7.7)
SODIUM BLD-SCNC: 140 MEQ/L (ref 135–145)
TOTAL PROTEIN: 6.8 G/DL (ref 6.1–8)
TRIGL SERPL-MCNC: 128 MG/DL (ref 0–199)
TSH SERPL DL<=0.05 MIU/L-ACNC: 2.09 UIU/ML (ref 0.4–4.2)
VITAMIN D 25-HYDROXY: 60 NG/ML (ref 30–100)
WBC # BLD: 7.1 THOU/MM3 (ref 4.8–10.8)

## 2022-01-31 PROCEDURE — 80053 COMPREHEN METABOLIC PANEL: CPT

## 2022-01-31 PROCEDURE — 82306 VITAMIN D 25 HYDROXY: CPT

## 2022-01-31 PROCEDURE — 84443 ASSAY THYROID STIM HORMONE: CPT

## 2022-01-31 PROCEDURE — 36415 COLL VENOUS BLD VENIPUNCTURE: CPT

## 2022-01-31 PROCEDURE — 85025 COMPLETE CBC W/AUTO DIFF WBC: CPT

## 2022-01-31 PROCEDURE — 80061 LIPID PANEL: CPT

## 2022-02-07 ENCOUNTER — OFFICE VISIT (OUTPATIENT)
Dept: PHYSICAL MEDICINE AND REHAB | Age: 57
End: 2022-02-07
Payer: COMMERCIAL

## 2022-02-07 ENCOUNTER — TELEPHONE (OUTPATIENT)
Dept: PHYSICAL MEDICINE AND REHAB | Age: 57
End: 2022-02-07

## 2022-02-07 VITALS
BODY MASS INDEX: 48.82 KG/M2 | DIASTOLIC BLOOD PRESSURE: 68 MMHG | HEIGHT: 65 IN | WEIGHT: 293 LBS | SYSTOLIC BLOOD PRESSURE: 108 MMHG

## 2022-02-07 DIAGNOSIS — G89.4 CHRONIC PAIN SYNDROME: ICD-10-CM

## 2022-02-07 DIAGNOSIS — G89.29 CHRONIC MYOFASCIAL PAIN: ICD-10-CM

## 2022-02-07 DIAGNOSIS — M17.11 PRIMARY OSTEOARTHRITIS OF RIGHT KNEE: ICD-10-CM

## 2022-02-07 DIAGNOSIS — M46.1 SACROILIAC INFLAMMATION (HCC): ICD-10-CM

## 2022-02-07 DIAGNOSIS — M54.9 MID BACK PAIN: ICD-10-CM

## 2022-02-07 DIAGNOSIS — M79.18 CHRONIC MYOFASCIAL PAIN: ICD-10-CM

## 2022-02-07 DIAGNOSIS — M47.814 SPONDYLOSIS OF THORACIC REGION WITHOUT MYELOPATHY OR RADICULOPATHY: Primary | ICD-10-CM

## 2022-02-07 DIAGNOSIS — M47.816 SPONDYLOSIS OF LUMBAR REGION WITHOUT MYELOPATHY OR RADICULOPATHY: ICD-10-CM

## 2022-02-07 PROCEDURE — G8484 FLU IMMUNIZE NO ADMIN: HCPCS | Performed by: NURSE PRACTITIONER

## 2022-02-07 PROCEDURE — 99214 OFFICE O/P EST MOD 30 MIN: CPT | Performed by: NURSE PRACTITIONER

## 2022-02-07 PROCEDURE — 3017F COLORECTAL CA SCREEN DOC REV: CPT | Performed by: NURSE PRACTITIONER

## 2022-02-07 PROCEDURE — 1036F TOBACCO NON-USER: CPT | Performed by: NURSE PRACTITIONER

## 2022-02-07 PROCEDURE — G8427 DOCREV CUR MEDS BY ELIG CLIN: HCPCS | Performed by: NURSE PRACTITIONER

## 2022-02-07 PROCEDURE — G8417 CALC BMI ABV UP PARAM F/U: HCPCS | Performed by: NURSE PRACTITIONER

## 2022-02-07 ASSESSMENT — ENCOUNTER SYMPTOMS
SINUS PAIN: 0
EYES NEGATIVE: 1
RESPIRATORY NEGATIVE: 1
GASTROINTESTINAL NEGATIVE: 1
SORE THROAT: 0
BACK PAIN: 1
VOICE CHANGE: 0

## 2022-02-07 NOTE — PROGRESS NOTES
[address] [facility] [address]  Dept: [phone]  Dept Fax: [phone]: [phone]    Visit Date: 2/7/2022    Functionality Assessment/Goals Worksheet     On a scale of 0 (Does not Interfere) to 10 (Completely Interferes)     1. Which number describes how during the past week pain has interfered with       the following:  A. General Activity:  7  B. Mood: 4  C. Walking Ability:  9  D. Normal Work (Includes both work outside the home and housework):  7  E. Relations with Other People:   3  F. Sleep:   4  G. Enjoyment of Life:   5    2. Patient Prefers to Take their Pain Medications:     [x]  On a regular basis   [x]  Only when necessary    []  Does not take pain medications    3. What are the Patient's Goals/Expectations for Visiting Pain Management? []  Learn about my pain    [x]  Receive Medication   []  Physical Therapy     []  Treat Depression   [x]  Receive Injections    []  Treat Sleep   []  Deal with Anxiety and Stress   []  Treat Opoid Dependence/Addiction   []  Other:      HPI:   Gerardo Weathers is a 61 y.o. female is here today for    Chief Complaint: Back pain, right knee pain     HPI   FU from right knee Synvisc injection from 1/4/2022. Receiving over 85% relief of right knee pain from injection. Able to walk and do mor with less pain. Right knee pain is very tolerable at this time     Main complaint today is pain in mid back area as previous T-facet RFA has  worn off recently worse on right side stabbing pain   Pain increases with bending, lifting, twisting , walking, standing, getting up and down and housework or working at job. Continues norco prn and flexeril which remains effective     Medications reviewed. Patient denies side effects with medications. Patient states she is taking medications as prescribed.  Shedenies receiving pain medications from other friction rub. No gallop. Pulmonary:      Effort: Pulmonary effort is normal. No respiratory distress. Breath sounds: Normal breath sounds. No wheezing or rales. Chest:      Chest wall: No tenderness. Abdominal:      General: Bowel sounds are normal. There is no distension. Palpations: Abdomen is soft. Tenderness: There is no abdominal tenderness. There is no guarding or rebound. Musculoskeletal:         General: Swelling and tenderness present. Cervical back: Full passive range of motion without pain, normal range of motion and neck supple. Tenderness and bony tenderness present. No edema, erythema or rigidity. No muscular tenderness. Normal range of motion. Thoracic back: Bony tenderness present. Decreased range of motion. Lumbar back: Spasms, tenderness and bony tenderness present. Decreased range of motion. Back:       Right hip: Tenderness present. Left hip: Bony tenderness present. Decreased range of motion. Decreased strength. Right knee: Bony tenderness present. Decreased range of motion. Tenderness present. Right lower leg: Edema present. Left lower leg: Edema present. Legs:    Skin:     General: Skin is warm and dry. Coloration: Skin is not pale. Findings: No erythema or rash. Neurological:      General: No focal deficit present. Mental Status: She is alert and oriented to person, place, and time. She is not disoriented. Cranial Nerves: No cranial nerve deficit. Sensory: No sensory deficit. Motor: No atrophy or abnormal muscle tone. Coordination: Coordination normal.      Gait: Gait abnormal.      Deep Tendon Reflexes: Babinski sign absent on the right side. Comments: SLR neg. Psychiatric:         Attention and Perception: She is attentive. Mood and Affect: Mood is not anxious or depressed. Affect is not labile, blunt, angry or inappropriate. Speech: She is communicative. Speech is not rapid and pressured, delayed, slurred or tangential.         Behavior: Behavior normal. Behavior is not agitated, slowed, aggressive, withdrawn, hyperactive or combative. Thought Content: Thought content normal. Thought content is not paranoid or delusional. Thought content does not include homicidal or suicidal ideation. Thought content does not include homicidal or suicidal plan. Cognition and Memory: Memory is not impaired. She does not exhibit impaired recent memory or impaired remote memory. Judgment: Judgment normal. Judgment is not impulsive or inappropriate. SCOOTER  Patricks test  positive  Yeoman's  positive  Gaenslen's  positive         Assessment:     1. Spondylosis of thoracic region without myelopathy or radiculopathy    2. Mid back pain    3. Spondylosis of lumbar region without myelopathy or radiculopathy    4. Sacroiliac inflammation ([address]    5. Primary osteoarthritis of right knee    6. Chronic pain syndrome    7. Chronic myofascial pain            Plan:      · OARRS reviewed. Current MED: 10.00  · Patient was offered naloxone for home. · Discussed long term side effects of medications, tolerance, dependency and addiction. · Previous UDS reviewed  · UDS preformed today for compliance. · Patient told can not receive any pain medications from any other source. · No evidence of abuse, diversion or aberrant behavior.  Medications and/or procedures to improve function and quality of life- patient understanding with this and that may not be pain free   Discussed with patient about safe storage of medications at home   Discussed possible weaning of medication dosing dependent on treatment/procedure results.  Discussed with patient about risks with procedure including infection, reaction to medication, increased pain, or bleeding.  Procedure notes reviewed in detail   Receiving over 85% relief of right knee pain from Synvisc one injection.    · Plan Bilateral T-facet RFA @  T8-9, and T9-T10 for longer term pain relief. Procedure and risks discussed in detail with patient.   · Needs to hold Mobic  · Current medications remain effective in decreasing pain to a tolerable level. · Continue Norco 5/325 BID prn- filled filled 1/19/2022 and has plenty takes prn   · Continue Flexeril prn from pcp   · Will get next UDS at procedural FU. Patient is compliant. Meds. Prescribed:   No orders of the defined types were placed in this encounter. Return for Bilateral T-facet RFA @  T8-9, and T9-T10. , follow up after procedure.                Electronically signed by TAMIE Whaley CNP on2/7/2022 at 12:28 PM

## 2022-02-21 DIAGNOSIS — G89.4 CHRONIC PAIN SYNDROME: ICD-10-CM

## 2022-02-21 RX ORDER — HYDROCODONE BITARTRATE AND ACETAMINOPHEN 5; 325 MG/1; MG/1
1 TABLET ORAL 2 TIMES DAILY PRN
Qty: 60 TABLET | Refills: 0 | Status: SHIPPED | OUTPATIENT
Start: 2022-02-21 | End: 2022-03-21 | Stop reason: SDUPTHER

## 2022-02-21 NOTE — TELEPHONE ENCOUNTER
OARRS reviewed. UDS: + for   HYDROCODONE, FLEXERIL  Last seen: 2/7/2022.  Follow-up:   Future Appointments   Date Time Provider Christine Branch   3/21/2022  1:00 PM TAMIE Fernández - CNP [facility] [place]   6/27/2022  1:00 PM Rylie Yanes

## 2022-02-22 DIAGNOSIS — M47.814 SPONDYLOSIS OF THORACIC REGION WITHOUT MYELOPATHY OR RADICULOPATHY: Primary | ICD-10-CM

## 2022-02-28 ENCOUNTER — PATIENT MESSAGE (OUTPATIENT)
Dept: PHYSICAL MEDICINE AND REHAB | Age: 57
End: 2022-02-28

## 2022-03-02 ENCOUNTER — PREP FOR PROCEDURE (OUTPATIENT)
Dept: PHYSICAL MEDICINE AND REHAB | Age: 57
End: 2022-03-02

## 2022-03-07 ENCOUNTER — APPOINTMENT (OUTPATIENT)
Dept: GENERAL RADIOLOGY | Age: 57
End: 2022-03-07
Attending: PHYSICAL MEDICINE & REHABILITATION
Payer: COMMERCIAL

## 2022-03-07 ENCOUNTER — HOSPITAL ENCOUNTER (OUTPATIENT)
Age: 57
Setting detail: OUTPATIENT SURGERY
Discharge: HOME | End: 2022-03-07
Attending: PHYSICAL MEDICINE & REHABILITATION | Admitting: PHYSICAL MEDICINE & REHABILITATION
Payer: COMMERCIAL

## 2022-03-07 VITALS
OXYGEN SATURATION: 95 % | HEIGHT: 64 IN | DIASTOLIC BLOOD PRESSURE: 56 MMHG | WEIGHT: 293 LBS | SYSTOLIC BLOOD PRESSURE: 125 MMHG | RESPIRATION RATE: 16 BRPM | BODY MASS INDEX: 50.02 KG/M2 | TEMPERATURE: 97.6 F | HEART RATE: 94 BPM

## 2022-03-07 PROCEDURE — 64633 DESTROY CERV/THOR FACET JNT: CPT | Performed by: PHYSICAL MEDICINE & REHABILITATION

## 2022-03-07 PROCEDURE — 3600000056 HC PAIN LEVEL 4 BASE: Performed by: PHYSICAL MEDICINE & REHABILITATION

## 2022-03-07 PROCEDURE — 99152 MOD SED SAME PHYS/QHP 5/>YRS: CPT | Performed by: PHYSICAL MEDICINE & REHABILITATION

## 2022-03-07 PROCEDURE — 7100000011 HC PHASE II RECOVERY - ADDTL 15 MIN: Performed by: PHYSICAL MEDICINE & REHABILITATION

## 2022-03-07 PROCEDURE — 3209999900 FLUORO FOR SURGICAL PROCEDURES

## 2022-03-07 PROCEDURE — 2500000003 HC RX 250 WO HCPCS: Performed by: PHYSICAL MEDICINE & REHABILITATION

## 2022-03-07 PROCEDURE — 2709999900 HC NON-CHARGEABLE SUPPLY: Performed by: PHYSICAL MEDICINE & REHABILITATION

## 2022-03-07 PROCEDURE — 99153 MOD SED SAME PHYS/QHP EA: CPT | Performed by: PHYSICAL MEDICINE & REHABILITATION

## 2022-03-07 PROCEDURE — 6360000002 HC RX W HCPCS: Performed by: PHYSICAL MEDICINE & REHABILITATION

## 2022-03-07 PROCEDURE — 3600000057 HC PAIN LEVEL 4 ADDL 15 MIN: Performed by: PHYSICAL MEDICINE & REHABILITATION

## 2022-03-07 PROCEDURE — 64634 DESTROY C/TH FACET JNT ADDL: CPT | Performed by: PHYSICAL MEDICINE & REHABILITATION

## 2022-03-07 PROCEDURE — 7100000010 HC PHASE II RECOVERY - FIRST 15 MIN: Performed by: PHYSICAL MEDICINE & REHABILITATION

## 2022-03-07 RX ORDER — TRIAMCINOLONE ACETONIDE 40 MG/ML
INJECTION, SUSPENSION INTRA-ARTICULAR; INTRAMUSCULAR PRN
Status: DISCONTINUED | OUTPATIENT
Start: 2022-03-07 | End: 2022-03-07 | Stop reason: ALTCHOICE

## 2022-03-07 RX ORDER — FENTANYL CITRATE 50 UG/ML
INJECTION, SOLUTION INTRAMUSCULAR; INTRAVENOUS PRN
Status: DISCONTINUED | OUTPATIENT
Start: 2022-03-07 | End: 2022-03-07 | Stop reason: ALTCHOICE

## 2022-03-07 RX ORDER — MIDAZOLAM HYDROCHLORIDE 1 MG/ML
INJECTION INTRAMUSCULAR; INTRAVENOUS PRN
Status: DISCONTINUED | OUTPATIENT
Start: 2022-03-07 | End: 2022-03-07 | Stop reason: ALTCHOICE

## 2022-03-07 RX ORDER — LIDOCAINE HYDROCHLORIDE 10 MG/ML
INJECTION, SOLUTION INFILTRATION; PERINEURAL PRN
Status: DISCONTINUED | OUTPATIENT
Start: 2022-03-07 | End: 2022-03-07 | Stop reason: ALTCHOICE

## 2022-03-07 RX ORDER — LIDOCAINE HYDROCHLORIDE 20 MG/ML
INJECTION, SOLUTION INFILTRATION; PERINEURAL PRN
Status: DISCONTINUED | OUTPATIENT
Start: 2022-03-07 | End: 2022-03-07 | Stop reason: ALTCHOICE

## 2022-03-07 ASSESSMENT — PAIN SCALES - GENERAL: PAINLEVEL_OUTOF10: 0

## 2022-03-07 ASSESSMENT — PAIN DESCRIPTION - DESCRIPTORS: DESCRIPTORS: ACHING

## 2022-03-07 ASSESSMENT — PAIN - FUNCTIONAL ASSESSMENT: PAIN_FUNCTIONAL_ASSESSMENT: 0-10

## 2022-03-07 NOTE — OP NOTE
Operative Note      Patient: Chad Carbone  YOB: 1965  MRN: [id number]    Date of Procedure: 3/7/2022    Pre-Op Diagnosis: Spondylosis of thoracic region without myelopathy or radiculopathy    Post-Op Diagnosis: Same       Procedure(s):  Bilateral T-facet RFA @  T8-9, and T9-T10    Surgeon(s):  Kenda Severin, MD    Assistant:   * No surgical staff found *    Anesthesia: IV Sedation    Estimated Blood Loss (mL): Minimal    Complications: None    CONSENT:     The risks, benefits, alternatives, plan, complications, and personnel were discussed prior to the procedure. The patient understood and agreed to proceed. PROCEDURE:     Anesthesia: Moderate sedation using 3 mg IV versed & 100 mcg IV fentanyl. The patient was positioned prone. Sign-in and time-out was performed. Identifying the site, in this case, both sides . Left side was done first followed by the right side. T8,T9, T10. Sterile technique was done in the usual manner using chlorhexidine. Thiis was followed by infiltration of a 3 cc of 1% preservative-free lidocaine. A  100 mm , 17  gauge radiofrequency needles were positioned under fluoroscopic  guidance. A total of 6 radiofrequency needles were positioned. Upon confirmation that the needle was properly positioned, a series of a stimulation was performed as follows: In both motor and sensory stimulation, there was no evidence of radicular pain pattern. Prior to lesioning, 1 mL of 2% lidocaine was injected to the sites identified. Total of  6 ml was injected. 80° for 105 seconds continuous thermal ablation was performed( with 15 sec ramp up)      This was followed by injecting solution of 80  mg of triamcinolone and  6 ml of 1% preservative-free lidocaine was injected without difficulty. The patient tolerated the procedure well and went to the recovery room with stable vital signs.        The patient tolerated the procedure well and went to the recovery room with stable vital signs. Moderate Sedation Time: > 15 minutes with a nurse administering the medication(s) under my supervision. The patient was independently monitored by a Registered Nurse assigned to the Procedure Room ( automated blood pressure, continuous EKG, Capnography and continuous pulse oximetry)    Start time: 1029  End Time:  1046      PLAN:   Home instructions were provided. The patient will follow up in 4 to 6 weeks or earlier if needed.        Electronically signed by Apollo Mandel MD on 3/7/2022 at 1:26 PM

## 2022-03-07 NOTE — H&P
Florence Castaneda MD      Tracey Parker is a 61 y.o. female, who came in for a procedure,  Thoracic medial branch RFA, T8,9, 10, BILATERAL    No change since last visit. Treatment done: physical therapy, anti-inflammatory medication and muscle relaxant    Allergies   Allergen Reactions    [place] [Macadamia Nut Oil] Other (See Comments)     Numbness and Tingling in mouth    Elemental Sulfur     Other Other (See Comments)     Artificial sweeteners - migraines    Saccharin     Sulfa Antibiotics Hives       Social History     Socioeconomic History    Marital status: Single     Spouse name: Not on file    Number of children: Not on file    Years of education: Not on file    Highest education level: Not on file   Occupational History    Not on file   Tobacco Use    Smoking status: Former Smoker     Packs/day: 1.00     Years: 7.00     Pack years: 7.00     Types: Cigarettes, E-Cigarettes     Start date: 2005     Quit date: 2011     Years since quittin.1    Smokeless tobacco: Never Used   Vaping Use    Vaping Use: Never used   Substance and Sexual Activity    Alcohol use: Yes     Alcohol/week: 0.0 standard drinks     Comment: occasionally, 1-3 times a month    Drug use: No    Sexual activity: Never   Other Topics Concern    Not on file   Social History Narrative    Not on file     Social Determinants of Health     Financial Resource Strain:     Difficulty of Paying Living Expenses: Not on file   Food Insecurity:     Worried About Running Out of Food in the Last Year: Not on file    Verenice of Food in the Last Year: Not on file   Transportation Needs:     Lack of Transportation (Medical): Not on file    Lack of Transportation (Non-Medical):  Not on file   Physical Activity:     Days of Exercise per Week: Not on file    Minutes of Exercise per Session: Not on file   Stress:     Feeling of Stress : Not on file   Social Connections:     Frequency of Communication with Friends and Family: Not on file    Frequency of Social Gatherings with Friends and Family: Not on file    Attends Restoration Services: Not on file    Active Member of Clubs or Organizations: Not on file    Attends Club or Organization Meetings: Not on file    Marital Status: Not on file   Intimate Partner Violence:     Fear of Current or Ex-Partner: Not on file    Emotionally Abused: Not on file    Physically Abused: Not on file    Sexually Abused: Not on file   Housing Stability:     Unable to Pay for Housing in the Last Year: Not on file    Number of [place] in the Last Year: Not on file    Unstable Housing in the Last Year: Not on file       Past Medical History:   Diagnosis Date    Allergic rhinitis     Depression     Hypertension     ABDI on CPAP     Osteoarthritis     Vitamin D deficiency        Past Surgical History:   Procedure Laterality Date    ARTHROCENTESIS Right 10/16/2017    RIGHT HIP LARGE JOINT INJECTION performed by Desi Garcia MD at [address] [place] [address] ARTHROCENTESIS Left 12/5/2017    LEFT HIP LARGE JOINT INJECTION performed by Desi Garcia MD at [address] [place] [address] ARTHROCENTESIS Right 7/18/2019    Rt.  Knee Steroid Injection performed by Desi Garcia MD at [address] Bilateral 7/19/2021    bilateral SI MBB # 1 performed by Desi Garcia MD at [address]  2002     right front upper implant    HAND SURGERY Right 05/15/2015    index finger cleaned out D/T infected cat bite    HEMORRHOID SURGERY  2016    series of 3 bandings for internal hemorrhoids, Dr. Glover Officer Left 2/7/2020    Right knee steroid injection performed by Desi Garcia MD at [address] [place] [address] Right 6/4/2021    Right knee Synvisc injection performed by Desi Garcia MD at [address] [place] [address] Right 1/4/2022    Right Synvisc One injection performed by Dominga Leblanc MD at [address] Right 4/16/2019    T-facet RFA @ T7-8, 8-9, 9-10 performed by Dominga Leblanc MD at [address] Left 6/13/2019    T-facet RFA @ T7-8, 8-9, 9-10 LEFT performed by Dominga Leblanc MD at [place] [address] FACET LEVEL 1 BILATERAL Bilateral 1/4/2019    LUMBAR FACET bilateral T-facet MBB @ T7-T8, T8-T9, and T9-T10 performed by Dominga Leblanc MD at [place] [address] FACET LEVEL 1 BILATERAL Bilateral 2/25/2019    Thoracic FACET bilateral T-facet MBB @ T7-T8, T8-T9, and T9-T10 MBB #2 performed by Dominga Leblanc MD at [place] [address] Left 12/05/2017    HIP INJECTION     NERVE SURGERY Bilateral 9/13/2019    bilateral T-facet MBB # 1 @ T4-5, T5-6, and T6-7. performed by Dominga Leblanc MD at [facility]  4/11/16    L4-5, L5-S1 Facet injection    OTHER SURGICAL HISTORY  06/13/2016    Right Hip Injection    OTHER SURGICAL HISTORY Right 10/16/2017    Hip Injection     PAIN MANAGEMENT PROCEDURE Right 7/31/2020    Right knee genicular nerve block performed by Dominga Leblanc MD at [facility] Right 8/31/2020    Right knee genicular nerve RFA performed by Dominga Leblanc MD at [facility] Right 1/12/2021    Bilateral T-facet RFA @ T7-8, T8-9, and T9-T10 RIGHT SIDE FIRST performed by Dominga Leblanc MD at [facility] Left 3/19/2021    Left T-facet RFA @ T7-8, T8-9, and T9-T10. performed by Dominga Leblanc MD at [address] [place], [place] [address]atib ARTHROCENTESIS ASPIR&/INJ MAJOR JT/BURSA W/O US Right 9/17/2018    RIGHT HIP LARGE JOINT STEROID INJECTION performed by Dominga Leblanc MD at [facility] OR    NE OFFICE/OUTPT VISIT,PROCEDURE ONLY N/A 11/9/2018    Kyphoplasty T12 BILATERAL performed by Karina Campa MD at [facility] 1998      as child    WISDOM [address]         Prior to Visit Medications    Medication Sig Taking? Authorizing Provider   HYDROcodone-acetaminophen (NORCO) 5-325 MG per tablet Take 1 tablet by mouth 2 times daily as needed for Pain for up to 30 days. Intended supply: 30 days Yes [place] Morning, APRN - CNP   triamterene (DYRENIUM) 50 MG capsule take 1 capsule by mouth once daily if needed for edema Yes Historical Provider, MD   CPAP Machine MISC by Does not apply route Please change CPAP pressure to 17 cm H20. Yes Rojas Milian PA-C   loratadine (CLARITIN) 10 MG tablet Take 10 mg by mouth daily Yes Historical Provider, MD   Turmeric 500 MG CAPS Take by mouth daily Yes Historical Provider, MD   cyclobenzaprine (FLEXERIL) 10 MG tablet Take 1 tablet by mouth 3 times daily as needed for Muscle spasms WARNING: This medication may make your drowsy. Do not operate heavy machinery or motor vehicles during use. Yes Silvana Lindsay PA-C   lisinopril (PRINIVIL;ZESTRIL) 10 MG tablet Take 10 mg by mouth nightly  Yes Historical Provider, MD   citalopram (CELEXA) 20 MG tablet Take 20 mg by mouth daily  Yes Historical Provider, MD   spironolactone (ALDACTONE) 25 MG tablet Take 1 tablet by mouth daily.  Yes Maranda Bueno MD   meloxicam (MOBIC) 15 MG tablet Take 1 tablet by mouth daily  [place] Morning, APRN - CNP   Elastic Bandages & Supports (B & B SACROILIAC BELT) MISC 1 Device by Does not apply route as needed (pain)  ChristianaTAMIE Foley - CNP   Cholecalciferol (VITAMIN D3) 5000 units CAPS Take 1 capsule by mouth daily  Historical Provider, MD   Omega-3 Fatty Acids (FISH OIL) 1200 MG CAPS   Historical Provider, MD   calcium carbonate (OSCAL) 500 MG TABS tablet Take 500 mg by mouth 2 times daily  Historical Provider, MD       Family History   Problem Relation Age of Onset    Substance Abuse Sister     Heart Disease Mother         stent    Cancer Neg Hx     Diabetes Neg Hx     High Blood Pressure Neg Hx     Stroke Neg Hx          Review of Systems : All systems reviewed, all unremarkable other than HPI. No change since last visit. Physical Exam  Constitutional:       Appearance: Normal appearance. HENT:      Head: Normocephalic and atraumatic. Cardiovascular:      Rate and Rhythm: Normal rate and regular rhythm. Pulses: Normal pulses. Pulmonary:      Effort: Pulmonary effort is normal. No respiratory distress. Breath sounds: Normal breath sounds. Musculoskeletal:         General: Tenderness present. Cervical back: Neck supple. Comments: THORACIC FACET     Neurological:      Mental Status: She is alert and oriented to person, place, and time. Psychiatric:         Behavior: Behavior normal.     :    Cervical Spine Exam: Full ROM, without limitation     Access: Adequate mouth opening       Assessment:   THORACIC SPONYDLOSIS     PLAN:     As advertised. Planned duration of treatment: 4 weeks. Further assessment and followup in  4 weeks for follow up post injection.        Electronically signed by Spero Aschoff, MD on 3/7/2022 at 9:36 AM

## 2022-03-07 NOTE — PROGRESS NOTES
1050: Patient to Phase II Recovery via bed in stable condition on room air. Patient alert and oriented at this time. Denies any pain, SOB, or nausea. 1052: Patient requesting a snack at this time. 1058: IV removed. No complications. 1102: Patient finishing her snack at this time. 1106: Patient getting dressed at this time. 1112: Patient discharged to vehicle via wheelchair.

## 2022-03-21 ENCOUNTER — OFFICE VISIT (OUTPATIENT)
Dept: PHYSICAL MEDICINE AND REHAB | Age: 57
End: 2022-03-21
Payer: COMMERCIAL

## 2022-03-21 VITALS
DIASTOLIC BLOOD PRESSURE: 82 MMHG | HEIGHT: 64 IN | SYSTOLIC BLOOD PRESSURE: 124 MMHG | WEIGHT: 293 LBS | BODY MASS INDEX: 50.02 KG/M2

## 2022-03-21 DIAGNOSIS — M16.0 PRIMARY OSTEOARTHRITIS OF BOTH HIPS: ICD-10-CM

## 2022-03-21 DIAGNOSIS — G89.29 CHRONIC MYOFASCIAL PAIN: ICD-10-CM

## 2022-03-21 DIAGNOSIS — M79.18 CHRONIC MYOFASCIAL PAIN: ICD-10-CM

## 2022-03-21 DIAGNOSIS — M46.1 SACROILIAC INFLAMMATION (HCC): ICD-10-CM

## 2022-03-21 DIAGNOSIS — M54.17 LUMBOSACRAL RADICULITIS: Primary | ICD-10-CM

## 2022-03-21 DIAGNOSIS — M17.11 PRIMARY OSTEOARTHRITIS OF RIGHT KNEE: ICD-10-CM

## 2022-03-21 DIAGNOSIS — G89.4 CHRONIC PAIN SYNDROME: ICD-10-CM

## 2022-03-21 DIAGNOSIS — M47.816 SPONDYLOSIS OF LUMBAR SPINE: ICD-10-CM

## 2022-03-21 DIAGNOSIS — M47.814 SPONDYLOSIS OF THORACIC REGION WITHOUT MYELOPATHY OR RADICULOPATHY: ICD-10-CM

## 2022-03-21 PROCEDURE — G8417 CALC BMI ABV UP PARAM F/U: HCPCS | Performed by: NURSE PRACTITIONER

## 2022-03-21 PROCEDURE — 3017F COLORECTAL CA SCREEN DOC REV: CPT | Performed by: NURSE PRACTITIONER

## 2022-03-21 PROCEDURE — G8484 FLU IMMUNIZE NO ADMIN: HCPCS | Performed by: NURSE PRACTITIONER

## 2022-03-21 PROCEDURE — 99214 OFFICE O/P EST MOD 30 MIN: CPT | Performed by: NURSE PRACTITIONER

## 2022-03-21 PROCEDURE — 1036F TOBACCO NON-USER: CPT | Performed by: NURSE PRACTITIONER

## 2022-03-21 PROCEDURE — G8427 DOCREV CUR MEDS BY ELIG CLIN: HCPCS | Performed by: NURSE PRACTITIONER

## 2022-03-21 RX ORDER — GABAPENTIN 300 MG/1
300 CAPSULE ORAL NIGHTLY
Qty: 14 CAPSULE | Refills: 0 | Status: SHIPPED | OUTPATIENT
Start: 2022-03-21 | End: 2022-06-08 | Stop reason: SDUPTHER

## 2022-03-21 RX ORDER — HYDROCODONE BITARTRATE AND ACETAMINOPHEN 5; 325 MG/1; MG/1
1 TABLET ORAL 2 TIMES DAILY PRN
Qty: 60 TABLET | Refills: 0 | Status: SHIPPED | OUTPATIENT
Start: 2022-03-23 | End: 2022-03-24 | Stop reason: SDUPTHER

## 2022-03-21 ASSESSMENT — ENCOUNTER SYMPTOMS
EYES NEGATIVE: 1
GASTROINTESTINAL NEGATIVE: 1
SINUS PAIN: 0
BACK PAIN: 1
SORE THROAT: 0
VOICE CHANGE: 0
RESPIRATORY NEGATIVE: 1

## 2022-03-21 NOTE — PROGRESS NOTES
[address] [facility] [address]  Dept: [phone]  Dept Fax: [phone]: [phone]    Visit Date: 3/21/2022    Functionality Assessment/Goals Worksheet     On a scale of 0 (Does not Interfere) to 10 (Completely Interferes)     1. Which number describes how during the past week pain has interfered with       the following:  A. General Activity:  5  B. Mood: 5  C. Walking Ability:  8  D. Normal Work (Includes both work outside the home and housework):  6  E. Relations with Other People:   5  F. Sleep:   5  G. Enjoyment of Life:   3       2. Patient Prefers to Take their Pain Medications:     [x]  On a regular basis   []  Only when necessary    []  Does not take pain medications    3. What are the Patient's Goals/Expectations for Visiting Pain Management? []  Learn about my pain    []  Receive Medication   []  Physical Therapy     []  Treat Depression   []  Receive Injections    []  Treat Sleep   []  Deal with Anxiety and Stress   []  Treat Opoid Dependence/Addiction   []  Other:      HPI:   Boo Landrum is a 61 y.o. female is here today for    Chief Complaint: Back pain, right knee pain     HPI   FU from bilateral T-facet RFA from 3/7/2022 with Dr. Marilyn Falcon. Receiving 85% relief of mid back pain from T-facet RFA- pain is greatly improved and still recieveing relief. Right knee pain remains tolerable from synvisc injection. Continues to have pain in bilateral SI area- sharp and stabbing with walking increased with activity  Has been having new intermittent numbness in right leg that stated this winter after she reports a fall. From knee down again intermittent. Discussed updated lumbar imaging   Norco prn remains effective for this. Pain increases with lifting, walking and standing. Medications reviewed. Patient denies side effects with medications.  Patient Rhythm: Normal rate and regular rhythm. Heart sounds: Normal heart sounds. No murmur heard. No friction rub. No gallop. Pulmonary:      Effort: Pulmonary effort is normal. No respiratory distress. Breath sounds: Normal breath sounds. No wheezing or rales. Chest:      Chest wall: No tenderness. Abdominal:      General: Bowel sounds are normal. There is no distension. Palpations: Abdomen is soft. Tenderness: There is no abdominal tenderness. There is no guarding or rebound. Musculoskeletal:         General: Swelling and tenderness present. Cervical back: Full passive range of motion without pain, normal range of motion and neck supple. Tenderness and bony tenderness present. No edema, erythema or rigidity. No muscular tenderness. Normal range of motion. Thoracic back: Bony tenderness present. Decreased range of motion. Lumbar back: Spasms, tenderness and bony tenderness present. Decreased range of motion. Negative right straight leg raise test and negative left straight leg raise test.        Back:       Right hip: Tenderness present. Left hip: Bony tenderness present. Decreased range of motion. Decreased strength. Right knee: Bony tenderness present. Decreased range of motion. Tenderness present. Right lower leg: Edema present. Left lower leg: Edema present. Legs:    Skin:     General: Skin is warm and dry. Coloration: Skin is not pale. Findings: No erythema or rash. Neurological:      General: No focal deficit present. Mental Status: She is alert and oriented to person, place, and time. She is not disoriented. Cranial Nerves: No cranial nerve deficit. Sensory: Sensory deficit present. Motor: Weakness present. No atrophy or abnormal muscle tone. Coordination: Coordination abnormal.      Gait: Gait abnormal.      Deep Tendon Reflexes: Babinski sign absent on the right side.       Comments: 4/5 strength right lower extremity    Psychiatric:         Attention and Perception: She is attentive. Mood and Affect: Mood is not anxious or depressed. Affect is not labile, blunt, angry or inappropriate. Speech: She is communicative. Speech is not rapid and pressured, delayed, slurred or tangential.         Behavior: Behavior normal. Behavior is not agitated, slowed, aggressive, withdrawn, hyperactive or combative. Thought Content: Thought content normal. Thought content is not paranoid or delusional. Thought content does not include homicidal or suicidal ideation. Thought content does not include homicidal or suicidal plan. Cognition and Memory: Memory is not impaired. She does not exhibit impaired recent memory or impaired remote memory. Judgment: Judgment normal. Judgment is not impulsive or inappropriate. SCOOTER  Patricks test  positive  Yeoman's  positive  Gaenslen's  positive        Assessment:     1. Lumbosacral radiculitis    2. Spondylosis of lumbar spine    3. Sacroiliac inflammation (HCC)    4. Spondylosis of thoracic region without myelopathy or radiculopathy    5. Primary osteoarthritis of both hips    6. Primary osteoarthritis of right knee    7. Chronic myofascial pain    8. Chronic pain syndrome            Plan:      · OARRS reviewed. Current MED: 10.00  · Patient was offered naloxone for home. · Discussed long term side effects of medications, tolerance, dependency and addiction. · Previous UDS reviewed  · UDS preformed today for compliance. · Patient told can not receive any pain medications from any other source. · No evidence of abuse, diversion or aberrant behavior.  Medications and/or procedures to improve function and quality of life- patient understanding with this and that may not be pain free   Discussed with patient about safe storage of medications at home   Discussed possible weaning of medication dosing dependent on treatment/procedure results.  Discussed with patient about risks with procedure including infection, reaction to medication, increased pain, or bleeding. · Procedure notes reviewed in detail   Receiving 85% relief of of mid back pain from bilateral T-facet RFA    Ordered updated lumbar xray d/t low back pain and new intermittent numbness in right leg    Ordered physical therapy for 6 weeks    Trial Neurontin 300 mg at bedtime for nerve pain- 2 week trial   · Continue Norco 5/325 BID prn- ordered refill    · Continue Flexeril prn from pcp- filled 3/14/2022      Meds. Prescribed:   Orders Placed This Encounter   Medications    HYDROcodone-acetaminophen (NORCO) 5-325 MG per tablet     Sig: Take 1 tablet by mouth 2 times daily as needed for Pain for up to 30 days. Intended supply: 30 days     Dispense:  60 tablet     Refill:  0     Reduce doses taken as pain becomes manageable    gabapentin (NEURONTIN) 300 MG capsule     Sig: Take 1 capsule by mouth nightly for 14 days. Dispense:  14 capsule     Refill:  0       Return in about 2 months (around 5/21/2022), or if symptoms worsen or fail to improve, for follow up  for medications.                Electronically signed by TAMIE Baez CNP on3/21/2022 at 1:39 PM

## 2022-03-24 DIAGNOSIS — M54.17 LUMBOSACRAL RADICULITIS: Primary | ICD-10-CM

## 2022-03-24 DIAGNOSIS — M47.814 SPONDYLOSIS OF THORACIC REGION WITHOUT MYELOPATHY OR RADICULOPATHY: ICD-10-CM

## 2022-03-24 DIAGNOSIS — G89.4 CHRONIC PAIN SYNDROME: ICD-10-CM

## 2022-03-24 RX ORDER — HYDROCODONE BITARTRATE AND ACETAMINOPHEN 5; 325 MG/1; MG/1
1 TABLET ORAL 2 TIMES DAILY PRN
Qty: 14 TABLET | Refills: 0 | Status: SHIPPED | OUTPATIENT
Start: 2022-03-24 | End: 2022-03-31

## 2022-03-24 NOTE — TELEPHONE ENCOUNTER
Pt. Requested refill on Norco and was positive for alcohol - UDS DONE 3/21/22. Refused refill and calling pt. To come in for repeat urine screen.

## 2022-03-24 NOTE — TELEPHONE ENCOUNTER
Pt. In for urine screen. Setup as you requested for 1 week  OARRS reviewed. UDS: + for  Alcohol, hydrocodone,  lexapro  Last seen: 3/21/2022.  Follow-up:   Future Appointments   Date Time Provider Christine Sarina   3/30/2022  9:15 AM CAROLE Joya PT [facility] [facility]   5/23/2022 11:45 AM TAMIE Mullins - CNP [facility] [place]   6/27/2022  1:00 PM Rylie Garibay 44

## 2022-03-24 NOTE — TELEPHONE ENCOUNTER
Left pt. On voice mail message about positie UDS for alcohol. Denied refill on Norco. To come in for repeat urine within 24 hrs. And will need negative results before refill.

## 2022-03-30 ENCOUNTER — HOSPITAL ENCOUNTER (OUTPATIENT)
Dept: PHYSICAL THERAPY | Age: 57
Setting detail: THERAPIES SERIES
Discharge: HOME | End: 2022-03-30
Payer: COMMERCIAL

## 2022-03-30 PROCEDURE — 97162 PT EVAL MOD COMPLEX 30 MIN: CPT

## 2022-03-30 NOTE — PROGRESS NOTES
** PLEASE SIGN, DATE AND TIME CERTIFICATION BELOW AND RETURN TO [facility] OUTPATIENT REHABILITATION (FAX #: [phone]). ATTEST/CO-SIGN IF ACCESSING VIA [employer]. THANK YOU.**    I certify that I have examined the patient below and determined that Physical Medicine and Rehabilitation service is necessary and that I approve the established plan of care for up to 90 days or as specifically noted. Attestation, signature or co-signature of physician indicates approval of certification requirements.    ________________________ ____________ __________  Physician Signature   Date   Time  [address] [facility]  PHYSICAL THERAPY  [x] EVALUATION  [] DAILY NOTE (LAND) [] DAILY NOTE (AQUATIC ) [] PROGRESS NOTE [] DISCHARGE NOTE    [x] [facility]   [] [facility] 90    [] [address] [facility]   [] [facility]    Date: 3/30/2022  Patient Name:  Shobha Norris  : 1965  MRN: [id number]  CSN: [id number]    Referring Practitioner TAMIE French*   Diagnosis Radiculopathy, lumbosacral region [M54.17]  Spondylosis without myelopathy or radiculopathy, lumbar region [M47.816]    Treatment Diagnosis Chronic SIJ pain, acute RLE radiculopathy, lumbar and hip tightness, core weakness, difficulty walking   Date of Evaluation 3/30/22    Additional Pertinent History Left ARIELLE . Obesity, arthritis, concussion in , HTN, anxiety and depression. Functional Outcome Measure Used Modified Oswestry   Functional Outcome Score 19/50=38% (3/30/22)       Insurance: Primary: Payor: Srinivasan Richards /  /  / ,   Secondary:    Authorization Information: PRECERTIFICATION REQUIRED:  Pre-cert required after initial evaluation through [facility]. If [employer] is secondary insurance no pre-cert is needed. INSURANCE THERAPY BENEFIT: Unlimited visits for anyone under the age of 21. For those 21 and above, 30 visits for OT, PT and ST each per calendar year are approved. Benefit will not cover maintenance or preventative treatment. FCE is a covered benefit.     AQUATIC THERAPY COVERED:   Yes  MODALITIES COVERED:  Yes. Hot/Cold Packs are not covered. TELEHEALTH COVERED:  Yes   Visit # 1, 1/10 for progress note   Visits Allowed: 1   Recertification Date: 2/19/00   Physician Follow-Up: 5/23/22   Physician Orders:    History of Present Illness: Pt presents with chronic lumbar and SIJ pain. She recently started having pain radiating down right leg to toes. . Pt has had injections that have helped. In December 2013, pt injured SI when she stepped out of house to sweep snow off patio, slipped on ice and went into splits and twisted to get out of it causing significant pain. Pt was unable to get medical treatment until a year later d/t insurance. Pt had reached a point that she thought she was going to be wheelchair bound. Pt reports PCP looking into minimally invasive procedure where pins are inserted into sacrum for stabilization. Pain has affected walking, standing tolerance, lifting, cleaning, and dressing. SUBJECTIVE: Pt was going to pool and exercising, such as light jogging, which helped improve muscle mass and mobility and lose some weight but then family issues and Covid happened and she has been as active since. Pt recently prescribed Gabapentin for radicular pain providing relief to overall pain but not right leg pain. Pt also takes Norco for pain. Pt has tried heat and ice without relief. Social/Functional History and Current Status:  Medications and Allergies have been reviewed and are listed on Medical History Questionnaire. Marichuy Dao lives alone in a single story home with 2 stairs and no handrail to enter.     Task Previous- prior to injury in 2013 Current   ADLs  Independent Modified Independent- difficulty with LE dressing, socks/shoes   IADL's Independent Modified Independent- takes extra time d/t pain, hasn't swept in months   Ambulation Independent Modified Independent   Transfers Independent Modified Independent   Recreation Independent Dependent/Unable   Community Integration Independent Independent- uses delivery service and cart to move groceries into home; uses electric cart in the stores   Driving Active  Active    Work Part-Time. 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        Objective:    OBSERVATION   Pain 7/10 SIJ, radiating down right leg   Palpation Tender B piriformis, R>L, tight quadratus lumborum   Sensation Numbness in both feet, right>left   Edema    Spinal Accessory Motion    Bed Mobility Mod I- labored movement, difficulty rolling on small bed, log roll technique   Transfers Mod I   Ambulation With cane- equal step length, minimal sway  Without cane- increased trunk sway, mild unsteadiness   Stairs Reciprocating pattern with cane and and handrail   Balance        POSTURE    No Deficit Deficit Comments   Forward Head  x    Rounded Shoulders  x    Kyphosis x     Lordosis  x decreased   Lateral Shift      Scoliosis      Iliac Crest x     PSIS      ASIS      Leg Length Discrp x  Legs appear equal in supine   Slumped Sitting          TRUNK RANGE OF MOTION   Flexion: Limited 75%- unable to bend over to pull sock on   Extension:    Lateral Flexion Left:    Lateral Flexion Right:    Rotation Left: WFL- tightness reported   Rotation Right: WFL- tightness reported   Trunk Range of Motion is [facility]  []     LOWER EXTREMITY RANGE OF MOTION    Left Right Comments   Hip Flexion knee to chest 50% 50%    Hip Extension 50% 50%    Hip ABDuction      Hip ADDuction      Hip Internal Rotation 50% 25%    Hip External Rotation 75% 50%    Hip Range of Motion is [facility]  []      Knee Flexion      Knee Extension      Knee Range of Motion is [facility]  [x]       LOWER EXTREMITY STRENGTH    Left Right Comments   Hip Flexion 3 5    Hip Abduction 5 5    Hip Adduction 5 5    Hip Extension      Hip External Rotation      Knee Flexion 5 5 Knee Extension      Ankle DF 5 5    Ankle PF      LE strength is WFL []      CORE STRENGTH   Weakness noted with rolling in bed, bridging       SPECIAL TESTS (+/-)    Left Right Comments   SLR  - -    90/90 Hamstring length      SKTC - -    DKTC - -    Slump      SI Compression/Distraction      SCOOTER - +    FADIR - +    Emilie Silversmith            TREATMENT   Precautions:    Pain: 7/10 right SIJ radiating down right leg to toes    X in shaded column indicates activity completed today   Modalities Parameters/  Location  Notes                     Manual Therapy Time/Technique  Notes                     Exercise/Intervention   Notes                                                                                  Specific Interventions Next Treatment: LTR, lumbar, piriformis and hamstring stretches, core strengthening, balance training; modalities and manual as needed, transition to aquatics if land unsuccessful after 5-6 visits    Activity/Treatment Tolerance:  [x]  Patient tolerated treatment well  []  Patient limited by fatigue  []  Patient limited by pain   []  Patient limited by medical complications  []  Other:     Assessment: 61year old presents with chronic SIJ pain and acute RLE radiculopathy. Pt obese which affects ROM but tightness noted in low back and both hips. Pt demos core weakness affecting stability when walking. Pt has poor standing and walking tolerance, uses a cane or furniture walks, and unable to clean home or lift things from the floor, light or heavy. No pelvic alignment issues noted. Pt will benefit from skilled PT to address listed impairments to reduce pain and improve ROM, strength and mobility.      Body Structures/Functions/Activity Limitations: impaired activity tolerance, impaired balance, impaired ROM, impaired strength, pain, abnormal gait and abnormal posture  Prognosis: good      Goals    Patient Goal: Figure out right leg issue    Short Term Goals: 4 weeks  Patient will demonstrate good core engagement and postural awareness during therapy session with <4 cues  to carry over to standing to do dishes and cook  Patient will have <50% lumbar and hip AROM restriction for ease bending over to niharika socks and shoes    Long Term Goals: 8 weeks  Patient will demonstrate good core engagement and postural awareness during therapy session without cues  to carry over to lifting household items from various levels  Patient will tolerate walking >20 minutes at grocery store with cart with < 4/10 pain. Patient will be independent and compliant with HEP to achieve above goals. Patient Education:   [x]  HEP/Education Completed: Plan of Care, Goals, core engagement   [facility] Access Code:  []  No new Education completed  []  Reviewed Prior HEP      [x]  Patient verbalized and/or demonstrated understanding of education provided. []  Patient unable to verbalize and/or demonstrate understanding of education provided. Will continue education. []  Barriers to learning:     PLAN:  Treatment Recommendations: Strengthening, Range of Motion, Balance Training, Functional Mobility Training, Gait Training, Manual Therapy - Soft Tissue Mobilization, Home Exercise Program, Patient Education, Aquatics and Modalities    [x]  Plan of care initiated. Plan to see patient 2 times per week for 8 weeks to address the treatment planned outlined above.   []  Continue with current plan of care  []  Modify plan of care as follows:    []  Hold pending physician visit  []  Discharge    Time In 0916   Time Out 0956   Timed Code Minutes: 0 min   Total Treatment Time: 40 min     Electronically Signed by: Stephanie Jensen PT

## 2022-04-05 ENCOUNTER — HOSPITAL ENCOUNTER (OUTPATIENT)
Dept: PHYSICAL THERAPY | Age: 57
Setting detail: THERAPIES SERIES
Discharge: HOME | End: 2022-04-05
Payer: COMMERCIAL

## 2022-04-05 PROCEDURE — 97110 THERAPEUTIC EXERCISES: CPT

## 2022-04-05 NOTE — PROGRESS NOTES
[address] [facility]  PHYSICAL THERAPY  [] EVALUATION  [x] DAILY NOTE (LAND) [] DAILY NOTE (AQUATIC ) [] PROGRESS NOTE [] DISCHARGE NOTE    [x] OUTPATIENT REHABILITATION [facility]   [] [place] [address]    [] [facility]   [] [facility]    Date: 2022  Patient Name:  Hayden Thompson  : 1965  MRN: [id number]  CSN: [id number]    Referring Practitioner TAMIE Mauricio*   Diagnosis Radiculopathy, lumbosacral region [M54.17]  Spondylosis without myelopathy or radiculopathy, lumbar region [M47.816]    Treatment Diagnosis Chronic SIJ pain, acute RLE radiculopathy, lumbar and hip tightness, core weakness, difficulty walking   Date of Evaluation 3/30/22    Additional Pertinent History Left ARIELLE . Obesity, arthritis, concussion in , HTN, anxiety and depression. Functional Outcome Measure Used Modified Oswestry   Functional Outcome Score 19/50=38% (3/30/22)       Insurance: Primary: Payor: Kenroy Matias /  /  / ,   Secondary:    Authorization Information: PRECERTIFICATION REQUIRED:  Pre-cert required after initial evaluation through [address] [place] [address]. If [facility] is secondary insurance no pre-cert is needed. INSURANCE THERAPY BENEFIT: Unlimited visits for anyone under the age of 21. For those 21 and above, 30 visits for OT, PT and ST each per calendar year are approved. Benefit will not cover maintenance or preventative treatment. FCE is a covered benefit.     AQUATIC THERAPY COVERED:   Yes  MODALITIES COVERED:  Yes. Hot/Cold Packs are not covered. TELEHEALTH COVERED:  Yes   Visit # 2, 2/10 for progress note   Visits Allowed: eval +  RECEIVED AUTH FOR PT  TOTAL  UNITS  FROM 22 TO   CPT CODES:  10829, 25193, 79755, 99934, 95539, 54936   Recertification Date: 3/52/78   Physician Follow-Up: 22   Physician Orders:    History of Present Illness: Pt presents with chronic lumbar and SIJ pain.  She recently started having pain radiating down right leg to toes. . Pt has had injections that have helped. In December 2013, pt injured SI when she stepped out of house to sweep snow off patio, slipped on ice and went into splits and twisted to get out of it causing significant pain. Pt was unable to get medical treatment until a year later d/t insurance. Pt had reached a point that she thought she was going to be wheelchair bound. Pt reports PCP looking into minimally invasive procedure where pins are inserted into sacrum for stabilization. Pain has affected walking, standing tolerance, lifting, cleaning, and dressing. SUBJECTIVE: Pt reports back and right leg are feeling the same. Pt arrived 7 min late, and reports she left mask in [place] after she was up to [facility] so went out to get it. Pt notes pain is flared up from all the walking. OBJECTIVE:  TREATMENT   Precautions:    Pain: 9/10 right SIJ radiating down right leg to toes    X in shaded column indicates activity completed today   Modalities Parameters/  Location  Notes   MHP concurrent with exercises  x Extra towel layer               Manual Therapy Time/Technique  Notes                     Exercise/Intervention   Notes   LTR 5x5 sec B  x    Hamstring stretch with strap 5x10 sec B  x    SKTC, piriformis IR stretches with strap 5x10 sec B  x    Hooklying with bolster under knees:       Abdominal bracing 10x5 sec  x    Glut sets 10x5 sec  x    SAQ                                     Specific Interventions Next Treatment: core strengthening, balance training; modalities and manual as needed, transition to aquatics if land unsuccessful after 5-6 visits    Activity/Treatment Tolerance:  [x]  Patient tolerated treatment well  []  Patient limited by fatigue  []  Patient limited by pain   []  Patient limited by medical complications  []  Other:     Assessment: Introduced multiple lumbar and hip stretches, as well as abdominal bracing and glut sets, all while lying on MHP.  Pt notes arm soreness holding legs up during stretches. Pt get SOB quickly and required multiple short rest breaks. No adverse effects to session but minimal change in pain. Goals    Patient Goal: Figure out right leg issue    Short Term Goals: 4 weeks  Patient will demonstrate good core engagement and postural awareness during therapy session with <4 cues  to carry over to standing to do dishes and cook  Patient will have <50% lumbar and hip AROM restriction for ease bending over to niharika socks and shoes    Long Term Goals: 8 weeks  Patient will demonstrate good core engagement and postural awareness during therapy session without cues  to carry over to lifting household items from various levels  Patient will tolerate walking >20 minutes at grocery store with cart with < 4/10 pain. Patient will be independent and compliant with HEP to achieve above goals. Patient Education:   [x]  HEP/Education Completed: stretches and abdominal bracing with handout   [employer] Access Code:  []  No new Education completed  []  Reviewed Prior HEP      [x]  Patient verbalized and/or demonstrated understanding of education provided. []  Patient unable to verbalize and/or demonstrate understanding of education provided. Will continue education. []  Barriers to learning:     PLAN:  Treatment Recommendations: Strengthening, Range of Motion, Balance Training, Functional Mobility Training, Gait Training, Manual Therapy - Soft Tissue Mobilization, Home Exercise Program, Patient Education, Aquatics and Modalities    []  Plan of care initiated. Plan to see patient 2 times per week for 8 weeks to address the treatment planned outlined above.   [x]  Continue with current plan of care  []  Modify plan of care as follows:    []  Hold pending physician visit  []  Discharge    Time In 1007   Time Out 1047   Timed Code Minutes: 40 min   Total Treatment Time: 40 min     Electronically Signed by: Rob Woo PT

## 2022-04-11 ENCOUNTER — PATIENT MESSAGE (OUTPATIENT)
Dept: PHYSICAL MEDICINE AND REHAB | Age: 57
End: 2022-04-11

## 2022-04-11 DIAGNOSIS — G89.4 CHRONIC PAIN SYNDROME: ICD-10-CM

## 2022-04-11 DIAGNOSIS — M47.816 SPONDYLOSIS OF LUMBAR SPINE: ICD-10-CM

## 2022-04-11 DIAGNOSIS — M54.17 LUMBOSACRAL RADICULITIS: Primary | ICD-10-CM

## 2022-04-12 RX ORDER — HYDROCODONE BITARTRATE AND ACETAMINOPHEN 5; 325 MG/1; MG/1
1 TABLET ORAL 2 TIMES DAILY PRN
Qty: 60 TABLET | Refills: 0 | Status: SHIPPED | OUTPATIENT
Start: 2022-04-12 | End: 2022-06-02 | Stop reason: SDUPTHER

## 2022-04-12 NOTE — TELEPHONE ENCOUNTER
OARRS reviewed. UDS: + for  Cyclobenzaprine, Norhydrocodone -consistent. Last seen: 3/21/2022.  Follow-up: 5/23/2022

## 2022-04-12 NOTE — TELEPHONE ENCOUNTER
From: Henry Thibodeaux  To: Zach Henson  Sent: 4/11/2022 7:15 AM EDT  Subject: [address] please    May I have a norco refill please?   Thank You

## 2022-04-13 ENCOUNTER — HOSPITAL ENCOUNTER (OUTPATIENT)
Dept: PHYSICAL THERAPY | Age: 57
Setting detail: THERAPIES SERIES
Discharge: HOME | End: 2022-04-13
Payer: COMMERCIAL

## 2022-04-13 PROCEDURE — 97110 THERAPEUTIC EXERCISES: CPT

## 2022-04-13 NOTE — PROGRESS NOTES
[address] [facility]  PHYSICAL THERAPY  [] EVALUATION  [x] DAILY NOTE (LAND) [] DAILY NOTE (AQUATIC)   [] PROGRESS NOTE [] DISCHARGE NOTE    [x] OUTPATIENT REHABILITATION CENTER [facility]   [] [place] [address]    [] [facility]   [] Eloy Rouse    Date: 2022  Patient Name:  Alex Murillo  : 1965  MRN: [id number]  CSN: [id number]    Referring Practitioner TAMIE Francisco*   Diagnosis Radiculopathy, lumbosacral region [M54.17]  Spondylosis without myelopathy or radiculopathy, lumbar region [M47.816]    Treatment Diagnosis Chronic SIJ pain, acute RLE radiculopathy, lumbar and hip tightness, core weakness, difficulty walking   Date of Evaluation 3/30/22    Additional Pertinent History Left ARIELLE . Obesity, arthritis, concussion in , HTN, anxiety and depression. Functional Outcome Measure Used Modified Oswestry   Functional Outcome Score 19/50=38% (3/30/22)       Insurance: Primary: Payor: Marko Barrera /  /  / ,   Secondary:    Authorization Information: PRECERTIFICATION REQUIRED:  Pre-cert required after initial evaluation through [address] [place] [address]. If [facility] is secondary insurance no pre-cert is needed. INSURANCE THERAPY BENEFIT: Unlimited visits for anyone under the age of 21. For those 21 and above, 30 visits for OT, PT and ST each per calendar year are approved. Benefit will not cover maintenance or preventative treatment. FCE is a covered benefit.     AQUATIC THERAPY COVERED:   Yes  MODALITIES COVERED:  Yes. Hot/Cold Packs are not covered. TELEHEALTH COVERED:  Yes   Visit # 3, 3/10 for progress note   Visits Allowed: eval +  RECEIVED AUTH FOR PT  TOTAL  UNITS  FROM 22 TO   CPT CODES:  99723, 70208, 91072, 75968, 63790, [id number]   Recertification Date:    Physician Follow-Up: 22   Physician Orders:    History of Present Illness: Pt presents with chronic lumbar and SIJ pain.  She recently started having pain of therapy session. Occasional short rest breaks due to mild shortness of breath. Cued patient for breathing techniques during activities. Goals    Patient Goal: Figure out right leg issue    Short Term Goals: 4 weeks  Patient will demonstrate good core engagement and postural awareness during therapy session with <4 cues  to carry over to standing to do dishes and cook  Patient will have <50% lumbar and hip AROM restriction for ease bending over to niharika socks and shoes    Long Term Goals: 8 weeks  Patient will demonstrate good core engagement and postural awareness during therapy session without cues  to carry over to lifting household items from various levels  Patient will tolerate walking >20 minutes at grocery store with cart with < 4/10 pain. Patient will be independent and compliant with HEP to achieve above goals. Patient Education:   [x]  HEP/Education Completed: Continue previous HEP per handout, monitor symptoms   Medbridge Access Code:  []  No new Education completed  []  Reviewed Prior HEP      [x]  Patient verbalized and/or demonstrated understanding of education provided. []  Patient unable to verbalize and/or demonstrate understanding of education provided. Will continue education. []  Barriers to learning:     PLAN:  Treatment Recommendations: Strengthening, Range of Motion, Balance Training, Functional Mobility Training, Gait Training, Manual Therapy - Soft Tissue Mobilization, Home Exercise Program, Patient Education, Aquatics and Modalities    []  Plan of care initiated. Plan to see patient 2 times per week for 8 weeks to address the treatment planned outlined above.   [x]  Continue with current plan of care  []  Modify plan of care as follows:    []  Hold pending physician visit  []  Discharge    Time In 1230   Time Out 1315   Timed Code Minutes: 45 min   Total Treatment Time: 45 min     Electronically Signed by: Robbie Botello PTA

## 2022-04-15 ENCOUNTER — HOSPITAL ENCOUNTER (OUTPATIENT)
Dept: PHYSICAL THERAPY | Age: 57
Setting detail: THERAPIES SERIES
Discharge: HOME | End: 2022-04-15
Payer: COMMERCIAL

## 2022-04-15 PROCEDURE — 97110 THERAPEUTIC EXERCISES: CPT

## 2022-04-15 NOTE — PROGRESS NOTES
radiating down right leg to toes. . Pt has had injections that have helped. In December 2013, pt injured SI when she stepped out of house to sweep snow off patio, slipped on ice and went into splits and twisted to get out of it causing significant pain. Pt was unable to get medical treatment until a year later d/t insurance. Pt had reached a point that she thought she was going to be wheelchair bound. Pt reports PCP looking into minimally invasive procedure where pins are inserted into sacrum for stabilization. Pain has affected walking, standing tolerance, lifting, cleaning, and dressing. SUBJECTIVE: Pt states pain was at a 7/10 this morning and upon ambulating into clinic it radhika to 9/10. Notes radicular symptoms in RLE. Felt sore from previous treatment session. OBJECTIVE:  TREATMENT   Precautions:    Pain: 9/10 Right SI joint radiating down right leg to toes    X in shaded column indicates activity completed today   Modalities Parameters/  Location  Notes   MHP concurrent with exercises  X Extra towel layer               Manual Therapy Time/Technique  Notes                     Exercise/Intervention   Notes   LTR 12x5 sec B  X    Hamstring stretch with strap 5x10 sec B  X    SKTC, piriformis IR stretches with strap 5x15 sec B  X           Hooklying with bolster under knees:       Abdominal bracing 10x5 sec      Glut sets 10x5 sec      SAQ 10x B             Nu step 5 min Level 0 x                    Specific Interventions Next Treatment: core strengthening, balance training; modalities and manual as needed, transition to aquatics if land unsuccessful after 5-6 visits    Activity/Treatment Tolerance:  [x]  Patient tolerated treatment well  []  Patient limited by fatigue  []  Patient limited by pain   []  Patient limited by medical complications  []  Other:     Assessment: Limited treatment session. Continued therex as noted above with increased reps and addition of nu step as tolerated.  Pt completed therex with slow pace. Vcs for proper technique needed. Will continue to progress as tolerated by pt per POC. Goals    Patient Goal: Figure out right leg issue    Short Term Goals: 4 weeks  Patient will demonstrate good core engagement and postural awareness during therapy session with <4 cues  to carry over to standing to do dishes and cook  Patient will have <50% lumbar and hip AROM restriction for ease bending over to niharika socks and shoes    Long Term Goals: 8 weeks  Patient will demonstrate good core engagement and postural awareness during therapy session without cues  to carry over to lifting household items from various levels  Patient will tolerate walking >20 minutes at grocery store with cart with < 4/10 pain. Patient will be independent and compliant with HEP to achieve above goals. Patient Education:   []  HEP/Education Completed: Continue previous HEP per handout, monitor symptoms   Medbridge Access Code:  []  No new Education completed  [x]  Reviewed Prior HEP      [x]  Patient verbalized and/or demonstrated understanding of education provided. []  Patient unable to verbalize and/or demonstrate understanding of education provided. Will continue education. []  Barriers to learning:     PLAN:  Treatment Recommendations: Strengthening, Range of Motion, Balance Training, Functional Mobility Training, Gait Training, Manual Therapy - Soft Tissue Mobilization, Home Exercise Program, Patient Education, Aquatics and Modalities    []  Plan of care initiated. Plan to see patient 2 times per week for 8 weeks to address the treatment planned outlined above.   [x]  Continue with current plan of care  []  Modify plan of care as follows:    []  Hold pending physician visit  []  Discharge    Time In 0745   Time Out 0815   Timed Code Minutes: 30   Total Treatment Time: 30     Electronically Signed by: Ria Issa PTA

## 2022-04-18 ENCOUNTER — APPOINTMENT (OUTPATIENT)
Dept: PHYSICAL THERAPY | Age: 57
End: 2022-04-18
Payer: COMMERCIAL

## 2022-04-20 ENCOUNTER — APPOINTMENT (OUTPATIENT)
Dept: PHYSICAL THERAPY | Age: 57
End: 2022-04-20
Payer: COMMERCIAL

## 2022-04-21 ENCOUNTER — APPOINTMENT (OUTPATIENT)
Dept: PHYSICAL THERAPY | Age: 57
End: 2022-04-21
Payer: COMMERCIAL

## 2022-04-25 ENCOUNTER — HOSPITAL ENCOUNTER (OUTPATIENT)
Dept: PHYSICAL THERAPY | Age: 57
Setting detail: THERAPIES SERIES
Discharge: HOME | End: 2022-04-25
Payer: COMMERCIAL

## 2022-04-25 PROCEDURE — 97110 THERAPEUTIC EXERCISES: CPT

## 2022-04-25 NOTE — PROGRESS NOTES
[address] [facility]  PHYSICAL THERAPY  [] EVALUATION  [x] DAILY NOTE (LAND) [] DAILY NOTE (AQUATIC)   [] PROGRESS NOTE [] DISCHARGE NOTE    [x] OUTPATIENT REHABILITATION [facility]   [] [place] [address]    [] [facility]   [] Kandi Bosworth    Date: 2022  Patient Name:  Barbara Robb  : 1965  MRN: [id number]  CSN: [id number]    Referring Practitioner TAMIE Manzo*   Diagnosis Radiculopathy, lumbosacral region [M54.17]  Spondylosis without myelopathy or radiculopathy, lumbar region [M47.816]    Treatment Diagnosis Chronic SIJ pain, acute RLE radiculopathy, lumbar and hip tightness, core weakness, difficulty walking   Date of Evaluation 3/30/22    Additional Pertinent History Left ARIELLE . Obesity, arthritis, concussion in , HTN, anxiety and depression. Functional Outcome Measure Used Modified Oswestry   Functional Outcome Score 19/50=38% (3/30/22)       Insurance: Primary: Payor: Alejandro Guthrie /  /  / ,   Secondary:    Authorization Information: PRECERTIFICATION REQUIRED:  Pre-cert required after initial evaluation through [address] [place] [address]. If [facility] is secondary insurance no pre-cert is needed. INSURANCE THERAPY BENEFIT: Unlimited visits for anyone under the age of 21. For those 21 and above, 30 visits for OT, PT and ST each per calendar year are approved. Benefit will not cover maintenance or preventative treatment. FCE is a covered benefit.     AQUATIC THERAPY COVERED:   Yes  MODALITIES COVERED:  Yes. Hot/Cold Packs are not covered. TELEHEALTH COVERED:  Yes   Visit # 5, 5/10 for progress note   Visits Allowed: eval +  RECEIVED AUTH FOR PT  TOTAL  UNITS  FROM 22 TO   CPT CODES:  38551, 75524, 91324, 36954, 79478, 27876   Recertification Date: 96   Physician Follow-Up: 22   Physician Orders:    History of Present Illness: Pt presents with chronic lumbar and SIJ pain.  She recently started having pain radiating down right leg to toes. . Pt has had injections that have helped. In December 2013, pt injured SI when she stepped out of house to sweep snow off patio, slipped on ice and went into splits and twisted to get out of it causing significant pain. Pt was unable to get medical treatment until a year later d/t insurance. Pt had reached a point that she thought she was going to be wheelchair bound. Pt reports PCP looking into minimally invasive procedure where pins are inserted into sacrum for stabilization. Pain has affected walking, standing tolerance, lifting, cleaning, and dressing. SUBJECTIVE: Patient states her lower back continues to bother her and rates current pain 9/10 along with radicular symptoms in RLE. Notes she continues to have stiffness. Reports compliance with HEP. OBJECTIVE:  TREATMENT   Precautions:    Pain: 9/10 Right SI joint radiating down right leg to toes    X in shaded column indicates activity completed today   Modalities Parameters/  Location  Notes   MHP concurrent with exercises  X Extra towel layer               Manual Therapy Time/Technique  Notes                     Exercise/Intervention   Notes   LTR 12x5 sec B  X    Hamstring stretch with strap 5x10 sec B  X    SKTC, piriformis IR stretches with strap 5x15 sec B  X           Hooklying with bolster under knees:       Abdominal bracing 10x5 sec  x    Glut sets 10x5 sec  x    SAQ 10x B  x           Nu step seat 12 5 min Level 1 x                    Specific Interventions Next Treatment: core strengthening, balance training; modalities and manual as needed, transition to aquatics if land unsuccessful after 5-6 visits    Activity/Treatment Tolerance:  [x]  Patient tolerated treatment well  []  Patient limited by fatigue  []  Patient limited by pain   []  Patient limited by medical complications  []  Other:     Assessment: Patient completed therex as listed above working on flexibility and strength.  Completed exercises with slow pace and rest breaks. Provided cues for correct exercise technique during treatment. Increased resistance on NuStep and added back previously held exercises. Will continue to progress as tolerated by patient per POC. Goals    Patient Goal: Figure out right leg issue    Short Term Goals: 4 weeks  Patient will demonstrate good core engagement and postural awareness during therapy session with <4 cues  to carry over to standing to do dishes and cook  Patient will have <50% lumbar and hip AROM restriction for ease bending over to niharika socks and shoes    Long Term Goals: 8 weeks  Patient will demonstrate good core engagement and postural awareness during therapy session without cues  to carry over to lifting household items from various levels  Patient will tolerate walking >20 minutes at grocery store with cart with < 4/10 pain. Patient will be independent and compliant with HEP to achieve above goals. Patient Education:   []  HEP/Education Completed: Continue previous HEP per handout, monitor symptoms   Medbridge Access Code:  []  No new Education completed  [x]  Reviewed Prior HEP      [x]  Patient verbalized and/or demonstrated understanding of education provided. []  Patient unable to verbalize and/or demonstrate understanding of education provided. Will continue education. []  Barriers to learning:     PLAN:  Treatment Recommendations: Strengthening, Range of Motion, Balance Training, Functional Mobility Training, Gait Training, Manual Therapy - Soft Tissue Mobilization, Home Exercise Program, Patient Education, Aquatics and Modalities    []  Plan of care initiated. Plan to see patient 2 times per week for 8 weeks to address the treatment planned outlined above.   [x]  Continue with current plan of care  []  Modify plan of care as follows:    []  Hold pending physician visit  []  Discharge    Time In 1130   Time Out 1210   Timed Code Minutes: 40 min   Total Treatment Time: 40 min Electronically Signed by: Lois Verma, PTA

## 2022-04-28 ENCOUNTER — HOSPITAL ENCOUNTER (OUTPATIENT)
Dept: PHYSICAL THERAPY | Age: 57
Setting detail: THERAPIES SERIES
Discharge: HOME | End: 2022-04-28
Payer: COMMERCIAL

## 2022-04-28 PROCEDURE — 97110 THERAPEUTIC EXERCISES: CPT

## 2022-04-28 NOTE — PROGRESS NOTES
[address] [facility]  PHYSICAL THERAPY  [] EVALUATION  [x] DAILY NOTE (LAND) [] DAILY NOTE (AQUATIC)   [] PROGRESS NOTE [] DISCHARGE NOTE    [x] OUTPATIENT REHABILITATION CENTER [facility]   [] [place] [address]    [] [facility]   [] Eloy Rouse    Date: 2022  Patient Name:  Alex Murillo  : 1965  MRN: [id number]  CSN: [id number]    Referring Practitioner TAMIE Francisco*   Diagnosis Radiculopathy, lumbosacral region [M54.17]  Spondylosis without myelopathy or radiculopathy, lumbar region [M47.816]    Treatment Diagnosis Chronic SIJ pain, acute RLE radiculopathy, lumbar and hip tightness, core weakness, difficulty walking   Date of Evaluation 3/30/22    Additional Pertinent History Left ARIELLE . Obesity, arthritis, concussion in , HTN, anxiety and depression. Functional Outcome Measure Used Modified Oswestry   Functional Outcome Score 19/50=38% (3/30/22)       Insurance: Primary: Payor: Marko Barrera /  /  / ,   Secondary:    Authorization Information: PRECERTIFICATION REQUIRED:  Pre-cert required after initial evaluation through [address] [place] [address]. If [facility] is secondary insurance no pre-cert is needed. INSURANCE THERAPY BENEFIT: Unlimited visits for anyone under the age of 21. For those 21 and above, 30 visits for OT, PT and ST each per calendar year are approved. Benefit will not cover maintenance or preventative treatment. FCE is a covered benefit.     AQUATIC THERAPY COVERED:   Yes  MODALITIES COVERED:  Yes. Hot/Cold Packs are not covered. TELEHEALTH COVERED:  Yes   Visit # 6, 6/10 for progress note   Visits Allowed: eval +  RECEIVED AUTH FOR PT  TOTAL  UNITS  FROM 22 TO   CPT CODES:  44792, 18871, 28645, 69726, 70173, 82949   Recertification Date:    Physician Follow-Up: 22   Physician Orders:    History of Present Illness: Pt presents with chronic lumbar and SIJ pain.  She recently started having pain radiating down right leg to toes. . Pt has had injections that have helped. In December 2013, pt injured SI when she stepped out of house to sweep snow off patio, slipped on ice and went into splits and twisted to get out of it causing significant pain. Pt was unable to get medical treatment until a year later d/t insurance. Pt had reached a point that she thought she was going to be wheelchair bound. Pt reports PCP looking into minimally invasive procedure where pins are inserted into sacrum for stabilization. Pain has affected walking, standing tolerance, lifting, cleaning, and dressing. SUBJECTIVE: Patient reports her back feels about the same and therapy sessions don't change pain for worse or better. Pt notes right knee is bothering her today. Pt doing HEP ~4 days/wk.      OBJECTIVE:  TREATMENT   Precautions:    Pain: 7/10 Right SI joint and right knee; no current radiating pain down right leg to toes    X in shaded column indicates activity completed today   Modalities Parameters/  Location  Notes   MHP concurrent with exercises  X Extra towel layer               Manual Therapy Time/Technique  Notes                     Exercise/Intervention   Notes   LTR 12x5 sec B  X    Hamstring stretch with strap 4x15 sec B  X    SKTC, piriformis IR stretches with strap 4x15 sec B  X           Hooklying with bolster under knees:       Abdominal bracing 10x5 sec  x    Glut sets 10x5 sec      SAQ 10x B  x           Nu step seat 12, arms 10 5 min Level 1 x                    Specific Interventions Next Treatment: core strengthening, balance training; modalities and manual as needed, transition to aquatics if land unsuccessful after 5-6 visits    Activity/Treatment Tolerance:  [x]  Patient tolerated treatment well  []  Patient limited by fatigue  []  Patient limited by pain   []  Patient limited by medical complications  []  Other:     Assessment: Lengthened hold time and decreased reps with stretches, otherwise no other progressions. Pt moves very slow through exercises but worked up a sweat. Pt reports right knee and back feel better at end of session. Goals    Patient Goal: Figure out right leg issue    Short Term Goals: 4 weeks  Patient will demonstrate good core engagement and postural awareness during therapy session with <4 cues  to carry over to standing to do dishes and cook  Patient will have <50% lumbar and hip AROM restriction for ease bending over to niharika socks and shoes    Long Term Goals: 8 weeks  Patient will demonstrate good core engagement and postural awareness during therapy session without cues  to carry over to lifting household items from various levels  Patient will tolerate walking >20 minutes at grocery store with cart with < 4/10 pain. Patient will be independent and compliant with HEP to achieve above goals. Patient Education:   []  HEP/Education Completed: Continue previous HEP per handout, monitor symptoms   [employer] Access Code:  []  No new Education completed  [x]  Reviewed Prior HEP      [x]  Patient verbalized and/or demonstrated understanding of education provided. []  Patient unable to verbalize and/or demonstrate understanding of education provided. Will continue education. []  Barriers to learning:     PLAN:  Treatment Recommendations: Strengthening, Range of Motion, Balance Training, Functional Mobility Training, Gait Training, Manual Therapy - Soft Tissue Mobilization, Home Exercise Program, Patient Education, Aquatics and Modalities    []  Plan of care initiated. Plan to see patient 2 times per week for 8 weeks to address the treatment planned outlined above.   [x]  Continue with current plan of care  []  Modify plan of care as follows:    []  Hold pending physician visit  []  Discharge    Time In 1433   Time Out 1518   Timed Code Minutes: 45 min   Total Treatment Time: 45 min     Electronically Signed by: Armand Allred PT

## 2022-05-02 ENCOUNTER — HOSPITAL ENCOUNTER (OUTPATIENT)
Dept: PHYSICAL THERAPY | Age: 57
Setting detail: THERAPIES SERIES
Discharge: HOME | End: 2022-05-02
Payer: COMMERCIAL

## 2022-05-02 PROCEDURE — 97110 THERAPEUTIC EXERCISES: CPT

## 2022-05-02 NOTE — PROGRESS NOTES
[address] [facility]  PHYSICAL THERAPY  [] EVALUATION  [x] DAILY NOTE (LAND) [] DAILY NOTE (AQUATIC)   [] PROGRESS NOTE [] DISCHARGE NOTE    [x] OUTPATIENT REHABILITATION [facility]   [] [place] [address]    [] 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 Obesity, arthritis, concussion in , HTN, anxiety and depression. Functional Outcome Measure Used Modified Oswestry   Functional Outcome Score 19/50=38% (3/30/22)       Insurance: Primary: Payor: Joceline Simon /  /  / ,   Secondary:    Authorization Information: PRECERTIFICATION REQUIRED:  Pre-cert required after initial evaluation through [email or web address]. If [employer] is secondary insurance no pre-cert is needed. INSURANCE THERAPY BENEFIT: Unlimited visits for anyone under the age of 21. For those 21 and above, 30 visits for OT, PT and ST each per calendar year are approved. Benefit will not cover maintenance or preventative treatment. FCE is a covered benefit.     AQUATIC THERAPY COVERED:   Yes  MODALITIES COVERED:  Yes. Hot/Cold Packs are not covered. TELEHEALTH COVERED:  Yes   Visit # 6, 10 for progress note   Visits Allowed: eval +  RECEIVED AUTH FOR PT  TOTAL  UNITS  FROM 22 TO   CPT CODES:  12645, 52937, 50665, 45688, 36960, 64798   Recertification Date:    Physician Follow-Up: 22   Physician Orders:    History of Present Illness: Pt presents with chronic lumbar and SIJ pain.  She recently started having pain radiating down right leg to toes. . Pt has had injections that have helped. In December 2013, pt injured SI when she stepped out of house to sweep snow off patio, slipped on ice and went into splits and twisted to get out of it causing significant pain. Pt was unable to get medical treatment until a year later d/t insurance. Pt had reached a point that she thought she was going to be wheelchair bound. Pt reports PCP looking into minimally invasive procedure where pins are inserted into sacrum for stabilization. Pain has affected walking, standing tolerance, lifting, cleaning, and dressing. SUBJECTIVE: Patient reports radicular pain usually occurs when sitting on taller chairs. Pt notes pain isn't too bad today. Pt hasn't noticed much change with therapy and willing to try aquatics.      OBJECTIVE:  TREATMENT   Precautions:    Pain: /10 Right SI joint and right knee; intermittent radiating pain down right leg to toes    X in shaded column indicates activity completed today   Modalities Parameters/  Location  Notes   MHP concurrent with exercises  X Extra towel layer               Manual Therapy Time/Technique  Notes                     Exercise/Intervention   Notes   LTR 12x5 sec B  X    Hamstring stretch with strap 3x15 sec B  X    SKTC, piriformis IR stretches with strap 3x15 sec B  X           Hooklying with bolster under knees:       Abdominal bracing 10x5 sec  x    Glut sets 10x5 sec  x    SAQ 10x B  x    Hip ADD ball squeeze 10x5 sec  x    March 10x5 sec  x           Nu step seat 10, arms 8 5 min Level 1 x                    Specific Interventions Next Treatment: core strengthening, balance training; modalities and manual as needed, transition to aquatics if land unsuccessful after 5-6 visits    Activity/Treatment Tolerance:  [x]  Patient tolerated treatment well  []  Patient limited by fatigue  []  Patient limited by pain   []  Patient limited by medical complications  []  Other:     Assessment: Introduced hip adduction squeeze and march for core stabilization. Ended with stretches. Reviewed pool guidelines and plan is to transition to aquatic therapy next session, since pt has seen minimal change with land. Pt notes right knee felt better at end of session. Goals    Patient Goal: Figure out right leg issue    Short Term Goals: 4 weeks  Patient will demonstrate good core engagement and postural awareness during therapy session with <4 cues  to carry over to standing to do dishes and cook  Patient will have <50% lumbar and hip AROM restriction for ease bending over to niharika socks and shoes    Long Term Goals: 8 weeks  Patient will demonstrate good core engagement and postural awareness during therapy session without cues  to carry over to lifting household items from various levels  Patient will tolerate walking >20 minutes at grocery store with cart with < 4/10 pain. Patient will be independent and compliant with HEP to achieve above goals. Patient Education:   [x]  HEP/Education Completed: pool guidelines   [facility] Access Code:  []  No new Education completed  [x]  Reviewed Prior HEP      [x]  Patient verbalized and/or demonstrated understanding of education provided. []  Patient unable to verbalize and/or demonstrate understanding of education provided. Will continue education. []  Barriers to learning:     PLAN:  Treatment Recommendations: Strengthening, Range of Motion, Balance Training, Functional Mobility Training, Gait Training, Manual Therapy - Soft Tissue Mobilization, Home Exercise Program, Patient Education, Aquatics and Modalities    []  Plan of care initiated. Plan to see patient 2 times per week for 8 weeks to address the treatment planned outlined above.   [x]  Continue with current plan of care  []  Modify plan of care as follows:    []  Hold pending physician visit  []  Discharge    Time In 1416   Time Out 1503   Timed Code Minutes: 47 min   Total Treatment Time: 47 min Electronically Signed by: José Stanley, PT

## 2022-05-05 ENCOUNTER — HOSPITAL ENCOUNTER (OUTPATIENT)
Dept: PHYSICAL THERAPY | Age: 57
Setting detail: THERAPIES SERIES
Discharge: HOME | End: 2022-05-05
Payer: COMMERCIAL

## 2022-05-05 PROCEDURE — 97113 AQUATIC THERAPY/EXERCISES: CPT

## 2022-05-05 NOTE — PROGRESS NOTES
[address] [facility]  PHYSICAL THERAPY  [] EVALUATION  [] DAILY NOTE (LAND) [x] DAILY NOTE (AQUATIC)   [] PROGRESS NOTE [] DISCHARGE NOTE    [x] OUTPATIENT REHABILITATION CENTER [facility]   [] [place] [address]    [] 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 Obesity, arthritis, concussion in , HTN, anxiety and depression. Functional Outcome Measure Used Modified Oswestry   Functional Outcome Score 19/50=38% (3/30/22)       Insurance: Primary: Payor: Emilio Lloyd /  /  / ,   Secondary:    Authorization Information: PRECERTIFICATION REQUIRED:  Pre-cert required after initial evaluation through [facility]. If [facility] is secondary insurance no pre-cert is needed. INSURANCE THERAPY BENEFIT: Unlimited visits for anyone under the age of 21. For those 21 and above, 30 visits for OT, PT and ST each per calendar year are approved. Benefit will not cover maintenance or preventative treatment. FCE is a covered benefit.     AQUATIC THERAPY COVERED:   Yes  MODALITIES COVERED:  Yes. Hot/Cold Packs are not covered. TELEHEALTH COVERED:  Yes   Visit # 7, 10 for progress note   Visits Allowed: eval +  RECEIVED AUTH FOR PT  TOTAL  UNITS  FROM 22 TO   CPT CODES:  82104, 19160, 73577, 45300, 83909, 47326   Recertification Date:    Physician Follow-Up: 22   Physician Orders:    History of Present Illness: Pt presents with chronic lumbar and SIJ pain.  She recently started having pain radiating down right leg to toes. . Pt has had injections that have helped. In December 2013, pt injured SI when she stepped out of house to sweep snow off patio, slipped on ice and went into splits and twisted to get out of it causing significant pain. Pt was unable to get medical treatment until a year later d/t insurance. Pt had reached a point that she thought she was going to be wheelchair bound. Pt reports PCP looking into minimally invasive procedure where pins are inserted into sacrum for stabilization. Pain has affected walking, standing tolerance, lifting, cleaning, and dressing. SUBJECTIVE: Patient reports walking and standing aggravate pain. Pain rated 9/10 walking into clinic but felt better just sitting on steps partially in the water.      OBJECTIVE:  AQUATICS TREATMENT   Precautions:    Pain: 9/10 Right SI joint and right knee; intermittent radiating pain down right leg to toes    X in shaded column indicates activity completed today   Exercise/Intervention Sets/Sec  Notes   Walk Forward 3 laps  x    Walk Backward 3 laps  x    Walk Sideways 3 laps  x           Lower Extremity Exercises:    4' step   Heel/Toe Raises 10  x    Marches 10  x    Squats 10  x    3 Way Hip 10  x    Hamstring Curls 10  x    Lunges       Step-Ups: forward 10  x           Lower Extremity Stretches:       HS and calf stretch at 4' steps 3x15 sec  x           Seated Exercises:              Upper Extremity Exercises:       Shoulder Flexion 15  x    Shoulder ABD/ADD 15  x    Shoulder Horizontal ABD/ADD 15  x    Shoulder Extension 15  x    Shoulder IR/ER       Shoulder Circles       Shoulder Shrugs       Rows 15  x    Bicep Curls       Noodle push/pull and sink 15  x           Upper Extremity Stretches:              Balance:              Dynamic Gait:              Deep Water:       Hang 5 min  x    Bicycle 3 min  x    Hip ABD/ADD 3 min  x    Hip Flex/Ext 3 min  x        Specific Interventions Next Treatment: core strengthening, balance training; modalities and manual as needed, transition to aquatics if land unsuccessful after 5-6 visits    Activity/Treatment Tolerance:  [x]  Patient tolerated treatment well  []  Patient limited by fatigue  []  Patient limited by pain   []  Patient limited by medical complications  []  Other:     Assessment: Initiated aquatic therapy with patient moving with ease throughout session. Pt reports deep water hip ABD/ADD takes pain out of back the most. Pt notes SI pain returned upon exiting pool. Goals    Patient Goal: Figure out right leg issue    Short Term Goals: 4 weeks  Patient will demonstrate good core engagement and postural awareness during therapy session with <4 cues  to carry over to standing to do dishes and cook  Patient will have <50% lumbar and hip AROM restriction for ease bending over to niharika socks and shoes    Long Term Goals: 8 weeks  Patient will demonstrate good core engagement and postural awareness during therapy session without cues  to carry over to lifting household items from various levels  Patient will tolerate walking >20 minutes at grocery store with cart with < 4/10 pain. Patient will be independent and compliant with HEP to achieve above goals. Patient Education:   [x]  HEP/Education Completed: monitor response to pool   [employer] Access Code:  []  No new Education completed  []  Reviewed Prior HEP      [x]  Patient verbalized and/or demonstrated understanding of education provided. []  Patient unable to verbalize and/or demonstrate understanding of education provided. Will continue education. []  Barriers to learning:     PLAN:  Treatment Recommendations: Strengthening, Range of Motion, Balance Training, Functional Mobility Training, Gait Training, Manual Therapy - Soft Tissue Mobilization, Home Exercise Program, Patient Education, Aquatics and Modalities    []  Plan of care initiated.   Plan to see patient 2 times per week for 8 weeks to address the treatment planned outlined above.   [x]  Continue with current plan of care  []  Modify plan of care as follows:    []  Hold pending physician visit  []  Discharge    Time In 1410   Time Out 1458   Timed Code Minutes: 48 min   Total Treatment Time: 48 min     Electronically Signed by: Michelle Zamudio PT

## 2022-05-09 ENCOUNTER — APPOINTMENT (OUTPATIENT)
Dept: PHYSICAL THERAPY | Age: 57
End: 2022-05-09
Payer: COMMERCIAL

## 2022-05-11 ENCOUNTER — HOSPITAL ENCOUNTER (OUTPATIENT)
Dept: PHYSICAL THERAPY | Age: 57
Setting detail: THERAPIES SERIES
Discharge: HOME | End: 2022-05-11
Payer: COMMERCIAL

## 2022-05-11 PROCEDURE — 97113 AQUATIC THERAPY/EXERCISES: CPT

## 2022-05-13 ENCOUNTER — APPOINTMENT (OUTPATIENT)
Dept: PHYSICAL THERAPY | Age: 57
End: 2022-05-13
Payer: COMMERCIAL

## 2022-05-16 ENCOUNTER — APPOINTMENT (OUTPATIENT)
Dept: PHYSICAL THERAPY | Age: 57
End: 2022-05-16
Payer: COMMERCIAL

## 2022-05-19 ENCOUNTER — HOSPITAL ENCOUNTER (OUTPATIENT)
Dept: PHYSICAL THERAPY | Age: 57
Setting detail: THERAPIES SERIES
End: 2022-05-19
Payer: COMMERCIAL

## 2022-06-01 ENCOUNTER — PATIENT MESSAGE (OUTPATIENT)
Dept: PHYSICAL MEDICINE AND REHAB | Age: 57
End: 2022-06-01

## 2022-06-01 DIAGNOSIS — M54.17 LUMBOSACRAL RADICULITIS: ICD-10-CM

## 2022-06-01 DIAGNOSIS — G89.4 CHRONIC PAIN SYNDROME: ICD-10-CM

## 2022-06-01 DIAGNOSIS — M47.816 SPONDYLOSIS OF LUMBAR SPINE: ICD-10-CM

## 2022-06-02 RX ORDER — HYDROCODONE BITARTRATE AND ACETAMINOPHEN 5; 325 MG/1; MG/1
1 TABLET ORAL 2 TIMES DAILY PRN
Qty: 60 TABLET | Refills: 0 | Status: SHIPPED | OUTPATIENT
Start: 2022-06-02 | End: 2022-07-14 | Stop reason: SDUPTHER

## 2022-06-02 NOTE — TELEPHONE ENCOUNTER
OARRS reviewed. UDS: + for  . Norhydrocodone, flexeril  Last seen: 3/21/2022.  Follow-up:   Future Appointments   Date Time Provider Christine Sarina   6/8/2022 12:00 PM Armida Araya, TAMIE - CNP [facility]   6/16/2022  9:15 AM Darrius Alvarado, PT [facility] [facility]   6/27/2022  1:00 PM Rylie Bee

## 2022-06-08 ENCOUNTER — OFFICE VISIT (OUTPATIENT)
Dept: PHYSICAL MEDICINE AND REHAB | Age: 57
End: 2022-06-08
Payer: COMMERCIAL

## 2022-06-08 VITALS
BODY MASS INDEX: 50.02 KG/M2 | WEIGHT: 293 LBS | DIASTOLIC BLOOD PRESSURE: 84 MMHG | SYSTOLIC BLOOD PRESSURE: 124 MMHG | HEIGHT: 64 IN

## 2022-06-08 DIAGNOSIS — M17.11 PRIMARY OSTEOARTHRITIS OF RIGHT KNEE: ICD-10-CM

## 2022-06-08 DIAGNOSIS — M54.9 MID BACK PAIN: ICD-10-CM

## 2022-06-08 DIAGNOSIS — M46.1 SACROILIAC INFLAMMATION (HCC): ICD-10-CM

## 2022-06-08 DIAGNOSIS — M16.0 PRIMARY OSTEOARTHRITIS OF BOTH HIPS: ICD-10-CM

## 2022-06-08 DIAGNOSIS — M79.18 CHRONIC MYOFASCIAL PAIN: Primary | ICD-10-CM

## 2022-06-08 DIAGNOSIS — M47.814 SPONDYLOSIS OF THORACIC REGION WITHOUT MYELOPATHY OR RADICULOPATHY: ICD-10-CM

## 2022-06-08 DIAGNOSIS — G89.4 CHRONIC PAIN SYNDROME: ICD-10-CM

## 2022-06-08 DIAGNOSIS — M54.17 LUMBOSACRAL RADICULITIS: ICD-10-CM

## 2022-06-08 DIAGNOSIS — M47.816 SPONDYLOSIS OF LUMBAR SPINE: ICD-10-CM

## 2022-06-08 DIAGNOSIS — G89.29 CHRONIC MYOFASCIAL PAIN: Primary | ICD-10-CM

## 2022-06-08 PROCEDURE — 99214 OFFICE O/P EST MOD 30 MIN: CPT | Performed by: NURSE PRACTITIONER

## 2022-06-08 PROCEDURE — 1036F TOBACCO NON-USER: CPT | Performed by: NURSE PRACTITIONER

## 2022-06-08 PROCEDURE — G8417 CALC BMI ABV UP PARAM F/U: HCPCS | Performed by: NURSE PRACTITIONER

## 2022-06-08 PROCEDURE — G8427 DOCREV CUR MEDS BY ELIG CLIN: HCPCS | Performed by: NURSE PRACTITIONER

## 2022-06-08 PROCEDURE — 3017F COLORECTAL CA SCREEN DOC REV: CPT | Performed by: NURSE PRACTITIONER

## 2022-06-08 RX ORDER — GABAPENTIN 300 MG/1
300 CAPSULE ORAL NIGHTLY
Qty: 30 CAPSULE | Refills: 0 | Status: SHIPPED | OUTPATIENT
Start: 2022-06-08 | End: 2022-07-06

## 2022-06-08 ASSESSMENT — ENCOUNTER SYMPTOMS
VOICE CHANGE: 0
SINUS PAIN: 0
BACK PAIN: 1
EYES NEGATIVE: 1
SORE THROAT: 0
GASTROINTESTINAL NEGATIVE: 1
RESPIRATORY NEGATIVE: 1

## 2022-06-08 NOTE — PROGRESS NOTES
[address] [facility] [address]  Dept: [phone]  Dept Fax: [phone]: [phone]    Visit Date: 6/8/2022    Functionality Assessment/Goals Worksheet     On a scale of 0 (Does not Interfere) to 10 (Completely Interferes)     1. Which number describes how during the past week pain has interfered with       the following:  A. General Activity:  7  B. Mood: 9  C. Walking Ability:  5  D. Normal Work (Includes both work outside the home and housework):  6  E. Relations with Other People:   0  F. Sleep:   5  G. Enjoyment of Life:   5    2. Patient Prefers to Take their Pain Medications:     [x]  On a regular basis   [x]  Only when necessary    []  Does not take pain medications    3. What are the Patient's Goals/Expectations for Visiting Pain Management? []  Learn about my pain    [x]  Receive Medication   []  Physical Therapy     []  Treat Depression   [x]  Receive Injections    []  Treat Sleep   []  Deal with Anxiety and Stress   []  Treat Opoid Dependence/Addiction   []  Other:      HPI:   Fatimah Cordova is a 61 y.o. female is here today for    Chief Complaint: Back pain, right knee pain       HPI   Continues to have pain in low back into SI area- aching and pressure. Reviewed updated lumbar MRI with patient. Still receiving relief of pain in right knee from Synvisc injection and relief of mid back pain from T-facet RFA    Has a complaint of muscle pain in upper back and between shoulder blades- aching sore, tight. Also aching pain in legs   Pain increases with bending, lifting, twisting , reaching, walking, standing, getting up and down and housework or working at job.     Kaela trevizo continues to help pain   Trial of Neurontin helped but she did not get refill after trial   Did aqua therapy which helped but canceled appointments d/t life schedule but states she is going to reschedule more visits and maybe get a membership to [facility]. Radiology:  FINDINGS:     Normal lumbar lordosis.  There is no substantial scoliosis. Tifton Gentleman is a     normal alignment of the vertebrae.          There is diffuse demineralization with multi-level endplate spondylosis.     There is multi-level degenerative disc disease with multi-level disc space     narrowing, most conspicuous at L1-L2 and L2-L3 as well as L5-S1.  T12     vertebral augmentation with residual compression deformity.  Multilevel     facet arthropathy.          The soft tissue structures are unremarkable.     ___________________________________          IMPRESSION:     Multilevel degenerative disc disease and facet arthropathy           Medications reviewed. Patient denies side effects with medications. Patient states she is taking medications as prescribed. Shedenies receiving pain medications from other sources. She denies any ER visits since last visit. Pain scale with out pain medications or at its worst is 8/10. Pain scale with pain medications or at its best is 3-6/10. Last dose of Norco was today   Drug screen reviewed from 3/24/2022 and was appropriate  Pill count completed  today and WNL: Yes      The patientis allergic to walnut [macadamia nut oil], elemental sulfur, other, saccharin, and sulfa antibiotics. Subjective:      Review of Systems   Constitutional: Negative. Negative for fatigue and fever. HENT: Negative for congestion, sinus pain, sore throat and voice change. Eyes: Negative. Respiratory: Negative. Cardiovascular: Positive for leg swelling. Gastrointestinal: Negative. Genitourinary: Negative. Musculoskeletal: Positive for arthralgias, back pain, gait problem, joint swelling, myalgias and neck stiffness. Negative for neck pain. Ambulating with cane   Skin: Negative. Neurological: Positive for weakness and numbness. Psychiatric/Behavioral: Negative.         Objective:     Vitals: 06/08/22 1202   BP: 124/84   Weight: (!) 382 lb (173.3 kg)   Height: 5' 4\" (1.626 m)       Physical Exam  Vitals and nursing note reviewed. Constitutional:       General: She is not in acute distress. Appearance: She is well-developed. She is not diaphoretic. HENT:      Head: Normocephalic and atraumatic. Right Ear: External ear normal.      Left Ear: External ear normal.      Nose: Nose normal.      Mouth/Throat:      Pharynx: No oropharyngeal exudate. Eyes:      General: No scleral icterus. Right eye: No discharge. Left eye: No discharge. Conjunctiva/sclera: Conjunctivae normal.      Pupils: Pupils are equal, round, and reactive to light. Neck:      Thyroid: No thyromegaly. Trachea: No tracheal deviation. Cardiovascular:      Rate and Rhythm: Normal rate and regular rhythm. Heart sounds: Normal heart sounds. No murmur heard. No friction rub. No gallop. Pulmonary:      Effort: Pulmonary effort is normal. No respiratory distress. Breath sounds: Normal breath sounds. No wheezing or rales. Chest:      Chest wall: No tenderness. Abdominal:      General: Bowel sounds are normal. There is no distension. Palpations: Abdomen is soft. Tenderness: There is no abdominal tenderness. There is no guarding or rebound. Musculoskeletal:         General: Swelling and tenderness present. Cervical back: Full passive range of motion without pain, normal range of motion and neck supple. Tenderness and bony tenderness present. No edema, erythema or rigidity. No muscular tenderness. Normal range of motion. Thoracic back: Bony tenderness present. Decreased range of motion. Lumbar back: Spasms, tenderness and bony tenderness present. Decreased range of motion. Negative right straight leg raise test and negative left straight leg raise test.        Back:       Right hip: Tenderness present. Left hip: Bony tenderness present.  Decreased range of motion. Decreased strength. Right knee: Bony tenderness present. Decreased range of motion. Tenderness present. Right lower leg: Edema present. Left lower leg: Edema present. Legs:    Skin:     General: Skin is warm and dry. Coloration: Skin is not pale. Findings: No erythema or rash. Neurological:      General: No focal deficit present. Mental Status: She is alert and oriented to person, place, and time. She is not disoriented. Cranial Nerves: No cranial nerve deficit. Sensory: Sensory deficit present. Motor: Weakness present. No atrophy or abnormal muscle tone. Coordination: Coordination abnormal.      Gait: Gait abnormal.      Deep Tendon Reflexes: Babinski sign absent on the right side. Comments: 4/5 strength right lower extremity    Psychiatric:         Attention and Perception: She is attentive. Mood and Affect: Mood is not anxious or depressed. Affect is not labile, blunt, angry or inappropriate. Speech: She is communicative. Speech is not rapid and pressured, delayed, slurred or tangential.         Behavior: Behavior normal. Behavior is not agitated, slowed, aggressive, withdrawn, hyperactive or combative. Thought Content: Thought content normal. Thought content is not paranoid or delusional. Thought content does not include homicidal or suicidal ideation. Thought content does not include homicidal or suicidal plan. Cognition and Memory: Memory is not impaired. She does not exhibit impaired recent memory or impaired remote memory. Judgment: Judgment normal. Judgment is not impulsive or inappropriate. SCOOTER  Patricks test  positive  Yeoman's  positive  Gaenslen's  positive     Assessment:     1. Chronic myofascial pain    2. Spondylosis of lumbar spine    3. Spondylosis of thoracic region without myelopathy or radiculopathy    4. Sacroiliac inflammation ([facility] [address]    5.  Primary osteoarthritis of both hips 6. Primary osteoarthritis of right knee    7. Mid back pain    8. Lumbosacral radiculitis    9. Chronic pain syndrome            Plan:      · OARRS reviewed. Current MED: 10.00  · Patient was offered naloxone for home. · Discussed long term side effects of medications, tolerance, dependency and addiction. · Previous UDS reviewed  · UDS preformed today for compliance. · Patient told can not receive any pain medications from any other source. · No evidence of abuse, diversion or aberrant behavior.  Medications and/or procedures to improve function and quality of life- patient understanding with this and that may not be pain free   Discussed with patient about safe storage of medications at home   Discussed possible weaning of medication dosing dependent on treatment/procedure results.  Discussed with patient about risks with procedure including infection, reaction to medication, increased pain, or bleeding. · Procedure notes reviewed in detail   Continues to receive relief from T-facet RFA and right Synvisc injection   Plan trigger point injections in back- thoracic and lumbar in office with Ashleigh TINOCO- a lot of muscle pain   · Medications continue to help- continue Norco 5/325 BID prn- Filled 6/2/2022, resume Neurontin 300 mg at bedtime- ordered  · Continue flexeril prn from pcp   · Encouraged to continue therapies and home exercises    Patient is compliant   reviewed lumbar xray     Meds. Prescribed:   Orders Placed This Encounter   Medications    gabapentin (NEURONTIN) 300 MG capsule     Sig: Take 1 capsule by mouth nightly for 30 days. Dispense:  30 capsule     Refill:  0       Return for  trigger point injections in back- thoracic and lumbar in office with Ashleigh TINOCO- FU after .                Electronically signed by TAMIE Bar CNP on6/8/2022 at 4:38 PM

## 2022-06-14 ENCOUNTER — OFFICE VISIT (OUTPATIENT)
Dept: PHYSICAL MEDICINE AND REHAB | Age: 57
End: 2022-06-14
Payer: COMMERCIAL

## 2022-06-14 VITALS
DIASTOLIC BLOOD PRESSURE: 72 MMHG | HEART RATE: 74 BPM | SYSTOLIC BLOOD PRESSURE: 136 MMHG | WEIGHT: 293 LBS | HEIGHT: 64 IN | BODY MASS INDEX: 50.02 KG/M2

## 2022-06-14 DIAGNOSIS — G89.4 CHRONIC PAIN SYNDROME: ICD-10-CM

## 2022-06-14 DIAGNOSIS — M79.18 CHRONIC MYOFASCIAL PAIN: Primary | ICD-10-CM

## 2022-06-14 DIAGNOSIS — G89.29 CHRONIC MYOFASCIAL PAIN: Primary | ICD-10-CM

## 2022-06-14 PROCEDURE — 20553 NJX 1/MLT TRIGGER POINTS 3/>: CPT | Performed by: NURSE PRACTITIONER

## 2022-06-14 RX ORDER — METHOCARBAMOL 100 MG/ML
100 INJECTION, SOLUTION INTRAMUSCULAR; INTRAVENOUS ONCE
Status: COMPLETED | OUTPATIENT
Start: 2022-06-14 | End: 2022-06-14

## 2022-06-14 RX ADMIN — METHOCARBAMOL 100 MG: 100 INJECTION, SOLUTION INTRAMUSCULAR; INTRAVENOUS at 13:18

## 2022-06-14 NOTE — PROGRESS NOTES
Procedure  Visit Date: 6/14/22    Abdirahman Sr is a 61 y.o. female presents today in the office for the following:  Chief Complaint   Patient presents with    Follow-up     thoracic and lumbar TPI       History of Present Illness   Lexii Christianson is here today for trigger point injections. Pre-Op Diagnosis   Myofacial pain syndrome     Post-Op Diagnosis   Myofacial pain syndrome     Procedure: Trigger point injection(s)    Procedure Documentation   Patient identified. Consent signed. Site identified. Trigger points were identified in the bilateral thoracic paraspinals, bilateral lumbar paraspinals for a total of 20 trigger point injections. Then with a 25g needle entered each trigger point and after negative aspiration, 1 cc of a mixture containing 1:10 - 100 mg methocarbamol: 0.5% bupivacaine was injected at each trigger point for a total of 20 trigger points. Procedural Complications: None      Vitals:    06/14/22 1233   BP: 136/72   Site: Right Lower Arm   Position: Sitting   Cuff Size: Large Adult   Pulse: 74   Weight: (!) 382 lb (173.3 kg)   Height: 5' 4\" (1.626 m)       Conclusion   No complications encountered. Patient discharged stable condition. Patient told if any problems to call office or go to ER. No orders of the defined types were placed in this encounter.       Electronically signed by TAMIE Jennings CNP on 6/14/22 at 12:43 PM EDT

## 2022-06-15 ENCOUNTER — APPOINTMENT (OUTPATIENT)
Dept: PHYSICAL THERAPY | Age: 57
End: 2022-06-15
Payer: COMMERCIAL

## 2022-06-16 ENCOUNTER — HOSPITAL ENCOUNTER (OUTPATIENT)
Dept: PHYSICAL THERAPY | Age: 57
Setting detail: THERAPIES SERIES
Discharge: HOME | End: 2022-06-16
Payer: COMMERCIAL

## 2022-06-16 PROCEDURE — 97113 AQUATIC THERAPY/EXERCISES: CPT

## 2022-06-16 NOTE — DISCHARGE SUMMARY
** PLEASE SIGN, DATE AND TIME CERTIFICATION BELOW AND RETURN TO [facility] OUTPATIENT REHABILITATION (FAX #: [phone]). ATTEST/CO-SIGN IF ACCESSING VIA [employer]. THANK YOU.**    I certify that I have examined the patient below and determined that Physical Medicine and Rehabilitation service is necessary and that I approve the established plan of care for up to 90 days or as specifically noted. Attestation, signature or co-signature of physician indicates approval of certification requirements.    ________________________ ____________ __________  Physician Signature   Date   Time      [address] [facility]  PHYSICAL THERAPY  [] EVALUATION  [] DAILY NOTE (LAND) [] DAILY NOTE (AQUATIC)   [] PROGRESS NOTE [x] DISCHARGE NOTE    [x] [address] [facility]   [] [facility] 90    [] [address] [facility]   [] Merlinda Box    Date: 2022  Patient Name:  Tejinder Membreno  : 1965  MRN: [id number]  CSN: [id number]    Referring Practitioner TAMIE Hernandez*   Diagnosis Radiculopathy, lumbosacral region [M54.17]  Spondylosis without myelopathy or radiculopathy, lumbar region [M47.816]    Treatment Diagnosis Chronic SIJ pain, acute RLE radiculopathy, lumbar and hip tightness, core weakness, difficulty walking   Date of Evaluation 3/30/22    Additional Pertinent History Left ARIELLE . Obesity, arthritis, concussion in , HTN, anxiety and depression. Functional Outcome Measure Used Modified Oswestry   Functional Outcome Score 19/50=38% (3/30/22) 27/50=54% (22)      Insurance: Primary: Payor: Peter Mendez /  /  / ,   Secondary:    Authorization Information: PRECERTIFICATION REQUIRED:  Pre-cert required after initial evaluation through [address] [place] [address]. If [employer] is secondary insurance no pre-cert is needed. INSURANCE THERAPY BENEFIT: Unlimited visits for anyone under the age of 21.     For those 21 and above, 30 visits for OT, PT and ST each per calendar year are approved. Benefit will not cover maintenance or preventative treatment. FCE is a covered benefit.     AQUATIC THERAPY COVERED:   Yes  MODALITIES COVERED:  Yes. Hot/Cold Packs are not covered. TELEHEALTH COVERED:  Yes   Visit # 9, 0/10 for progress note   Visits Allowed: eval +  RECEIVED AUTH FOR PT  TOTAL  UNITS  FROM 03/31/22 TO 07/30/223  CPT CODES:  02272, 41266, 94808, 30981, 94049, 67164   Recertification Date: 5/70/72   Physician Follow-Up: 5/23/22   Physician Orders:    History of Present Illness: Pt presents with chronic lumbar and SIJ pain. She recently started having pain radiating down right leg to toes. . Pt has had injections that have helped. In December 2013, pt injured SI when she stepped out of house to sweep snow off patio, slipped on ice and went into splits and twisted to get out of it causing significant pain. Pt was unable to get medical treatment until a year later d/t insurance. Pt had reached a point that she thought she was going to be wheelchair bound. Pt reports PCP looking into minimally invasive procedure where pins are inserted into sacrum for stabilization. Pain has affected walking, standing tolerance, lifting, cleaning, and dressing. SUBJECTIVE: Patient eager to get into pool d/t increased pain from walking into clinic. Pt reports she has been busy with other things, making it difficulty to get to therapy. Pt feels pool therapy helps, especially deep water exercises. She even had a session or two where pool alleviated all pain. Pt scheduled for [facility] next week. Pt plans to go to [facility] to continue with aquatic program. Pt notes she had trigger point therapy at pain management last week, which seems to be helping. Pain was 4/10 prior to session, 8/10 after walking into clinic.      OBJECTIVE:  AQUATICS TREATMENT   Precautions:    Pain: 8/10 Right SI joint and right knee; intermittent radiating pain down right leg to toes    X in shaded column indicates activity completed today   Exercise/Intervention Sets/Sec  Notes   Walk Forward 3 laps  X    Walk Backward 3 laps  X    Walk Sideways 3 laps  X           Lower Extremity Exercises:    4' step   Heel/Toe Raises 15x  X    Marches 15x  X    Squats 15x  X    3 Way Hip 15x  X    Hamstring Curls 15x  X    Lunges       Step-Ups: forward 15x  X           Lower Extremity Stretches:       HS and calf stretch at 4' steps 3x15 sec  x           Seated Exercises:              Upper Extremity Exercises: In 4'10\"   Shoulder Flexion 20x  x    Shoulder ABD/ADD 20x  x    Shoulder Horizontal ABD/ADD 20x  X    Shoulder Extension 20x  x    Shoulder IR/ER       Shoulder Circles       Shoulder Shrugs       Rows 20x  x    Bicep Curls       Noodle push/pull and sink 20x  x           Upper Extremity Stretches:              Balance:              Dynamic Gait:              Deep Water: In 8 foot depth   Hang 4 min  X At end of session- pain decreased to 1/10   Bicycle 3 min  X Start of session- pain decreased to 3/10   Hip ABD/ADD 3 min  X Start of session   Hip Flex/Ext 3 min  X At end of session     Specific Interventions Next Treatment: core strengthening, balance training; modalities and manual as needed, transition to aquatics if land unsuccessful after 5-6 visits    Activity/Treatment Tolerance:  [x]  Patient tolerated treatment well  []  Patient limited by fatigue  []  Patient limited by pain   []  Patient limited by medical complications  []  Other:     Assessment:  Pt demos minimal progress toward goals, as pain persists and affects walking tolerance. Pt has very limited lumbar and hip ROM without change since starting therapy. Pt demos good awareness of core engagement posture. Pt has gotten some pain relief with aquatic therapy vs land. Modified Oswestry score indicates more functional limitations since eval. Pt has good recall of aquatic program and doing chair and bed exercises at home.  Pt plans to continue aquatics at local community facility, which will make it easier to do and work with her schedule. Goals    Patient Goal: Figure out right leg issue    Short Term Goals: 4 weeks  Patient will demonstrate good core engagement and postural awareness during therapy session with <4 cues  to carry over to standing to do dishes and cook. GOAL MET:  see LTG summary. Discontinue Goal  Patient will have <50% lumbar and hip AROM restriction for ease bending over to niharika socks and shoes. GOAL NOT MET: Pt continues to have >75% lumbar and hip ROM restriction, and uses adaptive equipment to niharika socks and shoes. Discontinue Goal    Long Term Goals: 8 weeks  Patient will demonstrate good core engagement and postural awareness during therapy session without cues  to carry over to lifting household items from various levels. GOAL MET:  Pt demos good core engagement and posture during sessions and reports carry over to home and HEP. Discontinue Goal  Patient will tolerate walking >20 minutes at grocery store with cart with < 4/10 pain. GOAL NOT MET: Pain increases with walking from parking lot into clinic to 8/10, and isn't any different if pushing a cart. Discontinue Goal  Patient will be independent and compliant with HEP to achieve above goals. GOAL MET:  Pt reports doing chair and bed exercises every other day, plans to continue aquatics at [facility]. Discontinue Goal      Patient Education:   []  HEP/Education Completed: monitor response to increased repetitions   [facility] Access Code:  []  No new Education completed  [x]  Reviewed Prior HEP      [x]  Patient verbalized and/or demonstrated understanding of education provided. []  Patient unable to verbalize and/or demonstrate understanding of education provided. Will continue education.   []  Barriers to learning:     PLAN:  Treatment Recommendations: Strengthening, Range of Motion, Balance Training, Functional Mobility Training, Gait Training, Manual Therapy - Soft Tissue Mobilization, Home Exercise Program, Patient Education, Aquatics and Modalities    []  Plan of care initiated. Plan to see patient 2 times per week for 8 weeks to address the treatment planned outlined above.   []  Continue with current plan of care  []  Modify plan of care as follows:    []  Hold pending physician visit  [x]  Discharge    Time In 0913   Time Out 0958   Timed Code Minutes: 45 min   Total Treatment Time: 45 min     Electronically Signed by: Сергей Singletary PT

## 2022-06-27 NOTE — PROGRESS NOTES
[place]      Susan Poe         [phone]  6/28/2022   Chief Complaint   Patient presents with    Follow-up     ABDI 1 year sleep f/u         Pt of Dr. Angelita Tavera    PAP Download:   Yocasta Hadley or initial AHI: 84.9     Date of initial study: 3/7/17      Compliant  97%     Noncompliant 0 %     PAP Type Airsense 10 CPAP Level  17   Avg Hrs/Day 8 hrs 55 mins  AHI: 1.9   Recorded compliance dates , 5/29/22-6/27/22  Machine/Mfg:   [x] ResMed    [] Respironics/Dreamstation   Interface:   [x] Nasal    [] Nasal pillows   [] FFM      Provider:      [] [facility]     []Dwight     [x] [employer]    [] [address]    [] [place]               [] [employer]      [] [employer]    [] [address]      [] Other    Neck Size: 16.5  Mallampati Mallampati 1  ESS: 4  SAQLI: 76    Here is a scan of the most recent download:            Presentation:   Christy Alexander presents for sleep medicine follow up for obstructive sleep apnea  Since the last visit, Christy Alexander is doing well with PAP. She was intubated last week for D and C now with severe sore throat and difficulty talking. She was noted to have lung nodules on a CXR at [facility] and asking for eval.     Equipment issues: The pressure is  acceptable, the mask is acceptable     Sleep issues:  Do you feel better? Yes  More rested? Yes   Better concentration? yes    Progress History:   Since last visit any new medical issues? Yes D and C  New ER or hospital visits? No  Any new or changes in medicines? No  Any new sleep medicines? No    Review of Systems -   Review of Systems   Constitutional: Negative for activity change, appetite change, chills and fever. HENT: Positive for voice change. Negative for congestion and postnasal drip. Eyes: Negative. Respiratory: Negative for cough, chest tightness, shortness of breath, wheezing and stridor. Cardiovascular: Negative for chest pain and leg swelling. Gastrointestinal: Negative for diarrhea and nausea. Endocrine: Negative. Genitourinary: Negative. Musculoskeletal: Negative. Negative for arthralgias and back pain. Skin: Negative. Allergic/Immunologic: Negative. Neurological: Negative. Negative for dizziness and light-headedness. Psychiatric/Behavioral: Negative. All other systems reviewed and are negative. Physical Exam:    BMI:  Body mass index is 63 kg/m². Wt Readings from Last 3 Encounters:   06/28/22 (!) 367 lb (166.5 kg)   06/14/22 (!) 382 lb (173.3 kg)   06/08/22 (!) 382 lb (173.3 kg)     Weight lost 14 lbs over 2 weeks  Vitals: /84 (Site: Left Upper Arm, Position: Sitting, Cuff Size: Medium Adult)   Pulse 79   Temp 97.7 °F (36.5 °C) (Temporal)   Ht 5' 4\" (1.626 m)   Wt (!) 367 lb (166.5 kg)   LMP 10/06/2014   SpO2 96% Comment: on RA  BMI 63.00 kg/m²       Physical Exam  Constitutional:       General: She is not in acute distress. Appearance: Normal appearance. She is normal weight. She is not ill-appearing. HENT:      Head: Normocephalic and atraumatic. Nose: Nose normal.   Eyes:      Extraocular Movements: Extraocular movements intact. Pupils: Pupils are equal, round, and reactive to light. Pulmonary:      Effort: Pulmonary effort is normal.   Neurological:      Mental Status: She is alert and oriented to person, place, and time. Psychiatric:         Attention and Perception: Attention and perception normal.         Mood and Affect: Mood and affect normal.         Speech: Speech normal.         Behavior: Behavior normal.         Thought Content: Thought content normal.         Cognition and Memory: Cognition and memory normal.         Judgment: Judgment normal.           ASSESSMENT/DIAGNOSIS     Diagnosis Orders   1. Obstructive sleep apnea     2. Morbid obesity with body mass index of 60.0-69.9 in adult (HCC)     3. Lung nodules     4. Voice hoarseness              Plan   Do you need any equipment today?  Yes update supplies  - if voice hoarseness does not improve in next few days, will need follow up with PCP  - Concern for vocal cord injury   - Will get pulm eval for lung nodules  - Download reviewed and discussed with patient  - She  was advised to continue current positive airway pressure therapy with above described pressure. - She  advised to keep good compliance with current recommended pressure to get optimal results and clinical improvement  - Recommend 7-9 hours of sleep with PAP  - She was advised to call [facility] regarding supplies if needed.   -She call my office for earlier appointment if needed for worsening of sleep symptoms.   - She was instructed on weight loss  - Zena Galo was educated about my impression and plan. Patient verbalizesunderstanding.   We will see Leonor Gardner back in: 1 year with download    Information added by my medical assistant/LPN was reviewed today         Jeannette Vega PA-C, MPAS  6/28/2022

## 2022-06-28 ENCOUNTER — OFFICE VISIT (OUTPATIENT)
Dept: PULMONOLOGY | Age: 57
End: 2022-06-28
Payer: COMMERCIAL

## 2022-06-28 ENCOUNTER — HOSPITAL ENCOUNTER (OUTPATIENT)
Dept: GENERAL RADIOLOGY | Age: 57
Discharge: HOME | End: 2022-06-28

## 2022-06-28 VITALS
WEIGHT: 293 LBS | TEMPERATURE: 97.7 F | BODY MASS INDEX: 50.02 KG/M2 | DIASTOLIC BLOOD PRESSURE: 84 MMHG | OXYGEN SATURATION: 96 % | SYSTOLIC BLOOD PRESSURE: 128 MMHG | HEIGHT: 64 IN | HEART RATE: 79 BPM

## 2022-06-28 DIAGNOSIS — G47.33 OBSTRUCTIVE SLEEP APNEA: Primary | ICD-10-CM

## 2022-06-28 DIAGNOSIS — Z00.6 ENCOUNTER FOR EXAMINATION FOR NORMAL COMPARISON AND CONTROL IN CLINICAL RESEARCH PROGRAM: ICD-10-CM

## 2022-06-28 DIAGNOSIS — R91.8 LUNG NODULES: ICD-10-CM

## 2022-06-28 DIAGNOSIS — R49.0 VOICE HOARSENESS: ICD-10-CM

## 2022-06-28 DIAGNOSIS — E66.01 MORBID OBESITY WITH BODY MASS INDEX OF 60.0-69.9 IN ADULT (HCC): ICD-10-CM

## 2022-06-28 PROCEDURE — G8427 DOCREV CUR MEDS BY ELIG CLIN: HCPCS | Performed by: PHYSICIAN ASSISTANT

## 2022-06-28 PROCEDURE — 1036F TOBACCO NON-USER: CPT | Performed by: PHYSICIAN ASSISTANT

## 2022-06-28 PROCEDURE — 99214 OFFICE O/P EST MOD 30 MIN: CPT | Performed by: PHYSICIAN ASSISTANT

## 2022-06-28 PROCEDURE — 3017F COLORECTAL CA SCREEN DOC REV: CPT | Performed by: PHYSICIAN ASSISTANT

## 2022-06-28 PROCEDURE — G8417 CALC BMI ABV UP PARAM F/U: HCPCS | Performed by: PHYSICIAN ASSISTANT

## 2022-06-28 RX ORDER — BENZONATATE 100 MG/1
100-200 CAPSULE ORAL 3 TIMES DAILY PRN
Qty: 60 CAPSULE | Refills: 0 | Status: SHIPPED | OUTPATIENT
Start: 2022-06-28 | End: 2022-07-05

## 2022-06-28 ASSESSMENT — ENCOUNTER SYMPTOMS
DIARRHEA: 0
BACK PAIN: 0
STRIDOR: 0
SHORTNESS OF BREATH: 0
COUGH: 0
CHEST TIGHTNESS: 0
WHEEZING: 0
VOICE CHANGE: 1
ALLERGIC/IMMUNOLOGIC NEGATIVE: 1
EYES NEGATIVE: 1
NAUSEA: 0

## 2022-06-29 ENCOUNTER — OFFICE VISIT (OUTPATIENT)
Dept: PULMONOLOGY | Age: 57
End: 2022-06-29
Payer: COMMERCIAL

## 2022-06-29 VITALS
DIASTOLIC BLOOD PRESSURE: 78 MMHG | HEART RATE: 88 BPM | TEMPERATURE: 98 F | WEIGHT: 293 LBS | SYSTOLIC BLOOD PRESSURE: 128 MMHG | HEIGHT: 64 IN | OXYGEN SATURATION: 97 % | BODY MASS INDEX: 50.02 KG/M2

## 2022-06-29 DIAGNOSIS — G47.33 OSA ON CPAP: ICD-10-CM

## 2022-06-29 DIAGNOSIS — Z99.89 OSA ON CPAP: ICD-10-CM

## 2022-06-29 DIAGNOSIS — R93.89 ABNORMAL CHEST X-RAY: Primary | ICD-10-CM

## 2022-06-29 DIAGNOSIS — Z77.22 TOBACCO SMOKE EXPOSURE: ICD-10-CM

## 2022-06-29 DIAGNOSIS — R91.8 ABNORMAL CHEST X-RAY WITH MULTIPLE LUNG NODULES: ICD-10-CM

## 2022-06-29 PROCEDURE — 99215 OFFICE O/P EST HI 40 MIN: CPT | Performed by: INTERNAL MEDICINE

## 2022-06-29 PROCEDURE — 3017F COLORECTAL CA SCREEN DOC REV: CPT | Performed by: INTERNAL MEDICINE

## 2022-06-29 PROCEDURE — G8417 CALC BMI ABV UP PARAM F/U: HCPCS | Performed by: INTERNAL MEDICINE

## 2022-06-29 PROCEDURE — G8427 DOCREV CUR MEDS BY ELIG CLIN: HCPCS | Performed by: INTERNAL MEDICINE

## 2022-06-29 PROCEDURE — 1036F TOBACCO NON-USER: CPT | Performed by: INTERNAL MEDICINE

## 2022-06-29 NOTE — PROGRESS NOTES
[facility] [address]  Pulmonary medicine clinic initial consult note Rick Virk is an established patient of my sleep medicine clinic at [facility]. She came for Pulmonary medicine evaluation today. She was seen by me Anjum Montenegro PA-C for the last time on 2022. Patient: Germaine Castro  : 1965      Chief complaint/[place]:     Germaine Castro is a 61 y.o. old female came for further evaluation regarding her abnormal chest Xray the 2 upper lobe lung nodules with referral from Ms. Anjum Montenegro PA-C. She underwent preoperative chest x-ray PA and lateral views on 2022 requested by Dr. Mona Carrel, MD-her gynecologist for planned minor outpatient GYN procedure. She successfully underwent had GYN procedure without any pulmonary complications on 2022. She underwent chest Xray on: The above chest Xray was performed at: [facility]    She was ever seen by a pulmonologist in the past:NO  She was ever diagnosed with COVID19 infection/Pneumonia:NO  She was ever diagnosed with Pneumonia:NO    She was ever diagnosed with following pulmonary diseases:  Bronchial asthma: NO  COPD:NO  Pulmonary fibrosis:NO  Sarcoidosis:NO  Pulmonary embolism: NO  Pulmonary hypertension:NO  Pleural effusion/s in the past:NO    She was ever diagnosed with lung cancer:NO  She ever diagnosed with any extrapulmonary cancers I.e Breast,Colon etc:NO  She ever received any radiation therapy to chest: NO     She ever diagnosed with pulmonary tuberculosis in the past:NO  She ever recently exposed to any patients with tuberculosis:NO  She ever recently travelled to endemic places of tuberculosis:NO  She ever tested for PPD: Negative several years back.   She underwent PPD testing as a part of her work while she is working at [employer] in the past.    She received COVID-19 vaccine along with a booster. She is currently using any oxygen supplementation at rest, exercise or during sleep/at night time: No.    She ever diagnosed with connective tissue diseases including Systemic lupus Erythematosus, Rheumatoid arthritis etc:NO  Any of her first-degree family relative ever diagnosed with Lung cancer:NO  She ever exposed to radon, or uranium in the past: NO    She denies any history of exposure to fungus in the past.      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 Social History     Tobacco Use    Smoking status: Former Smoker     Packs/day: 1.00     Years: 7.00     Pack years: 7.00     Types: Cigarettes, E-Cigarettes     Start date: 2005     Quit date: 2011     Years since quittin.4    Smokeless tobacco: Never Used   Vaping Use    Vaping Use: Never used   Substance Use Topics    Alcohol use: Not Currently     Comment: occasionally, 1-3 times a month    Drug use: No     She had a history of tobacco smoking for 7 years with 1 pack of cigarettes per day. She quit smoking in . History of recreational or IV drug use in the past:NO  History of Alcohol use: No.       History of exposure to coal mines/coal dust: NO  History of exposure to foundry dust/welding: NO  History of exposure to quarry/silica/sandblasting: NO  History of exposure to asbestos/working with breaks/ships: NO  History of exposure to farm dust: NO  History of recent travel to long distances: NO  History of exposure to birds, pigeons, or chickens in the past:NO  Pet animals at home:Yes  Dogs: 0  Cats: 5              Review of Systems:   General/Constitutional: She gained approximately 60 pounds of weight in the last 1 year with a normal appetite. No fever or chills.   HENT: She developed hoarseness of voice after having general anesthesia for the planned GYN procedure by her gynecologist  Eyes: Negative. Upper respiratory tract: Occasional nasal stuffiness with no post nasal drip. Lower respiratory tract/ lungs: See HPI. No hemoptysis. Cardiovascular: No palpitations or chest pain. Gastrointestinal: No nausea or vomiting. Neurological: No focal neurologiacal weakness. Extremities: Bilateral leg edema. Musculoskeletal: No complaints. Genitourinary: No complaints. Hematological: Negative. Psychiatric/Behavioral: Negative. Skin: No itching. Current Medications:          Past Medical History:   Diagnosis Date    Allergic rhinitis     Depression     Hypertension     ABDI on CPAP     Osteoarthritis     Vitamin D deficiency        Past Surgical History:   Procedure Laterality Date    ARTHROCENTESIS Right 10/16/2017    RIGHT HIP LARGE JOINT INJECTION performed by Navi Ledesma MD at [address] [place] [address] ARTHROCENTESIS Left 12/5/2017    LEFT HIP LARGE JOINT INJECTION performed by Navi Ledesma MD at [address] [place] [address] ARTHROCENTESIS Right 7/18/2019    Rt.  Knee Steroid Injection performed by Navi Ledesma MD at [address] Bilateral 7/19/2021    bilateral SI MBB # 1 performed by Navi Ledesma MD at [address] [place] [address]  2002     right front upper implant    FACET JOINT INJECTION Bilateral 3/7/2022    Bilateral T-facet RFA @  T8-9, and T9-T10 performed by Jass Hunter MD at [address] [place] [address] HAND SURGERY Right 05/15/2015    index finger cleaned out D/T infected cat bite    HEMORRHOID SURGERY  2016    series of 3 bandings for internal hemorrhoids, Dr. Kya Davis Left 2/7/2020    Right knee steroid injection performed by Navi Ledesma MD at [address] [place] [address] Right 6/4/2021    Right knee Synvisc injection performed by Navi Ledesma MD at [address] [place] [address] Right 1/4/2022    Right Synvisc One injection performed by New Buckner MD at [address] Right 4/16/2019    T-facet RFA @ T7-8, 8-9, 9-10 performed by New Buckner MD at [address] Left 6/13/2019    T-facet RFA @ T7-8, 8-9, 9-10 LEFT performed by New Buckner MD at [place] [address] FACET LEVEL 1 BILATERAL Bilateral 1/4/2019    LUMBAR FACET bilateral T-facet MBB @ T7-T8, T8-T9, and T9-T10 performed by New Buckner MD at [place] [address] FACET LEVEL 1 BILATERAL Bilateral 2/25/2019    Thoracic FACET bilateral T-facet MBB @ T7-T8, T8-T9, and T9-T10 MBB #2 performed by New Buckner MD at [address] [place] [address] Left 12/05/2017    HIP INJECTION     NERVE SURGERY Bilateral 9/13/2019    bilateral T-facet MBB # 1 @ T4-5, T5-6, and T6-7. performed by New Buckner MD at [facility]  4/11/16    L4-5, L5-S1 Facet injection    OTHER SURGICAL HISTORY  06/13/2016    Right Hip Injection    OTHER SURGICAL HISTORY Right 10/16/2017    Hip Injection     PAIN MANAGEMENT PROCEDURE Right 7/31/2020    Right knee genicular nerve block performed by New Buckner MD at [facility] Right 8/31/2020    Right knee genicular nerve RFA performed by New Buckner MD at [facility] Right 1/12/2021    Bilateral T-facet RFA @ T7-8, T8-9, and T9-T10 RIGHT SIDE FIRST performed by New Buckner MD at [facility] Left 3/19/2021    Left T-facet RFA @ T7-8, T8-9, and T9-T10. performed by New Buckner MD at [address] [place] [address] [facility] Era ARTHROCENTESIS ASPIR&/INJ MAJOR JT/BURSA W/O US Right 9/17/2018    RIGHT HIP LARGE JOINT STEROID INJECTION performed by New Buckner MD at [facility] OFFICE/OUTPT VISIT,PROCEDURE ONLY N/A 11/9/2018    Kyphoplasty T12 BILATERAL performed by Jefe Mejia MD at [facility] 1998      as child    WISDOM TOOTH EXTRACTION         Allergies   Allergen Reactions    [address] [Macadamia Nut Oil] Other (See Comments)     Numbness and Tingling in mouth    Elemental Sulfur     Other Other (See Comments)     Artificial sweeteners - migraines    Saccharin     Sulfa Antibiotics Hives       Current Outpatient Medications   Medication Sig Dispense Refill    benzonatate (TESSALON) 100 MG capsule Take 1-2 capsules by mouth 3 times daily as needed for Cough 60 capsule 0    gabapentin (NEURONTIN) 300 MG capsule Take 1 capsule by mouth nightly for 30 days. 30 capsule 0    HYDROcodone-acetaminophen (NORCO) 5-325 MG per tablet Take 1 tablet by mouth 2 times daily as needed for Pain for up to 30 days. Intended supply: 30 days 60 tablet 0    triamterene (DYRENIUM) 50 MG capsule take 1 capsule by mouth once daily if needed for edema      CPAP Machine MISC by Does not apply route Please change CPAP pressure to 17 cm H20. 1 each 0    loratadine (CLARITIN) 10 MG tablet Take 10 mg by mouth daily      Elastic Bandages & Supports (B & B SACROILIAC BELT) MISC 1 Device by Does not apply route as needed (pain) 1 each 0    Cholecalciferol (VITAMIN D3) 5000 units CAPS Take 1 capsule by mouth daily      Omega-3 Fatty Acids (FISH OIL) 1200 MG CAPS       calcium carbonate (OSCAL) 500 MG TABS tablet Take 500 mg by mouth 2 times daily      Turmeric 500 MG CAPS Take by mouth daily      cyclobenzaprine (FLEXERIL) 10 MG tablet Take 1 tablet by mouth 3 times daily as needed for Muscle spasms WARNING: This medication may make your drowsy. Do not operate heavy machinery or motor vehicles during use.  15 tablet 0    lisinopril (PRINIVIL;ZESTRIL) 10 MG tablet Take 10 mg by mouth nightly       citalopram (CELEXA) 20 MG tablet Take 20 mg by mouth daily       spironolactone (ALDACTONE) 25 MG tablet Take 1 tablet by mouth daily. 15 tablet 0    meloxicam (MOBIC) 15 MG tablet Take 1 tablet by mouth daily 30 tablet 2     No current facility-administered medications for this visit. Family History   Problem Relation Age of Onset    Substance Abuse Sister     Heart Disease Mother         stent    Cancer Neg Hx     Diabetes Neg Hx     High Blood Pressure Neg Hx     Stroke Neg Hx            Physical Exam     VITALS:  /78 (Site: Left Lower Arm, Position: Sitting, Cuff Size: Medium Adult)   Pulse 88   Temp 98 °F (36.7 °C)   Ht 5' 4\" (1.626 m)   Wt (!) 366 lb (166 kg)   LMP 10/06/2014   SpO2 97% Comment: room air at rest  BMI 62.82 kg/m²   Nursing note and vitals reviewed. Constitutional: Patient appears well built and well nourished. No distress. Patient is oriented to person, place, and time. HENT:   Head: Normocephalic and atraumatic. Right Ear: External ear normal.   Left Ear: External ear normal.   Mouth/Throat: Oropharynx is clear and moist.  No oral thrush. Eyes: Conjunctivae are normal. Pupils are equal, round, and reactive to light. No scleral icterus. Neck: Neck supple. No JVD present. No tracheal deviation present. Cardiovascular: Normal rate, regular rhythm, normal heart sounds. No murmur heard. Pulmonary/Chest: Effort normal and breath sounds normal. No stridor. No respiratory distress. No wheezes. No rales. Patient exhibits no tenderness. Abdominal: Soft. Patient exhibits no distension. No tenderness. Musculoskeletal: Normal range of motion. Extremities: Patient exhibits no edema and no tenderness. Lymphadenopathy:  No cervical adenopathy. Neurological: Patient is alert and oriented to person, place, and time. Skin: Skin is warm and dry. Patient is not diaphoretic. Psychiatric: Patient  has a normal mood and affect.  Patient behavior is normal.       Diagnostic Data:    Radiological Data:  Chest x-ray performed on 28 June 2022 at [facility].        Chest x-ray performed on 21 June 2018:  Stable radiographic appearance of the chest. No evidence of an acute process. I compared her chest x-ray performed on 28 June 2022 to her previous chest x-ray performed on 21 June 2018 and a CT scan chest performed on 25 January 2017      CT scan of chest without IV contrast performed on 25 January 2017:  1. Superior endplate compression fracture at T12 and spinous process fracture at T11 2. No definite acute intrathoracic injury is identified within limitations. Stable radiographic appearance of the chest. No evidence of an acute process. Pulmonary function tests:  None in epic      Assessment:  -2 upper lobe pulmonary nodules today on her chest x-ray dated 28 June 2022 at [facility].  She was noted to have these nodules on her previous chest x-ray performed on 21 June 2018-needs follow-up due to her history of tobacco smoking in the past  -Hypertension on treatment with medications under control.  -Severe obstructive sleep apnea diagnosed by sleep study performed on 7 March 2017. She is currently on treatment with a CPAP device with a pressure of 17 cm of water with no EPR. She is using her CPAP machine with excellent compliance for more than 4 hours of CPAP therapy. She follows with vincent Gonzalez PA-C at [facility] sleep clinic.  -History of hypersomnia ( Excessive daytime sleepiness)may be due to obstructive sleep apnea   -Chronic back surgery on treatment with Norco and Flexiril. She follows with MD Bo from pain management. -Depression on meds. -GERD (gastroesophageal reflux disease). -Allergic rhinitis-under control. She is currently not using any nasal spray.       Recommendations/Plan:  - Patient to have chest X-ray PA and Lateral views in 3months to follow abnormal chest X-ray to right upper lobe lung nodules noted on her chest x-ray performed on 28 June 2022. Chest Xray need to be done 2days before clinic visit.  -Schedule patient for full pulmonary function tests before clinic visit. Argentina Masterson was advised  to keep her scheduled follow up at [facility] for pulmonary disease) sleep clinic with Anson Da Silva PA-C for further evaluation and management of sleep apnea. -She was advised to loose weight by controlling diet and doing exercise once cleared by her family physician.   - Schedule patient for follow up with my clinic in 3months with recommended test/s chest x-ray PA and lateral views and full PFTs to be done at least 2 days before clinic visit. Patient advised to make early appointment if needed. Argentina Masterson was advised to make early appointment with my clinic if she develops any constitutional symptoms including loss of weight, poor appetite or hemoptysis. She verbalizes under standing.  - Patient educated about my impression and plan. Patient verbalizes understanding.      -I personally reviewed updated the Past medical hx, Past surgical hx,Social hx, Family hx, Medications, Allergies in the discrete data section of the patient chart along with labs, Pulmonary medicine,Sleep medicine related, Pathological, Microbiological and Radiological investigations. Total time spent interviewing the patient, evaluating lab data and X-ray data and processing orders was 45 Minutes. I personally spent more than 50% of the appointment time face to face with the patient providing counseling and coordinating the patient's care.

## 2022-06-29 NOTE — PATIENT INSTRUCTIONS
Recommendations/Plan:  - Patient to have chest X-ray PA and Lateral views in 3months to follow abnormal chest X-ray to right upper lobe lung nodules noted on her chest x-ray performed on 28 June 2022. Chest Xray need to be done 2days before clinic visit.  -Schedule patient for full pulmonary function tests before clinic visit. Gogo Awan was advised  to keep her scheduled follow up at [facility] for pulmonary disease) sleep clinic with Jeannette Vega PA-C for further evaluation and management of sleep apnea. -She was advised to loose weight by controlling diet and doing exercise once cleared by her family physician.   - Schedule patient for follow up with my clinic in 3months with recommended test/s chest x-ray PA and lateral views and full PFTs to be done at least 2 days before clinic visit. Patient advised to make early appointment if needed. Gogo Awan was advised to make early appointment with my clinic if she develops any constitutional symptoms including loss of weight, poor appetite or hemoptysis. She verbalizes under standing.  - Patient educated about my impression and plan. Patient verbalizes understanding.

## 2022-06-29 NOTE — PROGRESS NOTES
Neck Circumference -   18.5  Mallampati - 3    Lung Nodule Screening     [] Qualifies    [x] Does not qualify   [] Declined    [] Completed Pt presents for Rituxan infusion offering no compliants  Pt stated no current infection or antibiotic treatment  Pt tolerated treatment without incident  PIV removed  AVS declined  New orders needed to schedule next appointment  Pt made aware and will be seeing the MD in a month

## 2022-07-04 DIAGNOSIS — M54.17 LUMBOSACRAL RADICULITIS: ICD-10-CM

## 2022-07-06 RX ORDER — GABAPENTIN 300 MG/1
CAPSULE ORAL
Qty: 30 CAPSULE | Refills: 0 | Status: SHIPPED | OUTPATIENT
Start: 2022-07-08 | End: 2022-08-01

## 2022-07-06 NOTE — TELEPHONE ENCOUNTER
OARRS reviewed. UDS: negative   Last seen: 6/8/2022.  Follow-up:   Future Appointments   Date Time Provider Christine Branch   7/26/2022 12:15 PM Arben Dominguez, APRN - CNP [facility] - [facility]   8/30/2022  1:00 PM [facility] 1 [facility]   9/13/2022 11:40 AM Reggie Mann MD [facility] [address]   6/26/2023 11:45 AM Rylie Wright

## 2022-07-14 DIAGNOSIS — M47.816 SPONDYLOSIS OF LUMBAR SPINE: ICD-10-CM

## 2022-07-14 DIAGNOSIS — G89.4 CHRONIC PAIN SYNDROME: ICD-10-CM

## 2022-07-14 DIAGNOSIS — M54.17 LUMBOSACRAL RADICULITIS: ICD-10-CM

## 2022-07-14 RX ORDER — HYDROCODONE BITARTRATE AND ACETAMINOPHEN 5; 325 MG/1; MG/1
1 TABLET ORAL 2 TIMES DAILY PRN
Qty: 60 TABLET | Refills: 0 | Status: SHIPPED | OUTPATIENT
Start: 2022-07-14 | End: 2022-08-13

## 2022-07-14 NOTE — TELEPHONE ENCOUNTER
OARRS reviewed. UDS: + for   Lexapro, flexeril and norco  Last seen: 6/8/2022.  Follow-up:   Future Appointments   Date Time Provider Christine Branch   7/26/2022 12:15 PM TAMIE Ritter - CNP [facility] - [place]   8/30/2022  1:00 PM [facility] FUNCTION ROOM 1 [facility] PFT [facility]   9/13/2022 11:40 AM [facility] [address]   6/26/2023 11:45 AM Rylie Mejia

## 2022-07-26 ENCOUNTER — OFFICE VISIT (OUTPATIENT)
Dept: PHYSICAL MEDICINE AND REHAB | Age: 57
End: 2022-07-26
Payer: COMMERCIAL

## 2022-07-26 VITALS
HEIGHT: 64 IN | HEART RATE: 88 BPM | BODY MASS INDEX: 50.02 KG/M2 | DIASTOLIC BLOOD PRESSURE: 84 MMHG | WEIGHT: 293 LBS | SYSTOLIC BLOOD PRESSURE: 132 MMHG | OXYGEN SATURATION: 95 %

## 2022-07-26 DIAGNOSIS — G89.4 CHRONIC PAIN SYNDROME: ICD-10-CM

## 2022-07-26 DIAGNOSIS — M47.816 SPONDYLOSIS OF LUMBAR SPINE: ICD-10-CM

## 2022-07-26 DIAGNOSIS — M47.814 SPONDYLOSIS OF THORACIC REGION WITHOUT MYELOPATHY OR RADICULOPATHY: ICD-10-CM

## 2022-07-26 DIAGNOSIS — M54.17 LUMBOSACRAL RADICULITIS: ICD-10-CM

## 2022-07-26 DIAGNOSIS — M54.9 MID BACK PAIN: ICD-10-CM

## 2022-07-26 DIAGNOSIS — M79.18 CHRONIC MYOFASCIAL PAIN: Primary | ICD-10-CM

## 2022-07-26 DIAGNOSIS — M46.1 SACROILIAC INFLAMMATION (HCC): ICD-10-CM

## 2022-07-26 DIAGNOSIS — M17.11 PRIMARY OSTEOARTHRITIS OF RIGHT KNEE: ICD-10-CM

## 2022-07-26 DIAGNOSIS — M47.812 CERVICAL SPONDYLOSIS: ICD-10-CM

## 2022-07-26 DIAGNOSIS — G89.29 CHRONIC MYOFASCIAL PAIN: Primary | ICD-10-CM

## 2022-07-26 PROCEDURE — 99214 OFFICE O/P EST MOD 30 MIN: CPT | Performed by: NURSE PRACTITIONER

## 2022-07-26 ASSESSMENT — ENCOUNTER SYMPTOMS
VOICE CHANGE: 0
BACK PAIN: 1
EYES NEGATIVE: 1
SORE THROAT: 0
GASTROINTESTINAL NEGATIVE: 1
RESPIRATORY NEGATIVE: 1
SINUS PAIN: 0

## 2022-07-26 NOTE — PROGRESS NOTES
[facility] [place] [address]  Dept: [phone]  Dept Fax: [phone]: [phone]    Visit Date: 7/26/2022    Functionality Assessment/Goals Worksheet     On a scale of 0 (Does not Interfere) to 10 (Completely Interferes)     1. Which number describes how during the past week pain has interfered with       the following:  A. General Activity:  7  B. Mood: 7  C. Walking Ability:  9  D. Normal Work (Includes both work outside the home and housework):  6  E. Relations with Other People:   5  F. Sleep:   3  G. Enjoyment of Life:   7    2. Patient Prefers to Take their Pain Medications:     [x]  On a regular basis   [x]  Only when necessary    []  Does not take pain medications    3. What are the Patient's Goals/Expectations for Visiting Pain Management? []  Learn about my pain    [x]  Receive Medication   []  Physical Therapy     []  Treat Depression   [x]  Receive Injections    []  Treat Sleep   []  Deal with Anxiety and Stress   []  Treat Opoid Dependence/Addiction   []  Other:      HPI:   Marcellus Sawyer is a 61 y.o. female is here today for    Chief Complaint: Back pain, neck pain , muscle pain      HPI   FU from trigger point injections with Ashleigh CNP from 6/14/2022. Great short term relief of muscle pain in mid and low back which lasted for a months. Wants to do them again and up into neck and upper back as well - aching tight pain \"muscle pain\". Felt that they were very effective     Continues to have pain in low back and into SI area  deep aching and pressure pain most bothersome in SI area     Right knee pain remains tolerable     Currently working with aqua therapy   Pain increases with bending, lifting, twisting , walking, standing, getting up and down, and housework or working at job. Norco remains effective.  Addition of Neurontin really helping and using less Norco d/t it   Nerve pain greatly improved. Medications reviewed. Patient denies side effects with medications. Patient states she is taking medications as prescribed. Shedenies receiving pain medications from other sources. She denies any ER visits since last visit. Pain scale with out pain medications or at its worst is 5/10 \"good today without medications can get up to 8/10   Pain scale with pain medications or at its best is 3-4/10. Last dose of Norco and Neurontin was Last night   Drug screen reviewed from 6/9/2022 and was appropriate  Pill count completed  today and WNL: Yes      The patientis allergic to walnut [macadamia nut oil], elemental sulfur, other, saccharin, and sulfa antibiotics. Subjective:      Review of Systems   Constitutional: Negative. Negative for fatigue and fever. HENT:  Positive for dental problem. Negative for congestion, sinus pain, sore throat and voice change. Eyes: Negative. Respiratory: Negative. Cardiovascular:  Positive for leg swelling. Negative for chest pain. Gastrointestinal: Negative. Genitourinary:  Positive for flank pain. Musculoskeletal:  Positive for arthralgias, back pain, gait problem, joint swelling, myalgias, neck pain and neck stiffness. Ambulating with cane   Skin: Negative. Neurological:  Positive for weakness and numbness. Psychiatric/Behavioral:  Positive for sleep disturbance. The patient is nervous/anxious. Objective:     Vitals:    07/26/22 1212   BP: 132/84   Site: Left Lower Arm   Position: Sitting   Cuff Size: Large Adult   Pulse: 88   SpO2: 95%   Weight: (!) 366 lb (166 kg)   Height: 5' 4.02\" (1.626 m)       Physical Exam  Vitals and nursing note reviewed. Constitutional:       General: She is not in acute distress. Appearance: Normal appearance. She is well-developed. She is not diaphoretic. HENT:      Head: Normocephalic and atraumatic.       Right Ear: External ear normal.      Left Ear: External ear normal.      Nose: Nose normal.      Mouth/Throat:      Pharynx: No oropharyngeal exudate. Eyes:      General: No scleral icterus. Right eye: No discharge. Left eye: No discharge. Conjunctiva/sclera: Conjunctivae normal.      Pupils: Pupils are equal, round, and reactive to light. Neck:      Thyroid: No thyromegaly. Trachea: No tracheal deviation. Cardiovascular:      Rate and Rhythm: Normal rate and regular rhythm. Pulses: Normal pulses. Heart sounds: Normal heart sounds. No murmur heard. No friction rub. No gallop. Pulmonary:      Effort: Pulmonary effort is normal. No respiratory distress. Breath sounds: Normal breath sounds. No wheezing or rales. Chest:      Chest wall: No tenderness. Abdominal:      General: Bowel sounds are normal. There is no distension. Palpations: Abdomen is soft. Tenderness: There is no abdominal tenderness. There is no guarding or rebound. Musculoskeletal:         General: Swelling and tenderness present. Cervical back: Rigidity, spasms, tenderness and bony tenderness present. No edema or erythema. No muscular tenderness. Decreased range of motion. Thoracic back: Bony tenderness present. Decreased range of motion. Lumbar back: Spasms, tenderness and bony tenderness present. Decreased range of motion. Negative right straight leg raise test and negative left straight leg raise test.        Back:       Right hip: Tenderness present. Left hip: Bony tenderness present. Decreased range of motion. Decreased strength. Right knee: Bony tenderness present. Decreased range of motion. Tenderness present. Right lower leg: Edema present. Left lower leg: Edema present. Legs:    Skin:     General: Skin is warm and dry. Coloration: Skin is not pale. Findings: No erythema or rash. Neurological:      General: No focal deficit present.       Mental Status: She is alert and oriented to person, place, and time. She is not disoriented. Cranial Nerves: No cranial nerve deficit. Sensory: Sensory deficit present. Motor: Weakness present. No atrophy or abnormal muscle tone. Coordination: Coordination abnormal.      Gait: Gait abnormal.      Deep Tendon Reflexes: Babinski sign absent on the right side. Comments: 4/5 strength right lower extremity    Psychiatric:         Attention and Perception: She is attentive. Mood and Affect: Mood is not anxious or depressed. Affect is not labile, blunt, angry or inappropriate. Speech: She is communicative. Speech is not rapid and pressured, delayed, slurred or tangential.         Behavior: Behavior normal. Behavior is not agitated, slowed, aggressive, withdrawn, hyperactive or combative. Thought Content: Thought content normal. Thought content is not paranoid or delusional. Thought content does not include homicidal or suicidal ideation. Thought content does not include homicidal or suicidal plan. Cognition and Memory: Memory is not impaired. She does not exhibit impaired recent memory or impaired remote memory. Judgment: Judgment normal. Judgment is not impulsive or inappropriate. SCOOTER  Patricks test  positive  Yeoman's  or Gaenslen's positive       Assessment:     1. Chronic myofascial pain    2. Spondylosis of lumbar spine    3. Spondylosis of thoracic region without myelopathy or radiculopathy    4. Sacroiliac inflammation ([address]    5. Primary osteoarthritis of right knee    6. Mid back pain    7. Lumbosacral radiculitis    8. Cervical spondylosis    9. Chronic pain syndrome            Plan:      OARRS reviewed. Current MED: 10.00  Patient was offered naloxone for home. Discussed long term side effects of medications, tolerance, dependency and addiction. Previous UDS reviewed  UDS preformed today for compliance. Patient told can not receive any pain medications from any other source.   No evidence of abuse, diversion or aberrant behavior. Medications and/or procedures to improve function and quality of life- patient understanding with this and that may not be pain free  Discussed with patient about safe storage of medications at home  Discussed possible weaning of medication dosing dependent on treatment/procedure results. Discussed with patient about risks with procedure including infection, reaction to medication, increased pain, or bleeding. Procedure notes reviewed in detail  Continues to receive relief from T-facet RFA and right Synvisc injection  Great relief from trigger point injections in low and mid back   Plan trigger point injections upper back and neck with Ashleigh TINOCO   No relief from SI injections   Medications continue to help- continue Norco 5/325 BID prn- Filled 7/14/2022,  Neurontin 300 mg at bedtime- filled 7/6/2022. Continue flexeril prn from pcp  Continue therapies   Updated UDS ordered. Patient is compliant. Meds. Prescribed:   No orders of the defined types were placed in this encounter. Return for Plan trigger point injections upper back and neck with Ashleigh TINOCO. FU 10 weeks with me.                Electronically signed by Ren Rubinstein, APRN - CNP on7/26/2022 at 12:44 PM

## 2022-07-31 DIAGNOSIS — M54.17 LUMBOSACRAL RADICULITIS: ICD-10-CM

## 2022-08-01 RX ORDER — GABAPENTIN 300 MG/1
CAPSULE ORAL
Qty: 30 CAPSULE | Refills: 0 | Status: SHIPPED | OUTPATIENT
Start: 2022-08-06 | End: 2022-08-29

## 2022-08-01 NOTE — TELEPHONE ENCOUNTER
OARRS reviewed. UDS: + for  Lexapro, Gabapentin, Hydrocodone, Cyclobenzaprine. Last seen: 7/26/2022.  Follow-up: 10/4/22

## 2022-08-10 ENCOUNTER — OFFICE VISIT (OUTPATIENT)
Dept: PHYSICAL MEDICINE AND REHAB | Age: 57
End: 2022-08-10
Payer: COMMERCIAL

## 2022-08-10 VITALS
SYSTOLIC BLOOD PRESSURE: 110 MMHG | HEIGHT: 64 IN | DIASTOLIC BLOOD PRESSURE: 74 MMHG | WEIGHT: 293 LBS | BODY MASS INDEX: 50.02 KG/M2

## 2022-08-10 DIAGNOSIS — G89.29 CHRONIC MYOFASCIAL PAIN: Primary | ICD-10-CM

## 2022-08-10 DIAGNOSIS — M79.18 CHRONIC MYOFASCIAL PAIN: Primary | ICD-10-CM

## 2022-08-10 DIAGNOSIS — G89.4 CHRONIC PAIN SYNDROME: ICD-10-CM

## 2022-08-10 PROCEDURE — 20553 NJX 1/MLT TRIGGER POINTS 3/>: CPT | Performed by: NURSE PRACTITIONER

## 2022-08-10 RX ORDER — METHOCARBAMOL 100 MG/ML
100 INJECTION, SOLUTION INTRAMUSCULAR; INTRAVENOUS ONCE
Status: COMPLETED | OUTPATIENT
Start: 2022-08-10 | End: 2022-08-10

## 2022-08-10 RX ADMIN — METHOCARBAMOL 100 MG: 100 INJECTION, SOLUTION INTRAMUSCULAR; INTRAVENOUS at 13:09

## 2022-08-10 NOTE — PROGRESS NOTES
Procedure  Visit Date: 8/10/22    Vish Hooker is a 61 y.o. female presents today in the office for the following:  Chief Complaint   Patient presents with    Follow-up     Cerv/thoracic TPI r/s from 8/4 per pt request       History of Present Illness   Meera Latham is here today for trigger point injections. Pre-Op Diagnosis   Myofacial pain syndrome     Post-Op Diagnosis   Myofacial pain syndrome     Procedure: Trigger point injection(s)    Procedure Documentation   Patient identified. Consent signed. Site identified. Trigger points were identified in the bilateral cervical paraspinals, bilateral trapezius, bilateral thoracic paraspinals for a total of 20 trigger point injections. Then with a 25g needle entered each trigger point and after negative aspiration, 1 cc of a mixture containing 1:10 - 100 mg methocarbamol: 0.25% bupivacaine was injected at each trigger point for a total of 20 trigger points. Procedural Complications: None      Vitals:    08/10/22 1225   BP: 110/74   Site: Left Lower Arm   Position: Sitting   Cuff Size: Large Adult   Weight: (!) 366 lb (166 kg)   Height: 5' 4.02\" (1.626 m)       Conclusion   No complications encountered. Patient discharged stable condition. Patient told if any problems to call office or go to ER. No orders of the defined types were placed in this encounter.       Electronically signed by TAMIE Ramirez CNP on 8/10/22 at 12:45 PM EDT

## 2022-08-22 ENCOUNTER — PATIENT MESSAGE (OUTPATIENT)
Dept: PHYSICAL MEDICINE AND REHAB | Age: 57
End: 2022-08-22

## 2022-08-22 DIAGNOSIS — M46.1 SACROILIAC INFLAMMATION (HCC): ICD-10-CM

## 2022-08-22 DIAGNOSIS — G89.4 CHRONIC PAIN SYNDROME: ICD-10-CM

## 2022-08-22 DIAGNOSIS — M47.816 SPONDYLOSIS OF LUMBAR SPINE: ICD-10-CM

## 2022-08-22 DIAGNOSIS — M79.18 CHRONIC MYOFASCIAL PAIN: ICD-10-CM

## 2022-08-22 DIAGNOSIS — G89.29 CHRONIC MYOFASCIAL PAIN: ICD-10-CM

## 2022-08-22 DIAGNOSIS — M54.17 LUMBOSACRAL RADICULITIS: Primary | ICD-10-CM

## 2022-08-23 RX ORDER — HYDROCODONE BITARTRATE AND ACETAMINOPHEN 5; 325 MG/1; MG/1
1 TABLET ORAL 2 TIMES DAILY PRN
Qty: 60 TABLET | Refills: 0 | Status: SHIPPED | OUTPATIENT
Start: 2022-08-23 | End: 2022-10-04 | Stop reason: SDUPTHER

## 2022-08-23 NOTE — TELEPHONE ENCOUNTER
From: Carrolyn Romberg  To: Kenroy Montesinos  Sent: 8/22/2022 4:28 PM EDT  Subject: norco refill    May I have a norco refill please?   Thanks,  Nasir Cabrales

## 2022-08-23 NOTE — TELEPHONE ENCOUNTER
OARRS reviewed. UDS: + for Flexeril, Gabapentin, Hydrocodone, Lexapro. Last seen: 7/26/2022.  Follow-up: 10/4/22

## 2022-08-28 DIAGNOSIS — M54.17 LUMBOSACRAL RADICULITIS: ICD-10-CM

## 2022-08-29 RX ORDER — GABAPENTIN 300 MG/1
300 CAPSULE ORAL DAILY
Qty: 30 CAPSULE | Refills: 0 | Status: SHIPPED | OUTPATIENT
Start: 2022-09-01 | End: 2022-10-04 | Stop reason: SDUPTHER

## 2022-08-29 NOTE — TELEPHONE ENCOUNTER
OARRS reviewed. UDS: + for  Cyclobenzaprine, Gabapentin, Hydrocodone, Citalopram/Escitalopram -consistent. Last seen: 7/26/2022.  Follow-up: 10/4/2022

## 2022-09-27 ENCOUNTER — HOSPITAL ENCOUNTER (OUTPATIENT)
Age: 57
Discharge: HOME | End: 2022-09-27
Payer: COMMERCIAL

## 2022-09-27 ENCOUNTER — HOSPITAL ENCOUNTER (OUTPATIENT)
Dept: GENERAL RADIOLOGY | Age: 57
Discharge: HOME | End: 2022-09-27
Payer: COMMERCIAL

## 2022-09-27 ENCOUNTER — HOSPITAL ENCOUNTER (OUTPATIENT)
Dept: PULMONOLOGY | Age: 57
Discharge: HOME | End: 2022-09-27
Payer: COMMERCIAL

## 2022-09-27 DIAGNOSIS — Z99.89 OSA ON CPAP: ICD-10-CM

## 2022-09-27 DIAGNOSIS — Z77.22 TOBACCO SMOKE EXPOSURE: ICD-10-CM

## 2022-09-27 DIAGNOSIS — G47.33 OSA ON CPAP: ICD-10-CM

## 2022-09-27 DIAGNOSIS — R93.89 ABNORMAL CHEST X-RAY: ICD-10-CM

## 2022-09-27 DIAGNOSIS — R91.8 ABNORMAL CHEST X-RAY WITH MULTIPLE LUNG NODULES: ICD-10-CM

## 2022-09-27 PROCEDURE — 94010 BREATHING CAPACITY TEST: CPT

## 2022-09-27 PROCEDURE — 94729 DIFFUSING CAPACITY: CPT

## 2022-09-27 PROCEDURE — 94726 PLETHYSMOGRAPHY LUNG VOLUMES: CPT

## 2022-09-27 PROCEDURE — 71046 X-RAY EXAM CHEST 2 VIEWS: CPT

## 2022-10-04 ENCOUNTER — TELEPHONE (OUTPATIENT)
Dept: PHYSICAL MEDICINE AND REHAB | Age: 57
End: 2022-10-04

## 2022-10-04 ENCOUNTER — OFFICE VISIT (OUTPATIENT)
Dept: PHYSICAL MEDICINE AND REHAB | Age: 57
End: 2022-10-04
Payer: COMMERCIAL

## 2022-10-04 VITALS
SYSTOLIC BLOOD PRESSURE: 112 MMHG | WEIGHT: 293 LBS | HEIGHT: 64 IN | BODY MASS INDEX: 50.02 KG/M2 | DIASTOLIC BLOOD PRESSURE: 74 MMHG

## 2022-10-04 DIAGNOSIS — M17.11 PRIMARY OSTEOARTHRITIS OF RIGHT KNEE: ICD-10-CM

## 2022-10-04 DIAGNOSIS — M54.9 MID BACK PAIN: ICD-10-CM

## 2022-10-04 DIAGNOSIS — M47.814 SPONDYLOSIS OF THORACIC REGION WITHOUT MYELOPATHY OR RADICULOPATHY: Primary | ICD-10-CM

## 2022-10-04 DIAGNOSIS — M47.816 SPONDYLOSIS OF LUMBAR SPINE: ICD-10-CM

## 2022-10-04 DIAGNOSIS — M54.17 LUMBOSACRAL RADICULITIS: ICD-10-CM

## 2022-10-04 DIAGNOSIS — G89.29 CHRONIC MYOFASCIAL PAIN: ICD-10-CM

## 2022-10-04 DIAGNOSIS — G89.4 CHRONIC PAIN SYNDROME: ICD-10-CM

## 2022-10-04 DIAGNOSIS — M46.1 SACROILIAC INFLAMMATION (HCC): ICD-10-CM

## 2022-10-04 DIAGNOSIS — M79.18 CHRONIC MYOFASCIAL PAIN: ICD-10-CM

## 2022-10-04 PROCEDURE — 1036F TOBACCO NON-USER: CPT | Performed by: NURSE PRACTITIONER

## 2022-10-04 PROCEDURE — G8417 CALC BMI ABV UP PARAM F/U: HCPCS | Performed by: NURSE PRACTITIONER

## 2022-10-04 PROCEDURE — G8427 DOCREV CUR MEDS BY ELIG CLIN: HCPCS | Performed by: NURSE PRACTITIONER

## 2022-10-04 PROCEDURE — 99214 OFFICE O/P EST MOD 30 MIN: CPT | Performed by: NURSE PRACTITIONER

## 2022-10-04 PROCEDURE — 3017F COLORECTAL CA SCREEN DOC REV: CPT | Performed by: NURSE PRACTITIONER

## 2022-10-04 PROCEDURE — G8484 FLU IMMUNIZE NO ADMIN: HCPCS | Performed by: NURSE PRACTITIONER

## 2022-10-04 RX ORDER — HYDROCODONE BITARTRATE AND ACETAMINOPHEN 5; 325 MG/1; MG/1
1 TABLET ORAL 2 TIMES DAILY PRN
Qty: 60 TABLET | Refills: 0 | Status: SHIPPED | OUTPATIENT
Start: 2022-10-04 | End: 2022-11-03

## 2022-10-04 RX ORDER — GABAPENTIN 300 MG/1
300 CAPSULE ORAL DAILY
Qty: 30 CAPSULE | Refills: 0 | Status: SHIPPED | OUTPATIENT
Start: 2022-10-12 | End: 2022-11-11

## 2022-10-04 ASSESSMENT — ENCOUNTER SYMPTOMS
VOICE CHANGE: 0
BACK PAIN: 1
RESPIRATORY NEGATIVE: 1
EYES NEGATIVE: 1
GASTROINTESTINAL NEGATIVE: 1
SORE THROAT: 0
SINUS PAIN: 0

## 2022-10-04 NOTE — PROGRESS NOTES
[facility] [address]  Dept: [phone]  Dept Fax: [phone]: [phone]    Visit Date: 10/4/2022    Functionality Assessment/Goals Worksheet     On a scale of 0 (Does not Interfere) to 10 (Completely Interferes)     1. Which number describes how during the past week pain has interfered with       the following:  A. General Activity:  8  B. Mood: 9  C. Walking Ability:  7  D. Normal Work (Includes both work outside the home and housework):  6  E. Relations with Other People:   6  F. Sleep:   6  G. Enjoyment of Life:   7    2. Patient Prefers to Take their Pain Medications:     []  On a regular basis   [x]  Only when necessary    []  Does not take pain medications    3. What are the Patient's Goals/Expectations for Visiting Pain Management? []  Learn about my pain    [x]  Receive Medication   []  Physical Therapy     []  Treat Depression   [x]  Receive Injections    []  Treat Sleep   [x]  Deal with Anxiety and Stress   []  Treat Opoid Dependence/Addiction   []  Other:      HPI:   Melvin Gruber is a 61 y.o. female is here today for    Chief Complaint: Back pain     HPI   10 week FU. Main complaint is mid back pain that has been increasing lately as effects from previous T-facet RFA has worn off- Constant burning aching pain across mid back     Still has muscle pain stiffness and spasms throughout back but she canceled 3 appointments scheduled with [facility] for trigger point injections. States she had increased depression and had a hard time getting out. Right knee pain remains tolerable   Pain increases with bending, lifting, twisting , walking, standing, getting up and down, and housework or working at job. Current pain medications remain effective    Medications reviewed. Patient denies side effects with medications.  Patient states she is taking medications as prescribed. Shedenies receiving pain medications from other sources. She denies any ER visits since last visit. Pain scale with out pain medications or at its worst is 7/10. Pain scale with pain medications or at its best is 2-3/10. Last dose of Norco and Neurontin was last night   Drug screen reviewed from 7/26/2022 and was appropriate  Pill count completed  today and WNL: Yes      The patientis allergic to walnut [macadamia nut oil], elemental sulfur, other, saccharin, and sulfa antibiotics. Subjective:      Review of Systems   Constitutional: Negative. Negative for fatigue and fever. HENT:  Positive for dental problem. Negative for congestion, sinus pain, sore throat and voice change. Eyes: Negative. Respiratory: Negative. Cardiovascular:  Positive for leg swelling. Negative for chest pain. Gastrointestinal: Negative. Genitourinary: Negative. Negative for flank pain. Musculoskeletal:  Positive for arthralgias, back pain, gait problem, joint swelling, myalgias, neck pain and neck stiffness. Ambulating with cane   Skin: Negative. Neurological:  Positive for weakness and numbness. Psychiatric/Behavioral:  Positive for dysphoric mood and sleep disturbance. Negative for suicidal ideas. The patient is nervous/anxious. Objective:     Vitals:    10/04/22 1130   BP: 112/74   Weight: (!) 366 lb (166 kg)   Height: 5' 4.02\" (1.626 m)       Physical Exam  Vitals and nursing note reviewed. Constitutional:       General: She is not in acute distress. Appearance: Normal appearance. She is well-developed. She is obese. She is not diaphoretic. HENT:      Head: Normocephalic and atraumatic. Right Ear: External ear normal.      Left Ear: External ear normal.      Nose: Nose normal.      Mouth/Throat:      Pharynx: No oropharyngeal exudate. Eyes:      General: No scleral icterus. Right eye: No discharge. Left eye: No discharge.       Conjunctiva/sclera: Conjunctivae normal.      Pupils: Pupils are equal, round, and reactive to light. Neck:      Thyroid: No thyromegaly. Trachea: No tracheal deviation. Cardiovascular:      Rate and Rhythm: Normal rate and regular rhythm. Pulses: Normal pulses. Heart sounds: Normal heart sounds. No murmur heard. No friction rub. No gallop. Pulmonary:      Effort: Pulmonary effort is normal. No respiratory distress. Breath sounds: Normal breath sounds. No wheezing or rales. Chest:      Chest wall: No tenderness. Abdominal:      General: Bowel sounds are normal. There is no distension. Palpations: Abdomen is soft. Tenderness: There is no abdominal tenderness. There is no guarding or rebound. Musculoskeletal:         General: Swelling and tenderness present. Cervical back: Rigidity, spasms, tenderness and bony tenderness present. No edema or erythema. No muscular tenderness. Decreased range of motion. Thoracic back: Bony tenderness present. Decreased range of motion. Lumbar back: Spasms, tenderness and bony tenderness present. Decreased range of motion. Negative right straight leg raise test and negative left straight leg raise test.        Back:       Right hip: Tenderness present. Left hip: Bony tenderness present. Decreased range of motion. Decreased strength. Right knee: Bony tenderness present. Decreased range of motion. Tenderness present. Right lower leg: Edema present. Left lower leg: Edema present. Legs:    Skin:     General: Skin is warm and dry. Coloration: Skin is not pale. Findings: No erythema or rash. Neurological:      General: No focal deficit present. Mental Status: She is alert and oriented to person, place, and time. She is not disoriented. Cranial Nerves: No cranial nerve deficit. Sensory: Sensory deficit present. Motor: Weakness present. No atrophy or abnormal muscle tone.       Coordination: Coordination abnormal.      Gait: Gait abnormal.      Deep Tendon Reflexes: Babinski sign absent on the right side. Comments: 4/5 strength right lower extremity    Psychiatric:         Attention and Perception: She is attentive. Mood and Affect: Mood is not anxious or depressed. Affect is not labile, blunt, angry or inappropriate. Speech: She is communicative. Speech is not rapid and pressured, delayed, slurred or tangential.         Behavior: Behavior normal. Behavior is not agitated, slowed, aggressive, withdrawn, hyperactive or combative. Thought Content: Thought content normal. Thought content is not paranoid or delusional. Thought content does not include homicidal or suicidal ideation. Thought content does not include homicidal or suicidal plan. Cognition and Memory: Memory is not impaired. She does not exhibit impaired recent memory or impaired remote memory. Judgment: Judgment normal. Judgment is not impulsive or inappropriate. SCOOTER  Patricks test  positive  Yeoman's  or Gaenslen's positive         Assessment:     1. Spondylosis of thoracic region without myelopathy or radiculopathy    2. Mid back pain    3. Spondylosis of lumbar spine    4. Lumbosacral radiculitis    5. Sacroiliac inflammation ([address]    6. Chronic myofascial pain    7. Primary osteoarthritis of right knee    8. Chronic pain syndrome            Plan:      OARRS reviewed. Current MED: 10.00  Patient was offered naloxone for home. Discussed long term side effects of medications, tolerance, dependency and addiction. Previous UDS reviewed  UDS preformed today for compliance. Patient told can not receive any pain medications from any other source. No evidence of abuse, diversion or aberrant behavior.   Medications and/or procedures to improve function and quality of life- patient understanding with this and that may not be pain free  Discussed with patient about safe storage of medications at home  Discussed possible weaning of medication dosing dependent on treatment/procedure results. Discussed with patient about risks with procedure including infection, reaction to medication, increased pain, or bleeding. Procedure notes reviewed in detail  Plan Bilateral T-facet RFA @  T8-9, and T9-T10 for longer term pain relief. Procedure and risks discussed in detail with patient. Received over 80% relief from previous RFA for about 7 months   Continues good relief of right knee pain from Synvisc injection   Needs to hold Mobic  No relief from SI injections- is looking into SI fusion. Canceled several appointments for trigger point injections   Medications continue to help- continue Norco 5/325 BID prn- ordered refill,  Neurontin 300 mg at bedtime-ordered next refill for 10/12/2022  Continue flexeril prn from pcp  Updated UDS ordered   Continues psychosocial therapy     Meds. Prescribed:   Orders Placed This Encounter   Medications    gabapentin (NEURONTIN) 300 MG capsule     Sig: Take 1 capsule by mouth daily for 30 days. Dispense:  30 capsule     Refill:  0    HYDROcodone-acetaminophen (NORCO) 5-325 MG per tablet     Sig: Take 1 tablet by mouth 2 times daily as needed for Pain for up to 30 days. Take lowest dose possible to manage pain     Dispense:  60 tablet     Refill:  0     Reduce doses taken as pain becomes manageable         Return for Bilateral T-facet RFA @  T8-9, and T9-T10. , follow up after procedure.                Electronically signed by TAMIE Florian CNP on10/4/2022 at 11:57 AM

## 2022-10-06 ENCOUNTER — TELEPHONE (OUTPATIENT)
Dept: PHYSICAL MEDICINE AND REHAB | Age: 57
End: 2022-10-06

## 2022-10-06 ENCOUNTER — HOSPITAL ENCOUNTER (OUTPATIENT)
Age: 57
Discharge: HOME | End: 2022-10-06
Payer: COMMERCIAL

## 2022-10-06 LAB
ALBUMIN SERPL-MCNC: 4.3 G/DL (ref 3.5–5.1)
ALP BLD-CCNC: 126 U/L (ref 38–126)
ALT SERPL-CCNC: 56 U/L (ref 11–66)
ANION GAP SERPL CALCULATED.3IONS-SCNC: 12 MEQ/L (ref 8–16)
AST SERPL-CCNC: 36 U/L (ref 5–40)
AVERAGE GLUCOSE: 117 MG/DL (ref 70–126)
BASOPHILS # BLD: 0.6 %
BASOPHILS ABSOLUTE: 0 THOU/MM3 (ref 0–0.1)
BILIRUB SERPL-MCNC: 0.6 MG/DL (ref 0.3–1.2)
BUN BLDV-MCNC: 13 MG/DL (ref 7–22)
CALCIUM SERPL-MCNC: 10.3 MG/DL (ref 8.5–10.5)
CHLORIDE BLD-SCNC: 102 MEQ/L (ref 98–111)
CHOLESTEROL, TOTAL: 196 MG/DL (ref 100–199)
CO2: 27 MEQ/L (ref 23–33)
CORTISOL COLLECTION INFO: NORMAL
CORTISOL: 7.69 UG/DL
CREAT SERPL-MCNC: 0.9 MG/DL (ref 0.4–1.2)
EOSINOPHIL # BLD: 2.4 %
EOSINOPHILS ABSOLUTE: 0.2 THOU/MM3 (ref 0–0.4)
ERYTHROCYTE [DISTWIDTH] IN BLOOD BY AUTOMATED COUNT: 13.4 % (ref 11.5–14.5)
ERYTHROCYTE [DISTWIDTH] IN BLOOD BY AUTOMATED COUNT: 43.8 FL (ref 35–45)
GFR SERPL CREATININE-BSD FRML MDRD: 64 ML/MIN/1.73M2
GLUCOSE BLD-MCNC: 109 MG/DL (ref 70–108)
HBA1C MFR BLD: 5.9 % (ref 4.4–6.4)
HCT VFR BLD CALC: 41.5 % (ref 37–47)
HDLC SERPL-MCNC: 60 MG/DL
HEMOGLOBIN: 13.8 GM/DL (ref 12–16)
IMMATURE GRANS (ABS): 0.02 THOU/MM3 (ref 0–0.07)
IMMATURE GRANULOCYTES: 0.3 %
LDL CHOLESTEROL CALCULATED: 116 MG/DL
LYMPHOCYTES # BLD: 30.3 %
LYMPHOCYTES ABSOLUTE: 1.9 THOU/MM3 (ref 1–4.8)
MCH RBC QN AUTO: 29.7 PG (ref 26–33)
MCHC RBC AUTO-ENTMCNC: 33.3 GM/DL (ref 32.2–35.5)
MCV RBC AUTO: 89.4 FL (ref 81–99)
MONOCYTES # BLD: 7.5 %
MONOCYTES ABSOLUTE: 0.5 THOU/MM3 (ref 0.4–1.3)
NUCLEATED RED BLOOD CELLS: 0 /100 WBC
PLATELET # BLD: 300 THOU/MM3 (ref 130–400)
PMV BLD AUTO: 9.2 FL (ref 9.4–12.4)
POTASSIUM SERPL-SCNC: 4.5 MEQ/L (ref 3.5–5.2)
RBC # BLD: 4.64 MILL/MM3 (ref 4.2–5.4)
SEG NEUTROPHILS: 58.9 %
SEGMENTED NEUTROPHILS ABSOLUTE COUNT: 3.8 THOU/MM3 (ref 1.8–7.7)
SODIUM BLD-SCNC: 141 MEQ/L (ref 135–145)
TOTAL PROTEIN: 7.3 G/DL (ref 6.1–8)
TRIGL SERPL-MCNC: 99 MG/DL (ref 0–199)
TSH SERPL DL<=0.05 MIU/L-ACNC: 0.85 UIU/ML (ref 0.4–4.2)
VITAMIN D 25-HYDROXY: 83 NG/ML (ref 30–100)
WBC # BLD: 6.4 THOU/MM3 (ref 4.8–10.8)

## 2022-10-06 PROCEDURE — 84443 ASSAY THYROID STIM HORMONE: CPT

## 2022-10-06 PROCEDURE — 80061 LIPID PANEL: CPT

## 2022-10-06 PROCEDURE — 85025 COMPLETE CBC W/AUTO DIFF WBC: CPT

## 2022-10-06 PROCEDURE — 36415 COLL VENOUS BLD VENIPUNCTURE: CPT

## 2022-10-06 PROCEDURE — 82533 TOTAL CORTISOL: CPT

## 2022-10-06 PROCEDURE — 83036 HEMOGLOBIN GLYCOSYLATED A1C: CPT

## 2022-10-06 PROCEDURE — 82306 VITAMIN D 25 HYDROXY: CPT

## 2022-10-06 PROCEDURE — 80053 COMPREHEN METABOLIC PANEL: CPT

## 2022-11-03 ENCOUNTER — TELEPHONE (OUTPATIENT)
Dept: PHYSICAL MEDICINE AND REHAB | Age: 57
End: 2022-11-03

## 2022-11-09 DIAGNOSIS — M54.17 LUMBOSACRAL RADICULITIS: ICD-10-CM

## 2022-11-09 RX ORDER — GABAPENTIN 300 MG/1
300 CAPSULE ORAL DAILY
Qty: 30 CAPSULE | Refills: 0 | Status: SHIPPED | OUTPATIENT
Start: 2022-11-11 | End: 2022-12-11

## 2022-11-09 NOTE — PROGRESS NOTES
[facility]    Patient: Isael Mauro, 61 y.o.   : 1965  11/15/2022    Patient of Dr. Rodrigue Mead   Patient presents with    Follow-up     Pulm f/u with PFT(22) to CXR (22)        HPI  Cristian Vincent is here for follow up for lung nodules. Patient was last evaluated by Dr. Srinivasa Rea on 2022 as a new pulmonary evaluation. Patient is here with chest x-ray to follow lung nodules that revealed stable scattered benign calcified granulomas. She was noted to have a chest x-ray performed on 2018 that also revealed the 2 pulmonary nodules. She is also here with normal pulmonary function test.  Patient reports that she may get some shortness of breath with exertional task, however she reports that this is more related to her sciatic pain. She also reports some shortness of breath when she has having sinus congestion. She is taking loratadine for her allergy symptoms. Patient reports that hoarseness has resolved. She states that she was intubated for a procedure in 2022 and had persistent hoarseness and lost her voice after this. This is now resolved. She denies fever, appetite change, unexpected weight loss, hemoptysis, cough, wheezing, chest tightness. Her past medical history is significant for allergic rhinitis, depression, hypertension, ABDI on CPAP, osteoarthritis, vitamin D deficiency, and sciatica (follows with Dr. Dianne Serna). Patient gained 4 lbs since last appointment.     Allergy regimen: loratadine    MMRC Dyspnea Scale:   0: Dyspneic on strenuous exercise  1: Dyspneic on walking a slight hill  2: Dyspneic on walking level ground; must stop occasionally due to breathlessness  3: Must stop for breathlessness after walking 100 yards or after a few minutes  4: Cannot leave house; breathless on dressing/undressing    MMRC dyspnea score: 1 - she reports it is more to do with sciatica       Progress History:   Since last visit any new medical issues? No  New ER or hospital visits? No  Any new or changes in medicines? No  Using inhalers? 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 History of recreational or IV drug use in the past:NO  History of Alcohol use: No.    History of exposure to coal mines/coal dust: NO  History of exposure to foundry dust/welding: NO  History of exposure to quarry/silica/sandblasting: NO  History of exposure to asbestos/working with breaks/ships: NO  History of exposure to farm dust: NO  History of recent travel to long distances: NO  History of gardening with possible exposure to fungus  History of exposure to birds, pigeons, or chickens in the past:NO  Pet animals at home:Yes  Dogs: 0  Cats: 5    Flu vaccine: 10/4/22  Pneumonia vaccine: Has not received  COVID-19 vaccine: vaccinated and booster  Past Medical hx   PMH:  Past Medical History:   Diagnosis Date    Allergic rhinitis     Depression     Hypertension     ABDI on CPAP     Osteoarthritis     Vitamin D deficiency      SURGICAL HISTORY:  Past Surgical History:   Procedure Laterality Date    ARTHROCENTESIS Right 10/16/2017    RIGHT HIP LARGE JOINT INJECTION performed by Jeni Rubinstein, MD at [address]    ARTHROCENTESIS Left 12/05/2017    LEFT HIP LARGE JOINT INJECTION performed by Jeni Rubinstein, MD at [address]    ARTHROCENTESIS Right 07/18/2019    Rt.  Knee Steroid Injection performed by Jeni Rubinstein, MD at [facility] Bilateral 07/19/2021    bilateral SI MBB # 1 performed by Jeni Rubinstein, MD at [address] [place] [address]    DENTAL SURGERY  2002 right front upper implant    DILATION AND CURETTAGE OF UTERUS  06/2022    FACET JOINT INJECTION Bilateral 03/07/2022    Bilateral T-facet RFA @  T8-9, and T9-T10 performed by Ruby Malin MD at [facility] Right 05/15/2015    index finger cleaned out D/T infected cat bite    HEMORRHOID SURGERY  2016    series of 3 bandings for internal hemorrhoids, Dr. Kenyon Sahni Left 02/07/2020    Right knee steroid injection performed by Dan Kehr, MD at [facility] Right 06/04/2021    Right knee Synvisc injection performed by Dan Kehr, MD at [address] [facility] Right 01/04/2022    Right Synvisc One injection performed by Dan Kehr, MD at [facility] Right 04/16/2019    T-facet RFA @ T7-8, 8-9, 9-10 performed by Dan Kehr, MD at [facility] Left 06/13/2019    T-facet RFA @ T7-8, 8-9, 9-10 LEFT performed by Dan Kehr, MD at [place] [address] FACET LEVEL 1 BILATERAL Bilateral 01/04/2019    LUMBAR FACET bilateral T-facet MBB @ T7-T8, T8-T9, and T9-T10 performed by Dan Kehr, MD at [place] [address] FACET LEVEL 1 BILATERAL Bilateral 02/25/2019    Thoracic FACET bilateral T-facet MBB @ T7-T8, T8-T9, and T9-T10 MBB #2 performed by Dan Kehr, MD at [address] [place] [address] Left 12/05/2017    HIP INJECTION     NERVE SURGERY Bilateral 09/13/2019    bilateral T-facet MBB # 1 @ T4-5, T5-6, and T6-7. performed by Dan Kehr, MD at [place] [address]  04/11/2016    L4-5, L5-S1 Facet injection    OTHER SURGICAL HISTORY  06/13/2016    Right Hip Injection    OTHER SURGICAL HISTORY Right 10/16/2017    Hip Injection     PAIN MANAGEMENT PROCEDURE Right 07/31/2020    Right knee genicular nerve block performed by Anabella Barber MD at [facility] Right 2020    Right knee genicular nerve RFA performed by Anabella Barber MD at [facility] Right 2021    Bilateral T-facet RFA @ T7-8, T8-9, and T9-T10 RIGHT SIDE FIRST performed by Anabella Barber MD at [facility] Left 2021    Left T-facet RFA @ T7-8, T8-9, and T9-T10. performed by Anabella Barber MD at [facility] ARTHROCENTESIS ASPIR&/INJ MAJOR JT/BURSA W/O US Right 2018    RIGHT HIP LARGE JOINT STEROID INJECTION performed by Anabella Barber MD at [facility] OFFICE/OUTPT VISIT,PROCEDURE ONLY N/A 2018    Kyphoplasty T12 BILATERAL performed by Anabella Barber MD at [place] [address]      as child    WISDOM TOOTH EXTRACTION       SOCIAL HISTORY:  Social History     Tobacco Use    Smoking status: Former     Packs/day: 1.00     Years: 7.00     Pack years: 7.00     Types: Cigarettes, E-Cigarettes     Start date: 2005     Quit date: 2011     Years since quittin.8    Smokeless tobacco: Never   Vaping Use    Vaping Use: Never used   Substance Use Topics    Alcohol use: Not Currently     Comment: occasionally, 1-3 times a month    Drug use: No     ALLERGIES:  Allergies   Allergen Reactions    [place] [Macadamia Nut Oil] Other (See Comments)     Numbness and Tingling in mouth    Elemental Sulfur     Other Other (See Comments)     Artificial sweeteners - migraines    Saccharin     Sulfa Antibiotics Hives     FAMILY HISTORY:  Family History   Problem Relation Age of Onset    Substance Abuse Sister     Heart Disease Mother         stent    Cancer Neg Hx     Diabetes Neg Hx     High Blood Pressure Neg Hx     Stroke Neg Hx      CURRENT MEDICATIONS:  Current Outpatient Medications   Medication Sig Dispense Refill    fluticasone (FLONASE) 50 MCG/ACT nasal spray 2 sprays by Nasal route daily [place] towards outside of nose, not towards the septum 16 g 6    gabapentin (NEURONTIN) 300 MG capsule Take 1 capsule by mouth daily for 30 days. 30 capsule 0    Benzonatate (TESSALON PERLES PO) Take by mouth      triamterene (DYRENIUM) 50 MG capsule take 1 capsule by mouth once daily if needed for edema      CPAP Machine MISC by Does not apply route Please change CPAP pressure to 17 cm H20. 1 each 0    meloxicam (MOBIC) 15 MG tablet Take 1 tablet by mouth daily 30 tablet 2    loratadine (CLARITIN) 10 MG tablet Take 10 mg by mouth daily      Elastic Bandages & Supports (B & B SACROILIAC BELT) MISC 1 Device by Does not apply route as needed (pain) 1 each 0    Cholecalciferol (VITAMIN D3) 5000 units CAPS Take 1 capsule by mouth daily      Omega-3 Fatty Acids (FISH OIL) 1200 MG CAPS       calcium carbonate (OSCAL) 500 MG TABS tablet Take 500 mg by mouth 2 times daily      Turmeric 500 MG CAPS Take by mouth daily      cyclobenzaprine (FLEXERIL) 10 MG tablet Take 1 tablet by mouth 3 times daily as needed for Muscle spasms WARNING: This medication may make your drowsy. Do not operate heavy machinery or motor vehicles during use. 15 tablet 0    lisinopril (PRINIVIL;ZESTRIL) 10 MG tablet Take 10 mg by mouth nightly       citalopram (CELEXA) 20 MG tablet Take 20 mg by mouth daily       spironolactone (ALDACTONE) 25 MG tablet Take 1 tablet by mouth daily. 15 tablet 0     No current facility-administered medications for this visit. William Patella ROS   Review of Systems   Constitutional:  Negative for appetite change, fever and unexpected weight change. HENT:  Positive for congestion, postnasal drip and sinus pressure. Negative for rhinorrhea, sinus pain and sneezing. Respiratory:  Positive for shortness of breath. Negative for cough, chest tightness and wheezing. Denies hemoptysis   Cardiovascular:  Positive for leg swelling (worse in the summer - on diuretics).  Negative for chest pain and palpitations. Allergic/Immunologic: Positive for environmental allergies. Physical exam   /62 (Site: Left Upper Arm, Position: Sitting, Cuff Size: Large Adult)   Pulse 96   Temp 97.5 °F (36.4 °C) (Oral)   Ht 5' 4\" (1.626 m)   Wt (!) 370 lb 12.8 oz (168.2 kg)   LMP 10/06/2014   SpO2 97%   BMI 63.65 kg/m²    Wt Readings from Last 3 Encounters:   11/15/22 (!) 370 lb 12.8 oz (168.2 kg)   10/04/22 (!) 366 lb (166 kg)   08/10/22 (!) 366 lb (166 kg)       Physical Exam  Constitutional:       General: She is not in acute distress. Appearance: She is obese. She is not ill-appearing, toxic-appearing or diaphoretic. Comments: BMI 63.65   HENT:      Head: Normocephalic and atraumatic. Right Ear: External ear normal.      Left Ear: External ear normal.      Mouth/Throat:      Mouth: Mucous membranes are moist.      Pharynx: No oropharyngeal exudate or posterior oropharyngeal erythema. Eyes:      General:         Right eye: No discharge. Left eye: No discharge. Cardiovascular:      Rate and Rhythm: Normal rate and regular rhythm. Pulmonary:      Effort: Pulmonary effort is normal. No respiratory distress. Breath sounds: No wheezing, rhonchi or rales. Chest:      Chest wall: No tenderness. Musculoskeletal:      Cervical back: Neck supple. Right lower leg: No edema. Left lower leg: No edema. Skin:     General: Skin is warm and dry. Neurological:      General: No focal deficit present. Mental Status: She is alert. Psychiatric:         Mood and Affect: Mood normal.         Behavior: Behavior normal.         Thought Content:  Thought content normal.        Results   Lung Nodule Screening     [] Qualifies    [x] Does not qualify   [] Declined    [] Completed  < 20 PY history   The USPSTF recommends annual screening for lung cancer with low-dose computed tomography (LDCT) in adults aged 48 to 80 years who have a 20 pack-year smoking history and currently smoke or have quit within the past 15 years. Screening should be discontinued once a person has not smoked for 15 years or develops a health problem that substantially limits life expectancy or the ability or willingness to have curative lung surgery. Chest x-ray 9/27/22  Narrative   PROCEDURE: XR CHEST (2 VW)       CLINICAL INFORMATION: Abnormal chest x-ray. COMPARISON: Chest x-ray 6/28/2022, 6/21/2018 and 10/9/2015. TECHNIQUE: PA and lateral views of the chest performed. FINDINGS:    Lines/tubes: None. Heart/mediastinum: The heart size is normal. The pulmonary vascularity is unremarkable. Lungs: Linear scarring at the left lung base is unchanged. Scattered benign calcified granuloma appear stable. No focal consolidation, pleural effusion, or pneumothorax is observed. Bones: Degenerative disc disease in the thoracic spine is unchanged. The visualized skeletal structures appear intact. Impression   No acute intrathoracic process. Stable chronic findings are noted. **This report has been created using voice recognition software. It may contain minor errors which are inherent in voice recognition technology. **       Final report electronically signed by Dr Trisha Whitney on 9/27/2022 4:15 PM           Assessment      Diagnosis Orders   1. Abnormal chest x-ray with multiple lung nodules  XR CHEST (2 VW)      2. Tobacco smoke exposure  XR CHEST (2 VW)      3. Morbid obesity with body mass index of 60.0-69.9 in adult ([address]        4. Voice hoarseness        5. Post-nasal drip  fluticasone (FLONASE) 50 MCG/ACT nasal spray            Plan   1. Abnormal chest x-ray with multiple lung nodules  -Reviewed chest x-ray result with patient with stable calcified granulomas. Will follow with chest x-ray in 6 months due to patient's tobacco smoking history  -Received flu vaccine  -COVID-19 vaccinations up to date     2.  Tobacco smoke exposure  -Does not qualify for CT Lung Screenings  - XR CHEST (2 VW); Future    3. Morbid obesity with body mass index of 60.0-69.9 in [facility])  -Patient gained 4 lbs since last appointment. Advised patient to work on weight loss and increasing her physical activity    4. Voice hoarseness  -Resolved. Advised patient to call with further hoarseness    5. Post-nasal drip  -Start fluticasone (FLONASE) 50 MCG/ACT nasal spray; 2 sprays by Nasal route daily [place] towards outside of nose, not towards the septum  -Continue loratadine    Advised patient to call office with any changes, questions, or concerns regarding respiratory status or issues with prescribed medications    Return in about 6 months (around 5/15/2023) for lung nodules with CXR prior.        Electronically signed by TAMIE Burrows CNP on 11/15/2022 at 3:46 PM     (Please note that portions of this note may have been completed with a voice recognition program. Efforts were made to edit the dictation but occasionally words are mis-transcribed)

## 2022-11-09 NOTE — TELEPHONE ENCOUNTER
OARRS reviewed. UDS: + for  Cyclobenzaprine, Citalopram/Escitalopram , Hydrocodone, Gabapentin -consistent. Last seen: 10/4/2022.  Follow-up: 1/9/2023

## 2022-11-15 ENCOUNTER — OFFICE VISIT (OUTPATIENT)
Dept: PULMONOLOGY | Age: 57
End: 2022-11-15
Payer: COMMERCIAL

## 2022-11-15 VITALS
HEART RATE: 96 BPM | TEMPERATURE: 97.5 F | SYSTOLIC BLOOD PRESSURE: 126 MMHG | OXYGEN SATURATION: 97 % | WEIGHT: 293 LBS | HEIGHT: 64 IN | DIASTOLIC BLOOD PRESSURE: 62 MMHG | BODY MASS INDEX: 50.02 KG/M2

## 2022-11-15 DIAGNOSIS — R49.0 VOICE HOARSENESS: ICD-10-CM

## 2022-11-15 DIAGNOSIS — R09.82 POST-NASAL DRIP: ICD-10-CM

## 2022-11-15 DIAGNOSIS — R91.8 ABNORMAL CHEST X-RAY WITH MULTIPLE LUNG NODULES: Primary | ICD-10-CM

## 2022-11-15 DIAGNOSIS — E66.01 MORBID OBESITY WITH BODY MASS INDEX OF 60.0-69.9 IN ADULT (HCC): ICD-10-CM

## 2022-11-15 DIAGNOSIS — Z77.22 TOBACCO SMOKE EXPOSURE: ICD-10-CM

## 2022-11-15 PROCEDURE — 1036F TOBACCO NON-USER: CPT

## 2022-11-15 PROCEDURE — G8417 CALC BMI ABV UP PARAM F/U: HCPCS

## 2022-11-15 PROCEDURE — G8484 FLU IMMUNIZE NO ADMIN: HCPCS

## 2022-11-15 PROCEDURE — 3017F COLORECTAL CA SCREEN DOC REV: CPT

## 2022-11-15 PROCEDURE — 99213 OFFICE O/P EST LOW 20 MIN: CPT

## 2022-11-15 PROCEDURE — G8427 DOCREV CUR MEDS BY ELIG CLIN: HCPCS

## 2022-11-15 RX ORDER — FLUTICASONE PROPIONATE 50 MCG
2 SPRAY, SUSPENSION (ML) NASAL DAILY
Qty: 16 G | Refills: 6 | Status: SHIPPED | OUTPATIENT
Start: 2022-11-15 | End: 2023-11-15

## 2022-11-15 ASSESSMENT — ENCOUNTER SYMPTOMS
SINUS PAIN: 0
COUGH: 0
SHORTNESS OF BREATH: 1
RHINORRHEA: 0
CHEST TIGHTNESS: 0
WHEEZING: 0
SINUS PRESSURE: 1

## 2022-11-17 DIAGNOSIS — M47.816 SPONDYLOSIS OF LUMBAR SPINE: ICD-10-CM

## 2022-11-17 DIAGNOSIS — G89.29 CHRONIC MYOFASCIAL PAIN: ICD-10-CM

## 2022-11-17 DIAGNOSIS — G89.4 CHRONIC PAIN SYNDROME: ICD-10-CM

## 2022-11-17 DIAGNOSIS — M46.1 SACROILIAC INFLAMMATION (HCC): ICD-10-CM

## 2022-11-17 DIAGNOSIS — M79.18 CHRONIC MYOFASCIAL PAIN: ICD-10-CM

## 2022-11-17 DIAGNOSIS — M54.17 LUMBOSACRAL RADICULITIS: ICD-10-CM

## 2022-11-17 RX ORDER — HYDROCODONE BITARTRATE AND ACETAMINOPHEN 5; 325 MG/1; MG/1
1 TABLET ORAL 2 TIMES DAILY PRN
Qty: 60 TABLET | Refills: 0 | Status: SHIPPED | OUTPATIENT
Start: 2022-11-17 | End: 2022-12-17

## 2022-11-17 NOTE — TELEPHONE ENCOUNTER
OARRS reviewed. UDS: + for  . Lexapro, cyclobenzaprine, hydrocodone, gabapentin  Last seen: 10/4/2022.  Follow-up:   Future Appointments   Date Time Provider [place]   1/9/2023  9:15 AM TAMIE Patel CNP N [facility] - [address] [facility]   5/15/2023  2:00 PM TAMIE Gordon [address]   6/26/2023 11:45 AM Sw[facility] Bailey [facility]-C [facility] [address] [facility]

## 2022-12-02 ENCOUNTER — TELEPHONE (OUTPATIENT)
Dept: PULMONOLOGY | Age: 57
End: 2022-12-02

## 2022-12-10 DIAGNOSIS — M54.17 LUMBOSACRAL RADICULITIS: ICD-10-CM

## 2022-12-12 RX ORDER — GABAPENTIN 300 MG/1
300 CAPSULE ORAL DAILY
Qty: 30 CAPSULE | Refills: 0 | Status: SHIPPED | OUTPATIENT
Start: 2022-12-12 | End: 2023-01-11

## 2022-12-12 NOTE — TELEPHONE ENCOUNTER
OARRS reviewed. UDS: + Cyclobenzaprine, Lexapro, Hydrocodone, Gabapentin  Last seen: 10/4/2022.  Follow-up: 01/09/2023

## 2022-12-14 ENCOUNTER — PREP FOR PROCEDURE (OUTPATIENT)
Dept: PHYSICAL MEDICINE AND REHAB | Age: 57
End: 2022-12-14

## 2022-12-15 NOTE — DISCHARGE INSTRUCTIONS
ANESTHESIA INSTRUCTIONS FOLLOWING SURGERY        Since you may experience some intermittent light-headedness for the next several hours, we suggest you plan on bed rest or quiet relaxation this evening. You must have a friend or relative stay with you tonight. Because of the sedation you have received, it is recommended that you do not drive a motor vehicle, operate any kind of machinery, or sign any contractual agreement for 24 hours following the procedure. You should not take alcoholic beverages tonight and only take sleeping medication that has been specifically prescribed for you by your physician. Call office [phone] if you have:  Temperature greater than 100.4  Persistent nausea and vomiting  Severe uncontrolled pain  Redness, tenderness, or signs of infection (pain, swelling, redness, odor or green/yellow discharge around the site)  Difficulty breathing, headache or visual disturbances  Hives  Persistent dizziness or light-headedness  Extreme fatigue  Any other questions or concerns you may have after discharge    In an emergency, call 911 or go to an Emergency Department at a nearby hospital    It is important to bring a complete, current list of your medications to any medical appointments or hospitalizations. REMINDER:   Carry a list of your medications and allergies with you at all times  Call your pharmacy at least 1 week in advance to refill prescriptions    Diet: Resume your usual diet. Good nutrition promotes healing. Increase fluid intake. Activity: Rest for 24 hrs then resume normal activity. HOME MEDICATIONS:      If on blood thinning products such as; Aspirin, NSAIDS, Plavix, Coumadin, Xarelto, Fish Oil, Multi-Vitamins or Herbal Supplements restart in 24 hours      Restart Metformin in 48 hours if you had procedure with dye. Restart Metformin in 24 hours if no dye used during procedure.         Education Materials Received: {yes/no:995640}  Belongings Returned: {yes/no:513824}          I understand and acknowledge receipt of the above instructions. Patient or Guardian Signature                                                         Date/Time                                                                                                                                            Physician's or R.N.'s Signature                                                                  Date/Time      The discharge instructions have been reviewed with the patient and/or Guardian. Patient and/or Guardian signed and retained a printed copy. Call office [phone] if you have:  Temperature greater than 100.4  Persistent nausea and vomiting  Severe uncontrolled pain  Redness, tenderness, or signs of infection (pain, swelling, redness, odor or green/yellow discharge around the site)  Difficulty breathing, headache or visual disturbances  Hives  Persistent dizziness or light-headedness  Extreme fatigue  Any other questions or concerns you may have after discharge    In an emergency, call 911 or go to an Emergency Department at a nearby hospital    It is important to bring a complete, current list of your medications to any medical appointments or hospitalizations. REMINDER:   Carry a list of your medications and allergies with you at all times  Call your pharmacy at least 1 week in advance to refill prescriptions    Diet: Resume your usual diet. Good nutrition promotes healing. Increase fluid intake. Activity: Rest for 24 hrs then resume normal activity. Education Materials Received: {yes/no:371203}  Belongings Returned: {yes/no:[id number]}          I understand and acknowledge receipt of the above instructions. Patient or Guardian Signature                                                         Date/Time                                                                                                                                            Physician's or R.N.'s Signature                                                                  Date/Time      The discharge instructions have been reviewed with the patient and/or Guardian. Patient and/or Guardian signed and retained a printed copy.

## 2022-12-19 ENCOUNTER — HOSPITAL ENCOUNTER (OUTPATIENT)
Age: 57
Setting detail: OUTPATIENT SURGERY
Discharge: HOME | End: 2022-12-19
Attending: PAIN MEDICINE | Admitting: PAIN MEDICINE
Payer: COMMERCIAL

## 2022-12-19 ENCOUNTER — ANESTHESIA (OUTPATIENT)
Dept: OPERATING ROOM | Age: 57
End: 2022-12-19
Payer: COMMERCIAL

## 2022-12-19 ENCOUNTER — ANESTHESIA EVENT (OUTPATIENT)
Dept: OPERATING ROOM | Age: 57
End: 2022-12-19
Payer: COMMERCIAL

## 2022-12-19 ENCOUNTER — APPOINTMENT (OUTPATIENT)
Dept: GENERAL RADIOLOGY | Age: 57
End: 2022-12-19
Attending: PAIN MEDICINE
Payer: COMMERCIAL

## 2022-12-19 VITALS
RESPIRATION RATE: 12 BRPM | DIASTOLIC BLOOD PRESSURE: 50 MMHG | SYSTOLIC BLOOD PRESSURE: 101 MMHG | TEMPERATURE: 97.8 F | WEIGHT: 293 LBS | OXYGEN SATURATION: 94 % | HEART RATE: 84 BPM | BODY MASS INDEX: 50.02 KG/M2 | HEIGHT: 64 IN

## 2022-12-19 PROCEDURE — 7100000010 HC PHASE II RECOVERY - FIRST 15 MIN: Performed by: PAIN MEDICINE

## 2022-12-19 PROCEDURE — 3600000057 HC PAIN LEVEL 4 ADDL 15 MIN: Performed by: PAIN MEDICINE

## 2022-12-19 PROCEDURE — 3700000000 HC ANESTHESIA ATTENDED CARE: Performed by: PAIN MEDICINE

## 2022-12-19 PROCEDURE — 3700000001 HC ADD 15 MINUTES (ANESTHESIA): Performed by: PAIN MEDICINE

## 2022-12-19 PROCEDURE — 64634 DESTROY C/TH FACET JNT ADDL: CPT | Performed by: PAIN MEDICINE

## 2022-12-19 PROCEDURE — 7100000011 HC PHASE II RECOVERY - ADDTL 15 MIN: Performed by: PAIN MEDICINE

## 2022-12-19 PROCEDURE — 64633 DESTROY CERV/THOR FACET JNT: CPT | Performed by: PAIN MEDICINE

## 2022-12-19 PROCEDURE — 6360000002 HC RX W HCPCS: Performed by: STUDENT IN AN ORGANIZED HEALTH CARE EDUCATION/TRAINING PROGRAM

## 2022-12-19 PROCEDURE — 2709999900 HC NON-CHARGEABLE SUPPLY: Performed by: PAIN MEDICINE

## 2022-12-19 PROCEDURE — 3209999900 FLUORO FOR SURGICAL PROCEDURES

## 2022-12-19 PROCEDURE — 3600000056 HC PAIN LEVEL 4 BASE: Performed by: PAIN MEDICINE

## 2022-12-19 PROCEDURE — 2500000003 HC RX 250 WO HCPCS: Performed by: PAIN MEDICINE

## 2022-12-19 RX ORDER — HYDROCODONE BITARTRATE AND ACETAMINOPHEN 5; 325 MG/1; MG/1
1 TABLET ORAL EVERY 6 HOURS PRN
COMMUNITY

## 2022-12-19 RX ORDER — LIDOCAINE HYDROCHLORIDE 10 MG/ML
INJECTION, SOLUTION EPIDURAL; INFILTRATION; INTRACAUDAL; PERINEURAL PRN
Status: DISCONTINUED | OUTPATIENT
Start: 2022-12-19 | End: 2022-12-19 | Stop reason: ALTCHOICE

## 2022-12-19 RX ORDER — PROPOFOL 10 MG/ML
INJECTION, EMULSION INTRAVENOUS PRN
Status: DISCONTINUED | OUTPATIENT
Start: 2022-12-19 | End: 2022-12-19 | Stop reason: SDUPTHER

## 2022-12-19 RX ORDER — BUPIVACAINE HYDROCHLORIDE 2.5 MG/ML
INJECTION, SOLUTION EPIDURAL; INFILTRATION; INTRACAUDAL PRN
Status: DISCONTINUED | OUTPATIENT
Start: 2022-12-19 | End: 2022-12-19 | Stop reason: ALTCHOICE

## 2022-12-19 RX ORDER — FENTANYL CITRATE 50 UG/ML
INJECTION, SOLUTION INTRAMUSCULAR; INTRAVENOUS PRN
Status: DISCONTINUED | OUTPATIENT
Start: 2022-12-19 | End: 2022-12-19 | Stop reason: SDUPTHER

## 2022-12-19 RX ADMIN — PROPOFOL 60 MG: 10 INJECTION, EMULSION INTRAVENOUS at 14:38

## 2022-12-19 RX ADMIN — PROPOFOL 20 MG: 10 INJECTION, EMULSION INTRAVENOUS at 14:40

## 2022-12-19 RX ADMIN — FENTANYL CITRATE 100 MCG: 50 INJECTION, SOLUTION INTRAMUSCULAR; INTRAVENOUS at 14:24

## 2022-12-19 ASSESSMENT — PAIN SCALES - GENERAL: PAINLEVEL_OUTOF10: 0

## 2022-12-19 ASSESSMENT — PAIN - FUNCTIONAL ASSESSMENT: PAIN_FUNCTIONAL_ASSESSMENT: 0-10

## 2022-12-19 NOTE — ANESTHESIA PRE PROCEDURE
Department of Anesthesiology  Preprocedure Note       Name:  Tess Silva   Age:  61 y.o.  :  1965                                          MRN:  [id number]         Date:  2022      Surgeon: Minerva Fleming):  Oh Solis MD    Procedure: Procedure(s):  Bilateral Thoracic facet Radiofrequency ablation Thoracic 8-9, 9-10    Medications prior to admission:   Prior to Admission medications    Medication Sig Start Date End Date Taking? Authorizing Provider   gabapentin (NEURONTIN) 300 MG capsule Take 1 capsule by mouth daily for 30 days.  22  TAMIE Florian CNP   CPAP Machine MISC by Does not apply route Please change CPAP pressure to 18 cm H20. 22   Real Mitchell PA-C   fluticasone (FLONASE) 50 MCG/ACT nasal spray 2 sprays by Nasal route daily [place] towards outside of nose, not towards the septum 11/15/22 11/15/23  TAMIE Izquierdo CNP   Benzonatate (TESSALON PERLES PO) Take by mouth    Historical Provider, MD   triamterene (DYRENIUM) 50 MG capsule take 1 capsule by mouth once daily if needed for edema 21   Historical Provider, MD   meloxicam (MOBIC) 15 MG tablet Take 1 tablet by mouth daily 21  TAMIE Florian CNP   loratadine (CLARITIN) 10 MG tablet Take 10 mg by mouth daily    Historical Provider, MD   Elastic Bandages & Supports (B & B SACROILIAC BELT) MISC 1 Device by Does not apply route as needed (pain) 18   TAMIE Rubin CNP   Cholecalciferol (VITAMIN D3) 5000 units CAPS Take 1 capsule by mouth daily    Historical Provider, MD   Omega-3 Fatty Acids (FISH OIL) 1200 MG CAPS  18   Historical Provider, MD   calcium carbonate (OSCAL) 500 MG TABS tablet Take 500 mg by mouth 2 times daily    Historical Provider, MD   Turmeric 500 MG CAPS Take by mouth daily    Historical Provider, MD   cyclobenzaprine (FLEXERIL) 10 MG tablet Take 1 tablet by mouth 3 times daily as needed for Muscle spasms WARNING: This medication may make your drowsy. Do not operate heavy machinery or motor vehicles during use. 12/18/16   Yandy Be PA-C   lisinopril (PRINIVIL;ZESTRIL) 10 MG tablet Take 10 mg by mouth nightly  5/6/16   Historical Provider, MD   citalopram (CELEXA) 20 MG tablet Take 20 mg by mouth daily  10/2/15   Historical Provider, MD   spironolactone (ALDACTONE) 25 MG tablet Take 1 tablet by mouth daily. 4/19/13   Edilberto Castro MD       Current medications:    No current facility-administered medications for this encounter. Allergies: Allergies   Allergen Reactions    Gilberts [Macadamia Nut Oil] Other (See Comments)     Numbness and Tingling in mouth    Elemental Sulfur     Other Other (See Comments)     Artificial sweeteners - migraines    Saccharin     Sulfa Antibiotics Hives       Problem List:    Patient Active Problem List   Diagnosis Code    Occult blood in stools R19.5    Second degree hemorrhoids K64.1    ABDI on CPAP G47.33, Z99.89    Trochanteric bursitis of right hip M70.61    Primary osteoarthritis of right hip M16.11    Pathologic thoracic fracture M84.48XA    Localized osteoporosis with current pathological fracture M80.80XA    Thoracic spondylosis M47.814    Morbid obesity with BMI of 50.0-59.9, adult (HCC) E66.01, Z68.43    Primary osteoarthritis of right knee M17.11    Sacroiliac inflammation (HCC) M46.1       Past Medical History:        Diagnosis Date    Allergic rhinitis     Depression     Hypertension     ABDI on CPAP     Osteoarthritis     Vitamin D deficiency        Past Surgical History:        Procedure Laterality Date    ARTHROCENTESIS Right 10/16/2017    RIGHT HIP LARGE JOINT INJECTION performed by Dan Kehr, MD at [address] [place] [address] ARTHROCENTESIS Left 12/05/2017    LEFT HIP LARGE JOINT INJECTION performed by Dan Kehr, MD at [address] [place] [address] ARTHROCENTESIS Right 07/18/2019    Rt.  Knee Steroid Injection performed by Lucile Siemens, MD at [address] INJECTION Bilateral 07/19/2021    bilateral SI MBB # 1 performed by Lucile Siemens, MD at [address] [facility]  2016   [address] [facility]  2002     right front upper implant    DILATION AND CURETTAGE OF UTERUS  06/2022    FACET JOINT INJECTION Bilateral 03/07/2022    Bilateral T-facet RFA @  T8-9, and T9-T10 performed by Sima Vaca MD at [address] [place] [address] HAND SURGERY Right 05/15/2015    index finger cleaned out D/T infected cat bite    HEMORRHOID SURGERY  2016    series of 3 bandings for internal hemorrhoids, Dr. Lynda Davis Left 02/07/2020    Right knee steroid injection performed by Lucile Siemens, MD at [address] [place] [address] Right 06/04/2021    Right knee Synvisc injection performed by Lucile Siemens, MD at [address] [place] [address] Right 01/04/2022    Right Synvisc One injection performed by Lucile Siemens, MD at [facility] Right 04/16/2019    T-facet RFA @ T7-8, 8-9, 9-10 performed by Lucile Siemens, MD at [facility] Left 06/13/2019    T-facet RFA @ T7-8, 8-9, 9-10 LEFT performed by Lucile Siemens, MD at [place] [address] FACET LEVEL 1 BILATERAL Bilateral 01/04/2019    LUMBAR FACET bilateral T-facet MBB @ T7-T8, T8-T9, and T9-T10 performed by Lucile Siemens, MD at [place] [address] FACET LEVEL 1 BILATERAL Bilateral 02/25/2019    Thoracic FACET bilateral T-facet MBB @ T7-T8, T8-T9, and T9-T10 MBB #2 performed by Lucile Siemens, MD at [place] [address] Left 12/05/2017    HIP INJECTION     NERVE SURGERY Bilateral 09/13/2019    bilateral T-facet MBB # 1 @ T4-5, T5-6, and T6-7. performed by Lucile Siemens, MD at [place] [address]  04/11/2016 L4-5, L5-S1 Facet injection    OTHER SURGICAL HISTORY  2016    Right Hip Injection    OTHER SURGICAL HISTORY Right 10/16/2017    Hip Injection     PAIN MANAGEMENT PROCEDURE Right 2020    Right knee genicular nerve block performed by Gary Fairbanks MD at [facility] Right 2020    Right knee genicular nerve RFA performed by Gary Fairbanks MD at [facility] Right 2021    Bilateral T-facet RFA @ T7-8, T8-9, and T9-T10 RIGHT SIDE FIRST performed by Gary Fairbanks MD at [facility] Left 2021    Left T-facet RFA @ T7-8, T8-9, and T9-T10. performed by Gary Fairbanks MD at [facility] ARTHROCENTESIS ASPIR&/INJ MAJOR JT/BURSA W/O US Right 2018    RIGHT HIP LARGE JOINT STEROID INJECTION performed by Gary Fairbanks MD at [facility] OFFICE/OUTPT VISIT,PROCEDURE ONLY N/A 2018    Kyphoplasty T12 BILATERAL performed by Gary Fairbanks MD at [place] [address]      as child    WISDOM TOOTH EXTRACTION         Social History:    Social History     Tobacco Use    Smoking status: Former     Packs/day: 1.00     Years: 7.00     Pack years: 7.00     Types: Cigarettes, E-Cigarettes     Start date: 2005     Quit date: 2011     Years since quittin.9    Smokeless tobacco: Never   Substance Use Topics    Alcohol use: Not Currently     Comment: occasionally, 1-3 times a month                                Counseling given: Not Answered      Vital Signs (Current): There were no vitals filed for this visit.                                            BP Readings from Last 3 Encounters:   11/15/22 126/62   10/04/22 112/74   08/10/22 110/74       NPO Status:                                                                                 BMI:   Wt Readings from Last 3 Encounters:   11/15/22 (!) 370 lb 12.8 oz (168.2 kg)   10/04/22 (!) 366 lb (166 kg)   08/10/22 (!) 366 lb (166 kg)     There is no height or weight on file to calculate BMI.    CBC:   Lab Results   Component Value Date/Time    WBC 6.4 10/06/2022 04:50 PM    RBC 4.64 10/06/2022 04:50 PM    HGB 13.8 10/06/2022 04:50 PM    HCT 41.5 10/06/2022 04:50 PM    MCV 89.4 10/06/2022 04:50 PM    RDW 14.0 07/11/2017 09:26 PM     10/06/2022 04:50 PM       CMP:   Lab Results   Component Value Date/Time     10/06/2022 04:50 PM    K 4.5 10/06/2022 04:50 PM     10/06/2022 04:50 PM    CO2 27 10/06/2022 04:50 PM    BUN 13 10/06/2022 04:50 PM    CREATININE 0.9 10/06/2022 04:50 PM    LABGLOM 64 10/06/2022 04:50 PM    GLUCOSE 109 10/06/2022 04:50 PM    PROT 7.3 10/06/2022 04:50 PM    CALCIUM 10.3 10/06/2022 04:50 PM    BILITOT 0.6 10/06/2022 04:50 PM    ALKPHOS 126 10/06/2022 04:50 PM    AST 36 10/06/2022 04:50 PM    ALT 56 10/06/2022 04:50 PM       POC Tests: No results for input(s): POCGLU, POCNA, POCK, POCCL, POCBUN, POCHEMO, POCHCT in the last 72 hours.     Coags: No results found for: PROTIME, INR, APTT    HCG (If Applicable):   Lab Results   Component Value Date    PREGSERUM NEGATIVE 04/19/2013        ABGs: No results found for: PHART, PO2ART, LLW1YNA, MCV0XOK, BEART, V8NPKIIU     Type & Screen (If Applicable):  No results found for: LABABO, LABRH    Drug/Infectious Status (If Applicable):  No results found for: HIV, HEPCAB    COVID-19 Screening (If Applicable):   Lab Results   Component Value Date/Time    COVID19 Not Detected 07/25/2020 10:04 AM           Anesthesia Evaluation    Airway: Mallampati: II  TM distance: >3 FB   Neck ROM: full  Mouth opening: > = 3 FB   Dental:          Pulmonary:normal exam    (+) sleep apnea:                             Cardiovascular:  Exercise tolerance: good (>4 METS),   (+) hypertension:,                   Neuro/Psych:   (+) depression/anxiety             GI/Hepatic/Renal: Endo/Other:    (+) : arthritis:., .                 Abdominal:             Vascular: Other Findings:           Anesthesia Plan      MAC     ASA 2       Induction: intravenous. Anesthetic plan and risks discussed with patient.                         Paula Del Rio DO   12/19/2022

## 2022-12-19 NOTE — H&P
[address] [place] [address]  History and Physical Update    Pt Name: Iesha Trinh  MRN: [id number]  YOB: 1965  Date of evaluation: 12/19/2022      I have examined the patient and reviewed the H&P/Consult and there are no changes to the patient or plans.         Electronically signed by Comfort Mccollum MD on 12/19/2022 at 2:00 PM

## 2022-12-19 NOTE — OP NOTE
Pre-Procedure Note    Patient Name: Richmond Montez   YOB: 1965  Medical Record Number: [id number]  Date: 12/19/22    Indication:  Thoracic pain    Consent: On file. Vital Signs:   Vitals:    12/19/22 1406   BP: (!) 122/58   Pulse: 95   Resp: 16   Temp: 97.1 °F (36.2 °C)   SpO2: 97%       Past Medical History:   has a past medical history of Allergic rhinitis, Depression, Hypertension, ABDI on CPAP, Osteoarthritis, and Vitamin D deficiency. Past Surgical History:   has a past surgical history that includes Dental surgery (2002); [place] tooth extraction; Hand surgery (Right, 05/15/2015); other surgical history (04/11/2016); Colonoscopy (2016); other surgical history (06/13/2016); Hemorrhoid surgery (2016); Tonsillectomy; other surgical history (Right, 10/16/2017); arthrocentesis (Right, 10/16/2017); Nerve Surgery (Left, 12/05/2017); arthrocentesis (Left, 12/05/2017); pr arthrocentesis aspir&/inj major jt/bursa w/o us (Right, 09/17/2018); pr office/outpt visit,procedure only (N/A, 11/09/2018); Nerve Block Lumb Facet Level 1 Bilateral (Bilateral, 01/04/2019); Nerve Block Lumb Facet Level 1 Bilateral (Bilateral, 02/25/2019); Lumbar spine surgery (Right, 04/16/2019); Lumbar spine surgery (Left, 06/13/2019); arthrocentesis (Right, 07/18/2019); Nerve Surgery (Bilateral, 09/13/2019); hip surgery (Left, 02/07/2020); Pain management procedure (Right, 07/31/2020); Pain management procedure (Right, 08/31/2020); Pain management procedure (Right, 01/12/2021); Pain management procedure (Left, 03/19/2021); hip surgery (Right, 06/04/2021); Back Injection (Bilateral, 07/19/2021); hip surgery (Right, 01/04/2022); Facet joint injection (Bilateral, 03/07/2022); and Dilation and curettage of uterus (06/2022). Pre-Sedation Documentation and Exam:   Vital signs have been reviewed (see flow sheet for vitals).      Sedation/ Anesthesia Plan:   MAC    Patient is an appropriate candidate for plan of sedation: yes    Preoperative Diagnosis:  T-spondylosis    Post-Op Dx: as above    Procedure Performed:  Bilateral Radiofrequency ablation of T8-9 and T9-10 under fluoroscopy guidance. Indication for the Procedure: The patient has ahistory of chronic low back pain that is not responding well to the conservative treatment. Patient's pain is mostly axial in nature. Pain is interfering with the activities of daily living. Physical examination revealed facet tenderness and facet loading is positive. Patient had undergone lumbar facet joint injections with pain relief that lasted for only a short period of time and had greater than 70% pain relief with confirmatory median branch blocks. Hence we decided to do radiofrequency abalation of median branches for long term pain releif. The procedure and risks  were discussed with the patient and an informed consent was obtained. Procedure:     A meaningful communication was kept up with the patient throughout the procedure. The patient is placed in prone position and skin over the back was prepped and draped in sterile manner. Then using fluoroscopy the junction of the transverse process of the vertebra with the superior process of the facet joint was observed and the view was optimized. The skin and deep tissues posterior were infiltrated with 10 ml of  1% xylocaine  The RF canula with the 10 mm active tip was introduced through the skin wheal under fluoroscopy guidance such that the tip of the needle lies in the groove of the transverse process with the superior processes of the facet joint. Then a lateral view of the lumbar spine was obtained to make sure the tip of needle is not in the neural foramen. Then electric impedence was checked to make sure it is acceptable. Then a sensory stimulus was applied at 50 Hz up to 1 volt and concordant pain symptoms were reproduced.  Then a motor stimulus was applied at 2 Hz up to 2 volts and no motor stimulation was seen in lower extremities. Some multifidus stimulus was seen. Then after negative aspiration 0.25%  Marcaine  4 cc was injected through the needle in divided doses. Then a lesion was done at 80 degrees centigrade for 90 seconds. EBL-0  Patient's vital signs and neurological status remained stable throughout the procedure and post procedural period. The patient tolerated the procedure well and was discharged home in stable condition.     Electronically signed by Bob Hunter MD on 12/19/22 at 2:22 PM EST

## 2022-12-19 NOTE — PROGRESS NOTES
1442-  Patient arrived to phase II via cart. Spontaneous respiraitons even and unlabored. Placed on monitor--VSS. Report received from Surgical RN. 1443-  Assessment completed. Patient is alert and oriented x4. IV capped off-- no complications. Patient denies pain--will monitor. Injection sites clean and dry. 1445-  Snack and drink given to patient. Family in car. 1450-  All belongings in room. 1500-  IV removed-- no  complications. Bandage applied. 1510-  Patient dressed. 1520-  Patient calling her ride. [address]  Patient discharged in stable condition with all belongings. This RN walked patient to car.

## 2022-12-19 NOTE — H&P
H&P    Patient main complaint is mid back pain that has been increasing lately as effects from previous T-facet RFA has worn off- Constant burning aching pain across mid back      Still has muscle pain stiffness and spasms throughout back but she [facility]. States she had increased depression and had a hard time getting out. Right knee pain remains tolerable   Pain increases with bending, lifting, twisting , walking, standing, getting up and down, and housework or working at job. Current pain medications remain effective     Medications reviewed. Patient denies side effects with medications. Patient states she is taking medications as prescribed. Shedenies receiving pain medications from other sources. She denies any ER visits since last visit. Pain scale with out pain medications or at its worst is 7/10. Pain scale with pain medications or at its best is 2-3/10. Last dose of Norco and Neurontin was last night   Drug screen reviewed from 7/26/2022 and was appropriate  Pill count completed  today and WNL: Yes        The patientis allergic to walnut [macadamia nut oil], elemental sulfur, other, saccharin, and sulfa antibiotics. Subjective:      Review of Systems   Constitutional: Negative. Negative for fatigue and fever. HENT:  Positive for dental problem. Negative for congestion, sinus pain, sore throat and voice change. Eyes: Negative. Respiratory: Negative. Cardiovascular:  Positive for leg swelling. Negative for chest pain. Gastrointestinal: Negative. Genitourinary: Negative. Negative for flank pain. Musculoskeletal:  Positive for arthralgias, back pain, gait problem, joint swelling, myalgias, neck pain and neck stiffness. Ambulating with cane   Skin: Negative. Neurological:  Positive for weakness and numbness. Psychiatric/Behavioral:  Positive for dysphoric mood and sleep disturbance.  Negative for suicidal ideas. The patient is nervous/anxious. Objective:      Vitals       Vitals:     10/04/22 1130   BP: 112/74   Weight: (!) 366 lb (166 kg)   Height: 5' 4.02\" (1.626 m)            Physical Exam  Vitals and nursing note reviewed. Constitutional:       General: She is not in acute distress. Appearance: Normal appearance. She is well-developed. She is obese. She is not diaphoretic. HENT:      Head: Normocephalic and atraumatic. Right Ear: External ear normal.      Left Ear: External ear normal.      Nose: Nose normal.      Mouth/Throat:      Pharynx: No oropharyngeal exudate. Eyes:      General: No scleral icterus. Right eye: No discharge. Left eye: No discharge. Conjunctiva/sclera: Conjunctivae normal.      Pupils: Pupils are equal, round, and reactive to light. Neck:      Thyroid: No thyromegaly. Trachea: No tracheal deviation. Cardiovascular:      Rate and Rhythm: Normal rate and regular rhythm. Pulses: Normal pulses. Heart sounds: Normal heart sounds. No murmur heard. No friction rub. No gallop. Pulmonary:      Effort: Pulmonary effort is normal. No respiratory distress. Breath sounds: Normal breath sounds. No wheezing or rales. Chest:      Chest wall: No tenderness. Abdominal:      General: Bowel sounds are normal. There is no distension. Palpations: Abdomen is soft. Tenderness: There is no abdominal tenderness. There is no guarding or rebound. Musculoskeletal:         General: Swelling and tenderness present. Cervical back: Rigidity, spasms, tenderness and bony tenderness present. No edema or erythema. No muscular tenderness. Decreased range of motion. Thoracic back: Bony tenderness present. Decreased range of motion. Lumbar back: Spasms, tenderness and bony tenderness present. Decreased range of motion.  Negative right straight leg raise test and negative left straight leg raise test.        Back:     Right hip: Tenderness present. Left hip: Bony tenderness present. Decreased range of motion. Decreased strength. Right knee: Bony tenderness present. Decreased range of motion. Tenderness present. Right lower leg: Edema present. Left lower leg: Edema present. Legs:  Skin:     General: Skin is warm and dry. Coloration: Skin is not pale. Findings: No erythema or rash. Neurological:      General: No focal deficit present. Mental Status: She is alert and oriented to person, place, and time. She is not disoriented. Cranial Nerves: No cranial nerve deficit. Sensory: Sensory deficit present. Motor: Weakness present. No atrophy or abnormal muscle tone. Coordination: Coordination abnormal.      Gait: Gait abnormal.      Deep Tendon Reflexes: Babinski sign absent on the right side. Comments: 4/5 strength right lower extremity    Psychiatric:         Attention and Perception: She is attentive. Mood and Affect: Mood is not anxious or depressed. Affect is not labile, blunt, angry or inappropriate. Speech: She is communicative. Speech is not rapid and pressured, delayed, slurred or tangential.         Behavior: Behavior normal. Behavior is not agitated, slowed, aggressive, withdrawn, hyperactive or combative. Thought Content: Thought content normal. Thought content is not paranoid or delusional. Thought content does not include homicidal or suicidal ideation. Thought content does not include homicidal or suicidal plan. Cognition and Memory: Memory is not impaired. She does not exhibit impaired recent memory or impaired remote memory. Judgment: Judgment normal. Judgment is not impulsive or inappropriate. SCOOTER  Patricks test  positive  Yeoman's  or Gaenslen's positive        Assessment:      1. Spondylosis of thoracic region without myelopathy or radiculopathy    2. Mid back pain    3. Spondylosis of lumbar spine    4. Lumbosacral radiculitis    5. Sacroiliac inflammation ([address]    6. Chronic myofascial pain    7. Primary osteoarthritis of right knee    8. Chronic pain syndrome       Plan:      OARRS reviewed. Current MED: 10.00  Patient was offered naloxone for home. Discussed long term side effects of medications, tolerance, dependency and addiction. Previous UDS reviewed  UDS preformed today for compliance. Patient told can not receive any pain medications from any other source. No evidence of abuse, diversion or aberrant behavior. Medications and/or procedures to improve function and quality of life- patient understanding with this and that may not be pain free  Discussed with patient about safe storage of medications at home  Discussed possible weaning of medication dosing dependent on treatment/procedure results. Discussed with patient about risks with procedure including infection, reaction to medication, increased pain, or bleeding. Procedure notes reviewed in detail  Plan Bilateral T-facet RFA @  T8-9, and T9-T10 for longer term pain relief. Procedure and risks discussed in detail with patient. Received over 80% relief from previous RFA for about 7 months   Continues good relief of right knee pain from Synvisc injection   Needs to hold Mobic  No relief from SI injections- is looking into SI fusion. Canceled several appointments for trigger point injections   Medications continue to help- continue Norco 5/325 BID prn- ordered refill,  Neurontin 300 mg at bedtime-ordered next refill for 10/12/2022  Continue flexeril prn from pcp  Updated UDS ordered   Continues psychosocial therapy           Return for Bilateral T-facet RFA @  T8-9, and T9-T10. , follow up after procedure.

## 2023-01-06 DIAGNOSIS — M54.17 LUMBOSACRAL RADICULITIS: ICD-10-CM

## 2023-01-06 RX ORDER — GABAPENTIN 300 MG/1
300 CAPSULE ORAL DAILY
Qty: 30 CAPSULE | Refills: 0 | Status: SHIPPED | OUTPATIENT
Start: 2023-01-11 | End: 2023-02-10

## 2023-01-06 NOTE — TELEPHONE ENCOUNTER
OARRS reviewed. UDS: + Cyclobenzaprine, Lexapro, Hydrocodone, Gabapentin   Last seen: 10/4/2022.  Follow-up:   Future Appointments   Date Time Provider Christine [place]   1/9/2023  9:15 AM TAMIE Sellers CNP [facility]   5/15/2023  2:00 PM TAMIE Corrales 37   6/26/2023 11:45 AM Devan Childs PA-C [facility] [address] [facility]

## 2023-01-09 ENCOUNTER — OFFICE VISIT (OUTPATIENT)
Dept: PHYSICAL MEDICINE AND REHAB | Age: 58
End: 2023-01-09
Payer: COMMERCIAL

## 2023-01-09 ENCOUNTER — TELEPHONE (OUTPATIENT)
Dept: PHYSICAL MEDICINE AND REHAB | Age: 58
End: 2023-01-09

## 2023-01-09 VITALS
SYSTOLIC BLOOD PRESSURE: 126 MMHG | WEIGHT: 293 LBS | BODY MASS INDEX: 50.02 KG/M2 | HEIGHT: 64 IN | DIASTOLIC BLOOD PRESSURE: 80 MMHG

## 2023-01-09 DIAGNOSIS — M54.17 LUMBOSACRAL RADICULITIS: ICD-10-CM

## 2023-01-09 DIAGNOSIS — M46.1 SACROILIAC INFLAMMATION (HCC): ICD-10-CM

## 2023-01-09 DIAGNOSIS — M79.18 CHRONIC MYOFASCIAL PAIN: ICD-10-CM

## 2023-01-09 DIAGNOSIS — M25.561 CHRONIC PAIN OF RIGHT KNEE: ICD-10-CM

## 2023-01-09 DIAGNOSIS — M17.11 PRIMARY OSTEOARTHRITIS OF RIGHT KNEE: Primary | ICD-10-CM

## 2023-01-09 DIAGNOSIS — G89.29 CHRONIC PAIN OF RIGHT KNEE: ICD-10-CM

## 2023-01-09 DIAGNOSIS — M16.0 PRIMARY OSTEOARTHRITIS OF BOTH HIPS: ICD-10-CM

## 2023-01-09 DIAGNOSIS — G89.29 CHRONIC MYOFASCIAL PAIN: ICD-10-CM

## 2023-01-09 DIAGNOSIS — M47.814 SPONDYLOSIS OF THORACIC REGION WITHOUT MYELOPATHY OR RADICULOPATHY: ICD-10-CM

## 2023-01-09 DIAGNOSIS — M47.816 SPONDYLOSIS OF LUMBAR SPINE: ICD-10-CM

## 2023-01-09 DIAGNOSIS — G89.4 CHRONIC PAIN SYNDROME: ICD-10-CM

## 2023-01-09 PROCEDURE — G8427 DOCREV CUR MEDS BY ELIG CLIN: HCPCS | Performed by: NURSE PRACTITIONER

## 2023-01-09 PROCEDURE — 3017F COLORECTAL CA SCREEN DOC REV: CPT | Performed by: NURSE PRACTITIONER

## 2023-01-09 PROCEDURE — 99214 OFFICE O/P EST MOD 30 MIN: CPT | Performed by: NURSE PRACTITIONER

## 2023-01-09 PROCEDURE — G8417 CALC BMI ABV UP PARAM F/U: HCPCS | Performed by: NURSE PRACTITIONER

## 2023-01-09 PROCEDURE — G8484 FLU IMMUNIZE NO ADMIN: HCPCS | Performed by: NURSE PRACTITIONER

## 2023-01-09 PROCEDURE — 1036F TOBACCO NON-USER: CPT | Performed by: NURSE PRACTITIONER

## 2023-01-09 RX ORDER — HYDROCODONE BITARTRATE AND ACETAMINOPHEN 5; 325 MG/1; MG/1
1 TABLET ORAL 2 TIMES DAILY PRN
Qty: 60 TABLET | Refills: 0 | Status: SHIPPED | OUTPATIENT
Start: 2023-01-12 | End: 2023-02-11

## 2023-01-09 ASSESSMENT — ENCOUNTER SYMPTOMS
SORE THROAT: 0
VOICE CHANGE: 0
SINUS PAIN: 0
GASTROINTESTINAL NEGATIVE: 1
RESPIRATORY NEGATIVE: 1
BACK PAIN: 1
EYES NEGATIVE: 1

## 2023-01-09 NOTE — PROGRESS NOTES
[address] [facility] [address]  Dept: [phone]  Dept Fax: [phone]: [phone]    Visit Date: 1/9/2023    Functionality Assessment/Goals Worksheet     On a scale of 0 (Does not Interfere) to 10 (Completely Interferes)     1. Which number describes how during the past week pain has interfered with       the following:  A. General Activity:  6  B. Mood: 9  C. Walking Ability:  8  D. Normal Work (Includes both work outside the home and housework):  6  E. Relations with Other People:   5  F. Sleep:   4  G. Enjoyment of Life:   5    2. Patient Prefers to Take their Pain Medications:     [x]  On a regular basis   [x]  Only when necessary    []  Does not take pain medications    3. What are the Patient's Goals/Expectations for Visiting Pain Management? []  Learn about my pain    [x]  Receive Medication   []  Physical Therapy     []  Treat Depression   [x]  Receive Injections    []  Treat Sleep   []  Deal with Anxiety and Stress   []  Treat Opoid Dependence/Addiction   []  Other:        HPI:   Margo Uribe is a 61 y.o. female is here today for    Chief Complaint: Back pain, right knee pain     HPI   FU from bilateral T-facet RFA from 12/19/2022. Patient reports that she is receiving over 90% relief of mid back pain from this RFA. Mid back pain is very tolerable just gets increased pain with sitting long periods. Still has pain in bilateral SI area   She missed appointment with [facility] for trigger point injections. Continues to get muscle spasms in back but states since RFA everything is better   Main compliant today is pain in right as effects from Synvisc injection has worn off. She receive relief for a year.  Sharp aching and throbbing pain     Continues physical therapy twice per week at [facility]   Pain increases with bending, lifting, twisting , walking, standing, stairs, getting up and down, and housework or working at job.      Current pain medications remain effective   Medications reviewed. Patient denies side effects with medications. Patient states she is taking medications as prescribed. Shedenies receiving pain medications from other sources. She denies any ER visits since last visit.    Pain scale with out pain medications or at its worst is 8/10 right knee   Pain scale with pain medications or at its best is 4/10.  Last dose of Norco, neurontin was last night   Drug screen reviewed from 10/4/2022 and was appropriate  Pill count completed  today and WNL: Yes      The patientis allergic to walnut [macadamia nut oil], elemental sulfur, other, saccharin, and sulfa antibiotics.      Subjective:      Review of Systems   Constitutional: Negative.  Negative for fatigue and fever.   HENT:  Positive for dental problem. Negative for congestion, sinus pain, sore throat and voice change.    Eyes: Negative.    Respiratory: Negative.     Cardiovascular:  Positive for leg swelling. Negative for chest pain.   Gastrointestinal: Negative.    Genitourinary: Negative.  Negative for flank pain.   Musculoskeletal:  Positive for arthralgias, back pain, gait problem, joint swelling, myalgias, neck pain and neck stiffness.        Ambulating with cane   Skin: Negative.    Neurological:  Positive for weakness and numbness.   Psychiatric/Behavioral:  Positive for dysphoric mood and sleep disturbance. Negative for suicidal ideas. The patient is not nervous/anxious.      Objective:     Vitals:    01/09/23 0908   BP: 126/80   Weight: (!) 371 lb (168.3 kg)   Height: 5' 4\" (1.626 m)       Physical Exam  Vitals and nursing note reviewed.   Constitutional:       General: She is not in acute distress.     Appearance: Normal appearance. She is well-developed. She is obese. She is not diaphoretic.   HENT:      Head: Normocephalic and atraumatic.      Right Ear: External ear normal.      Left Ear:  External ear normal.      Nose: Nose normal.      Mouth/Throat:      Pharynx: No oropharyngeal exudate. Eyes:      General: No scleral icterus. Right eye: No discharge. Left eye: No discharge. Conjunctiva/sclera: Conjunctivae normal.      Pupils: Pupils are equal, round, and reactive to light. Neck:      Thyroid: No thyromegaly. Trachea: No tracheal deviation. Cardiovascular:      Rate and Rhythm: Normal rate and regular rhythm. Pulses: Normal pulses. Heart sounds: Normal heart sounds. No murmur heard. No friction rub. No gallop. Pulmonary:      Effort: Pulmonary effort is normal. No respiratory distress. Breath sounds: Normal breath sounds. No wheezing or rales. Chest:      Chest wall: No tenderness. Abdominal:      General: Bowel sounds are normal. There is no distension. Palpations: Abdomen is soft. Tenderness: There is no abdominal tenderness. There is no guarding or rebound. Musculoskeletal:         General: Swelling and tenderness present. Cervical back: Rigidity, spasms, tenderness and bony tenderness present. No edema or erythema. No muscular tenderness. Decreased range of motion. Thoracic back: Bony tenderness present. Decreased range of motion. Lumbar back: Spasms, tenderness and bony tenderness present. Decreased range of motion. Negative right straight leg raise test and negative left straight leg raise test.        Back:       Right hip: Tenderness present. Left hip: Bony tenderness present. Decreased range of motion. Decreased strength. Right knee: Swelling and bony tenderness present. Decreased range of motion. Tenderness present. Right lower leg: Edema present. Left lower leg: Edema present. Legs:    Skin:     General: Skin is warm and dry. Coloration: Skin is not pale. Findings: No erythema or rash. Neurological:      General: No focal deficit present.       Mental Status: She is alert and oriented to person, place, and time. She is not disoriented. Cranial Nerves: No cranial nerve deficit. Sensory: Sensory deficit present. Motor: Weakness present. No atrophy or abnormal muscle tone. Coordination: Coordination abnormal.      Gait: Gait abnormal.      Deep Tendon Reflexes: Babinski sign absent on the right side. Comments: 4/5 strength right lower extremity    Psychiatric:         Attention and Perception: She is attentive. Mood and Affect: Mood is not anxious or depressed. Affect is not labile, blunt, angry or inappropriate. Speech: She is communicative. Speech is not rapid and pressured, delayed, slurred or tangential.         Behavior: Behavior normal. Behavior is not agitated, slowed, aggressive, withdrawn, hyperactive or combative. Thought Content: Thought content normal. Thought content is not paranoid or delusional. Thought content does not include homicidal or suicidal ideation. Thought content does not include homicidal or suicidal plan. Cognition and Memory: Memory is not impaired. She does not exhibit impaired recent memory or impaired remote memory. Judgment: Judgment normal. Judgment is not impulsive or inappropriate. SCOOTER  Patricks test  positive  Yeoman's  or Gaenslen's positive         Assessment:     1. Primary osteoarthritis of right knee    2. Chronic pain of right knee    3. Spondylosis of lumbar spine    4. Sacroiliac inflammation ([address]    5. Lumbosacral radiculitis    6. Primary osteoarthritis of both hips    7. Spondylosis of thoracic region without myelopathy or radiculopathy    8. Chronic myofascial pain    9. Chronic pain syndrome            Plan:      OARRS reviewed. Current MED: 10.00  Patient was offered naloxone for home. Discussed long term side effects of medications, tolerance, dependency and addiction. Previous UDS reviewed  UDS preformed today for compliance.   Patient told can not receive any pain medications from any other source. No evidence of abuse, diversion or aberrant behavior. Medications and/or procedures to improve function and quality of life- patient understanding with this and that may not be pain free  Discussed with patient about safe storage of medications at home  Discussed possible weaning of medication dosing dependent on treatment/procedure results. Discussed with patient about risks with procedure including infection, reaction to medication, increased pain, or bleeding. Procedure notes reviewed in detail  Receiving over 90% relief of mid back pain from bilateral T-facet RFA. Plan Right knee Synvisc One injection in [address]. Procedure discussed. Received 1 year of relief from previous injection   Needs to hold Mobic for procedure   No relief from SI injections- is looking into SI fusion. Canceled several appointments for trigger point injections   Medications continue to help- continue Norco 5/325 BID prn- ordered refill,  Neurontin 300 mg at bedtime-refill ordered 1/11/2023  Continue flexeril prn from pcp  Is compliant. Continues psychosocial therapy     Meds. Prescribed:   Orders Placed This Encounter   Medications    HYDROcodone-acetaminophen (NORCO) 5-325 MG per tablet     Sig: Take 1 tablet by mouth 2 times daily as needed for Pain for up to 30 days. Take lowest dose possible to manage pain     Dispense:  60 tablet     Refill:  0     Reduce doses taken as pain becomes manageable         Return for Right knee Synvisc One injection in [facility]. , follow up after procedure.                Electronically signed by TAMIE Quinn CNP on1/9/2023 at 11:21 AM

## 2023-02-02 ENCOUNTER — PREP FOR PROCEDURE (OUTPATIENT)
Dept: PHYSICAL MEDICINE AND REHAB | Age: 58
End: 2023-02-02

## 2023-02-02 NOTE — DISCHARGE INSTRUCTIONS
ANESTHESIA INSTRUCTIONS FOLLOWING SURGERY        Since you may experience some intermittent light-headedness for the next several hours, we suggest you plan on bed rest or quiet relaxation this evening. You must have a friend or relative stay with you tonight. Because of the sedation you have received, it is recommended that you do not drive a motor vehicle, operate any kind of machinery, or sign any contractual agreement for 24 hours following the procedure. You should not take alcoholic beverages tonight and only take sleeping medication that has been specifically prescribed for you by your physician. Call office [phone] if you have:  Temperature greater than 100.4  Persistent nausea and vomiting  Severe uncontrolled pain  Redness, tenderness, or signs of infection (pain, swelling, redness, odor or green/yellow discharge around the site)  Difficulty breathing, headache or visual disturbances  Hives  Persistent dizziness or light-headedness  Extreme fatigue  Any other questions or concerns you may have after discharge    In an emergency, call 911 or go to an Emergency Department at a nearby hospital    It is important to bring a complete, current list of your medications to any medical appointments or hospitalizations. REMINDER:   Carry a list of your medications and allergies with you at all times  Call your pharmacy at least 1 week in advance to refill prescriptions    Diet: Resume your usual diet. Good nutrition promotes healing. Increase fluid intake. Activity: Rest for 24 hrs then resume normal activity. HOME MEDICATIONS:      If on blood thinning products such as; Aspirin, NSAIDS, Plavix, Coumadin, Xarelto, Fish Oil, Multi-Vitamins or Herbal Supplements restart in 24 hours      Restart Metformin in 48 hours if you had procedure with dye. Restart Metformin in 24 hours if no dye used during procedure.         Education Materials Received: {yes/no:603704}  Belongings Returned: {yes/no:806944}          I understand and acknowledge receipt of the above instructions. Patient or Guardian Signature                                                         Date/Time                                                                                                                                            Physician's or R.N.'s Signature                                                                  Date/Time      The discharge instructions have been reviewed with the patient and/or Guardian. Patient and/or Guardian signed and retained a printed copy. Surgical Site Infections        How can we work together to prevent Surgical Site Infections? We would like to thank you for choosing [facility] for your Surgical Care. Below you will find helpful information on how we can work together to prevent Surgical Site Infections. What is a Surgical Site Infection (SSI)? A surgical site infection is an infection that occurs after surgery in the part of the body where the surgery took place. Most patients who have surgery do not develop an infection. However, infections develop in about 1 to 3 out of every 100 patients who have surgery. Some of the common symptoms of a surgical site infection are:  Redness and pain around the area where you had surgery  Drainage of cloudy fluid from your surgical wound  Fever    Can SSIs be treated? Yes. Most surgical site infections can be treated with antibiotics. The antibiotic given to you depends on the bacteria (germs) causing the infection. Sometimes patients with SSIs also need another surgery to treat the infection. What are some of the things that hospitals are doing to prevent SSIs? To prevent SSIs, doctors, nurses, and other healthcare providers:   May remove some of your hair immediately before your surgery using electric clippers if the hair is in the same area where the procedure will occur. They should not shave you with a razor. Give you antibiotics before your surgery starts. In most cases, you should get antibiotics within 60 minutes before the surgery starts and the antibiotics should be stopped within 24 hours after surgery. Clean the skin at the site of your surgery with a special soap that kills germs. Clean their hands and arms up to their elbows with an antiseptic agent just before the surgery. Wear special hair covers, masks, gowns, and gloves during surgery to  keep the surgery area clean. Clean their hands with soap and water or an alcohol-based hand rub before and after caring for each patient. If you do not see your providers clean their hands, please     ask  them to do so. What can I do to help prevent SSIs? Before your surgery:  Tell your doctor about other medical problems you may have. Health problems such as allergies, diabetes, and obesity could affect your surgery and your treatment. Quit smoking. Patients who smoke get more infections. Talk to your doctor about how you can quit before your surgery. Do not shave near where you will have surgery. Shaving with a razor can irritate your skin and make it easier to develop an infection. At the time of your surgery:  Speak up if someone tries to shave you with a razor before surgery. Ask why you need to be shaved and talk with your surgeon if you have any concerns. Ask if you will get antibiotics before surgery. After your surgery:  Make sure that your healthcare providers clean their hands before examining you, either with soap and water or an alcohol-based hand rub. Family and friends who visit you should not touch the surgical wound or dressings. Family and friends should clean their hands with soap and water or an alcohol-based hand rub before and after visiting you.  If you do not see them clean their hands, ask them to clean their hands. What do I need to do when I go home from the hospital?  Before you go home, your doctor or nurse should explain everything you need to know about taking care of your wound. Make sure you understand how to care for your wound before you leave the hospital.  Always clean your hands before and after caring for your wound. Before you go home, make sure you know who to contact if you have questions or problems after you get home. If you have any symptoms of an infection, such as redness and pain at the surgery site, drainage, or fever, call your doctor immediately. If you have additional questions, please ask your doctor or nurse. Call office [phone] if you have:  Temperature greater than 100.4  Persistent nausea and vomiting  Severe uncontrolled pain  Redness, tenderness, or signs of infection (pain, swelling, redness, odor or green/yellow discharge around the site)  Difficulty breathing, headache or visual disturbances  Hives  Persistent dizziness or light-headedness  Extreme fatigue  Any other questions or concerns you may have after discharge    In an emergency, call 911 or go to an Emergency Department at a nearby hospital    It is important to bring a complete, current list of your medications to any medical appointments or hospitalizations. REMINDER:   Carry a list of your medications and allergies with you at all times  Call your pharmacy at least 1 week in advance to refill prescriptions    Diet: Resume your usual diet. Good nutrition promotes healing. Increase fluid intake. Activity: Rest for 24 hrs then resume normal activity. Education Materials Received: {yes/no:012829}  Belongings Returned: {yes/no:[id number]}          I understand and acknowledge receipt of the above instructions.                                                                                                                                           Patient or Guardian Signature                                                         Date/Time                                                                                                                                            Physician's or R.N.'s Signature                                                                  Date/Time      The discharge instructions have been reviewed with the patient and/or Guardian. Patient and/or Guardian signed and retained a printed copy.

## 2023-02-06 ENCOUNTER — ANESTHESIA (OUTPATIENT)
Dept: OPERATING ROOM | Age: 58
End: 2023-02-06
Payer: COMMERCIAL

## 2023-02-06 ENCOUNTER — HOSPITAL ENCOUNTER (OUTPATIENT)
Age: 58
Setting detail: OUTPATIENT SURGERY
Discharge: HOME | End: 2023-02-06
Attending: PAIN MEDICINE | Admitting: PAIN MEDICINE
Payer: COMMERCIAL

## 2023-02-06 ENCOUNTER — APPOINTMENT (OUTPATIENT)
Dept: GENERAL RADIOLOGY | Age: 58
End: 2023-02-06
Attending: PAIN MEDICINE
Payer: COMMERCIAL

## 2023-02-06 ENCOUNTER — ANESTHESIA EVENT (OUTPATIENT)
Dept: OPERATING ROOM | Age: 58
End: 2023-02-06
Payer: COMMERCIAL

## 2023-02-06 VITALS
TEMPERATURE: 96.8 F | HEART RATE: 80 BPM | HEIGHT: 65 IN | RESPIRATION RATE: 16 BRPM | SYSTOLIC BLOOD PRESSURE: 122 MMHG | DIASTOLIC BLOOD PRESSURE: 58 MMHG | WEIGHT: 293 LBS | BODY MASS INDEX: 48.82 KG/M2 | OXYGEN SATURATION: 96 %

## 2023-02-06 PROCEDURE — 6360000002 HC RX W HCPCS: Performed by: REGISTERED NURSE

## 2023-02-06 PROCEDURE — 3600000002 HC SURGERY LEVEL 2 BASE: Performed by: PAIN MEDICINE

## 2023-02-06 PROCEDURE — 2500000003 HC RX 250 WO HCPCS: Performed by: REGISTERED NURSE

## 2023-02-06 PROCEDURE — 3209999900 FLUORO FOR SURGICAL PROCEDURES

## 2023-02-06 PROCEDURE — 77002 NEEDLE LOCALIZATION BY XRAY: CPT | Performed by: PAIN MEDICINE

## 2023-02-06 PROCEDURE — 3700000000 HC ANESTHESIA ATTENDED CARE: Performed by: PAIN MEDICINE

## 2023-02-06 PROCEDURE — 2709999900 HC NON-CHARGEABLE SUPPLY: Performed by: PAIN MEDICINE

## 2023-02-06 PROCEDURE — 2500000003 HC RX 250 WO HCPCS: Performed by: PAIN MEDICINE

## 2023-02-06 PROCEDURE — 6360000002 HC RX W HCPCS: Performed by: PAIN MEDICINE

## 2023-02-06 PROCEDURE — 7100000010 HC PHASE II RECOVERY - FIRST 15 MIN: Performed by: PAIN MEDICINE

## 2023-02-06 PROCEDURE — 7100000011 HC PHASE II RECOVERY - ADDTL 15 MIN: Performed by: PAIN MEDICINE

## 2023-02-06 PROCEDURE — 20610 DRAIN/INJ JOINT/BURSA W/O US: CPT | Performed by: PAIN MEDICINE

## 2023-02-06 RX ORDER — LIDOCAINE HYDROCHLORIDE 10 MG/ML
INJECTION, SOLUTION EPIDURAL; INFILTRATION; INTRACAUDAL; PERINEURAL PRN
Status: DISCONTINUED | OUTPATIENT
Start: 2023-02-06 | End: 2023-02-06 | Stop reason: ALTCHOICE

## 2023-02-06 RX ORDER — LIDOCAINE HYDROCHLORIDE 20 MG/ML
INJECTION, SOLUTION EPIDURAL; INFILTRATION; INTRACAUDAL; PERINEURAL PRN
Status: DISCONTINUED | OUTPATIENT
Start: 2023-02-06 | End: 2023-02-06 | Stop reason: SDUPTHER

## 2023-02-06 RX ORDER — FENTANYL CITRATE 50 UG/ML
INJECTION, SOLUTION INTRAMUSCULAR; INTRAVENOUS PRN
Status: DISCONTINUED | OUTPATIENT
Start: 2023-02-06 | End: 2023-02-06 | Stop reason: SDUPTHER

## 2023-02-06 RX ADMIN — PROPOFOL 50 MG: 10 INJECTION, EMULSION INTRAVENOUS at 08:21

## 2023-02-06 RX ADMIN — FENTANYL CITRATE 50 MCG: 50 INJECTION, SOLUTION INTRAMUSCULAR; INTRAVENOUS at 08:23

## 2023-02-06 RX ADMIN — LIDOCAINE HYDROCHLORIDE 40 MG: 20 INJECTION, SOLUTION EPIDURAL; INFILTRATION; INTRACAUDAL; PERINEURAL at 08:21

## 2023-02-06 RX ADMIN — FENTANYL CITRATE 50 MCG: 50 INJECTION, SOLUTION INTRAMUSCULAR; INTRAVENOUS at 08:20

## 2023-02-06 ASSESSMENT — PAIN DESCRIPTION - DESCRIPTORS: DESCRIPTORS: DISCOMFORT

## 2023-02-06 ASSESSMENT — PAIN - FUNCTIONAL ASSESSMENT: PAIN_FUNCTIONAL_ASSESSMENT: 0-10

## 2023-02-06 ASSESSMENT — PAIN SCALES - GENERAL: PAINLEVEL_OUTOF10: 0

## 2023-02-06 NOTE — H&P
H&P    Main compliant today is pain in right as effects from Synvisc injection has worn off. She receive relief for a year. Sharp aching and throbbing pain      Continues physical therapy twice per week at [facility]   Pain increases with bending, lifting, twisting , walking, standing, stairs, getting up and down, and housework or working at job. Current pain medications remain effective   Medications reviewed. Patient denies side effects with medications. Patient states she is taking medications as prescribed. Shedenies receiving pain medications from other sources. She denies any ER visits since last visit. Pain scale with out pain medications or at its worst is 8/10 right knee   Pain scale with pain medications or at its best is 4/10. Last dose of Norco, neurontin was last night   Drug screen reviewed from 10/4/2022 and was appropriate  Pill count completed  today and WNL: Yes        The patientis allergic to [place] [macadamia nut oil], elemental sulfur, other, saccharin, and sulfa antibiotics. Subjective:      Review of Systems   Constitutional: Negative. Negative for fatigue and fever. HENT:  Positive for dental problem. Negative for congestion, sinus pain, sore throat and voice change. Eyes: Negative. Respiratory: Negative. Cardiovascular:  Positive for leg swelling. Negative for chest pain. Gastrointestinal: Negative. Genitourinary: Negative. Negative for flank pain. Musculoskeletal:  Positive for arthralgias, back pain, gait problem, joint swelling, myalgias, neck pain and neck stiffness. Ambulating with cane   Skin: Negative. Neurological:  Positive for weakness and numbness. Psychiatric/Behavioral:  Positive for dysphoric mood and sleep disturbance. Negative for suicidal ideas. The patient is not nervous/anxious.        Objective:      Vitals       Vitals:     01/09/23 0908   BP: 126/80   Weight: (!) 371 lb (168.3 kg)   Height: 5' 4\" (1.626 m) Physical Exam  Vitals and nursing note reviewed. Constitutional:       General: She is not in acute distress. Appearance: Normal appearance. She is well-developed. She is obese. She is not diaphoretic. HENT:      Head: Normocephalic and atraumatic. Right Ear: External ear normal.      Left Ear: External ear normal.      Nose: Nose normal.      Mouth/Throat:      Pharynx: No oropharyngeal exudate. Eyes:      General: No scleral icterus. Right eye: No discharge. Left eye: No discharge. Conjunctiva/sclera: Conjunctivae normal.      Pupils: Pupils are equal, round, and reactive to light. Neck:      Thyroid: No thyromegaly. Trachea: No tracheal deviation. Cardiovascular:      Rate and Rhythm: Normal rate and regular rhythm. Pulses: Normal pulses. Heart sounds: Normal heart sounds. No murmur heard. No friction rub. No gallop. Pulmonary:      Effort: Pulmonary effort is normal. No respiratory distress. Breath sounds: Normal breath sounds. No wheezing or rales. Chest:      Chest wall: No tenderness. Abdominal:      General: Bowel sounds are normal. There is no distension. Palpations: Abdomen is soft. Tenderness: There is no abdominal tenderness. There is no guarding or rebound. Musculoskeletal:         General: Swelling and tenderness present. Cervical back: Rigidity, spasms, tenderness and bony tenderness present. No edema or erythema. No muscular tenderness. Decreased range of motion. Thoracic back: Bony tenderness present. Decreased range of motion. Lumbar back: Spasms, tenderness and bony tenderness present. Decreased range of motion. Negative right straight leg raise test and negative left straight leg raise test.        Back:     Right hip: Tenderness present. Left hip: Bony tenderness present. Decreased range of motion. Decreased strength. Right knee: Swelling and bony tenderness present.  Decreased range of motion. Tenderness present. Right lower leg: Edema present. Left lower leg: Edema present. Legs:  Skin:     General: Skin is warm and dry. Coloration: Skin is not pale. Findings: No erythema or rash. Neurological:      General: No focal deficit present. Mental Status: She is alert and oriented to person, place, and time. She is not disoriented. Cranial Nerves: No cranial nerve deficit. Sensory: Sensory deficit present. Motor: Weakness present. No atrophy or abnormal muscle tone. Coordination: Coordination abnormal.      Gait: Gait abnormal.      Deep Tendon Reflexes: Babinski sign absent on the right side. Comments: 4/5 strength right lower extremity    Psychiatric:         Attention and Perception: She is attentive. Mood and Affect: Mood is not anxious or depressed. Affect is not labile, blunt, angry or inappropriate. Speech: She is communicative. Speech is not rapid and pressured, delayed, slurred or tangential.         Behavior: Behavior normal. Behavior is not agitated, slowed, aggressive, withdrawn, hyperactive or combative. Thought Content: Thought content normal. Thought content is not paranoid or delusional. Thought content does not include homicidal or suicidal ideation. Thought content does not include homicidal or suicidal plan. Cognition and Memory: Memory is not impaired. She does not exhibit impaired recent memory or impaired remote memory. Judgment: Judgment normal. Judgment is not impulsive or inappropriate. SCOOTER  Patricks test  positive  Yeoman's  or Gaenslen's positive        Assessment:      1. Primary osteoarthritis of right knee    2. Chronic pain of right knee    3. Spondylosis of lumbar spine    4. Sacroiliac inflammation ([address]    5. Lumbosacral radiculitis    6. Primary osteoarthritis of both hips    7. Spondylosis of thoracic region without myelopathy or radiculopathy    8.  Chronic myofascial pain    9. Chronic pain syndrome       Plan:      OARRS reviewed. Current MED: 10.00  Patient was offered naloxone for home. Discussed long term side effects of medications, tolerance, dependency and addiction. Previous UDS reviewed  UDS preformed today for compliance. Patient told can not receive any pain medications from any other source. No evidence of abuse, diversion or aberrant behavior. Medications and/or procedures to improve function and quality of life- patient understanding with this and that may not be pain free  Discussed with patient about safe storage of medications at home  Discussed possible weaning of medication dosing dependent on treatment/procedure results. Discussed with patient about risks with procedure including infection, reaction to medication, increased pain, or bleeding. Procedure notes reviewed in detail  Receiving over 90% relief of mid back pain from bilateral T-facet RFA. Plan Right knee Synvisc One injection in [address]. Procedure discussed. Received 1 year of relief from previous injection   Needs to hold Mobic for procedure   No relief from SI injections- is looking into SI fusion. Canceled several appointments for trigger point injections   Medications continue to help- continue Norco 5/325 BID prn- ordered refill,  Neurontin 300 mg at bedtime-refill ordered 1/11/2023  Continue flexeril prn from pcp  Is compliant. Continues psychosocial therapy            Return for Right knee Synvisc One injection in [facility]. , follow up after procedure.

## 2023-02-06 NOTE — ANESTHESIA PRE PROCEDURE
Department of Anesthesiology  Preprocedure Note       Name:  Nargis Milligan   Age:  61 y.o.  :  1965                                          MRN:  [id number]         Date:  2023      Surgeon: Mercedes Escalante):  Makayla Cordova MD    Procedure: Procedure(s):  Right knee Synvisc injection    Medications prior to admission:   Prior to Admission medications    Medication Sig Start Date End Date Taking? Authorizing Provider   HYDROcodone-acetaminophen (NORCO) 5-325 MG per tablet Take 1 tablet by mouth 2 times daily as needed for Pain for up to 30 days. Take lowest dose possible to manage pain 23  TAMIE Najera CNP   gabapentin (NEURONTIN) 300 MG capsule Take 1 capsule by mouth daily for 30 days.  1/11/23 2/10/23  TAMIE Najera CNP   CPAP Machine MISC by Does not apply route Please change CPAP pressure to 18 cm H20. 22   Terry Wright PA-C   fluticasone (FLONASE) 50 MCG/ACT nasal spray 2 sprays by Nasal route daily [place] towards outside of nose, not towards the septum 11/15/22 11/15/23  TAMIE Gregg CNP   Benzonatate (TESSALON PERLES PO) Take by mouth    Historical Provider, MD   triamterene (DYRENIUM) 50 MG capsule take 1 capsule by mouth once daily if needed for edema 21   Historical Provider, MD   meloxicam (MOBIC) 15 MG tablet Take 1 tablet by mouth daily 21  TAMIE Najera CNP   loratadine (CLARITIN) 10 MG tablet Take 10 mg by mouth daily    Historical Provider, MD   Elastic Bandages & Supports (B & B SACROILIAC BELT) MISC 1 Device by Does not apply route as needed (pain) 18   Christiana TAMIE Oliveira CNP   Cholecalciferol (VITAMIN D3) 5000 units CAPS Take 1 capsule by mouth daily    Historical Provider, MD   Omega-3 Fatty Acids (FISH OIL) 1200 MG CAPS  18   Historical Provider, MD   calcium carbonate (OSCAL) 500 MG TABS tablet Take 500 mg by mouth 2 times daily    Historical Provider, MD   Turmeric 500 MG CAPS Take by mouth daily    Historical Provider, MD   cyclobenzaprine (FLEXERIL) 10 MG tablet Take 1 tablet by mouth 3 times daily as needed for Muscle spasms WARNING: This medication may make your drowsy. Do not operate heavy machinery or motor vehicles during use. 12/18/16   Abby Weeks PA-C   lisinopril (PRINIVIL;ZESTRIL) 10 MG tablet Take 10 mg by mouth nightly  5/6/16   Historical Provider, MD   citalopram (CELEXA) 20 MG tablet Take 20 mg by mouth daily  10/2/15   Historical Provider, MD   spironolactone (ALDACTONE) 25 MG tablet Take 1 tablet by mouth daily. 4/19/13   Ari Macdonald MD       Current medications:    No current facility-administered medications for this encounter. Allergies:     Allergies   Allergen Reactions    [place] [Macadamia Nut Oil] Other (See Comments)     Numbness and Tingling in mouth    Elemental Sulfur     Other Other (See Comments)     Artificial sweeteners - migraines    Saccharin     Sulfa Antibiotics Hives       Problem List:    Patient Active Problem List   Diagnosis Code    Occult blood in stools R19.5    Second degree hemorrhoids K64.1    ABDI on CPAP G47.33, Z99.89    Trochanteric bursitis of right hip M70.61    Primary osteoarthritis of right hip M16.11    Pathologic thoracic fracture M84.48XA    Localized osteoporosis with current pathological fracture M80.80XA    Thoracic spondylosis M47.814    Morbid obesity with BMI of 50.0-59.9, adult (HCC) E66.01, Z68.43    Primary osteoarthritis of right knee M17.11    Sacroiliac inflammation ([facility]) M46.1       Past Medical History:        Diagnosis Date    Allergic rhinitis     Depression     Hypertension     ABDI on CPAP     Osteoarthritis     Vitamin D deficiency        Past Surgical History:        Procedure Laterality Date    ARTHROCENTESIS Right 10/16/2017    RIGHT HIP LARGE JOINT INJECTION performed by Alber Baker MD at [address] [place] [address] ARTHROCENTESIS Left 12/05/2017    LEFT HIP LARGE JOINT INJECTION performed by Fred Glass MD at [address] [place] [address] ARTHROCENTESIS Right 07/18/2019    Rt.  Knee Steroid Injection performed by Fred Glass MD at [address] Bilateral 07/19/2021    bilateral SI MBB # 1 performed by Fred Glass MD at [address] [place] [address]  2016   [facility]  2002     right front upper implant    DILATION AND CURETTAGE OF UTERUS  06/2022    FACET JOINT INJECTION Bilateral 03/07/2022    Bilateral T-facet RFA @  T8-9, and T9-T10 performed by Mel De La Rosa MD at [address] [place] [address] HAND SURGERY Right 05/15/2015    index finger cleaned out D/T infected cat bite    HEMORRHOID SURGERY  2016    series of 3 bandings for internal hemorrhoids, Dr. Batsheva Galindo Left 02/07/2020    Right knee steroid injection performed by Fred Glass MD at [address] [place] [address] Right 06/04/2021    Right knee Synvisc injection performed by Fred Glass MD at [address] [place] [address] Right 01/04/2022    Right Synvisc One injection performed by Fred Glass MD at [facility] Right 04/16/2019    T-facet RFA @ T7-8, 8-9, 9-10 performed by Fred Glass MD at [facility] Left 06/13/2019    T-facet RFA @ T7-8, 8-9, 9-10 LEFT performed by Fred Glass MD at [place] [address] FACET LEVEL 1 BILATERAL Bilateral 01/04/2019    LUMBAR FACET bilateral T-facet MBB @ T7-T8, T8-T9, and T9-T10 performed by Fred Glass MD at [place] [address] FACET LEVEL 1 BILATERAL Bilateral 02/25/2019    Thoracic FACET bilateral T-facet MBB @ T7-T8, T8-T9, and T9-T10 MBB #2 performed by Fred Glass MD at [place] [address] Left 12/05/2017    HIP INJECTION     NERVE SURGERY Bilateral 2019    bilateral T-facet MBB # 1 @ T4-5, T5-6, and T6-7. performed by Kathy Bates MD at [place] [address]  2016    L4-5, L5-S1 Facet injection    OTHER SURGICAL HISTORY  2016    Right Hip Injection    OTHER SURGICAL HISTORY Right 10/16/2017    Hip Injection     PAIN MANAGEMENT PROCEDURE Right 2020    Right knee genicular nerve block performed by Kathy Bates MD at [facility] Right 2020    Right knee genicular nerve RFA performed by Kathy Bates MD at [facility] Right 2021    Bilateral T-facet RFA @ T7-8, T8-9, and T9-T10 RIGHT SIDE FIRST performed by Kathy Bates MD at [address] [place] [address] PAIN MANAGEMENT PROCEDURE Left 2021    Left T-facet RFA @ T7-8, T8-9, and T9-T10. performed by Kathy Bates MD at [facility] Bilateral 2022    Bilateral Thoracic facet Radiofrequency ablation Thoracic 8-9, 9-10 performed by Kathy Bates MD at [address] ARTHROCENTESIS ASPIR&/INJ MAJOR JT/BURSA W/O US Right 2018    RIGHT HIP LARGE JOINT STEROID INJECTION performed by Kathy Bates MD at [address] OFFICE/OUTPT VISIT,PROCEDURE ONLY N/A 2018    Kyphoplasty T12 BILATERAL performed by Kathy Bates MD at [place] [address]      as child    WISDOM TOOTH EXTRACTION         Social History:    Social History     Tobacco Use    Smoking status: Former     Packs/day: 1.00     Years: 7.00     Pack years: 7.00     Types: Cigarettes, E-Cigarettes     Start date: 2005     Quit date: 2011     Years since quittin.1    Smokeless tobacco: Never   Substance Use Topics    Alcohol use: Not Currently     Comment: occasionally, 1-3 times a month                                Counseling given: Not Answered      Vital Signs (Current):   Vitals:    02/06/23 0740   BP: 137/64   Pulse: (!) 101   Resp: 20   Temp: (!) 96.5 °F (35.8 °C)   TempSrc: Temporal   SpO2: 96%   Weight: (!) 369 lb 12.8 oz (167.7 kg)   Height: 5' 5\" (1.651 m)                                              BP Readings from Last 3 Encounters:   02/06/23 137/64   01/09/23 126/80   12/19/22 (!) 101/50       NPO Status: Time of last liquid consumption: 2330                        Time of last solid consumption: 2330                        Date of last liquid consumption: 02/05/23                        Date of last solid food consumption: 02/05/23    BMI:   Wt Readings from Last 3 Encounters:   02/06/23 (!) 369 lb 12.8 oz (167.7 kg)   01/09/23 (!) 371 lb (168.3 kg)   12/19/22 (!) 371 lb (168.3 kg)     Body mass index is 61.54 kg/m². CBC:   Lab Results   Component Value Date/Time    WBC 6.4 10/06/2022 04:50 PM    RBC 4.64 10/06/2022 04:50 PM    HGB 13.8 10/06/2022 04:50 PM    HCT 41.5 10/06/2022 04:50 PM    MCV 89.4 10/06/2022 04:50 PM    RDW 14.0 07/11/2017 09:26 PM     10/06/2022 04:50 PM       CMP:   Lab Results   Component Value Date/Time     10/06/2022 04:50 PM    K 4.5 10/06/2022 04:50 PM     10/06/2022 04:50 PM    CO2 27 10/06/2022 04:50 PM    BUN 13 10/06/2022 04:50 PM    CREATININE 0.9 10/06/2022 04:50 PM    LABGLOM 64 10/06/2022 04:50 PM    GLUCOSE 109 10/06/2022 04:50 PM    PROT 7.3 10/06/2022 04:50 PM    CALCIUM 10.3 10/06/2022 04:50 PM    BILITOT 0.6 10/06/2022 04:50 PM    ALKPHOS 126 10/06/2022 04:50 PM    AST 36 10/06/2022 04:50 PM    ALT 56 10/06/2022 04:50 PM       POC Tests: No results for input(s): POCGLU, POCNA, POCK, POCCL, POCBUN, POCHEMO, POCHCT in the last 72 hours.     Coags: No results found for: PROTIME, INR, APTT    HCG (If Applicable):   Lab Results   Component Value Date    PREGSERUM NEGATIVE 04/19/2013        ABGs: No results found for: PHART, PO2ART, VIQ2SWA, NKD4IWM, BEART, C6JZGHFY Type & Screen (If Applicable):  No results found for: LABABO, LABRH    Drug/Infectious Status (If Applicable):  No results found for: HIV, HEPCAB    COVID-19 Screening (If Applicable):   Lab Results   Component Value Date/Time    COVID19 Not Detected 07/25/2020 10:04 AM           Anesthesia Evaluation  Patient summary reviewed  Airway: Mallampati: III  TM distance: >3 FB   Neck ROM: full  Mouth opening: > = 3 FB   Dental:          Pulmonary:   (+) sleep apnea:                             Cardiovascular:    (+) hypertension:,                   Neuro/Psych:   (+) psychiatric history:            GI/Hepatic/Renal:   (+) morbid obesity          Endo/Other:    (+) : arthritis:., .                 Abdominal:             Vascular: Other Findings:           Anesthesia Plan      MAC     ASA 3       Induction: intravenous. Anesthetic plan and risks discussed with patient. Plan discussed with CRNA. Apolinar Hodgkin.  [address] [place] [address]   2/6/2023

## 2023-02-06 NOTE — OP NOTE
Pre-Procedure Note    Patient Name: Shelby Duong   YOB: 1965  Medical Record Number: [id number]  Date: 2/6/23       Indication:  Right knee pain    Consent: On file. Vital Signs:   Vitals:    02/06/23 0740   BP: 137/64   Pulse: (!) 101   Resp: 20   Temp: (!) 96.5 °F (35.8 °C)   SpO2: 96%       Past Medical History:   has a past medical history of Allergic rhinitis, Depression, Hypertension, ABDI on CPAP, Osteoarthritis, and Vitamin D deficiency. Past Surgical History:   has a past surgical history that includes Dental surgery (2002); [place] tooth extraction; Hand surgery (Right, 05/15/2015); other surgical history (04/11/2016); Colonoscopy (2016); other surgical history (06/13/2016); Hemorrhoid surgery (2016); Tonsillectomy; other surgical history (Right, 10/16/2017); arthrocentesis (Right, 10/16/2017); Nerve Surgery (Left, 12/05/2017); arthrocentesis (Left, 12/05/2017); pr arthrocentesis aspir&/inj major jt/bursa w/o us (Right, 09/17/2018); pr office/outpt visit,procedure only (N/A, 11/09/2018); Nerve Block Lumb Facet Level 1 Bilateral (Bilateral, 01/04/2019); Nerve Block Lumb Facet Level 1 Bilateral (Bilateral, 02/25/2019); Lumbar spine surgery (Right, 04/16/2019); Lumbar spine surgery (Left, 06/13/2019); arthrocentesis (Right, 07/18/2019); Nerve Surgery (Bilateral, 09/13/2019); hip surgery (Left, 02/07/2020); Pain management procedure (Right, 07/31/2020); Pain management procedure (Right, 08/31/2020); Pain management procedure (Right, 01/12/2021); Pain management procedure (Left, 03/19/2021); hip surgery (Right, 06/04/2021); Back Injection (Bilateral, 07/19/2021); hip surgery (Right, 01/04/2022); Facet joint injection (Bilateral, 03/07/2022); Dilation and curettage of uterus (06/2022); and Pain management procedure (Bilateral, 12/19/2022). Pre-Sedation Documentation and Exam:   Vital signs have been reviewed (see flow sheet for vitals).      Sedation/ Anesthesia Plan:   intravenous sedation   as needed. Patient is an appropriate candidate for plan of sedation: yes         Preoperative Diagnosis: Osteoarthritis of the Right knee. Postoperative Diagnosis: Osteoarthritis of the Right knee. Procedure Performed:  Injection of Synvisc One Right knee under fluoroscopy     Indication for the Procedure: The patient has Right knee pain not responding to conservative treatment. Examination showed decreased range of motion, swelling and effusion. Tenderness was found. Medial joint line and lateral joint line tenderness was noted. Hence we decided to inject the Right knee joint for the pain relief. The procedure and the risks were discussed with the patient and an informed consent was obtained. Procedure: The patient is placed in supine/sitting position. The Right knee was flexed to about 90 degrees. Landmarks under fluoroscopy identified. Skin over the Right knee was prepped with chloro-prep and draped in sterile manner. Then skin and deep tissues medial to the patellar tendon just above the tibial plateau were infiltrated with 5  ml of 1% xylocaine. The #22-gauge 3-1/2 inch spinal needle was introduced through the skin wheal. Then the needle is directed parallel to the tibial plateau and slightly medial direction into the knee joint. Then after negative aspiration a total of 6 cc of Hylan  was  injected into the joint. The needle is removed and a Band-Aid was placed over the needle insertion site. EBL-0  Patient tolerated the procedure well and the vital signs remained stable. Patient will be seen in the pain clinic in two weeks for follow up and further planning.        Electronically signed by Carl Noe MD on 2/6/23 at 8:28 AM EST

## 2023-02-06 NOTE — ANESTHESIA POSTPROCEDURE EVALUATION
Department of Anesthesiology  Postprocedure Note    Patient: Leslie Ramon  MRN: [id number]  YOB: 1965  Date of evaluation: 2/6/2023      Procedure Summary     Date: 02/06/23 Room / Location: [facility] 03 / [address] [facility]    Anesthesia Start: 0820 Anesthesia Stop: 0830    Procedure: Right knee Synvisc injection (Right) Diagnosis:       Primary osteoarthritis of right knee      (Primary Osteoarthritis of Rt. Knee)    Surgeons: Alber Baker MD Responsible Provider: Olga Reynolds DO    Anesthesia Type: MAC ASA Status: 3          Anesthesia Type: No value filed. Jose Phase I:      Jose Phase II: Jose Score: 10      Anesthesia Post Evaluation    Comments: Maxime Carnes 59  POST-ANESTHESIA NOTE       Name:  Leslie Ramon                                         Age:  61 y.o. MRN:  [id number]      Last Vitals:  BP (!) 122/58   Pulse 80   Temp 96.8 °F (36 °C) (Temporal)   Resp 16   Ht 5' 5\" (1.651 m)   Wt (!) 369 lb 12.8 oz (167.7 kg)   [facility] 10/06/2014   SpO2 96%   BMI 61.54 kg/m²   Patient Vitals in the past 4 hrs:  02/06/23 0829, BP:(!) 122/58, Temp:96.8 °F (36 °C), Temp src:Temporal, Pulse:80, Resp:16, SpO2:96 %  02/06/23 0740, BP:137/64, Temp:(!) 96.5 °F (35.8 °C), Temp src:Temporal, Pulse:(!) 101, Resp:20, SpO2:96 %, Height:5' 5\" (1.651 m), Weight:(!) 369 lb 12.8 oz (167.7 kg)    Level of Consciousness:  Awake    Respiratory:  Stable    Oxygen Saturation:  Stable    Cardiovascular:  Stable    Hydration:  Adequate    PONV:  Stable    Post-op Pain:  Adequate analgesia    Post-op Assessment:  No apparent anesthetic complications    Additional Follow-Up / Treatment / Comment:  None    Monroe Jones  [address] Shaw HospitalDO  February 6, 2023   10:10 AM

## 2023-02-06 NOTE — H&P
[address] [place] [address]  History and Physical Update    Pt Name: Elizabeth Cox  MRN: [id number]  YOB: 1965  Date of evaluation: 2/6/2023      I have examined the patient and reviewed the H&P/Consult and there are no changes to the patient or plans.         Electronically signed by Jonathan Pruitt MD on 2/6/2023 at 8:34 AM

## 2023-02-06 NOTE — PROGRESS NOTES
3347 Received in Phase 2 . Drowsy responsive to verbal stimuli. Airway patent. O2 sat 96%  Injection site clean and dry. Denies pain on arrival.  5280 Drink and snack given.   5009 Assisted to sit at side of cart to get dressed  [phone] Waiting for ride  [phone] Discharged home via private car without complaint

## 2023-02-07 DIAGNOSIS — M54.17 LUMBOSACRAL RADICULITIS: ICD-10-CM

## 2023-02-07 RX ORDER — GABAPENTIN 300 MG/1
300 CAPSULE ORAL DAILY
Qty: 30 CAPSULE | Refills: 0 | Status: SHIPPED | OUTPATIENT
Start: 2023-02-10 | End: 2023-03-12

## 2023-02-07 NOTE — TELEPHONE ENCOUNTER
OARRS reviewed. UDS: + for  Cyclobenzaprine, Citalopram, Hydrocodone, Gabapentin. Last seen: 1/9/2023.  Follow-up: 2/20/2023

## 2023-03-07 DIAGNOSIS — M54.17 LUMBOSACRAL RADICULITIS: ICD-10-CM

## 2023-03-07 RX ORDER — GABAPENTIN 300 MG/1
300 CAPSULE ORAL DAILY
Qty: 30 CAPSULE | Refills: 0 | Status: SHIPPED | OUTPATIENT
Start: 2023-03-12 | End: 2023-04-11

## 2023-03-07 NOTE — TELEPHONE ENCOUNTER
OARRS reviewed. UDS: + for  Cyclobenzaprine, Citalopram/Escitalopram , Hydrocodone, Gabapentin. Last seen: 1/9/2023.  Follow-up:

## 2023-03-09 DIAGNOSIS — M54.17 LUMBOSACRAL RADICULITIS: Primary | ICD-10-CM

## 2023-03-09 DIAGNOSIS — G89.4 CHRONIC PAIN SYNDROME: ICD-10-CM

## 2023-03-09 DIAGNOSIS — M25.561 CHRONIC PAIN OF RIGHT KNEE: ICD-10-CM

## 2023-03-09 DIAGNOSIS — G89.29 CHRONIC PAIN OF RIGHT KNEE: ICD-10-CM

## 2023-03-09 DIAGNOSIS — M17.11 PRIMARY OSTEOARTHRITIS OF RIGHT KNEE: ICD-10-CM

## 2023-03-09 DIAGNOSIS — M47.816 SPONDYLOSIS OF LUMBAR SPINE: ICD-10-CM

## 2023-03-09 DIAGNOSIS — M46.1 SACROILIAC INFLAMMATION (HCC): ICD-10-CM

## 2023-03-09 RX ORDER — HYDROCODONE BITARTRATE AND ACETAMINOPHEN 5; 325 MG/1; MG/1
1 TABLET ORAL 2 TIMES DAILY
Qty: 60 TABLET | Refills: 0 | Status: SHIPPED | OUTPATIENT
Start: 2023-03-09 | End: 2023-04-08

## 2023-03-09 NOTE — TELEPHONE ENCOUNTER
OARRS reviewed. UDS: + for  Cyclobenzaprine, Citalopram/Escitalopram, Hydrocodone, Gabapentin  Last seen: 1/9/2023.  Follow-up:   Future Appointments   Date Time Provider Christine Branch   3/16/2023  9:00 AM Иван Brantley, PT [facility] [address] [facility]   3/24/2023 10:30 AM Иван Brantley PT [facility] [address] [facility]   5/15/2023  2:00 PM TAMIE Galindo - ALEJANDRINA [facility] [address]   6/26/2023 11:45 AM Lux Salinas PA-C [facility] [address]

## 2023-03-15 ENCOUNTER — OFFICE VISIT (OUTPATIENT)
Dept: PHYSICAL MEDICINE AND REHAB | Age: 58
End: 2023-03-15
Payer: COMMERCIAL

## 2023-03-15 VITALS
HEIGHT: 65 IN | WEIGHT: 293 LBS | SYSTOLIC BLOOD PRESSURE: 124 MMHG | DIASTOLIC BLOOD PRESSURE: 82 MMHG | BODY MASS INDEX: 48.82 KG/M2

## 2023-03-15 DIAGNOSIS — M79.18 CHRONIC MYOFASCIAL PAIN: ICD-10-CM

## 2023-03-15 DIAGNOSIS — G89.29 CHRONIC PAIN OF RIGHT KNEE: ICD-10-CM

## 2023-03-15 DIAGNOSIS — M17.11 PRIMARY OSTEOARTHRITIS OF RIGHT KNEE: Primary | ICD-10-CM

## 2023-03-15 DIAGNOSIS — M47.816 SPONDYLOSIS OF LUMBAR SPINE: ICD-10-CM

## 2023-03-15 DIAGNOSIS — M54.17 LUMBOSACRAL RADICULITIS: ICD-10-CM

## 2023-03-15 DIAGNOSIS — M47.814 SPONDYLOSIS OF THORACIC REGION WITHOUT MYELOPATHY OR RADICULOPATHY: ICD-10-CM

## 2023-03-15 DIAGNOSIS — M16.0 PRIMARY OSTEOARTHRITIS OF BOTH HIPS: ICD-10-CM

## 2023-03-15 DIAGNOSIS — G89.29 CHRONIC MYOFASCIAL PAIN: ICD-10-CM

## 2023-03-15 DIAGNOSIS — M25.561 CHRONIC PAIN OF RIGHT KNEE: ICD-10-CM

## 2023-03-15 DIAGNOSIS — M46.1 SACROILIAC INFLAMMATION (HCC): ICD-10-CM

## 2023-03-15 DIAGNOSIS — G89.4 CHRONIC PAIN SYNDROME: ICD-10-CM

## 2023-03-15 PROCEDURE — G8484 FLU IMMUNIZE NO ADMIN: HCPCS | Performed by: NURSE PRACTITIONER

## 2023-03-15 PROCEDURE — 1036F TOBACCO NON-USER: CPT | Performed by: NURSE PRACTITIONER

## 2023-03-15 PROCEDURE — G8417 CALC BMI ABV UP PARAM F/U: HCPCS | Performed by: NURSE PRACTITIONER

## 2023-03-15 PROCEDURE — 99214 OFFICE O/P EST MOD 30 MIN: CPT | Performed by: NURSE PRACTITIONER

## 2023-03-15 PROCEDURE — G8427 DOCREV CUR MEDS BY ELIG CLIN: HCPCS | Performed by: NURSE PRACTITIONER

## 2023-03-15 PROCEDURE — 3017F COLORECTAL CA SCREEN DOC REV: CPT | Performed by: NURSE PRACTITIONER

## 2023-03-15 ASSESSMENT — ENCOUNTER SYMPTOMS
EYES NEGATIVE: 1
BACK PAIN: 1
SINUS PAIN: 0
RESPIRATORY NEGATIVE: 1
GASTROINTESTINAL NEGATIVE: 1
SORE THROAT: 0
VOICE CHANGE: 0

## 2023-03-15 NOTE — PROGRESS NOTES
[facility] [place] [facility]  [facility]  [address]  [place] [address]  Dept: [phone]  Dept Fax: [phone]  Loc: [phone]    Visit Date: 3/15/2023    Functionality Assessment/Goals Worksheet     On a scale of 0 (Does not Interfere) to 10 (Completely Interferes)     1.  Which number describes how during the past week pain has interfered with       the following:  A.  General Activity:  7  B.  Mood: 3  C.  Walking Ability:  5  D.  Normal Work (Includes both work outside the home and housework):  5  E.  Relations with Other People:   3  F.  Sleep:   3  G.  Enjoyment of Life:   2    2.  Patient Prefers to Take their Pain Medications:     [x]  On a regular basis   [x]  Only when necessary    []  Does not take pain medications    3.  What are the Patient's Goals/Expectations for Visiting Pain Management?     []  Learn about my pain    []  Receive Medication   []  Physical Therapy     []  Treat Depression   []  Receive Injections    []  Treat Sleep   []  Deal with Anxiety and Stress   []  Treat Opoid Dependence/Addiction   [x]  Other:     Considering lower back study MRI  Also discuss massage chair letter        HPI:   Jermain Mann is a 57 y.o. female is here today for    Chief Complaint: Back pain, right knee pain     HPI   FU from right knee Synvisc one injection from 2/6/2023. Is receiving 90% relief of right knee pain from this injection. Right knee pain is much better and is tolerable at this time.     Continues to have pain in low back- aching and pressure into buttocks / SI area pain radiating down right leg to toes- burning and numbness     Mid back pain remains very tolerable and controlled from T-facet RFA   States she continues home exercises learned from therapy   Really decraesed down Norco and is taking one per day. Planning on getting off and feels she is ready now.   Neurontin continues to help. She is going to  ask her pcp to take over this so she can FU her just prn for procedures. States she is trying to get on an experimental study for low back pain. She does not know what it is or anything about it but did voice she may need MRI prior     Overall is doing better. Pain is tolerable   Pain increases with bending, lifting, turning head, walking, standing, getting up and down, and housework or working at job. Prior Injections:  bilateral T-facet RFA from 12/19/2022  right knee Synvisc one injection from 2/6/2023        Medications reviewed. Patient denies side effects with medications. Patient states she is taking medications as prescribed. Shedenies receiving pain medications from other sources. She denies any ER visits since last visit. Pain scale with out pain medications or at its worst is 4/10. Now   Pain scale with pain medications or at its best is 1/10. Last dose of Norco was last night  Drug screen reviewed from 1/9/2023 and was appropriate  Pill count completed  today and WNL: Yes      The patientis allergic to walnut [macadamia nut oil], elemental sulfur, other, saccharin, and sulfa antibiotics. Subjective:      Review of Systems   Constitutional: Negative. Negative for fatigue and fever. HENT:  Positive for dental problem. Negative for congestion, sinus pain, sore throat and voice change. Eyes: Negative. Respiratory: Negative. Cardiovascular:  Positive for leg swelling. Negative for chest pain. Gastrointestinal: Negative. Genitourinary: Negative. Negative for flank pain. Musculoskeletal:  Positive for arthralgias, back pain, gait problem, joint swelling, myalgias and neck stiffness. Negative for neck pain. Ambulating with cane   Skin: Negative. Neurological:  Positive for weakness and numbness. Psychiatric/Behavioral:  Positive for sleep disturbance. Negative for dysphoric mood and suicidal ideas. The patient is not nervous/anxious.       Objective:     Vitals: 03/15/23 1224   BP: 124/82   Weight: (!) 369 lb (167.4 kg)   Height: 5' 5\" (1.651 m)       Physical Exam  Vitals and nursing note reviewed. Constitutional:       General: She is not in acute distress. Appearance: Normal appearance. She is well-developed. She is obese. She is not diaphoretic. HENT:      Head: Normocephalic and atraumatic. Right Ear: External ear normal.      Left Ear: External ear normal.      Nose: Nose normal.      Mouth/Throat:      Pharynx: No oropharyngeal exudate. Eyes:      General: No scleral icterus. Right eye: No discharge. Left eye: No discharge. Conjunctiva/sclera: Conjunctivae normal.      Pupils: Pupils are equal, round, and reactive to light. Neck:      Thyroid: No thyromegaly. Trachea: No tracheal deviation. Cardiovascular:      Rate and Rhythm: Normal rate and regular rhythm. Pulses: Normal pulses. Heart sounds: Normal heart sounds. No murmur heard. No friction rub. No gallop. Pulmonary:      Effort: Pulmonary effort is normal. No respiratory distress. Breath sounds: Normal breath sounds. No wheezing or rales. Chest:      Chest wall: No tenderness. Abdominal:      General: Bowel sounds are normal. There is no distension. Palpations: Abdomen is soft. Tenderness: There is no abdominal tenderness. There is no guarding or rebound. Musculoskeletal:         General: Tenderness present. No swelling. Cervical back: Bony tenderness present. No edema, erythema, rigidity, spasms or tenderness. No muscular tenderness. Normal range of motion. Thoracic back: Bony tenderness present. Normal range of motion. Lumbar back: Spasms, tenderness and bony tenderness present. Normal range of motion. Negative right straight leg raise test and negative left straight leg raise test.        Back:       Right hip: Tenderness present. Left hip: Bony tenderness present. Decreased range of motion.  Decreased strength. Right knee: Swelling and bony tenderness present. Decreased range of motion. Tenderness present. Right lower leg: Edema present. Left lower leg: Edema present. Legs:    Skin:     General: Skin is warm and dry. Coloration: Skin is not pale. Findings: No erythema or rash. Neurological:      General: No focal deficit present. Mental Status: She is alert and oriented to person, place, and time. She is not disoriented. Cranial Nerves: No cranial nerve deficit. Sensory: Sensory deficit present. Motor: Weakness present. No atrophy or abnormal muscle tone. Coordination: Coordination abnormal.      Gait: Gait abnormal.      Deep Tendon Reflexes: Babinski sign absent on the right side. Comments: 4/5 strength right lower extremity    Psychiatric:         Attention and Perception: She is attentive. Mood and Affect: Mood is not anxious or depressed. Affect is not labile, blunt, angry or inappropriate. Speech: She is communicative. Speech is not rapid and pressured, delayed, slurred or tangential.         Behavior: Behavior normal. Behavior is not agitated, slowed, aggressive, withdrawn, hyperactive or combative. Thought Content: Thought content normal. Thought content is not paranoid or delusional. Thought content does not include homicidal or suicidal ideation. Thought content does not include homicidal or suicidal plan. Cognition and Memory: Memory is not impaired. She does not exhibit impaired recent memory or impaired remote memory. Judgment: Judgment normal. Judgment is not impulsive or inappropriate. SCOOTER  Patricks test  positive  Yeoman's  or Gaenslen's positive       Assessment:     1. Primary osteoarthritis of right knee    2. Chronic pain of right knee    3. Lumbosacral radiculitis    4. Spondylosis of lumbar spine    5. Sacroiliac inflammation ([facility] [address]    6. Chronic pain syndrome    7.  Primary osteoarthritis of both hips    8. Spondylosis of thoracic region without myelopathy or radiculopathy    9. Chronic myofascial pain            Plan:      OARRS reviewed. Current MED: 5.00  Patient was offered naloxone for home. Discussed long term side effects of medications, tolerance, dependency and addiction. Previous UDS reviewed  UDS preformed today for compliance. Patient told can not receive any pain medications from any other source. No evidence of abuse, diversion or aberrant behavior. Medications and/or procedures to improve function and quality of life- patient understanding with this and that may not be pain free  Discussed with patient about safe storage of medications at home  Discussed possible weaning of medication dosing dependent on treatment/procedure results. Discussed with patient about risks with procedure including infection, reaction to medication, increased pain, or bleeding. Procedure notes reviewed in detail  Receiving over 90% relief of mid back pain from bilateral T-facet RFA. Receiving 90% relief of right knee pain from right knee Synvisc injection. Discussed repeating procedures when needing to   No relief from SI injections  Pain better controlled. Discontinuing Norco  Continues Neurontin 300 mg at bedtime- filled recently 3/12/2023. Is going to ask pcpc to take over. Continues Flexeril prn from pcp   Will FU prn if and when pain returns pcp takes over Neurontin. If pcp does not take over Neurontin will FU in 3 months    Meds. Prescribed:   No orders of the defined types were placed in this encounter. Return if symptoms worsen or fail to improve.                Electronically signed by TAMIE Garcia CNP on3/15/2023 at 1:09 PM

## 2023-03-16 ENCOUNTER — HOSPITAL ENCOUNTER (OUTPATIENT)
Dept: PHYSICAL THERAPY | Age: 58
Setting detail: THERAPIES SERIES
Discharge: HOME | End: 2023-03-16
Payer: COMMERCIAL

## 2023-03-16 PROCEDURE — 97162 PT EVAL MOD COMPLEX 30 MIN: CPT

## 2023-03-16 PROCEDURE — 97535 SELF CARE MNGMENT TRAINING: CPT

## 2023-03-16 NOTE — PROGRESS NOTES
** PLEASE SIGN, DATE AND TIME CERTIFICATION BELOW AND RETURN TO [facility] OUTPATIENT REHABILITATION (FAX #: [phone]).  ATTEST/CO-SIGN IF ACCESSING VIA [employer].  THANK YOU.**    I certify that I have examined the patient below and determined that Physical Medicine and Rehabilitation service is necessary and that I approve the established plan of care for up to 90 days or as specifically noted.  Attestation, signature or co-signature of physician indicates approval of certification requirements.    ________________________ ____________ __________  Physician Signature   Date   Time  [facility]  PHYSICAL THERAPY  [x] LYMPHEDEMA SERVICES EVALUATION  [] DAILY NOTE [] PROGRESS NOTE [] DISCHARGE NOTE    [x] OUTPATIENT REHABILITATION CENTER [facility]   [] [facility]    [] [facility]   [] [facility]    Date: 3/16/2023  Patient Name:  Jermain Mann  : 1965  MRN: [id number]  CSN: [id number]    Referring Practitioner Corina Temple APRN-CNP   Diagnosis LOWER EXTREMITIY SWELLING    Treatment Diagnosis -Hereditary Lymphedema Swelling BLE/Lower Trunk.  -Difficulty with prolonged standing/walking.   Date of Evaluation 3/16/23       Functional Outcome Measure Used LYMPHEDEMA LIFE IMPACT SCALE (LLIS)-Version 2.     Functional Outcome Score 45 (3/16/23)       Insurance: Primary: Payor: [facility] /  /  / ,   Secondary:    Authorization Information: PRECERTIFICATION REQUIRED:  Pre-cert required after initial evaluation through [facility]..   Visit # 1, 1/10 for progress note   Visits Allowed: INSURANCE THERAPY BENEFIT:   For those 21 and above, 30 visits for OT, PT and ST each per calendar year are approved. 2023  Benefit will not cover maintenance or preventative treatment.  FCE is a covered benefit.   Recertification Date: 2023 (12 weeks)   Physician Follow-Up: -To be scheduled by patient.   Physician Orders: -Referral rec'd for Lymphedema Clinic  on 02/27/2023. Cancer History No History of Cancer   Prior Lymphedema Treatment -Utilized Compression Garment: Patient reported only wearing NELLY hose following Left Hip Replacement.  -Utilized Elevation: Patient reported elevating her legs intermittently with a foam wedge which is placed on her bed. -Exercises: Patient reported completing simple leg exercises leg kicks, ankle pumps. (Increased exercising in pool during summer months). -Diet: Regular Diet.  -Diuretics: Patient reported taking a Diuretic daily, increasing during summer when swelling is worse. Additional Pertinent History  (HTN, OA) -Depression  -Allergic Rhinitis  -Vitamin D Deficiency  -Bilateral Thoracic Facet Radiofrequency Ablation (T8-9, 9-10). -Several Facet Joint, Bilateral Hip joint, knee injections.  -Patient reported undergoing a Left Total Hip Replacement in 2012. HPI -The patient reported that she has had swelling in her legs for many years, even when she was young. However, she reported that the swelling would decrease overnight to normal when she woke in the morning. However, she has noticed a gradual progression of swelling in bilateral lower extremities within the last 1-2 years. She reported noting a significant increase in swelling after her left hip surgery and after slipping on ice. SUBJECTIVE:     The patient presented to the Lymphedema clinic on this date with stage II-III Lymphedema swelling in Bilateral lower extremities and lower truncal region. She reported that the swelling gets progressively worse as the day progresses. Therefore, by the end of the day, the swelling is at it's most severe. Social/Functional History and Current Status:  Medications and Allergies have been reviewed and are listed on Medical History Questionnaire. Alonzo Teixeira lives alone in a single story home with  2 short entrance steps (patient holds onto door frame to assist with ascending/descending the steps).   Support System: Friend's son can assist as needed. Emotion support available with friends, but not physical assistance available. Task Previous Current   Bilateral Lower Extremity/Extremities Range of Motion [facility] [place] WFL   Bilateral Lower Extremity/Extremities Strength WFL WFL-Generalized weakness; Bilateral LE grossly 4+/5 with hip girdles being grossly 4/5. Sensation [facility] WFL: Patient was able to differentiate between right and left lower extremities with light touch. However, she reported radiating numbness/tingling/pain radiating down her right leg to her toes. ADL'S   Bathing Independent -Shower with tub: using shower chair during showering. Showering 3-4 times per week.  -No grab bars available. Dressing Independent -Uses a sock aid to niharika socks.   -Wears velcro shoes to slip on/off.  -Patient reported increasing size of pants due to swelling. Toileting Independent Modified Independent   Homemaking Independent -Modified Independent with frequent rest breaks during cooking/cleaning tasks.    -Patient reported sitting to complete certain homemaking tasks (ie. Loading the ). Equipment Used NA -Straight cane (Used primarily)  -Rollator (Used community ambulation). Functional Mobility   Ambulation Independent -Patient ambulated with support of straight cane. -Decreased heelstrike at initial contact, with decreased hip/knee flexion during swing phase.    -Slightly wide base of support. Transfers Independent Modified Independent   Community Integration   Driving Independent Modified Independent  -Slight difficulty with getting legs in/out of car due to large amount of swelling. 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 Recreation Independent -Patient reports that she enjoys pool exercising in summer. She reported that her hobbies are sedentary due to the pain/swelling.    FALLS: Patient reported falling at home approximately 8 months ago (Bruised, no fractures). OBJECTIVE  Lymphedema Assessment of Bilateral Lower Extremity/Extremities  Hyperkeratosis - Abnormal thickening of the outer layer of the skin. Mild   Hyperplasia - Presence of too many lymphatic vessels that do not function properly. None   Hyperpigmentation - Darkening of skin Moderate (Primarily in anterior lower leg reigons). Papillomas - Outward growth on skin None   Lymphorrhea (Weeping) - Lymph leakage through the skin, also known as Weeping Lymphedema. None     Pitting Edema -Tight tissue texture. Skin Condition Dry-Moderate. Open Wound(s) No Open WoundsPatient denied history of infections/open wounds. Tissue Temperature Normal   Skin Folds Small - Location: Medial knee/distal thigh regions. Fibrotic Tissue Moderate   Orange Peel Texture None   Nailbed Appearance/Condition Impaired     Circimferential Measurements  Circumferential Measurements for 3/16/2023 are as follows: Baseline. LANDMARKS LEFT (cm) RIGHT (cm)   Metatarsal Heads 21.6 21.0   Lateral Styloid 23.8 23.3   Heel 31.4 30.9   10 cm (Ankle) 28.9 27.7   15 cm 33.7 34.7   20 cm    39.7 41.0   25 cm 46.2 46.4   30 cm 49.2 49.0   35 cm 48.7 49.4   (Total-Distally) 323.2 323.4             43 cm (Mid-Patella) 53.4 57.7   50 cm 64.4 73.5   55 cm 72.0 80.6   60 cm 75.4 86.4   (Total-Proximally) 265.2 298.2     Hips TBA   Waist TBA   TBA-To Be assessed during next treatment. Conservative Lymphedema Treatment  Restrictions/Precautions -Standard/Universal Precautions  -Fall Risk   Pain: (0-10 pain scale)  \"0\" No pain. \"10\" Worst/Severe pain. -PAIN LOCATION: Low back pain, radiating down the right leg to toes. -PAIN RATIN/10  -PAIN DESCRIPTION: Constant, achy. X in shaded column indicates activity completed today   Treatments Interventions  Notes/Education   Skin Care -Instructed patient on skin care precautions.  -Instructed patient on steps to prevent edema and infection.    X -Instructed the patient to apply a generous amount of non-fragrant lotion (Low pH) on bilateral lower extremities, ending with using an upward motion. Compression Bandaging Location: Bilateral lower extremities (Metatarsal heads to knee joint line), Compression Gradient: Minimal (20-30 mmHg), Supplies (per involved extremity): Medigrip compression stockinette (Size F) X -Instructed the patient to remove the compression stockinettes nightly and reapply them every morning.  -Patient verbalized a good understanding. Compression Pump -     KT/Elastic taping -     Decongestive Therapy Exercises -     Manual Therapy Time/Technique  Notes   Manual Lymph Drainage  (MLD) No MLD completed. Will begin during subsequent sessions. Physiotouch -       Specific Interventions Next Treatment:   1) Measurements to be taken of Hips/Waist.  2) Initiate Complete Decongestive Therapy/Manual Lymph Drainage (CDT/MLD) (Unilaterally-MLD; Bilaterally Skin care, compression, exercises). 3) Initiate assessment for recommendations of compression garment needs. Activity/Evaluation/Treatment Tolerance:  [x]  Patient tolerated evaluation/treatment well []  Patient limited by fatigue  []  Patient limited by pain    []  Patient limited by medical complications  []  Other:     Patient Education:   [x]  HEP/Education Completed: Plan of Care, Goals, Refer to \"Conservative Lymphedema Treatment\" above. [employer] Access Code:  []  No new Education completed  []  Reviewed Prior HEP      [x]  Patient verbalized and/or demonstrated understanding of education provided. []  Patient unable to verbalize and/or demonstrate understanding of education provided. Will continue education. [x]  Barriers to learning: Slight physical limitations as noted by difficulty with been forward and reaching her feet.     Assessment: The patient tolerated today's initial evaluation/treatment that was initiated on this date and she was able to tolerate wearing the compression stockinettes without reports of pain.    Body Structures/Functions/Activity Limitations: Increased edema, Increased risk of infection, Decreased functional mobility, Decreased ADL's, Decreased strength, Decreased endurance, Decreased coordination, and Decreased balance    Prognosis: good    GOALS:  Patient Goal: \"I want to get my legs so they don't swell up, so I can get around better\" per patient.     Short Term Goals:  Time Frame: 6 weeks.  1) Patient will demonstrate a decrease in circumferential measurements of the affected extremities by 10 cm (Total circumference Distal LE) working towards the lymphedema swelling stabilizing and patient being able to get measured and fitted for a new compression garment to be worn daily to keep swelling down.  2) Patient will tolerate donning the proper compression stockinettes/bandages to bilateral lower extremities with modified independence working towards obtaining new compression garment to be worn daily to keep swelling down in legs.    Long Term Goals:  Time Frame: 12 weeks.  1) Patient/family/caregiver will demonstrate donning/doffing compression garments with modified independence to wear compression garments daily to keep swelling down.  2) Patient/family/caregiver will verbalize a good understanding regarding proper wearing and replacement schedule, precautions and care of garment(s).  3) Patient will demonstrate a decrease by at least 4 points in LLIS (Version 2) demonstrating improved functional mobility/quality of life.    PLAN:  Treatment Recommendations:Manual Therapy/Manual Lymph Drainage, Skin Care/Education, Compression Bandaging, Garment Fitting/Assessment, Strengthening , Endurance Training, Self-Care Training and Safety, Balance Training, Functional Mobility Training, Safety Training, Patient Education, and Home Exercise Program    [x]  Plan of care initiated.  Plan to see patient 2 times per week for 12 weeks to address the treatment planned outlined above-Decrease treatment  frequency as appropriate. [x]  Referral for Sequential Compression Pump (Highly recommend for home use at discharge). []  Continue with current plan of care  []  Modify plan of care as follows:    []  Hold pending physician visit  []  Discharge    THE PATIENT DEMONSTRATES GOOD POTENTIAL TO MAKE GOOD PROGRESS AND MEET ALL GOALS WITH TREATMENTS COMPLETED BY A SKILLED PHYSICAL THERAPIST/PHYSICAL THERAPIST ASSISTANT. Time In 0900   Time Out 1020   Timed Code Minutes: 13 min   Total Treatment Time: 80 min     Thank you TAMIE Sevilla-CNP for allowing me to assist in the care of your patient.     Electronically Signed by: Natali Wyatt PT, CLT-ERIK, SREEKANTH on 3/16/2023

## 2023-03-24 ENCOUNTER — HOSPITAL ENCOUNTER (OUTPATIENT)
Dept: PHYSICAL THERAPY | Age: 58
Setting detail: THERAPIES SERIES
Discharge: HOME | End: 2023-03-24
Payer: COMMERCIAL

## 2023-03-24 PROCEDURE — 97110 THERAPEUTIC EXERCISES: CPT

## 2023-03-24 PROCEDURE — 97140 MANUAL THERAPY 1/> REGIONS: CPT

## 2023-03-24 PROCEDURE — 97535 SELF CARE MNGMENT TRAINING: CPT

## 2023-03-24 NOTE — PROGRESS NOTES
towards the lymphedema swelling stabilizing and patient being able to get measured and fitted for a new compression garment to be worn daily to keep swelling down. 2) Patient will tolerate donning the proper compression stockinettes/bandages to bilateral lower extremities with modified independence working towards obtaining new compression garment to be worn daily to keep swelling down in legs. Long Term Goals:  Time Frame: 12 weeks. 1) Patient/family/caregiver will demonstrate donning/doffing compression garments with modified independence to wear compression garments daily to keep swelling down. 2) Patient/family/caregiver will verbalize a good understanding regarding proper wearing and replacement schedule, precautions and care of garment(s). 3) Patient will demonstrate a decrease by at least 4 points in LLIS (Version 2) demonstrating improved functional mobility/quality of life. PLAN:  [x]  Plan of care continued. Plan to see patient 2 times per week for 12 weeks to address the treatment planned outlined above-Decrease treatment frequency as appropriate. [x]  Referral for Sequential Compression Pump (Highly recommend for home use at discharge). []  Continue with current plan of care  []  Modify plan of care as follows:    []  Hold pending physician visit  []  Discharge    THE PATIENT DEMONSTRATES GOOD POTENTIAL TO MAKE GOOD PROGRESS AND MEET ALL GOALS WITH TREATMENTS COMPLETED BY A SKILLED PHYSICAL THERAPIST/PHYSICAL THERAPIST ASSISTANT. Time In 1035   Time Out 1130   Timed Code Minutes: 55 min   Total Treatment Time: 55 min     Thank you TAMIE Vallecillo-CNP for allowing me to assist in the care of your patient.     Electronically Signed by: Kortney Stark PT, RICCI-ERIK, SREEKANTH on 3/24/2023

## 2023-03-28 ENCOUNTER — HOSPITAL ENCOUNTER (OUTPATIENT)
Dept: PHYSICAL THERAPY | Age: 58
Setting detail: THERAPIES SERIES
Discharge: HOME | End: 2023-03-28
Payer: COMMERCIAL

## 2023-03-28 PROCEDURE — 97535 SELF CARE MNGMENT TRAINING: CPT

## 2023-03-28 PROCEDURE — 97140 MANUAL THERAPY 1/> REGIONS: CPT

## 2023-03-28 NOTE — PROGRESS NOTES
[address] [facility]  PHYSICAL THERAPY  [] LYMPHEDEMA SERVICES EVALUATION  [x] DAILY NOTE [] PROGRESS NOTE [] DISCHARGE NOTE    [x] [facility] [place]   [] [place]     [] [facility]   [] Saida Gonzalez    Date: 3/28/2023  Patient Name:  Noreen Page  : 1965  MRN: [id number]  CSN: [id number]    Referring Practitioner TAMIE Doss-CNP   Diagnosis LOWER EXTREMITIY SWELLING    Treatment Diagnosis -Hereditary Lymphedema Swelling BLE/Lower Trunk.  -Difficulty with prolonged standing/walking. Date of Evaluation 3/16/23       Functional Outcome Measure Used LYMPHEDEMA LIFE IMPACT SCALE (LLIS)-Version 2. Functional Outcome Score 45 (3/16/23)       Insurance: Primary: Payor: Clair Rodriguez /  /  / ,   Secondary:    Authorization Information: PRECERTIFICATION REQUIRED:  Pre-cert required after initial evaluation through [facility]. .   Visit # 3, 3/10 for progress note   Visits Allowed: INSURANCE THERAPY BENEFIT: (Received 3/20/23)  RECEIVED AUTH FOR:  -80 UNITS OF PT   -(3/20/23-23)   -FOR CPT CODES: [address], [address] [facility], [address], [address]  AUTH# [id number]   Recertification Date:  (12 weeks)   Physician Follow-Up: -To be scheduled by patient. Physician Orders: -Referral rec'd for Lymphedema Clinic on 2023. Cancer History No History of Cancer   Prior Lymphedema Treatment -Utilized Compression Garment: Patient reported only wearing NELLY hose following Left Hip Replacement.  -Utilized Elevation: Patient reported elevating her legs intermittently with a foam wedge which is placed on her bed. -Exercises: Patient reported completing simple leg exercises leg kicks, ankle pumps. (Increased exercising in pool during summer months). -Diet: Regular Diet.  -Diuretics: Patient reported taking a Diuretic daily, increasing during summer when swelling is worse.     Additional Pertinent History  (HTN, OA) -Depression  -Allergic

## 2023-03-31 ENCOUNTER — HOSPITAL ENCOUNTER (OUTPATIENT)
Dept: PHYSICAL THERAPY | Age: 58
Setting detail: THERAPIES SERIES
Discharge: HOME | End: 2023-03-31
Payer: COMMERCIAL

## 2023-03-31 PROCEDURE — 97535 SELF CARE MNGMENT TRAINING: CPT

## 2023-03-31 PROCEDURE — 97140 MANUAL THERAPY 1/> REGIONS: CPT

## 2023-03-31 NOTE — PROGRESS NOTES
elevated with right knee supported on bolster.   -Instructed patient to complete ankle pumps (x10 reps/hourly). Only able to perform ankle pumps today d/t late for appt. Decongestive Therapy Exercises (Seated/Standing) Exercises Reps Sets Hold                                                                          Manual Therapy Time/Technique  Notes   Manual Lymph Drainage  (MLD) Right Lower Extremity: Trunk: Effleurage, Profundus, Terminus, Inguinal Lnn, Axillary Lnn, and (IA) Anterior Inguinal to Axillary (3 steps), Lower Extremity: Thigh (Anterior, Posterior, Lateral, Medial to Lateral), Knee (Anterior, Posterior, Lateral, Medial), Lower Leg (Anterior, Posterior), Ankle (Anterior, Posterior, Lateral, Medial), and Foot/Toes (Anterior, Posterior), Rework: Lower Extremity (Toes to Groin), Inguinal Lnn, Axillary Lnn, (IA) Anterior Inguinal to Axillary, Terminus, Profundus, and Effleurage X -POSITION: Refer to position above (DTE -Supine). Physiotouch -       Specific Interventions Next Treatment:   1) Measurements to be taken of Hips/Waist.  2) Continue Complete Decongestive Therapy/Manual Lymph Drainage (CDT/MLD) (Unilaterally-MLD; Bilaterally Skin care, compression, exercises). 3) Initiate assessment for recommendations of compression garment needs. Activity/Treatment Tolerance:  [x]  Patient tolerated treatment well  []  Patient limited by fatigue  []  Patient limited by pain   []  Patient limited by medical complications  []  Other:     Patient Education:   [x]  HEP/Education Completed: MLD, kinesiotape wearing schedule and precautions, medigrip compression stockinettes. Keystok Access Code:  []  No new Education completed  [x]  Reviewed Prior HEP      [x]  Patient verbalized and/or demonstrated understanding of education provided. []  Patient unable to verbalize and/or demonstrate understanding of education provided. Will continue education.   [x]  Barriers to learning: Slight physical

## 2023-04-03 ENCOUNTER — HOSPITAL ENCOUNTER (OUTPATIENT)
Dept: PHYSICAL THERAPY | Age: 58
Setting detail: THERAPIES SERIES
Discharge: HOME | End: 2023-04-03
Payer: COMMERCIAL

## 2023-04-03 PROCEDURE — 97140 MANUAL THERAPY 1/> REGIONS: CPT

## 2023-04-03 PROCEDURE — 97535 SELF CARE MNGMENT TRAINING: CPT

## 2023-04-03 NOTE — PROGRESS NOTES
swelling is worse. Additional Pertinent History  (HTN, OA) -Depression  -Allergic Rhinitis  -Vitamin D Deficiency  -Bilateral Thoracic Facet Radiofrequency Ablation (T8-9, 9-10). -Several Facet Joint, Bilateral Hip joint, knee injections.  -Patient reported undergoing a Left Total Hip Replacement in . HPI -The patient reported that she has had swelling in her legs for many years, even when she was young. However, she reported that the swelling would decrease overnight to normal when she woke in the morning. However, she has noticed a gradual progression of swelling in bilateral lower extremities within the last 1-2 years. She reported noting a significant increase in swelling after her left hip surgery and after slipping on ice. SUBJECTIVE:  The patient presented to the Lymphedema clinic on this date with Medigrip compression stockinettes on bilateral lower extremities. She denied pain from the [address]. The patient was more alert in the afternoon versus during the morning appointments. Conservative Lymphedema Treatment  Restrictions/Precautions -Standard/Universal Precautions  -Fall Risk  -Risk for wounds/infections. Pain: (0-10 pain scale)  \"0\" No pain. \"10\" Worst/Severe pain. -PAIN LOCATION: Bilateral lower extremities (Low Back radiating down right leg to toes). -PAIN RATIN/10 (R knee) (5/10 Low back/SI joint-unrelated to Lymphedema). -PAIN DESCRIPTION: Constant, achy. (Sharp, burning low back and radiating pain). X in shaded column indicates activity completed today   Treatments Interventions  Notes/Education   Skin Care -Applied generous amount of Aquaphor moisturize to left lower extremity. X -Reviewed the importance of applying a low pH moisturizer on her legs daily.  -Reviewed signs of skin irritation with KT/elastic tape.    Compression Bandaging Location: Bilateral lower extremities (Metatarsal heads to knee joint line), Compression Gradient: Minimal

## 2023-04-07 ENCOUNTER — HOSPITAL ENCOUNTER (OUTPATIENT)
Dept: PHYSICAL THERAPY | Age: 58
Setting detail: THERAPIES SERIES
Discharge: HOME | End: 2023-04-07
Payer: COMMERCIAL

## 2023-04-07 PROCEDURE — 97140 MANUAL THERAPY 1/> REGIONS: CPT

## 2023-04-07 NOTE — PROGRESS NOTES
worse.    Additional Pertinent History  (HTN, OA) -Depression  -Allergic Rhinitis  -Vitamin D Deficiency  -Bilateral Thoracic Facet Radiofrequency Ablation (T8-9, 9-10). -Several Facet Joint, Bilateral Hip joint, knee injections.  -Patient reported undergoing a Left Total Hip Replacement in . HPI -The patient reported that she has had swelling in her legs for many years, even when she was young. However, she reported that the swelling would decrease overnight to normal when she woke in the morning. However, she has noticed a gradual progression of swelling in bilateral lower extremities within the last 1-2 years. She reported noting a significant increase in swelling after her left hip surgery and after slipping on ice. SUBJECTIVE:  Patient presented to therapy with no compression of her legs. She reports that on Wednesday she removed her KT tape due to it starting to come off. Patient reports that last night she removed her medigrip stockinettes and did not re-niharika yet today. She reports that her pain is not doing too bad overall today. She rates her right knee pain a 7/10 and lower back pain a 5/10. Conservative Lymphedema Treatment  Restrictions/Precautions -Standard/Universal Precautions  -Fall Risk  -Risk for wounds/infections. Pain: (0-10 pain scale)  \"0\" No pain. \"10\" Worst/Severe pain. -PAIN LOCATION: Bilateral lower extremities (Low Back radiating down right leg to toes). -PAIN RATIN/10 (R knee) (5/10 Low back/SI joint-unrelated to Lymphedema). -PAIN DESCRIPTION: Constant, achy. (Sharp, burning low back and radiating pain). X in shaded column indicates activity completed today   Treatments Interventions  Notes/Education   Skin Care -Applied generous amount of Aquaphor moisturize to left lower extremity. X -Reviewed the importance of applying a low pH moisturizer on her legs daily.  -Reviewed signs of skin irritation with KT/elastic tape.    Compression Bandaging Location:

## 2023-04-17 ENCOUNTER — HOSPITAL ENCOUNTER (OUTPATIENT)
Dept: PHYSICAL THERAPY | Age: 58
Setting detail: THERAPIES SERIES
Discharge: HOME | End: 2023-04-17
Payer: COMMERCIAL

## 2023-04-17 PROCEDURE — 97140 MANUAL THERAPY 1/> REGIONS: CPT

## 2023-04-17 PROCEDURE — 97535 SELF CARE MNGMENT TRAINING: CPT

## 2023-04-17 NOTE — PROGRESS NOTES
stretch during MLD and by softened tissue texture. 4) The patient has demonstrated decreased dry skin. Although the patient continues to make progress with the Conservative Lymphedema Treatments and she continues to elevate her legs at home daily and overnight, and completes the exercises. The patient reported that she has also increased her water intake. However, the patient has difficulty with managing to keep the swelling down in her proximal lower legs and lower truncal region. She will need proper equipment at her home to use to keep the swelling down when she is discharged from the Lymphedema clinic and to decrease the risk of recurring infections/wounds. The patient has good potential (with proper medical equipment) to meet all remaining goals while progressing through Phases I & II working towards becoming modified independent prior to discharge to home management. (Highly recommend pneumatic compression pumps and compression garments prior to discharge to home program). GOALS:  Patient Goal: \"I want to get my legs so they don't swell up, so I can get around better\" per patient. Short Term Goals:  Time Frame: 6 weeks. 1) Patient will demonstrate a decrease in circumferential measurements of the affected extremities by 10 cm (Total circumference Distal LE) working towards the lymphedema swelling stabilizing and patient being able to get measured and fitted for a new compression garment to be worn daily to keep swelling down. (MET: Distally: 04/17/23)  (NOT MET: Proximally: ONGOING)    2) Patient will tolerate donning the proper compression stockinettes/bandages to bilateral lower extremities with modified independence working towards obtaining new compression garment to be worn daily to keep swelling down in legs. (MET: Patient reported using assistive devices including Sock aid, and dressing stick). Long Term Goals:  Time Frame: 12 weeks.   1) Patient/family/caregiver will demonstrate

## 2023-04-19 ENCOUNTER — APPOINTMENT (OUTPATIENT)
Dept: PHYSICAL THERAPY | Age: 58
End: 2023-04-19
Payer: COMMERCIAL

## 2023-04-20 ENCOUNTER — APPOINTMENT (OUTPATIENT)
Dept: PHYSICAL THERAPY | Age: 58
End: 2023-04-20
Payer: COMMERCIAL

## 2023-04-24 ENCOUNTER — APPOINTMENT (OUTPATIENT)
Dept: PHYSICAL THERAPY | Age: 58
End: 2023-04-24
Payer: COMMERCIAL

## 2023-04-25 ENCOUNTER — HOSPITAL ENCOUNTER (OUTPATIENT)
Dept: PHYSICAL THERAPY | Age: 58
Setting detail: THERAPIES SERIES
End: 2023-04-25
Payer: COMMERCIAL

## 2023-04-26 ENCOUNTER — APPOINTMENT (OUTPATIENT)
Dept: PHYSICAL THERAPY | Age: 58
End: 2023-04-26
Payer: COMMERCIAL

## 2023-04-27 ENCOUNTER — HOSPITAL ENCOUNTER (OUTPATIENT)
Dept: PHYSICAL THERAPY | Age: 58
Setting detail: THERAPIES SERIES
Discharge: HOME | End: 2023-04-27
Payer: COMMERCIAL

## 2023-05-01 ENCOUNTER — HOSPITAL ENCOUNTER (OUTPATIENT)
Dept: PHYSICAL THERAPY | Age: 58
Setting detail: THERAPIES SERIES
Discharge: HOME | End: 2023-05-01

## 2023-05-04 RX ORDER — BENZONATATE 100 MG/1
CAPSULE ORAL
Qty: 60 CAPSULE | Refills: 0 | Status: SHIPPED | OUTPATIENT
Start: 2023-05-04

## 2023-05-04 RX ORDER — BENZONATATE 100 MG/1
CAPSULE ORAL
OUTPATIENT
Start: 2023-05-04 | End: 2023-05-11

## 2023-05-11 ENCOUNTER — HOSPITAL ENCOUNTER (OUTPATIENT)
Dept: GENERAL RADIOLOGY | Age: 58
Discharge: HOME | End: 2023-05-11
Payer: COMMERCIAL

## 2023-05-11 ENCOUNTER — HOSPITAL ENCOUNTER (OUTPATIENT)
Age: 58
Discharge: HOME | End: 2023-05-11
Payer: COMMERCIAL

## 2023-05-11 DIAGNOSIS — Z77.22 TOBACCO SMOKE EXPOSURE: ICD-10-CM

## 2023-05-11 DIAGNOSIS — R91.8 ABNORMAL CHEST X-RAY WITH MULTIPLE LUNG NODULES: ICD-10-CM

## 2023-05-11 PROCEDURE — 71046 X-RAY EXAM CHEST 2 VIEWS: CPT

## 2023-05-16 NOTE — CARE COORDINATION
[facility]  Therapy Contact Note      Date: 2023  Patient Name: Elizabeth Webber        MRN: [id number]    : 1965  (60 y.o.)  Gender: female   Manual TAMIE Martinez*  LOWER EXTREMITIY SWELLING     2023 10:13 AM  Therapist called and left a voicemail message to reschedule the patient's missed appointments and to notify her that she has been approved for the compression pumps and they are having difficulty with contacting her. Requested, patient return the call today by 3-3:30.     [address] [facility], [address][id number], [place], [facility]   2023

## 2023-06-05 ENCOUNTER — HOSPITAL ENCOUNTER (OUTPATIENT)
Dept: PHYSICAL THERAPY | Age: 58
Setting detail: THERAPIES SERIES
Discharge: HOME | End: 2023-06-05
Payer: COMMERCIAL

## 2023-06-05 PROCEDURE — 97140 MANUAL THERAPY 1/> REGIONS: CPT

## 2023-06-05 NOTE — CARE COORDINATION
[address] [address]  Therapy Contact Note      Date: 2023  Patient Name: Phong Cabrera        MRN: [id number]    : 1965  (60 y.o.)  Gender: female   Ambreen Nieto, APRN*  LOWER EXTREMITIY SWELLING     2023   The therapist requested clarification on the patient's insurance. When asked, the patient reported that she has not had any changes in her insurance, she continues to have [facility]. However, the Outpatient insurance/benefit personnel, reported that she was not able to get the patient's insurance verified. The patient reported that she would contact [facility] to obtain clarification of her current insurance status. Therapist provided patient with instructions to contact the OP Insurance/Benefits personnel (Valeta Card) after she obtains clarification of her insurance.      Anson Marx, P.T. [address][id number], SREEKANTH REID   2023

## 2023-06-05 NOTE — PROGRESS NOTES
** PLEASE SIGN, DATE AND TIME CERTIFICATION BELOW AND RETURN TO [facility] (FAX #: [phone]). ATTEST/CO-SIGN IF ACCESSING VIA INASSURED PHARMACY. THANK YOU.**    I certify that I have examined the patient below and determined that Physical Medicine and Rehabilitation service is necessary and that I approve the established plan of care for up to 90 days or as specifically noted. Attestation, signature or co-signature of physician indicates approval of certification requirements.    ________________________ ____________ __________  Physician Signature   Date   Time    [address] [facility]  PHYSICAL THERAPY  [] LYMPHEDEMA SERVICES EVALUATION  [] DAILY NOTE [x] PROGRESS NOTE [] DISCHARGE NOTE    [x] [address] [facility]   [] [address]    [] [facility]   [] Lurene Liguori    Date: 2023  Patient Name:  Aida Ford  : 1965  MRN: [id number]  CSN: [id number]    Referring Practitioner MARIE Robles   Diagnosis LOWER EXTREMITIY SWELLING    Treatment Diagnosis -Hereditary Lymphedema Swelling BLE/Lower Trunk.  -Difficulty with prolonged standing/walking. Date of Evaluation 3/16/23       Functional Outcome Measure Used LYMPHEDEMA LIFE IMPACT SCALE (LLIS)-Version 2. Functional Outcome Score 45 (3/16/23)   41 (23)      Insurance: Primary: Payor: Lia Zacarias /  /  / ,   Secondary:    Authorization Information: PRECERTIFICATION REQUIRED:  Pre-cert required after initial evaluation through [address] [place] [address]. .   Visit # 10, 10/10 for progress note   Visits Allowed: INSURANCE THERAPY BENEFIT: (Received 3/20/23)  RECEIVED AUTH FOR: -80 UNITS OF PT   -(3/20/23-23)   CODES UNITS    57783 --   45478 2 Units   40506    98173 --   Today's total 2 Units   (Total) 30 units/80 units    AUTH# [id number]   Recertification Date:  (7 weeks)   Physician Follow-Up: -To be scheduled by patient.    Physician Orders:

## 2023-06-07 NOTE — CARE COORDINATION
[address] [address]  Therapy Contact Note      Date: 2023  Patient Name: Satish Lopez        MRN: [id number]    : 1965  (60 y.o.)  Gender: female   TAMIE Garland*  LOWER EXTREMITIY SWELLING     2023 1:29 PM  A voicemail message was left by the therapist for the patient RE: insurance. Requested that the patient returns the call to the Lymphedema clinic for POC.     Lauro Lin P.T. [address][id number], SREEKANTH REID   2023

## 2023-06-26 ENCOUNTER — OFFICE VISIT (OUTPATIENT)
Dept: PULMONOLOGY | Age: 58
End: 2023-06-26
Payer: COMMERCIAL

## 2023-06-26 VITALS
DIASTOLIC BLOOD PRESSURE: 84 MMHG | HEART RATE: 81 BPM | SYSTOLIC BLOOD PRESSURE: 126 MMHG | TEMPERATURE: 98.1 F | OXYGEN SATURATION: 96 % | HEIGHT: 65 IN | WEIGHT: 293 LBS | BODY MASS INDEX: 48.82 KG/M2

## 2023-06-26 DIAGNOSIS — Z99.89 OSA ON CPAP: Primary | ICD-10-CM

## 2023-06-26 DIAGNOSIS — E66.01 MORBID OBESITY WITH BMI OF 50.0-59.9, ADULT (HCC): ICD-10-CM

## 2023-06-26 DIAGNOSIS — G47.33 OSA ON CPAP: Primary | ICD-10-CM

## 2023-06-26 PROCEDURE — 1036F TOBACCO NON-USER: CPT | Performed by: PHYSICIAN ASSISTANT

## 2023-06-26 PROCEDURE — G8427 DOCREV CUR MEDS BY ELIG CLIN: HCPCS | Performed by: PHYSICIAN ASSISTANT

## 2023-06-26 PROCEDURE — 99214 OFFICE O/P EST MOD 30 MIN: CPT | Performed by: PHYSICIAN ASSISTANT

## 2023-06-26 PROCEDURE — 3017F COLORECTAL CA SCREEN DOC REV: CPT | Performed by: PHYSICIAN ASSISTANT

## 2023-06-26 PROCEDURE — G8417 CALC BMI ABV UP PARAM F/U: HCPCS | Performed by: PHYSICIAN ASSISTANT

## 2023-06-26 ASSESSMENT — ENCOUNTER SYMPTOMS
BACK PAIN: 0
WHEEZING: 0
EYES NEGATIVE: 1
NAUSEA: 0
CHEST TIGHTNESS: 0
COUGH: 0
DIARRHEA: 0
ALLERGIC/IMMUNOLOGIC NEGATIVE: 1
STRIDOR: 0
SHORTNESS OF BREATH: 0

## 2023-06-28 ENCOUNTER — OFFICE VISIT (OUTPATIENT)
Dept: PULMONOLOGY | Age: 58
End: 2023-06-28
Payer: COMMERCIAL

## 2023-06-28 VITALS
BODY MASS INDEX: 48.82 KG/M2 | TEMPERATURE: 98.3 F | DIASTOLIC BLOOD PRESSURE: 60 MMHG | HEIGHT: 65 IN | HEART RATE: 59 BPM | WEIGHT: 293 LBS | OXYGEN SATURATION: 95 % | SYSTOLIC BLOOD PRESSURE: 110 MMHG

## 2023-06-28 DIAGNOSIS — E66.01 MORBID OBESITY WITH BMI OF 50.0-59.9, ADULT (HCC): ICD-10-CM

## 2023-06-28 DIAGNOSIS — R91.8 ABNORMAL CHEST X-RAY WITH MULTIPLE LUNG NODULES: Primary | ICD-10-CM

## 2023-06-28 DIAGNOSIS — Z87.891 PERSONAL HISTORY OF TOBACCO USE: ICD-10-CM

## 2023-06-28 PROCEDURE — G8427 DOCREV CUR MEDS BY ELIG CLIN: HCPCS

## 2023-06-28 PROCEDURE — 1036F TOBACCO NON-USER: CPT

## 2023-06-28 PROCEDURE — 99213 OFFICE O/P EST LOW 20 MIN: CPT

## 2023-06-28 PROCEDURE — 3017F COLORECTAL CA SCREEN DOC REV: CPT

## 2023-06-28 PROCEDURE — G8417 CALC BMI ABV UP PARAM F/U: HCPCS

## 2023-06-28 RX ORDER — ASCORBIC ACID 500 MG
TABLET ORAL
COMMUNITY
Start: 2022-06-20

## 2023-06-28 ASSESSMENT — ENCOUNTER SYMPTOMS
SINUS PRESSURE: 0
SINUS PAIN: 1
SHORTNESS OF BREATH: 1
COUGH: 0
RHINORRHEA: 0
WHEEZING: 0
CHEST TIGHTNESS: 0

## 2023-07-18 ENCOUNTER — HOSPITAL ENCOUNTER (OUTPATIENT)
Dept: SLEEP CENTER | Age: 58
Discharge: HOME | End: 2023-07-20
Payer: COMMERCIAL

## 2023-07-18 DIAGNOSIS — Z99.89 OSA ON CPAP: ICD-10-CM

## 2023-07-18 DIAGNOSIS — G47.33 OSA ON CPAP: ICD-10-CM

## 2023-07-18 PROCEDURE — 95811 POLYSOM 6/>YRS CPAP 4/> PARM: CPT

## 2023-07-19 LAB — STATUS: NORMAL

## 2023-07-19 NOTE — PROGRESS NOTES
[facility] [place]. Simplus size small mask         Title:  CPAP/BiLevel Titration's    Approved by:  Lianne Carrizales MD      Approval Date:  December, 2021 Next Review:  December, 2023     Responsible Party: SANDEEP Pace Institution/Entities Applies to:   [facility] Number:  None    Document Type:  Such as Guideline, Policy, Policy & Procedure, or Procedure, Instructions  Manual:  Policy and Procedures    Section: IV Policy Start Date: October, 2712           POLICY: Positive airway pressure device is used to treat patients with sleep related breathing disorders characterized by full or partial occlusion of the upper airway during sleep. A patient must have undergone polysomnography and diagnosed with obstructive sleep apnea. All individuals who record sleep studies must follow best practices for CPAP/bilevel/ASV titrations in order to attain the ideal pressure setting for their patients. Too low of pressure may cause patients to either be sub-optimally treated or to wake up in a panic. Too much pressure may cause the patient to experience bloating or mask leakage. Determining the appropriate pressure setting for each patient will lead to improved adherence and outcome. Titrations are not an exact science, and it is understood that technologists may need to make minor changes for individual patients. The procedures outlined below are meant to be a guideline and follow the spirit of the AASM clinical guidelines. PROCEDURE:    CPAP:    Review the patients pertinent medical al history, previous sleep study or studies to ass the severity of sleep disordered breathing. Review of pertinent information will help to attain a better titration.    Applications of electrodes, montages, filters, sensitivities and scoring will be performed according to the current version of the AASM Scoring Manual.   Prior to initiating study collect all appropriate

## 2023-07-20 DIAGNOSIS — G47.33 OSA ON CPAP: Primary | ICD-10-CM

## 2023-07-20 DIAGNOSIS — Z99.89 OSA ON CPAP: Primary | ICD-10-CM

## 2023-07-20 DIAGNOSIS — G47.33 OSA (OBSTRUCTIVE SLEEP APNEA): ICD-10-CM

## 2023-07-20 NOTE — PROGRESS NOTES
[place] [address]                               SLEEP STUDY REPORT    PATIENT NAME: Lavinia Cabrera                     :        1965  MED REC NO:   [id number]                           ROOM:  ACCOUNT NO:   [de-identified]                           ADMIT DATE: 2023  PROVIDER:     Lord Silverio. MD [place]    DATE OF STUDY:  2023    BIPAP RE-TITRATION STUDY REPORT    REFERRING PROVIDER:  Patricia Bettencourt PA-C    The patient's height is 65 inches, weight is 349 pounds with a BMI of  58.1    HISTORY:  The patient is a 50-year-old pleasant female underwent  split-night sleep study on 2017. The patient was diagnosed with  severe obstructive sleep apnea. The patient had an optimal titration to  a CPAP pressure of 15 cm of water. She is currently following with  Iza Mcdonough PA-C. The patient using CPAP with pressure of 18 cm  of water. The patient still complaining of insufficient pressure and  having headaches in the morning. The patient is scheduled for BiPAP  titration as a treatment. METHODS:  The patient underwent digital polysomnography in compliance  with the standards and specifications from the AASM Manual including the  simultaneous recording of 3 EEG channels (F4-M1, C4-M1, and O2-M1 with  back up electrodes F3-M2, C3-M2, and O1-M2), 2 EOG channels (E1-M2, and  E2-M1,), EMG (chin, left & right leg), EKG, Nonin pulse oximetry with   less than 2 second averaging time, body position, airflow recorded by  oral-nasal thermal sensor and nasal air pressure transducer, plus  respiratory effort recorded by calibrated respiratory inductance  plethysmography (RIP), flow volume loop, sound and video.   Sleep staging  and scoring followed the standard put forth by the American Academy of  Sleep Medicine and utilized the 1B obstructive hypopnea event  desaturation of 4 percent or

## 2023-07-21 ENCOUNTER — TELEPHONE (OUTPATIENT)
Dept: SLEEP CENTER | Age: 58
End: 2023-07-21

## 2023-07-28 ENCOUNTER — HOSPITAL ENCOUNTER (OUTPATIENT)
Dept: CT IMAGING | Age: 58
Discharge: HOME | End: 2023-07-28
Payer: COMMERCIAL

## 2023-07-28 DIAGNOSIS — R91.8 ABNORMAL CHEST X-RAY WITH MULTIPLE LUNG NODULES: ICD-10-CM

## 2023-07-28 DIAGNOSIS — E66.01 MORBID OBESITY WITH BMI OF 50.0-59.9, ADULT (HCC): ICD-10-CM

## 2023-07-28 DIAGNOSIS — Z87.891 PERSONAL HISTORY OF TOBACCO USE: ICD-10-CM

## 2023-07-28 PROCEDURE — 71250 CT THORAX DX C-: CPT

## 2023-08-09 ENCOUNTER — OFFICE VISIT (OUTPATIENT)
Dept: PULMONOLOGY | Age: 58
End: 2023-08-09
Payer: COMMERCIAL

## 2023-08-09 ENCOUNTER — TELEPHONE (OUTPATIENT)
Dept: PULMONOLOGY | Age: 58
End: 2023-08-09

## 2023-08-09 VITALS
TEMPERATURE: 95.5 F | WEIGHT: 293 LBS | DIASTOLIC BLOOD PRESSURE: 74 MMHG | OXYGEN SATURATION: 96 % | HEART RATE: 72 BPM | BODY MASS INDEX: 48.82 KG/M2 | SYSTOLIC BLOOD PRESSURE: 112 MMHG | HEIGHT: 65 IN

## 2023-08-09 DIAGNOSIS — R91.8 LUNG NODULES: Primary | ICD-10-CM

## 2023-08-09 DIAGNOSIS — G47.33 OSA ON CPAP: ICD-10-CM

## 2023-08-09 DIAGNOSIS — R91.8 ABNORMAL CHEST X-RAY WITH MULTIPLE LUNG NODULES: ICD-10-CM

## 2023-08-09 DIAGNOSIS — E66.01 MORBID OBESITY WITH BMI OF 50.0-59.9, ADULT (HCC): ICD-10-CM

## 2023-08-09 DIAGNOSIS — Z99.89 OSA ON CPAP: ICD-10-CM

## 2023-08-09 PROCEDURE — 99214 OFFICE O/P EST MOD 30 MIN: CPT | Performed by: SPECIALIST

## 2023-08-09 NOTE — PROGRESS NOTES
[facility]    Patient: Noemi Wilkins, 60 y.o.   : 1965    Patient of TAMIE Rodriguez CNP   Referring Provider: No ref. provider found       Subjective     Chief Complaint   Patient presents with    Follow-up     1 mo lung nodule w ct    Martinez Wong is being closely followed by other pulmonary providers and also the sleep physicians yet our center. Patient has history of lung nodules and abnormal chest x-ray. Recommended to get the CT scan of the chest and to follow-up hence the patient is here. SOFIA  Martinez Wong is here for results of the CT scan of the chest and for further advice. Immunizations:  Immunization History   Administered Date(s) Administered    COVID-19, MODERNA BLUE border, Primary or Immunocompromised, (age 12y+), IM, 100 mcg/0.5mL 2021, 2021, 2021    COVID-19, MODERNA Bivalent, (age 12y+), IM, 48 mcg/0.5 mL 2022    Influenza Virus Vaccine 2015, 10/14/2020, 10/04/2022    Influenza Whole 2015    Influenza, FLUARIX, FLULAVAL, FLUZONE (age 10 mo+) AND AFLURIA, (age 1 y+), PF, 0.5mL 2018    Influenza, FLUBLOK, (age 25 y+), PF, 0.5mL 10/13/2020, 11/10/2021      Past Medical hx   PMH:  Past Medical History:   Diagnosis Date    Allergic rhinitis     Depression     Hypertension     ABDI on CPAP     Osteoarthritis     Vitamin D deficiency      SURGICAL HISTORY:  Past Surgical History:   Procedure Laterality Date    ARTHROCENTESIS Right 10/16/2017    RIGHT HIP LARGE JOINT INJECTION performed by Haritha Marion MD at [address]    ARTHROCENTESIS Left 2017    LEFT HIP LARGE JOINT INJECTION performed by Haritha Marion MD at [address]    ARTHROCENTESIS Right 2019    Rt.  Knee Steroid Injection performed by Haritha Marion MD at [address] [facility] Bilateral 2021    bilateral SI MBB # 1 performed by Haritha Marion MD at [facility]

## 2023-08-09 NOTE — TELEPHONE ENCOUNTER
Patient was seen today for her follow up after her ct chest with dr. Bijal Bustillos which your schedule must of been full, Per Dr. Bijal Bustillos said she can come back to see you but would you like another ct scan for next year or just a follow with in a year? Please advise.

## 2023-10-17 ENCOUNTER — HOSPITAL ENCOUNTER (OUTPATIENT)
Age: 58
Discharge: HOME | End: 2023-10-17
Payer: COMMERCIAL

## 2023-10-17 LAB
25(OH)D3 SERPL-MCNC: 58 NG/ML (ref 30–100)
ALBUMIN SERPL BCG-MCNC: 4.4 G/DL (ref 3.5–5.1)
ALP SERPL-CCNC: 113 U/L (ref 38–126)
ALT SERPL W/O P-5'-P-CCNC: 21 U/L (ref 11–66)
ANION GAP SERPL CALC-SCNC: 11 MEQ/L (ref 8–16)
AST SERPL-CCNC: 17 U/L (ref 5–40)
BASOPHILS ABSOLUTE: 0 THOU/MM3 (ref 0–0.1)
BASOPHILS NFR BLD AUTO: 0.7 %
BILIRUB SERPL-MCNC: 0.7 MG/DL (ref 0.3–1.2)
BUN SERPL-MCNC: 11 MG/DL (ref 7–22)
CALCIUM SERPL-MCNC: 10.1 MG/DL (ref 8.5–10.5)
CHLORIDE SERPL-SCNC: 103 MEQ/L (ref 98–111)
CHOLEST SERPL-MCNC: 208 MG/DL (ref 100–199)
CO2 SERPL-SCNC: 27 MEQ/L (ref 23–33)
CREAT SERPL-MCNC: 0.9 MG/DL (ref 0.4–1.2)
DEPRECATED RDW RBC AUTO: 43.8 FL (ref 35–45)
EOSINOPHIL NFR BLD AUTO: 1.9 %
EOSINOPHILS ABSOLUTE: 0.1 THOU/MM3 (ref 0–0.4)
ERYTHROCYTE [DISTWIDTH] IN BLOOD BY AUTOMATED COUNT: 13.4 % (ref 11.5–14.5)
GFR SERPL CREATININE-BSD FRML MDRD: > 60 ML/MIN/1.73M2
GLUCOSE SERPL-MCNC: 92 MG/DL (ref 70–108)
HCT VFR BLD AUTO: 42.9 % (ref 37–47)
HDLC SERPL-MCNC: 60 MG/DL
HGB BLD-MCNC: 14.2 GM/DL (ref 12–16)
IMM GRANULOCYTES # BLD AUTO: 0.01 THOU/MM3 (ref 0–0.07)
IMM GRANULOCYTES NFR BLD AUTO: 0.1 %
LDLC SERPL CALC-MCNC: 127 MG/DL
LYMPHOCYTES ABSOLUTE: 2.3 THOU/MM3 (ref 1–4.8)
LYMPHOCYTES NFR BLD AUTO: 34.3 %
MCH RBC QN AUTO: 29.6 PG (ref 26–33)
MCHC RBC AUTO-ENTMCNC: 33.1 GM/DL (ref 32.2–35.5)
MCV RBC AUTO: 89.4 FL (ref 81–99)
MONOCYTES ABSOLUTE: 0.4 THOU/MM3 (ref 0.4–1.3)
MONOCYTES NFR BLD AUTO: 6.5 %
NEUTROPHILS NFR BLD AUTO: 56.5 %
NRBC BLD AUTO-RTO: 0 /100 WBC
PLATELET # BLD AUTO: 312 THOU/MM3 (ref 130–400)
PMV BLD AUTO: 9.5 FL (ref 9.4–12.4)
POTASSIUM SERPL-SCNC: 4.2 MEQ/L (ref 3.5–5.2)
PROT SERPL-MCNC: 7.5 G/DL (ref 6.1–8)
RBC # BLD AUTO: 4.8 MILL/MM3 (ref 4.2–5.4)
SEGMENTED NEUTROPHILS ABSOLUTE COUNT: 3.8 THOU/MM3 (ref 1.8–7.7)
SODIUM SERPL-SCNC: 141 MEQ/L (ref 135–145)
TRIGL SERPL-MCNC: 107 MG/DL (ref 0–199)
TSH SERPL DL<=0.005 MIU/L-ACNC: 0.99 UIU/ML (ref 0.4–4.2)
WBC # BLD AUTO: 6.7 THOU/MM3 (ref 4.8–10.8)

## 2023-10-17 PROCEDURE — 85025 COMPLETE CBC W/AUTO DIFF WBC: CPT

## 2023-10-17 PROCEDURE — 36415 COLL VENOUS BLD VENIPUNCTURE: CPT

## 2023-10-17 PROCEDURE — 84443 ASSAY THYROID STIM HORMONE: CPT

## 2023-10-17 PROCEDURE — 82306 VITAMIN D 25 HYDROXY: CPT

## 2023-10-17 PROCEDURE — 80061 LIPID PANEL: CPT

## 2023-10-17 PROCEDURE — 80053 COMPREHEN METABOLIC PANEL: CPT

## 2024-01-23 ENCOUNTER — HOSPITAL ENCOUNTER (OUTPATIENT)
Dept: GENERAL RADIOLOGY | Age: 59
Discharge: HOME | End: 2024-01-23
Payer: COMMERCIAL

## 2024-01-23 ENCOUNTER — OFFICE VISIT (OUTPATIENT)
Dept: PHYSICAL MEDICINE AND REHAB | Age: 59
End: 2024-01-23
Payer: COMMERCIAL

## 2024-01-23 ENCOUNTER — HOSPITAL ENCOUNTER (OUTPATIENT)
Age: 59
Discharge: HOME | End: 2024-01-23
Payer: COMMERCIAL

## 2024-01-23 VITALS
WEIGHT: 293 LBS | DIASTOLIC BLOOD PRESSURE: 84 MMHG | SYSTOLIC BLOOD PRESSURE: 114 MMHG | BODY MASS INDEX: 48.82 KG/M2 | HEIGHT: 65 IN

## 2024-01-23 DIAGNOSIS — M25.561 CHRONIC PAIN OF RIGHT KNEE: ICD-10-CM

## 2024-01-23 DIAGNOSIS — M54.9 MID BACK PAIN: ICD-10-CM

## 2024-01-23 DIAGNOSIS — M47.816 SPONDYLOSIS OF LUMBAR SPINE: ICD-10-CM

## 2024-01-23 DIAGNOSIS — M54.50 CHRONIC MIDLINE LOW BACK PAIN WITHOUT SCIATICA: ICD-10-CM

## 2024-01-23 DIAGNOSIS — G89.29 CHRONIC MIDLINE LOW BACK PAIN WITHOUT SCIATICA: ICD-10-CM

## 2024-01-23 DIAGNOSIS — G89.29 CHRONIC PAIN OF RIGHT KNEE: ICD-10-CM

## 2024-01-23 DIAGNOSIS — M46.1 SACROILIAC INFLAMMATION (HCC): ICD-10-CM

## 2024-01-23 DIAGNOSIS — G89.4 CHRONIC PAIN SYNDROME: ICD-10-CM

## 2024-01-23 DIAGNOSIS — G89.29 CHRONIC MYOFASCIAL PAIN: ICD-10-CM

## 2024-01-23 DIAGNOSIS — M47.814 SPONDYLOSIS OF THORACIC REGION WITHOUT MYELOPATHY OR RADICULOPATHY: Primary | ICD-10-CM

## 2024-01-23 DIAGNOSIS — M17.11 PRIMARY OSTEOARTHRITIS OF RIGHT KNEE: ICD-10-CM

## 2024-01-23 DIAGNOSIS — M79.18 CHRONIC MYOFASCIAL PAIN: ICD-10-CM

## 2024-01-23 PROCEDURE — 72100 X-RAY EXAM L-S SPINE 2/3 VWS: CPT

## 2024-01-23 PROCEDURE — 72072 X-RAY EXAM THORAC SPINE 3VWS: CPT

## 2024-01-23 PROCEDURE — 3017F COLORECTAL CA SCREEN DOC REV: CPT | Performed by: NURSE PRACTITIONER

## 2024-01-23 PROCEDURE — G8427 DOCREV CUR MEDS BY ELIG CLIN: HCPCS | Performed by: NURSE PRACTITIONER

## 2024-01-23 PROCEDURE — 1036F TOBACCO NON-USER: CPT | Performed by: NURSE PRACTITIONER

## 2024-01-23 PROCEDURE — G8417 CALC BMI ABV UP PARAM F/U: HCPCS | Performed by: NURSE PRACTITIONER

## 2024-01-23 PROCEDURE — G8484 FLU IMMUNIZE NO ADMIN: HCPCS | Performed by: NURSE PRACTITIONER

## 2024-01-23 PROCEDURE — 99214 OFFICE O/P EST MOD 30 MIN: CPT | Performed by: NURSE PRACTITIONER

## 2024-01-23 PROCEDURE — 73562 X-RAY EXAM OF KNEE 3: CPT

## 2024-01-23 ASSESSMENT — ENCOUNTER SYMPTOMS
GASTROINTESTINAL NEGATIVE: 1
VOICE CHANGE: 0
RESPIRATORY NEGATIVE: 1
EYES NEGATIVE: 1
SORE THROAT: 0
BACK PAIN: 1
SINUS PAIN: 0

## 2024-01-23 NOTE — PROGRESS NOTES
[facility] [place] [facility]  [facility]  [address]  [place] [facility] [address]  Dept: [phone]  Dept Fax: [phone]  Loc: [phone]    Visit Date: 1/23/2024    Functionality Assessment/Goals Worksheet     On a scale of 0 (Does not Interfere) to 10 (Completely Interferes)     1.  Which number describes how during the past week pain has interfered with       the following:  A.  General Activity:  9  B.  Mood: 7  C.  Walking Ability:  8  D.  Normal Work (Includes both work outside the home and housework):  10  E.  Relations with Other People:   6  F.  Sleep:   7  G.  Enjoyment of Life:   8    2.  Patient Prefers to Take their Pain Medications:     [x]  On a regular basis   []  Only when necessary    []  Does not take pain medications    3.  What are the Patient's Goals/Expectations for Visiting Pain Management?     []  Learn about my pain    []  Receive Medication   []  Physical Therapy     []  Treat Depression   []  Receive Injections    []  Treat Sleep   []  Deal with Anxiety and Stress   []  Treat Opoid Dependence/Addiction   []  Other:        HPI:   Jermain Mann is a 58 y.o. female is here today for    Chief Complaint: Back pain, right knee pain     HPI   Has been just a little over 10 months since last seen. Patient here with a main complaint of mid back pain that has increased over the past month as she feels that the relief she was receiving from her bilateral T-facet RFA has recently been wearing off as pain has started to wake her up from her sleep.  Pain starts in mid back about bra line area and slightly below and radiates across axially- Cosman aching pain with sharp pains and some burning.     Also feels that her right knee pain is slowly increasing as effects from previous Synvisc injection is wearing off- pain I right knee is aching and sometimes sharp depending on walking and standing.       Also has been noticing pain

## 2024-01-29 ENCOUNTER — TELEPHONE (OUTPATIENT)
Dept: PHYSICAL MEDICINE AND REHAB | Age: 59
End: 2024-01-29

## 2024-01-29 NOTE — TELEPHONE ENCOUNTER
Called patient to reschedule her procedure scheduled 02/20/2024 to 02/22/2024.  Left a voice message requesting a call back.

## 2024-01-30 ENCOUNTER — TELEPHONE (OUTPATIENT)
Dept: PHYSICAL MEDICINE AND REHAB | Age: 59
End: 2024-01-30

## 2024-01-30 NOTE — TELEPHONE ENCOUNTER
Pt. Called and LVM wanting called about questions regarding ablation scheduled in feb. Please call.

## 2024-02-15 NOTE — DISCHARGE INSTRUCTIONS
ANESTHESIA INSTRUCTIONS FOLLOWING SURGERY        Since you may experience some intermittent light-headedness for the next several hours, we suggest you plan on bed rest or quiet relaxation this evening. You must have a friend or relative stay with you tonight.    Because of the sedation you have received, it is recommended that you do not drive a motor vehicle, operate any kind of machinery, or sign any contractual agreement for 24 hours following the procedure.    You should not take alcoholic beverages tonight and only take sleeping medication that has been specifically prescribed for you by your physician.  Call office [phone] if you have:  Temperature greater than 100.4  Persistent nausea and vomiting  Severe uncontrolled pain  Redness, tenderness, or signs of infection (pain, swelling, redness, odor or green/yellow discharge around the site)  Difficulty breathing, headache or visual disturbances  Hives  Persistent dizziness or light-headedness  Extreme fatigue  Any other questions or concerns you may have after discharge    In an emergency, call 911 or go to an Emergency Department at a nearby hospital    It is important to bring a complete, current list of your medications to any medical appointments or hospitalizations.    REMINDER:   Carry a list of your medications and allergies with you at all times  Call your pharmacy at least 1 week in advance to refill prescriptions    Diet: Resume your usual diet. Good nutrition promotes healing. Increase fluid intake.     Activity: Rest for 24 hrs then resume normal activity.    HOME MEDICATIONS:      If on blood thinning products such as; Aspirin, NSAIDS, Plavix, Coumadin, Xarelto, Fish Oil, Multi-Vitamins or Herbal Supplements restart in 24 hours      Restart Metformin in 48 hours if you had procedure with dye.      Restart Metformin in 24 hours if no dye used during procedure.        Education Materials Received: {yes/no:987049}  Belongings Returned:

## 2024-02-22 ENCOUNTER — APPOINTMENT (OUTPATIENT)
Dept: GENERAL RADIOLOGY | Age: 59
End: 2024-02-22
Attending: PAIN MEDICINE
Payer: COMMERCIAL

## 2024-02-22 ENCOUNTER — HOSPITAL ENCOUNTER (OUTPATIENT)
Age: 59
Setting detail: OUTPATIENT SURGERY
Discharge: HOME | End: 2024-02-22
Attending: PAIN MEDICINE | Admitting: PAIN MEDICINE
Payer: COMMERCIAL

## 2024-02-22 VITALS
HEIGHT: 65 IN | RESPIRATION RATE: 16 BRPM | HEART RATE: 68 BPM | SYSTOLIC BLOOD PRESSURE: 123 MMHG | BODY MASS INDEX: 48.82 KG/M2 | TEMPERATURE: 98.9 F | DIASTOLIC BLOOD PRESSURE: 57 MMHG | OXYGEN SATURATION: 97 % | WEIGHT: 293 LBS

## 2024-02-22 PROCEDURE — 2709999900 HC NON-CHARGEABLE SUPPLY: Performed by: PAIN MEDICINE

## 2024-02-22 PROCEDURE — 7100000011 HC PHASE II RECOVERY - ADDTL 15 MIN: Performed by: PAIN MEDICINE

## 2024-02-22 PROCEDURE — 3600000056 HC PAIN LEVEL 4 BASE: Performed by: PAIN MEDICINE

## 2024-02-22 PROCEDURE — 6360000002 HC RX W HCPCS: Performed by: PAIN MEDICINE

## 2024-02-22 PROCEDURE — 3600000057 HC PAIN LEVEL 4 ADDL 15 MIN: Performed by: PAIN MEDICINE

## 2024-02-22 PROCEDURE — 7100000010 HC PHASE II RECOVERY - FIRST 15 MIN: Performed by: PAIN MEDICINE

## 2024-02-22 PROCEDURE — 99152 MOD SED SAME PHYS/QHP 5/>YRS: CPT | Performed by: PAIN MEDICINE

## 2024-02-22 PROCEDURE — 99153 MOD SED SAME PHYS/QHP EA: CPT | Performed by: PAIN MEDICINE

## 2024-02-22 PROCEDURE — 64634 DESTROY C/TH FACET JNT ADDL: CPT | Performed by: PAIN MEDICINE

## 2024-02-22 PROCEDURE — 2500000003 HC RX 250 WO HCPCS: Performed by: PAIN MEDICINE

## 2024-02-22 PROCEDURE — 64633 DESTROY CERV/THOR FACET JNT: CPT | Performed by: PAIN MEDICINE

## 2024-02-22 RX ORDER — LIDOCAINE HYDROCHLORIDE 20 MG/ML
INJECTION, SOLUTION INFILTRATION; PERINEURAL PRN
Status: DISCONTINUED | OUTPATIENT
Start: 2024-02-22 | End: 2024-02-22 | Stop reason: ALTCHOICE

## 2024-02-22 RX ORDER — MIDAZOLAM HYDROCHLORIDE 1 MG/ML
INJECTION INTRAMUSCULAR; INTRAVENOUS PRN
Status: DISCONTINUED | OUTPATIENT
Start: 2024-02-22 | End: 2024-02-22 | Stop reason: ALTCHOICE

## 2024-02-22 RX ORDER — FENTANYL CITRATE 50 UG/ML
INJECTION, SOLUTION INTRAMUSCULAR; INTRAVENOUS PRN
Status: DISCONTINUED | OUTPATIENT
Start: 2024-02-22 | End: 2024-02-22 | Stop reason: ALTCHOICE

## 2024-02-22 RX ORDER — BUPIVACAINE HYDROCHLORIDE 5 MG/ML
INJECTION, SOLUTION PERINEURAL PRN
Status: DISCONTINUED | OUTPATIENT
Start: 2024-02-22 | End: 2024-02-22 | Stop reason: ALTCHOICE

## 2024-02-22 ASSESSMENT — PAIN - FUNCTIONAL ASSESSMENT
PAIN_FUNCTIONAL_ASSESSMENT: NONE - DENIES PAIN
PAIN_FUNCTIONAL_ASSESSMENT: 0-10

## 2024-02-22 NOTE — POST SEDATION
IV sedation was used during the procedure:  - Moderate intravenous conscious sedation was supervised by Dr. Segura  - The patient was independently monitored by a Registered Nurse assigned to the procedure room  - Monitoring included automated blood pressure, continuous EKG, and continuous pulse oximetry  - The detailed conscious record is permanently stored in the Hospital Information System  - The following is the conscious sedation record:  Start Time: 1253  End Time : 1300  Duration: 15 minutes   Medications Administered:  100 mcg Fentanyl  Complications: none  EBL-0

## 2024-02-22 NOTE — PROGRESS NOTES
1303-Patient to Phase II via cart. Report received from Natalia DOMINGUEZ. Patient drowsy but responsive.Vitals obtained and stable. Respirations even and unlabored on room air. Patient denies pain, nausea, numbness and tingling. Patient able to move all extremities. No drainage noted at injection sites. Patient instructed to stay in bed. Instructed on call light use.  1308-Patient provided with snack and drink. Denies needs. Call light in reach.  1320-IV removed with no complications. Patient getting dressed at bedside. Ride notified of pickup. Will be here in 15 minutes.  1350-Patient meets discharge criteria. Discharged in stable condition. Patient brought to car via wheelchair with assistance from RN. Patient tolerated well. All belongings sent with patient.

## 2024-02-22 NOTE — H&P
H&P     Patient  with a main complaint of mid back pain that has increased over the past month as she feels that the relief she was receiving from her bilateral T-facet RFA has recently been wearing off as pain has started to wake her up from her sleep.  Pain starts in mid back about bra line area and slightly below and radiates across axially- [place] aching pain with sharp pains and some burning.      Also feels that her right knee pain is slowly increasing as effects from previous Synvisc injection is wearing off- pain I right knee is aching and sometimes sharp depending on walking and standing.         Also has been noticing pain in her low back starts in center to right side- aching pain      Denies any radicular pain   Pain increases with bending, lifting, twisting , walking, standing, getting up and down, and housework or working at job, cold and wet weather changes.      Ambulating with strait cane      She is on medical marijuana now which helps, continues Mobic daily, and continues Neurontin mg daily for numbness in her toes         Has completed PT from March till beginning of May which helped and she continues home exercises.         Prior Injections:  bilateral T-facet RFA @  T8-9, and T9-T10 from 12/19/2022 90% relief for about a full year      right knee Synvisc one injection from 2/6/2023 90% relief for over 10 months      Radiology:  Thoracic MRI:   FINDINGS:     No abnormal signal or expansion is present within the thoracic spinal cord. There is subtle flattening of the cord at the T11-T12 level. No abnormal lesion is seen within the spinal canal.     There is preservation of the expected thoracic kyphosis. No malalignment is seen. There is anterior wedging and loss of height of the T12 vertebral body. Depression of the superior endplate is also noted with linear hyperintense STIR signal. The loss of   height measures approximately 61%. This appears worse compared to the prior CT exam of December 
Take by mouth daily    Richard Mendoza MD   cyclobenzaprine (FLEXERIL) 10 MG tablet Take 1 tablet by mouth 3 times daily as needed for Muscle spasms WARNING: This medication may make your drowsy. Do not operate heavy machinery or motor vehicles during use. 12/18/16   Jennifer Kong PA-C   lisinopril (PRINIVIL;ZESTRIL) 10 MG tablet Take 1 tablet by mouth nightly 5/6/16   Richard Mendoza MD   citalopram (CELEXA) 20 MG tablet Take 1 tablet by mouth daily 10/2/15   Richard Mendoza MD   spironolactone (ALDACTONE) 25 MG tablet Take 1 tablet by mouth daily. 4/19/13   Joshua Alva MD     PHYSICAL:   Vitals:    02/22/24 1126   BP: (!) 108/55   Pulse:    Resp:    Temp:    SpO2:      Mental Status: alert and oriented   Heart:  Regular rate and rhythm    Lungs:  Clear to ausculation   Abdomen: soft, non tender   PLANNED PROCEDURE    Bilateral T-facet RFA @  T8-9, and T9-T10.    SEDATION  Planned agent:fentanyl and versed  ASA Classification: 2  Class 1: A normal healthy patient  Class 2: Pt with mild to moderate systemic disease  Class 3: Severe systemic disease or disturbance  Class 4: Severe systemic disorders that are already life threatening.  Class 5: Moribund pt with little chances of survival, for more than 24 hours.  Mallampati I Airway Classification: 2    1. Pre-procedure diagnostic studies complete and results available.   Comment:    2. Previous sedation/anesthesia experiences assessed.   Comment:    3. The patient is an appropriate candidate to undergo the planned procedure sedation and anesthesia. (Refer to nursing sedation/analgesia documentation record)  4. Formulation and discussion of sedation/procedure plan, risks, and expectations with patient and/or responsible adult completed.  5. Patient examined immediately prior to the procedure. (Refer to nursing sedation/analgesia documentation record)    Oli Arenas MD  Electronically signed 2/22/2024 at 12:46 PM

## 2024-02-22 NOTE — OP NOTE
Pre-Procedure Note    Patient Name: Jermain Mann   YOB: 1965  Medical Record Number: [id number]  Date: 2/22/24    Indication: Thoracic pain    Consent: On file.    Vital Signs:   Vitals:    02/22/24 1245   BP: (!) 131/97   Pulse:    Resp:    Temp:    SpO2:        Past Medical History:   has a past medical history of Allergic rhinitis, Depression, Hypertension, ABDI on CPAP, Osteoarthritis, and Vitamin D deficiency.    Past Surgical History:   has a past surgical history that includes Dental surgery (2002); [place] tooth extraction; Hand surgery (Right, 05/15/2015); other surgical history (04/11/2016); Colonoscopy (2016); other surgical history (06/13/2016); Hemorrhoid surgery (2016); Tonsillectomy; other surgical history (Right, 10/16/2017); arthrocentesis (Right, 10/16/2017); Nerve Surgery (Left, 12/05/2017); arthrocentesis (Left, 12/05/2017); pr arthrocentesis aspir&/inj major jt/bursa w/o us (Right, 09/17/2018); pr office/outpt visit,procedure only (N/A, 11/09/2018); Nerve Block Lumb Facet Level 1 Bilateral (Bilateral, 01/04/2019); Nerve Block Lumb Facet Level 1 Bilateral (Bilateral, 02/25/2019); Lumbar spine surgery (Right, 04/16/2019); Lumbar spine surgery (Left, 06/13/2019); arthrocentesis (Right, 07/18/2019); Nerve Surgery (Bilateral, 09/13/2019); hip surgery (Left, 02/07/2020); Pain management procedure (Right, 07/31/2020); Pain management procedure (Right, 08/31/2020); Pain management procedure (Right, 01/12/2021); Pain management procedure (Left, 03/19/2021); hip surgery (Right, 06/04/2021); Back Injection (Bilateral, 07/19/2021); hip surgery (Right, 01/04/2022); Facet joint injection (Bilateral, 03/07/2022); Dilation and curettage of uterus (06/2022); Pain management procedure (Bilateral, 12/19/2022); and Knee Arthrocentesis (Right, 2/6/2023).    Pre-Sedation Documentation and Exam:   Vital signs have been reviewed (see flow sheet for vitals).     Sedation/ Anesthesia Plan:   IV

## 2024-03-12 ENCOUNTER — TELEPHONE (OUTPATIENT)
Dept: PHYSICAL MEDICINE AND REHAB | Age: 59
End: 2024-03-12

## 2024-03-12 ENCOUNTER — OFFICE VISIT (OUTPATIENT)
Dept: PHYSICAL MEDICINE AND REHAB | Age: 59
End: 2024-03-12
Payer: COMMERCIAL

## 2024-03-12 VITALS
BODY MASS INDEX: 48.82 KG/M2 | DIASTOLIC BLOOD PRESSURE: 78 MMHG | WEIGHT: 293 LBS | SYSTOLIC BLOOD PRESSURE: 124 MMHG | HEIGHT: 65 IN

## 2024-03-12 DIAGNOSIS — M47.816 LUMBAR FACET ARTHROPATHY: ICD-10-CM

## 2024-03-12 DIAGNOSIS — M17.11 PRIMARY OSTEOARTHRITIS OF RIGHT KNEE: Primary | ICD-10-CM

## 2024-03-12 DIAGNOSIS — M47.816 SPONDYLOSIS OF LUMBAR SPINE: ICD-10-CM

## 2024-03-12 DIAGNOSIS — G89.29 CHRONIC PAIN OF RIGHT KNEE: ICD-10-CM

## 2024-03-12 DIAGNOSIS — M54.9 MID BACK PAIN: ICD-10-CM

## 2024-03-12 DIAGNOSIS — M47.814 SPONDYLOSIS OF THORACIC REGION WITHOUT MYELOPATHY OR RADICULOPATHY: ICD-10-CM

## 2024-03-12 DIAGNOSIS — M25.561 CHRONIC PAIN OF RIGHT KNEE: ICD-10-CM

## 2024-03-12 DIAGNOSIS — M79.18 CHRONIC MYOFASCIAL PAIN: ICD-10-CM

## 2024-03-12 DIAGNOSIS — M46.1 SACROILIAC INFLAMMATION (HCC): ICD-10-CM

## 2024-03-12 DIAGNOSIS — G89.4 CHRONIC PAIN SYNDROME: ICD-10-CM

## 2024-03-12 DIAGNOSIS — G89.29 CHRONIC MYOFASCIAL PAIN: ICD-10-CM

## 2024-03-12 PROCEDURE — 1036F TOBACCO NON-USER: CPT | Performed by: NURSE PRACTITIONER

## 2024-03-12 PROCEDURE — G8484 FLU IMMUNIZE NO ADMIN: HCPCS | Performed by: NURSE PRACTITIONER

## 2024-03-12 PROCEDURE — G8427 DOCREV CUR MEDS BY ELIG CLIN: HCPCS | Performed by: NURSE PRACTITIONER

## 2024-03-12 PROCEDURE — G8417 CALC BMI ABV UP PARAM F/U: HCPCS | Performed by: NURSE PRACTITIONER

## 2024-03-12 PROCEDURE — 99214 OFFICE O/P EST MOD 30 MIN: CPT | Performed by: NURSE PRACTITIONER

## 2024-03-12 PROCEDURE — 3017F COLORECTAL CA SCREEN DOC REV: CPT | Performed by: NURSE PRACTITIONER

## 2024-03-12 ASSESSMENT — ENCOUNTER SYMPTOMS
EYES NEGATIVE: 1
GASTROINTESTINAL NEGATIVE: 1
VOICE CHANGE: 0
SORE THROAT: 0
SINUS PAIN: 0
BACK PAIN: 1
RESPIRATORY NEGATIVE: 1

## 2024-03-12 NOTE — PROGRESS NOTES
storage of medications at home  Discussed possible weaning of medication dosing dependent on treatment/procedure results.   Discussed with patient about risks with procedure including infection, reaction to medication, increased pain, or bleeding.  Procedure notes reviewed in detail   Is receiving 80% relief of mid back pain from Bilateral T-facet RFA.   Plan to repeat right knee Synvisc injection in the ASC. Procedure discussed.   Needs to hold NSAID prior to procedure   Reviewed lumbar xray. Discussed L-facet MBB if needed in future   Continue home exercises  Continues Neurontin 300 mg nightly from pcp, mobic 15 mg daily, on medical marijuana    Meds. Prescribed:   No orders of the defined types were placed in this encounter.      Return for right knee Synvisc injection in the ASC. , follow up after procedure.               Electronically signed by TAMIE Fried CNP on3/12/2024 at 1:00 PM

## 2024-04-10 NOTE — DISCHARGE INSTRUCTIONS
ANESTHESIA INSTRUCTIONS FOLLOWING SURGERY        Since you may experience some intermittent light-headedness for the next several hours, we suggest you plan on bed rest or quiet relaxation this evening. You must have a friend or relative stay with you tonight.    Because of the sedation you have received, it is recommended that you do not drive a motor vehicle, operate any kind of machinery, or sign any contractual agreement for 24 hours following the procedure.    You should not take alcoholic beverages tonight and only take sleeping medication that has been specifically prescribed for you by your physician.  Call office [phone] if you have:  Temperature greater than 100.4  Persistent nausea and vomiting  Severe uncontrolled pain  Redness, tenderness, or signs of infection (pain, swelling, redness, odor or green/yellow discharge around the site)  Difficulty breathing, headache or visual disturbances  Hives  Persistent dizziness or light-headedness  Extreme fatigue  Any other questions or concerns you may have after discharge    In an emergency, call 911 or go to an Emergency Department at a nearby hospital    It is important to bring a complete, current list of your medications to any medical appointments or hospitalizations.    REMINDER:   Carry a list of your medications and allergies with you at all times  Call your pharmacy at least 1 week in advance to refill prescriptions    Diet: Resume your usual diet. Good nutrition promotes healing. Increase fluid intake.     Activity: Rest for 24 hrs then resume normal activity.    HOME MEDICATIONS:      If on blood thinning products such as; Aspirin, NSAIDS, Plavix, Coumadin, Xarelto, Fish Oil, Multi-Vitamins or Herbal Supplements restart in 24 hours      Restart Metformin in 48 hours if you had procedure with dye.      Restart Metformin in 24 hours if no dye used during procedure.        Education Materials Received: {yes/no:362292}  Belongings Returned:

## 2024-04-15 ENCOUNTER — APPOINTMENT (OUTPATIENT)
Dept: GENERAL RADIOLOGY | Age: 59
End: 2024-04-15
Attending: PAIN MEDICINE
Payer: COMMERCIAL

## 2024-04-15 ENCOUNTER — HOSPITAL ENCOUNTER (OUTPATIENT)
Age: 59
Setting detail: OUTPATIENT SURGERY
Discharge: HOME | End: 2024-04-15
Attending: PAIN MEDICINE | Admitting: PAIN MEDICINE
Payer: COMMERCIAL

## 2024-04-15 VITALS
OXYGEN SATURATION: 96 % | RESPIRATION RATE: 16 BRPM | TEMPERATURE: 97.7 F | HEART RATE: 57 BPM | SYSTOLIC BLOOD PRESSURE: 125 MMHG | HEIGHT: 65 IN | DIASTOLIC BLOOD PRESSURE: 56 MMHG | WEIGHT: 293 LBS | BODY MASS INDEX: 48.82 KG/M2

## 2024-04-15 PROCEDURE — 2709999900 HC NON-CHARGEABLE SUPPLY: Performed by: PAIN MEDICINE

## 2024-04-15 PROCEDURE — 2500000003 HC RX 250 WO HCPCS: Performed by: PAIN MEDICINE

## 2024-04-15 PROCEDURE — 20610 DRAIN/INJ JOINT/BURSA W/O US: CPT | Performed by: PAIN MEDICINE

## 2024-04-15 PROCEDURE — 7100000010 HC PHASE II RECOVERY - FIRST 15 MIN: Performed by: PAIN MEDICINE

## 2024-04-15 PROCEDURE — 7100000011 HC PHASE II RECOVERY - ADDTL 15 MIN: Performed by: PAIN MEDICINE

## 2024-04-15 PROCEDURE — 3600000054 HC PAIN LEVEL 3 BASE: Performed by: PAIN MEDICINE

## 2024-04-15 PROCEDURE — 6360000002 HC RX W HCPCS: Performed by: PAIN MEDICINE

## 2024-04-15 PROCEDURE — 77002 NEEDLE LOCALIZATION BY XRAY: CPT | Performed by: PAIN MEDICINE

## 2024-04-15 RX ORDER — LIDOCAINE HYDROCHLORIDE 20 MG/ML
INJECTION, SOLUTION INFILTRATION; PERINEURAL PRN
Status: DISCONTINUED | OUTPATIENT
Start: 2024-04-15 | End: 2024-04-15 | Stop reason: ALTCHOICE

## 2024-04-15 ASSESSMENT — PAIN - FUNCTIONAL ASSESSMENT
PAIN_FUNCTIONAL_ASSESSMENT: 0-10
PAIN_FUNCTIONAL_ASSESSMENT: NONE - DENIES PAIN

## 2024-04-15 NOTE — OP NOTE
Pre-Procedure Note    Patient Name: Jermain Mann   YOB: 1965  Medical Record Number: [id number]  Date: 4/15/24       Indication:  Right knee pain    Consent: On file.    Vital Signs:   Vitals:    04/15/24 0827   BP: (!) 109/56   Pulse: 67   Resp: 16   Temp: (!) 96.5 °F (35.8 °C)   SpO2: 95%       Past Medical History:   has a past medical history of Allergic rhinitis, Depression, Hypertension, ABDI on CPAP, Osteoarthritis, and Vitamin D deficiency.    Past Surgical History:   has a past surgical history that includes Dental surgery (2002); [place] tooth extraction; Hand surgery (Right, 05/15/2015); other surgical history (04/11/2016); Colonoscopy (2016); other surgical history (06/13/2016); Hemorrhoid surgery (2016); Tonsillectomy; other surgical history (Right, 10/16/2017); arthrocentesis (Right, 10/16/2017); Nerve Surgery (Left, 12/05/2017); arthrocentesis (Left, 12/05/2017); pr arthrocentesis aspir&/inj major jt/bursa w/o us (Right, 09/17/2018); pr office/outpt visit,procedure only (N/A, 11/09/2018); Nerve Block Lumb Facet Level 1 Bilateral (Bilateral, 01/04/2019); Nerve Block Lumb Facet Level 1 Bilateral (Bilateral, 02/25/2019); Lumbar spine surgery (Right, 04/16/2019); Lumbar spine surgery (Left, 06/13/2019); arthrocentesis (Right, 07/18/2019); Nerve Surgery (Bilateral, 09/13/2019); hip surgery (Left, 02/07/2020); Pain management procedure (Right, 07/31/2020); Pain management procedure (Right, 08/31/2020); Pain management procedure (Right, 01/12/2021); Pain management procedure (Left, 03/19/2021); hip surgery (Right, 06/04/2021); Back Injection (Bilateral, 07/19/2021); hip surgery (Right, 01/04/2022); Facet joint injection (Bilateral, 03/07/2022); Dilation and curettage of uterus (06/2022); Pain management procedure (Bilateral, 12/19/2022); Knee Arthrocentesis (Right, 2/6/2023); and Pain management procedure (Bilateral, 2/22/2024).    Pre-Sedation Documentation and Exam:   Vital signs have been

## 2024-04-15 NOTE — H&P
H&P    Patient main compliant at this time is pain in right knee as she feels that Synvisc injection has worn off. Constant aching pain with sharp pain occasionally. States ROM not as good as it was.     Deneis any low back pain at this time and states that it comes and goes.   Pain increases with bending, lifting, twisting , walking, standing, stairs, getting up and down, and housework or working at job.     Continues medical marijuana which helps and mobic, and Neurontin which all help      Prior Injections:  bilateral T-facet RFA @  T8-9, and T9-T10 from 12/19/2022 90% relief for about a full year      right knee Synvisc one injection from 2/6/2023 90% relief for over 10 months         bilateral T-facet RFA @ T8-9 and T9-10 completed by Dr. Segura on 2/22/2024 80% relief      Radiology:  Lumbar xray:     IMPRESSION:  1. No fracture and lumbar spine.. Moderate vertebral body spondylosis with a few bulky bridging osteophytes. Disc spaces appear fairly well maintained.  2. Mild degenerative facet arthropathy L4-5 and L5/S1 bilaterally. Old fracture T12 with evidence for prior cement injection. Left hip prosthesis. Mild degenerative changes right hip.  3. Overall appearance of lumbar spine slightly worse than prior.     Thoracic xray:   MPRESSION:  1. Slight upper thoracic dextro scoliosis. Slight mid and lower thoracic scoliosis. Cannot exclude muscle spasm.     2. Moderate spondylosis scattered in the thoracic spine with several bulky bridging osteophytes. Old mild compression fracture with evidence for prior vertebroplasty/kyphoplasty T12 level. The lateral projection of this fracture only seen on the lumbar   spine films obtained at the same time as the thoracic films.     3. Paravertebral soft tissues unremarkable.     Right knee xray:   FINDINGS: Moderate narrowing of the medial tibial femoral joint compartment. Moderate spurring of the knee medially and laterally. Moderate spurring along the posterior aspect

## 2024-04-15 NOTE — PROGRESS NOTES
0921 Patient arrived to phase II via cart.  Spontaneous respiraitons even and unlabored.  Placed on monitor--VSS.  Report received from OR RN    0922 Assessment completed.  Patient is alert and oriented x4.   Patient denies pain--will monitor.  Injection sites clean and dry.      0923 Pt. Denies all other needs at this time and states readiness to be discharged.    0925 Pt. Standing at bedside. With stand by assist of RN. Pt. Denies weakness or dizziness.    0927 Pt. Getting self dressed. Friend notified of discharge. Friend states shell arrive in 15 minutes.    0931 Snack and drink provided.    0944 Pt.s  arrived.    0945 Pt. Ambulated to private vehicle in stable condition with stand by assist of RN.

## 2024-04-29 ENCOUNTER — TELEPHONE (OUTPATIENT)
Dept: PHYSICAL MEDICINE AND REHAB | Age: 59
End: 2024-04-29

## 2024-04-29 ENCOUNTER — OFFICE VISIT (OUTPATIENT)
Dept: PHYSICAL MEDICINE AND REHAB | Age: 59
End: 2024-04-29
Payer: COMMERCIAL

## 2024-04-29 VITALS
HEIGHT: 65 IN | DIASTOLIC BLOOD PRESSURE: 68 MMHG | WEIGHT: 293 LBS | BODY MASS INDEX: 48.82 KG/M2 | SYSTOLIC BLOOD PRESSURE: 124 MMHG

## 2024-04-29 DIAGNOSIS — G89.29 CHRONIC MYOFASCIAL PAIN: ICD-10-CM

## 2024-04-29 DIAGNOSIS — M47.816 LUMBAR FACET ARTHROPATHY: ICD-10-CM

## 2024-04-29 DIAGNOSIS — M47.816 SPONDYLOSIS OF LUMBAR SPINE: ICD-10-CM

## 2024-04-29 DIAGNOSIS — M79.641 RIGHT HAND PAIN: Primary | ICD-10-CM

## 2024-04-29 DIAGNOSIS — M47.814 SPONDYLOSIS OF THORACIC REGION WITHOUT MYELOPATHY OR RADICULOPATHY: ICD-10-CM

## 2024-04-29 DIAGNOSIS — M25.561 CHRONIC PAIN OF RIGHT KNEE: ICD-10-CM

## 2024-04-29 DIAGNOSIS — M46.1 SACROILIAC INFLAMMATION (HCC): ICD-10-CM

## 2024-04-29 DIAGNOSIS — M54.9 MID BACK PAIN: ICD-10-CM

## 2024-04-29 DIAGNOSIS — G89.29 CHRONIC PAIN OF RIGHT KNEE: ICD-10-CM

## 2024-04-29 DIAGNOSIS — M79.18 CHRONIC MYOFASCIAL PAIN: ICD-10-CM

## 2024-04-29 DIAGNOSIS — G89.4 CHRONIC PAIN SYNDROME: ICD-10-CM

## 2024-04-29 DIAGNOSIS — M17.11 PRIMARY OSTEOARTHRITIS OF RIGHT KNEE: ICD-10-CM

## 2024-04-29 PROCEDURE — G8417 CALC BMI ABV UP PARAM F/U: HCPCS | Performed by: NURSE PRACTITIONER

## 2024-04-29 PROCEDURE — 99214 OFFICE O/P EST MOD 30 MIN: CPT | Performed by: NURSE PRACTITIONER

## 2024-04-29 PROCEDURE — 3017F COLORECTAL CA SCREEN DOC REV: CPT | Performed by: NURSE PRACTITIONER

## 2024-04-29 PROCEDURE — 1036F TOBACCO NON-USER: CPT | Performed by: NURSE PRACTITIONER

## 2024-04-29 PROCEDURE — G8427 DOCREV CUR MEDS BY ELIG CLIN: HCPCS | Performed by: NURSE PRACTITIONER

## 2024-04-29 ASSESSMENT — ENCOUNTER SYMPTOMS
VOICE CHANGE: 0
GASTROINTESTINAL NEGATIVE: 1
SINUS PAIN: 0
EYES NEGATIVE: 1
RESPIRATORY NEGATIVE: 1
BACK PAIN: 1
SORE THROAT: 0

## 2024-04-29 NOTE — PROGRESS NOTES
[facility] [place] [facility]  [facility]  [address]  [facility] [address]  Dept: [phone]  Dept Fax: [phone]  Loc: [phone]    Visit Date: 4/29/2024    Functionality Assessment/Goals Worksheet     On a scale of 0 (Does not Interfere) to 10 (Completely Interferes)     1.  Which number describes how during the past week pain has interfered with       the following:  A.  General Activity:  5  B.  Mood: 3  C.  Walking Ability:  4  D.  Normal Work (Includes both work outside the home and housework):  5  E.  Relations with Other People:   2  F.  Sleep:   2  G.  Enjoyment of Life:   2    2.  Patient Prefers to Take their Pain Medications:     [x]  On a regular basis   []  Only when necessary    []  Does not take pain medications    3.  What are the Patient's Goals/Expectations for Visiting Pain Management?     []  Learn about my pain    [x]  Receive Medication   []  Physical Therapy     []  Treat Depression   [x]  Receive Injections    []  Treat Sleep   []  Deal with Anxiety and Stress   []  Treat Opoid Dependence/Addiction   []  Other:        HPI:   Jermain Mann is a 58 y.o. female is here today for    Chief Complaint: Back pain, right hand pain     HPI   Fu from right knee synvis injection completed by Dr. Segura on 4/15/2024. Patient states that she is receiving about 95% relief of right knee pain from this injection and states pain is minimal and more intermittent in this area. Denies any pain in right knee at this time.     Continues to have pain in mid back that has afia intermittent aching pain and sometimes ahrp. States she is still receiving good relief from bilateral T-facet RFA.   Denies any low back pain     Has a new complaint of pain in right hand states mainly base of hand top and knuckles- aching and stiff especially when she wakes up. States it goes away with movement. States this has been going on for about a week

## 2024-04-29 NOTE — TELEPHONE ENCOUNTER
I received a call from S therapy dept.  They are requesting an order for occupational therapy since therapy is for her hand.

## 2024-06-26 DIAGNOSIS — M79.641 RIGHT HAND PAIN: ICD-10-CM

## 2024-07-01 ENCOUNTER — OFFICE VISIT (OUTPATIENT)
Dept: PHYSICAL MEDICINE AND REHAB | Age: 59
End: 2024-07-01
Payer: COMMERCIAL

## 2024-07-01 VITALS
DIASTOLIC BLOOD PRESSURE: 78 MMHG | WEIGHT: 293 LBS | SYSTOLIC BLOOD PRESSURE: 124 MMHG | HEIGHT: 65 IN | BODY MASS INDEX: 48.82 KG/M2

## 2024-07-01 DIAGNOSIS — M79.18 CHRONIC MYOFASCIAL PAIN: ICD-10-CM

## 2024-07-01 DIAGNOSIS — M47.816 SPONDYLOSIS OF LUMBAR SPINE: ICD-10-CM

## 2024-07-01 DIAGNOSIS — M17.11 PRIMARY OSTEOARTHRITIS OF RIGHT KNEE: ICD-10-CM

## 2024-07-01 DIAGNOSIS — M47.816 LUMBAR FACET ARTHROPATHY: ICD-10-CM

## 2024-07-01 DIAGNOSIS — G89.29 CHRONIC MYOFASCIAL PAIN: ICD-10-CM

## 2024-07-01 DIAGNOSIS — M25.561 CHRONIC PAIN OF RIGHT KNEE: ICD-10-CM

## 2024-07-01 DIAGNOSIS — M46.1 SACROILIAC INFLAMMATION (HCC): ICD-10-CM

## 2024-07-01 DIAGNOSIS — G89.4 CHRONIC PAIN SYNDROME: ICD-10-CM

## 2024-07-01 DIAGNOSIS — G89.29 CHRONIC PAIN OF RIGHT KNEE: ICD-10-CM

## 2024-07-01 DIAGNOSIS — M47.814 SPONDYLOSIS OF THORACIC REGION WITHOUT MYELOPATHY OR RADICULOPATHY: Primary | ICD-10-CM

## 2024-07-01 DIAGNOSIS — M54.9 MID BACK PAIN: ICD-10-CM

## 2024-07-01 PROCEDURE — G8417 CALC BMI ABV UP PARAM F/U: HCPCS | Performed by: NURSE PRACTITIONER

## 2024-07-01 PROCEDURE — 1036F TOBACCO NON-USER: CPT | Performed by: NURSE PRACTITIONER

## 2024-07-01 PROCEDURE — 99214 OFFICE O/P EST MOD 30 MIN: CPT | Performed by: NURSE PRACTITIONER

## 2024-07-01 PROCEDURE — 3017F COLORECTAL CA SCREEN DOC REV: CPT | Performed by: NURSE PRACTITIONER

## 2024-07-01 PROCEDURE — G8427 DOCREV CUR MEDS BY ELIG CLIN: HCPCS | Performed by: NURSE PRACTITIONER

## 2024-07-01 ASSESSMENT — ENCOUNTER SYMPTOMS
GASTROINTESTINAL NEGATIVE: 1
BACK PAIN: 1
SINUS PAIN: 0
RESPIRATORY NEGATIVE: 1
VOICE CHANGE: 0
SORE THROAT: 0
EYES NEGATIVE: 1

## 2024-07-01 NOTE — PROGRESS NOTES
knee Synvisc one injection from 2/6/2023 90% relief for over 10 months         bilateral T-facet RFA @ T8-9 and T9-10 completed by Dr. Segura on 2/22/2024 80% relief      right knee synvis injection completed by Dr. Segura on 4/15/2024: reveiwving over 95% relief     Radiology:  Lumbar xray:     IMPRESSION:  1. No fracture and lumbar spine.. Moderate vertebral body spondylosis with a few bulky bridging osteophytes. Disc spaces appear fairly well maintained.  2. Mild degenerative facet arthropathy L4-5 and L5/S1 bilaterally. Old fracture T12 with evidence for prior cement injection. Left hip prosthesis. Mild degenerative changes right hip.  3. Overall appearance of lumbar spine slightly worse than prior.     Thoracic xray:   MPRESSION:  1. Slight upper thoracic dextro scoliosis. Slight mid and lower thoracic scoliosis. Cannot exclude muscle spasm.     2. Moderate spondylosis scattered in the thoracic spine with several bulky bridging osteophytes. Old mild compression fracture with evidence for prior vertebroplasty/kyphoplasty T12 level. The lateral projection of this fracture only seen on the lumbar   spine films obtained at the same time as the thoracic films.     3. Paravertebral soft tissues unremarkable.     Right knee xray:   FINDINGS: Moderate narrowing of the medial tibial femoral joint compartment. Moderate spurring of the knee medially and laterally. Moderate spurring along the posterior aspect distal tibia. Moderate spurring along the posterior/lateral aspect of the   patella. Several ossicles are present posterior to the knee. The possibility of one or more loose intra-articular bodies cannot be excluded. These ossicles were not seen on the prior study from 5/17/2019.     IMPRESSION:  1. No acute findings.  2. Moderate degenerative changes of the knee.  3. Cannot exclude one or more loose intra-articular bodies.     Medications reviewed. Patient denies side effects with medications. Patient states she is

## 2024-08-13 NOTE — PROGRESS NOTES
Contacted [facility] regarding referral. Was advised to resend referral information and lab results to [phone]. Fax sent.    Pt asking for orange juice and chips. Denies needing pain medication. Pt incision site is dry and intact. Discharge instruction was given preop. No further questions. IV capped. VS stable.

## 2024-08-27 DIAGNOSIS — R91.8 ABNORMAL CHEST X-RAY WITH MULTIPLE LUNG NODULES: ICD-10-CM

## 2024-08-27 DIAGNOSIS — R91.8 LUNG NODULES: Primary | ICD-10-CM

## 2024-08-27 DIAGNOSIS — Z87.891 PERSONAL HISTORY OF TOBACCO USE: ICD-10-CM

## 2024-08-27 NOTE — PROGRESS NOTES
[facility]    Patient: JERMAIN BETANCUR, 59 y.o.   : 1965    Patient of Dr. Millard    Assessment/Plan   1. Lung nodules  -CT Chest last year showed stable lung nodules with largest measuring 3 mm dating back to 2017. No further follow up required. Chest x-ray stable with no acute lung findings.   -Reviewed preventative vaccinations    2. Personal history of tobacco use  -Does not qualify for lung screenings     3. Need for pneumococcal 20-valent conjugate vaccination  -Patient was to receive [id number] in office today however before immunization, she admitted she was given steroid injection about 1 month ago at . She was advised her immune system may not respond as well with recent steroid. Patient verbalized understanding and wishes to hold off on [id number]      Advised patient to call office with any changes, questions, or concerns regarding respiratory status or issues with prescribed medications    Return if symptoms worsen or fail to improve. Patient advised on s/s to call for earlier appt.   Subjective     Chief Complaint   Patient presents with    Follow-up     1 year lung nodule f/u with cxr-24        HPI  Complaints: Shortness of Breath  Onset Duration: Over a year  Exacerbating factors: Exertion  Alleviating factors: Rest  Associated symptoms: Cough - allergy related  Pertinent negatives: Sputum Production, Hemoptysis, Wheezing, and Chest Tightness  Risk factors for lung disease: animal exposure      Jermain is here for follow up for lung nodules. Overall patient reports respiratory symptoms have been stable since last appointment. Patient reports some physical limitation due to respiratory symptoms. Her past medical history is significant for allergic rhinitis, depression, hypertension, ABDI on CPAP, osteoarthritis, vitamin D deficiency, and sciatica (follows with Dr. Segura).      She is here with chest x-ray - normal     Allergy regimen:  SURGERY CENTER OR    PAIN MANAGEMENT PROCEDURE Bilateral 2022    Bilateral Thoracic facet Radiofrequency ablation Thoracic 8-9, 9-10 performed by Oli Arenas MD at [facility] [place]    PAIN MANAGEMENT PROCEDURE Bilateral 2024    Bilateral Thoracic 8-9, 9-10 facet radiofrequency ablation performed by Oli Arenas MD at [facility] [place] OR    UT ARTHROCENTESIS ASPIR&/INJ MAJOR JT/BURSA W/O US Right 2018    RIGHT HIP LARGE JOINT STEROID INJECTION performed by Oli Arenas MD at [facility] [place] OR    UT OFFICE/OUTPT VISIT,PROCEDURE ONLY N/A 2018    Kyphoplasty T12 BILATERAL performed by Oli Arenas MD at [facility] OR    SHOULDER SURGERY Right 4/15/2024    right knee synvisc injection performed by Oli Arenas MD at [facility] OR    TONSILLECTOMY      as child    WISDOM TOOTH EXTRACTION       SOCIAL HISTORY:  Social History     Tobacco Use    Smoking status: Former     Current packs/day: 0.00     Average packs/day: 1 pack/day for 7.0 years (7.0 ttl pk-yrs)     Types: Cigarettes, E-Cigarettes     Start date: 2005     Quit date: 2011     Years since quittin.6    Smokeless tobacco: Never    Tobacco comments:     Quit smoking    Vaping Use    Vaping status: Never Used   Substance Use Topics    Alcohol use: Not Currently     Comment: occasionally, 1-3 times a month    Drug use: Yes     Frequency: 7.0 times per week     Types: Marijuana (Weed)     Comment: Medical cannabis card - \"gummies\" last 2024     ALLERGIES:  Allergies   Allergen Reactions    [place] [Macadamia Nut Oil] Other (See Comments)     Numbness and Tingling in mouth    Elemental Sulfur     Other Other (See Comments)     Artificial sweeteners - migraines    Saccharin     Sulfa Antibiotics Hives     FAMILY HISTORY:  Family History   Problem Relation Age of Onset    Substance Abuse Sister     Heart Disease Mother         stent    Cancer Neg Hx

## 2024-08-28 ENCOUNTER — HOSPITAL ENCOUNTER (OUTPATIENT)
Age: 59
Discharge: HOME | End: 2024-08-28
Payer: COMMERCIAL

## 2024-08-28 ENCOUNTER — OFFICE VISIT (OUTPATIENT)
Dept: PULMONOLOGY | Age: 59
End: 2024-08-28
Payer: COMMERCIAL

## 2024-08-28 ENCOUNTER — HOSPITAL ENCOUNTER (OUTPATIENT)
Dept: GENERAL RADIOLOGY | Age: 59
Discharge: HOME | End: 2024-08-28
Payer: COMMERCIAL

## 2024-08-28 VITALS
BODY MASS INDEX: 48.82 KG/M2 | OXYGEN SATURATION: 95 % | HEART RATE: 69 BPM | HEIGHT: 65 IN | TEMPERATURE: 97.9 F | WEIGHT: 293 LBS | DIASTOLIC BLOOD PRESSURE: 80 MMHG | SYSTOLIC BLOOD PRESSURE: 124 MMHG

## 2024-08-28 DIAGNOSIS — Z23 NEED FOR PNEUMOCOCCAL 20-VALENT CONJUGATE VACCINATION: ICD-10-CM

## 2024-08-28 DIAGNOSIS — R91.8 LUNG NODULES: Primary | ICD-10-CM

## 2024-08-28 DIAGNOSIS — Z87.891 PERSONAL HISTORY OF TOBACCO USE: ICD-10-CM

## 2024-08-28 DIAGNOSIS — R91.8 ABNORMAL CHEST X-RAY WITH MULTIPLE LUNG NODULES: ICD-10-CM

## 2024-08-28 DIAGNOSIS — R91.8 LUNG NODULES: ICD-10-CM

## 2024-08-28 PROCEDURE — G8427 DOCREV CUR MEDS BY ELIG CLIN: HCPCS

## 2024-08-28 PROCEDURE — G8417 CALC BMI ABV UP PARAM F/U: HCPCS

## 2024-08-28 PROCEDURE — 71046 X-RAY EXAM CHEST 2 VIEWS: CPT

## 2024-08-28 PROCEDURE — 90677 PCV20 VACCINE IM: CPT

## 2024-08-28 PROCEDURE — 99213 OFFICE O/P EST LOW 20 MIN: CPT

## 2024-08-28 PROCEDURE — 90471 IMMUNIZATION ADMIN: CPT

## 2024-08-28 PROCEDURE — 1036F TOBACCO NON-USER: CPT

## 2024-08-28 PROCEDURE — 3017F COLORECTAL CA SCREEN DOC REV: CPT

## 2024-08-28 ASSESSMENT — ENCOUNTER SYMPTOMS
SHORTNESS OF BREATH: 1
CHEST TIGHTNESS: 0
WHEEZING: 0
RHINORRHEA: 0
SINUS PRESSURE: 0
SINUS PAIN: 1
COUGH: 1

## 2024-08-28 NOTE — PROGRESS NOTES
After obtaining consent, and per orders of  [place], injection of prevnar 20 given in Left arm by Coreen Ballesteros MA. Patient instructed to remain in clinic for 20 minutes afterwards, and to report any adverse reaction to me immediately.       LOT#[id number]  NDC#[id number]  EXP:9/2025

## 2024-09-09 ENCOUNTER — TELEPHONE (OUTPATIENT)
Dept: PHYSICAL MEDICINE AND REHAB | Age: 59
End: 2024-09-09

## 2024-09-09 ENCOUNTER — OFFICE VISIT (OUTPATIENT)
Dept: PHYSICAL MEDICINE AND REHAB | Age: 59
End: 2024-09-09
Payer: COMMERCIAL

## 2024-09-09 VITALS
DIASTOLIC BLOOD PRESSURE: 78 MMHG | WEIGHT: 293 LBS | HEIGHT: 65 IN | SYSTOLIC BLOOD PRESSURE: 122 MMHG | BODY MASS INDEX: 48.82 KG/M2

## 2024-09-09 DIAGNOSIS — M46.1 SACROILIAC INFLAMMATION (HCC): ICD-10-CM

## 2024-09-09 DIAGNOSIS — M47.816 SPONDYLOSIS OF LUMBAR SPINE: ICD-10-CM

## 2024-09-09 DIAGNOSIS — M47.816 LUMBAR FACET ARTHROPATHY: ICD-10-CM

## 2024-09-09 DIAGNOSIS — G89.4 CHRONIC PAIN SYNDROME: ICD-10-CM

## 2024-09-09 DIAGNOSIS — G89.29 CHRONIC MYOFASCIAL PAIN: ICD-10-CM

## 2024-09-09 DIAGNOSIS — M47.814 SPONDYLOSIS OF THORACIC REGION WITHOUT MYELOPATHY OR RADICULOPATHY: Primary | ICD-10-CM

## 2024-09-09 DIAGNOSIS — M25.561 CHRONIC PAIN OF RIGHT KNEE: ICD-10-CM

## 2024-09-09 DIAGNOSIS — G89.29 CHRONIC PAIN OF RIGHT KNEE: ICD-10-CM

## 2024-09-09 DIAGNOSIS — M54.9 MID BACK PAIN: ICD-10-CM

## 2024-09-09 DIAGNOSIS — M79.18 CHRONIC MYOFASCIAL PAIN: ICD-10-CM

## 2024-09-09 DIAGNOSIS — M47.814 FACET ARTHROPATHY, THORACIC: ICD-10-CM

## 2024-09-09 DIAGNOSIS — M17.11 PRIMARY OSTEOARTHRITIS OF RIGHT KNEE: ICD-10-CM

## 2024-09-09 PROCEDURE — 1036F TOBACCO NON-USER: CPT | Performed by: NURSE PRACTITIONER

## 2024-09-09 PROCEDURE — 3017F COLORECTAL CA SCREEN DOC REV: CPT | Performed by: NURSE PRACTITIONER

## 2024-09-09 PROCEDURE — 99214 OFFICE O/P EST MOD 30 MIN: CPT | Performed by: NURSE PRACTITIONER

## 2024-09-09 PROCEDURE — G8428 CUR MEDS NOT DOCUMENT: HCPCS | Performed by: NURSE PRACTITIONER

## 2024-09-09 PROCEDURE — G8417 CALC BMI ABV UP PARAM F/U: HCPCS | Performed by: NURSE PRACTITIONER

## 2024-09-09 ASSESSMENT — ENCOUNTER SYMPTOMS
RESPIRATORY NEGATIVE: 1
VOICE CHANGE: 0
SINUS PAIN: 0
EYES NEGATIVE: 1
GASTROINTESTINAL NEGATIVE: 1
BACK PAIN: 1
SORE THROAT: 0

## 2024-10-02 ENCOUNTER — APPOINTMENT (OUTPATIENT)
Dept: GENERAL RADIOLOGY | Age: 59
End: 2024-10-02
Attending: PAIN MEDICINE
Payer: COMMERCIAL

## 2024-10-02 ENCOUNTER — HOSPITAL ENCOUNTER (OUTPATIENT)
Age: 59
Setting detail: OUTPATIENT SURGERY
Discharge: HOME | End: 2024-10-02
Attending: PAIN MEDICINE | Admitting: PAIN MEDICINE
Payer: COMMERCIAL

## 2024-10-02 VITALS
WEIGHT: 293 LBS | OXYGEN SATURATION: 92 % | RESPIRATION RATE: 16 BRPM | HEART RATE: 73 BPM | SYSTOLIC BLOOD PRESSURE: 131 MMHG | HEIGHT: 65 IN | DIASTOLIC BLOOD PRESSURE: 62 MMHG | BODY MASS INDEX: 48.82 KG/M2 | TEMPERATURE: 97.2 F

## 2024-10-02 PROCEDURE — 64633 DESTROY CERV/THOR FACET JNT: CPT | Performed by: PAIN MEDICINE

## 2024-10-02 PROCEDURE — 7100000010 HC PHASE II RECOVERY - FIRST 15 MIN: Performed by: PAIN MEDICINE

## 2024-10-02 PROCEDURE — 99152 MOD SED SAME PHYS/QHP 5/>YRS: CPT | Performed by: PAIN MEDICINE

## 2024-10-02 PROCEDURE — 2709999900 HC NON-CHARGEABLE SUPPLY: Performed by: PAIN MEDICINE

## 2024-10-02 PROCEDURE — 3600000057 HC PAIN LEVEL 4 ADDL 15 MIN: Performed by: PAIN MEDICINE

## 2024-10-02 PROCEDURE — 3600000056 HC PAIN LEVEL 4 BASE: Performed by: PAIN MEDICINE

## 2024-10-02 PROCEDURE — 2500000003 HC RX 250 WO HCPCS: Performed by: PAIN MEDICINE

## 2024-10-02 PROCEDURE — 7100000011 HC PHASE II RECOVERY - ADDTL 15 MIN: Performed by: PAIN MEDICINE

## 2024-10-02 PROCEDURE — 64634 DESTROY C/TH FACET JNT ADDL: CPT | Performed by: PAIN MEDICINE

## 2024-10-02 PROCEDURE — 6360000002 HC RX W HCPCS: Performed by: PAIN MEDICINE

## 2024-10-02 RX ORDER — FENTANYL CITRATE 50 UG/ML
INJECTION, SOLUTION INTRAMUSCULAR; INTRAVENOUS PRN
Status: DISCONTINUED | OUTPATIENT
Start: 2024-10-02 | End: 2024-10-02 | Stop reason: ALTCHOICE

## 2024-10-02 RX ORDER — MIDAZOLAM HYDROCHLORIDE 1 MG/ML
INJECTION INTRAMUSCULAR; INTRAVENOUS PRN
Status: DISCONTINUED | OUTPATIENT
Start: 2024-10-02 | End: 2024-10-02 | Stop reason: ALTCHOICE

## 2024-10-02 RX ORDER — LIDOCAINE HYDROCHLORIDE 20 MG/ML
INJECTION, SOLUTION INFILTRATION; PERINEURAL PRN
Status: DISCONTINUED | OUTPATIENT
Start: 2024-10-02 | End: 2024-10-02 | Stop reason: ALTCHOICE

## 2024-10-02 RX ORDER — BUPIVACAINE HYDROCHLORIDE 5 MG/ML
INJECTION, SOLUTION PERINEURAL PRN
Status: DISCONTINUED | OUTPATIENT
Start: 2024-10-02 | End: 2024-10-02 | Stop reason: ALTCHOICE

## 2024-10-02 ASSESSMENT — PAIN - FUNCTIONAL ASSESSMENT
PAIN_FUNCTIONAL_ASSESSMENT: 0-10
PAIN_FUNCTIONAL_ASSESSMENT: 0-10

## 2024-10-02 NOTE — PROGRESS NOTES
1004 Received in Phase 2 . Awake responsive to verbal stimuli.  Airway patent. O2 sat 92%  Injection site clean and dry.  Denies pain on arrival.  1008 Drink and snack given.  1020 Assisted to sit in chair to get dressed  1029 Waiting for sister for ride home  1100 Discharged home via private car without complaint.

## 2024-10-02 NOTE — POST SEDATION
IV sedation was used during the procedure:  - Moderate intravenous conscious sedation was supervised by Dr. Segura  - The patient was independently monitored by a Registered Nurse assigned to the procedure room  - Monitoring included automated blood pressure, continuous EKG, and continuous pulse oximetry  - The detailed conscious record is permanently stored in the Hospital Information System  - The following is the conscious sedation record:  Start Time: 0949  End Time : 1004  Duration: 15 minutes   Medications Administered: 1 mg Versed, 100 mcg Fentanyl  Complications: None  EBL-0

## 2024-10-02 NOTE — OP NOTE
acceptable. Then a sensory stimulus was applied at 50 Hz up to 1 volt and concordant pain symptoms were reproduced. Then a motor stimulus was applied at 2 Hz up to 2 volts and no motor stimulation was seen in lower extremities.  Some multifidus stimulus was seen.  Then after negative aspiration a  0.5%  Marcaine  4 cc was injected through the needle in divided doses. Then a lesion was done at 80 degrees centigrade for 90 seconds.    EBL-0  Patient's vital signs and neurological status remained stable throughout the procedure and post procedural period.  The patient tolerated the procedure well and was discharged home in stable condition.       Electronically signed by Oli Arenas MD on 10/2/24 at 9:46 AM EDT

## 2024-10-02 NOTE — H&P
tenderness. Normal range of motion. Negative right straight leg raise test and negative left straight leg raise test.        Back:       Right hip: Tenderness and bony tenderness present.      Left hip: Bony tenderness present. Normal range of motion. Decreased strength.      Right knee: Bony tenderness present. No swelling. Normal range of motion. No tenderness.      Right lower leg: Edema present.      Left lower leg: Edema present.   Skin:     General: Skin is warm and dry.      Coloration: Skin is not pale.      Findings: No erythema or rash.   Neurological:      General: No focal deficit present.      Mental Status: She is alert and oriented to person, place, and time. She is not disoriented.      Cranial Nerves: No cranial nerve deficit.      Sensory: No sensory deficit.      Motor: Weakness present. No atrophy or abnormal muscle tone.      Coordination: Coordination abnormal.      Gait: Gait abnormal.      Deep Tendon Reflexes: Babinski sign absent on the right side.      Comments: 4/5 strength right lower extremity    Psychiatric:         Attention and Perception: She is attentive.         Mood and Affect: Mood is not anxious or depressed. Affect is not labile, blunt, angry or inappropriate.         Speech: She is communicative. Speech is not rapid and pressured, delayed, slurred or tangential.         Behavior: Behavior normal. Behavior is not agitated, slowed, aggressive, withdrawn, hyperactive or combative.         Thought Content: Thought content normal. Thought content is not paranoid or delusional. Thought content does not include homicidal or suicidal ideation. Thought content does not include homicidal or suicidal plan.         Cognition and Memory: Memory is not impaired. She does not exhibit impaired recent memory or impaired remote memory.         Judgment: Judgment normal. Judgment is not impulsive or inappropriate.      SCOOTER  Patricks test  positive  Yeoman's  or Gaenslen's positive

## 2024-10-02 NOTE — PRE SEDATION
[facility]  Pre-Sedation/Analgesia History & Physical    Pt Name: Jermain Mann  MRN: [id number]  YOB: 1965  Provider Performing Procedure: Oli Arenas MD  Primary Care Physician: Corina Temple, TAMIE - ALEJANDRINA    PRE-PROCEDURE   DNR-CCA/DNR-CC : No  Brief History/Pre-Procedure Diagnosis:     Patient complaints of pain that is increased in her mid back over the past 1 to 2 weeks as she feels relief from previous T-facet RFA has worn off. Pain is mid back is acing pain with occasional sharp jabbing pain which is all axially bilaterally.   Has intermittent pain in her low back that she states is minor compared to mid back pain.   Right knee pain pain remains tolerable as she continues relief from right knee Synvisc injection.     Continues medical marijuana, mobic, and Neurontin which all help      Pain increases with bending, lifting, twisting , walking, standing, getting up and down, and housework or working at job.           Prior Injections:  bilateral T-facet RFA @  T8-9, and T9-T10 from 12/19/2022 90% relief for about a full year      right knee Synvisc one injection from 2/6/2023 90% relief for over 10 months         bilateral T-facet RFA @ T8-9 and T9-10 completed by Dr. Segura on 2/22/2024 80% relief for 6.5 months     right knee synvis injection completed by Dr. Segura on 4/15/2024: reveiwving over 95% relief      Radiology:  Lumbar xray:     IMPRESSION:  1. No fracture and lumbar spine.. Moderate vertebral body spondylosis with a few bulky bridging osteophytes. Disc spaces appear fairly well maintained.  2. Mild degenerative facet arthropathy L4-5 and L5/S1 bilaterally. Old fracture T12 with evidence for prior cement injection. Left hip prosthesis. Mild degenerative changes right hip.  3. Overall appearance of lumbar spine slightly worse than prior.     Thoracic xray:   MPRESSION:  1. Slight upper thoracic dextro scoliosis. Slight mid and lower thoracic scoliosis.

## 2024-12-05 ENCOUNTER — OFFICE VISIT (OUTPATIENT)
Dept: PHYSICAL MEDICINE AND REHAB | Age: 59
End: 2024-12-05
Payer: COMMERCIAL

## 2024-12-05 VITALS
DIASTOLIC BLOOD PRESSURE: 74 MMHG | SYSTOLIC BLOOD PRESSURE: 138 MMHG | WEIGHT: 293 LBS | BODY MASS INDEX: 48.82 KG/M2 | HEIGHT: 65 IN

## 2024-12-05 DIAGNOSIS — M25.561 CHRONIC PAIN OF RIGHT KNEE: ICD-10-CM

## 2024-12-05 DIAGNOSIS — M54.9 MID BACK PAIN: ICD-10-CM

## 2024-12-05 DIAGNOSIS — G89.29 CHRONIC PAIN OF RIGHT KNEE: ICD-10-CM

## 2024-12-05 DIAGNOSIS — M47.814 SPONDYLOSIS OF THORACIC REGION WITHOUT MYELOPATHY OR RADICULOPATHY: ICD-10-CM

## 2024-12-05 DIAGNOSIS — M47.814 FACET ARTHROPATHY, THORACIC: ICD-10-CM

## 2024-12-05 DIAGNOSIS — G89.4 CHRONIC PAIN SYNDROME: ICD-10-CM

## 2024-12-05 DIAGNOSIS — M17.11 PRIMARY OSTEOARTHRITIS OF RIGHT KNEE: Primary | ICD-10-CM

## 2024-12-05 DIAGNOSIS — M79.18 CHRONIC MYOFASCIAL PAIN: ICD-10-CM

## 2024-12-05 DIAGNOSIS — M47.816 SPONDYLOSIS OF LUMBAR SPINE: ICD-10-CM

## 2024-12-05 DIAGNOSIS — G89.29 CHRONIC MYOFASCIAL PAIN: ICD-10-CM

## 2024-12-05 DIAGNOSIS — M47.816 LUMBAR FACET ARTHROPATHY: ICD-10-CM

## 2024-12-05 PROCEDURE — 99214 OFFICE O/P EST MOD 30 MIN: CPT | Performed by: NURSE PRACTITIONER

## 2024-12-05 PROCEDURE — G8484 FLU IMMUNIZE NO ADMIN: HCPCS | Performed by: NURSE PRACTITIONER

## 2024-12-05 PROCEDURE — 3017F COLORECTAL CA SCREEN DOC REV: CPT | Performed by: NURSE PRACTITIONER

## 2024-12-05 PROCEDURE — 1036F TOBACCO NON-USER: CPT | Performed by: NURSE PRACTITIONER

## 2024-12-05 PROCEDURE — G8417 CALC BMI ABV UP PARAM F/U: HCPCS | Performed by: NURSE PRACTITIONER

## 2024-12-05 PROCEDURE — G8427 DOCREV CUR MEDS BY ELIG CLIN: HCPCS | Performed by: NURSE PRACTITIONER

## 2024-12-05 ASSESSMENT — ENCOUNTER SYMPTOMS
SORE THROAT: 0
SINUS PAIN: 0
GASTROINTESTINAL NEGATIVE: 1
RESPIRATORY NEGATIVE: 1
VOICE CHANGE: 0
BACK PAIN: 0
EYES NEGATIVE: 1

## 2024-12-05 NOTE — PROGRESS NOTES
[facility] PROFESSIONAL [facility] NEUROSCI[facility]IL[facility]ATION [facility]  [address] [place] [address] [address]  [place] [id number]  Dept: [phone]  Dept Fax: [phone]  Loc: [phone]    Visit Date: 12/5/2024    Functionality Assessment/Goals Worksheet     On a scale of 0 (Does not Interfere) to 10 (Completely Interferes)     1.  Which number describes how during the past week pain has interfered with       the following:  A.  General Activity:  6  B.  Mood: 3  C.  Walking Ability:  6  D.  Normal Work (Includes both work outside the home and housework):  5  E.  Relations with Other People:   2  F.  Sleep:   3  G.  Enjoyment of Life:   8    2.  Patient Prefers to Take their Pain Medications:     [x]  On a regular basis   []  Only when necessary    []  Does not take pain medications    3.  What are the Patient's Goals/Expectations for Visiting Pain Management?     [x]  Learn about my pain    [x]  Receive Medication   [x]  Physical Therapy     []  Treat Depression   [x]  Receive Injections    []  Treat Sleep   []  Deal with Anxiety and Stress   []  Treat Opoid Dependence/Addiction   []  Other:      HPI:   Jermain Mann is a 59 y.o. female is here today for    Chief Complaint: Right knee pain     HPI   Follow up from bilateral T-facet RFA @ T8-9 and T9-10 completed by Dr. Segura on 10/2/2024.She missed her initial procedure follow up in at end of October. She reports that she is receiving 95% relief of mid back pain form this procedure. Denies any mid back pain at all today states she will get just an intermittent twinge and ache.States back pain has been very tolerable. States she is tolerating activity better with minimal pain pain in her back.     Has a main complaint of increasing pain in her right knee over the past month. States she feels that relief from previous Synvisc injection is wearing off. Has more pain with walking. Pain in right knee joint more medially- constant aching pain and

## 2024-12-09 ENCOUNTER — TELEPHONE (OUTPATIENT)
Dept: PHYSICAL MEDICINE AND REHAB | Age: 59
End: 2024-12-09

## 2025-01-16 NOTE — PROGRESS NOTES
[facility]  PHYSICAL THERAPY  [] EVALUATION  [] DAILY NOTE (LAND) [x] DAILY NOTE (AQUATIC)   [] PROGRESS NOTE [] DISCHARGE NOTE    [x] OUTPATIENT REHABILITATION CENTER [facility]   [] [place] [address]    [] [facility]   [] Dominic Dance    Date: 2022  Patient Name:  Jose Lay  : 1965  MRN: [id number]  CSN: [id number]    Referring Practitioner TAMIE Middleton*   Diagnosis Radiculopathy, lumbosacral region [M54.17]  Spondylosis without myelopathy or radiculopathy, lumbar region [M47.816]    Treatment Diagnosis Chronic SIJ pain, acute RLE radiculopathy, lumbar and hip tightness, core weakness, difficulty walking   Date of Evaluation 3/30/22    Additional Pertinent History Left ARIELLE . Obesity, arthritis, concussion in , HTN, anxiety and depression. Functional Outcome Measure Used Modified Oswestry   Functional Outcome Score 19/50=38% (3/30/22)       Insurance: Primary: Payor: Dariana Schaeffer /  /  / ,   Secondary:    Authorization Information: PRECERTIFICATION REQUIRED:  Pre-cert required after initial evaluation through [facility]. If [facility] is secondary insurance no pre-cert is needed. INSURANCE THERAPY BENEFIT: Unlimited visits for anyone under the age of 21. For those 21 and above, 30 visits for OT, PT and ST each per calendar year are approved. Benefit will not cover maintenance or preventative treatment. FCE is a covered benefit.     AQUATIC THERAPY COVERED:   Yes  MODALITIES COVERED:  Yes. Hot/Cold Packs are not covered. TELEHEALTH COVERED:  Yes   Visit # 8, 10 for progress note   Visits Allowed: eval +  RECEIVED AUTH FOR PT  TOTAL  UNITS  FROM 22 TO   CPT CODES:  83668, 06862, 56955, 85995, 25171, 65131   Recertification Date:    Physician Follow-Up: 22   Physician Orders:    History of Present Illness: Pt presents with chronic lumbar and SIJ pain.  She recently started having pain radiating down right leg to toes. . Pt has had injections that have helped. In December 2013, pt injured SI when she stepped out of house to sweep snow off patio, slipped on ice and went into splits and twisted to get out of it causing significant pain. Pt was unable to get medical treatment until a year later d/t insurance. Pt had reached a point that she thought she was going to be wheelchair bound. Pt reports PCP looking into minimally invasive procedure where pins are inserted into sacrum for stabilization. Pain has affected walking, standing tolerance, lifting, cleaning, and dressing. SUBJECTIVE: Patient reporting high pain levels today and requested to start with exercises in deeper water. Complaints of increased pain and leg symptoms while in the car on her way to therapy session. OBJECTIVE:  AQUATICS TREATMENT   Precautions:    Pain: 10/10 Right SI joint and right knee; intermittent radiating pain down right leg to toes    X in shaded column indicates activity completed today   Exercise/Intervention Sets/Sec  Notes   Walk Forward 3 laps  X    Walk Backward 3 laps  X    Walk Sideways 3 laps  X           Lower Extremity Exercises:    4' step   Heel/Toe Raises 15x  X    Marches 15x  X    Squats 15x  X    3 Way Hip 15x  X    Hamstring Curls 15x  X    Lunges       Step-Ups: forward 15x  X           Lower Extremity Stretches:       HS and calf stretch at 4' steps 3x15 sec  x           Seated Exercises:              Upper Extremity Exercises: In 4'10\"   Shoulder Flexion 20x  x    Shoulder ABD/ADD 20x  x    Shoulder Horizontal ABD/ADD 20x  X    Shoulder Extension 20x  x    Shoulder IR/ER       Shoulder Circles       Shoulder Shrugs       Rows 20x  x    Bicep Curls       Noodle push/pull and sink 20x  x           Upper Extremity Stretches:              Balance:              Dynamic Gait:              Deep Water:     In 8 foot depth   Hang 5 min  X At start of session to decrease pain   Bicycle 3 min  X Hip ABD/ADD 3 min  X    Hip Flex/Ext 3 min  X      Specific Interventions Next Treatment: core strengthening, balance training; modalities and manual as needed, transition to aquatics if land unsuccessful after 5-6 visits    Activity/Treatment Tolerance:  [x]  Patient tolerated treatment well  []  Patient limited by fatigue  []  Patient limited by pain   []  Patient limited by medical complications  []  Other:     Assessment:  Started with deep water exercises today to decrease pain and allow for tolerance of strengthening program. Pain decreased to 4/10 while in deep water. Pain decreased to 3/10 after completion of exercises. Wendy was able to tolerate increased repetitions today. Cued for abdominal bracing and posture during session. Goals    Patient Goal: Figure out right leg issue    Short Term Goals: 4 weeks  Patient will demonstrate good core engagement and postural awareness during therapy session with <4 cues  to carry over to standing to do dishes and cook  Patient will have <50% lumbar and hip AROM restriction for ease bending over to niharika socks and shoes    Long Term Goals: 8 weeks  Patient will demonstrate good core engagement and postural awareness during therapy session without cues  to carry over to lifting household items from various levels  Patient will tolerate walking >20 minutes at grocery store with cart with < 4/10 pain. Patient will be independent and compliant with HEP to achieve above goals. Patient Education:   [x]  HEP/Education Completed: monitor response to increased repetitions   [place] Access Code:  []  No new Education completed  []  Reviewed Prior HEP      [x]  Patient verbalized and/or demonstrated understanding of education provided. []  Patient unable to verbalize and/or demonstrate understanding of education provided. Will continue education.   []  Barriers to learning:     PLAN:  Treatment Recommendations: Strengthening, Range of Motion, Balance Training, Functional Mobility Training, Gait Training, Manual Therapy - Soft Tissue Mobilization, Home Exercise Program, Patient Education, Aquatics and Modalities    []  Plan of care initiated. Plan to see patient 2 times per week for 8 weeks to address the treatment planned outlined above.   [x]  Continue with current plan of care  []  Modify plan of care as follows:    []  Hold pending physician visit  []  Discharge    Time In 1430   Time Out 1515   Timed Code Minutes: 45 min   Total Treatment Time: 45 min     Electronically Signed by: Bia Valenzuela PTA back pain general

## 2025-02-03 ENCOUNTER — TELEPHONE (OUTPATIENT)
Dept: PHYSICAL MEDICINE AND REHAB | Age: 60
End: 2025-02-03

## 2025-02-03 ASSESSMENT — ENCOUNTER SYMPTOMS
GASTROINTESTINAL NEGATIVE: 1
VOICE CHANGE: 0
SORE THROAT: 0
RESPIRATORY NEGATIVE: 1
EYES NEGATIVE: 1
SINUS PAIN: 0
BACK PAIN: 0

## 2025-02-03 NOTE — H&P
Today, patient presents for planned RIGHT knee injection with synvisc    This note is reflective of the patient's previous visit for evaluation. We will proceed with today's planned procedure. Since patient's last visit for evaluation, there have been no interval changes in medical history. Patient has no new numbness, weakness, or focal neurological deficit since evaluation. Patient has no contraindications to injection (no anticoagulation or recent antibiotic intake for active infections), and has a  present or is able to drive themselves (as discussed and cleared by physician).  Allergies to latex, contrast dye, and steroid medications have been confirmed with the patient prior to the procedure.  NPO necessity has been assessed and accepted based on procedure complexity. The risks and benefits of the procedure have been explained including but are not limited to infection, bleeding, paralysis, immediate post procedure weakness, and dizziness; the patient acknowledges understanding and desires to proceed with the procedure. Patient has signed consent for same procedure as discussed in previous clinic encounter. All other questions and concerns were addressed at bedside. See procedure note for full details.       Post procedure Instructions: The patient was advised not to drive during the day of the procedure and not to engage in any significant decision making (unless otherwise states by physician). The patient was also advised to be cautious with walking/activity for 24 hours following today's visit and asked not to engage in over-exertion (unless otherwise states by physician). After this time, it is ok to resume pre-procedure level of activity. Patient advised to apply ice to site of injection in situations of pain and discomfort. Patient advised to not submerge site of injection during bath or pool activities for approximately 24 hours post-procedure. Patient attested to understanding post procedure

## 2025-02-04 ENCOUNTER — HOSPITAL ENCOUNTER (OUTPATIENT)
Age: 60
Setting detail: OUTPATIENT SURGERY
Discharge: HOME | End: 2025-02-04
Attending: ANESTHESIOLOGY | Admitting: ANESTHESIOLOGY
Payer: COMMERCIAL

## 2025-02-04 ENCOUNTER — APPOINTMENT (OUTPATIENT)
Dept: GENERAL RADIOLOGY | Age: 60
End: 2025-02-04
Attending: ANESTHESIOLOGY
Payer: COMMERCIAL

## 2025-02-04 VITALS
HEIGHT: 65 IN | DIASTOLIC BLOOD PRESSURE: 63 MMHG | BODY MASS INDEX: 48.82 KG/M2 | WEIGHT: 293 LBS | RESPIRATION RATE: 17 BRPM | TEMPERATURE: 98.2 F | SYSTOLIC BLOOD PRESSURE: 133 MMHG | HEART RATE: 68 BPM | OXYGEN SATURATION: 98 %

## 2025-02-04 PROCEDURE — 6360000002 HC RX W HCPCS: Performed by: ANESTHESIOLOGY

## 2025-02-04 PROCEDURE — 6360000004 HC RX CONTRAST MEDICATION: Performed by: ANESTHESIOLOGY

## 2025-02-04 PROCEDURE — 20610 DRAIN/INJ JOINT/BURSA W/O US: CPT | Performed by: ANESTHESIOLOGY

## 2025-02-04 PROCEDURE — 7100000010 HC PHASE II RECOVERY - FIRST 15 MIN: Performed by: ANESTHESIOLOGY

## 2025-02-04 PROCEDURE — 77002 NEEDLE LOCALIZATION BY XRAY: CPT | Performed by: ANESTHESIOLOGY

## 2025-02-04 PROCEDURE — 3600000054 HC PAIN LEVEL 3 BASE: Performed by: ANESTHESIOLOGY

## 2025-02-04 PROCEDURE — 2709999900 HC NON-CHARGEABLE SUPPLY: Performed by: ANESTHESIOLOGY

## 2025-02-04 RX ORDER — IOPAMIDOL 612 MG/ML
INJECTION, SOLUTION INTRAVASCULAR PRN
Status: DISCONTINUED | OUTPATIENT
Start: 2025-02-04 | End: 2025-02-04 | Stop reason: ALTCHOICE

## 2025-02-04 RX ORDER — LIDOCAINE HYDROCHLORIDE 10 MG/ML
INJECTION, SOLUTION EPIDURAL; INFILTRATION; INTRACAUDAL; PERINEURAL PRN
Status: DISCONTINUED | OUTPATIENT
Start: 2025-02-04 | End: 2025-02-04 | Stop reason: ALTCHOICE

## 2025-02-04 ASSESSMENT — PAIN - FUNCTIONAL ASSESSMENT
PAIN_FUNCTIONAL_ASSESSMENT: NONE - DENIES PAIN
PAIN_FUNCTIONAL_ASSESSMENT: 0-10

## 2025-02-04 NOTE — PROGRESS NOTES
1454 - Patient arrived to Phase II via bed, report from Yue DOMINGUEZ. Patient awake and alert without complaints. VSS, site WNL. Patient denies drink or snack need.   1456- Patient sat edge of bed and tolerated well. Patient dressed.  1307- Patient discharged ambulatory to private vehicle with personal cane.

## 2025-02-05 NOTE — PROCEDURES
Pre-operative Diagnosis:  RIGHT  Knee pain     Post-operative Diagnosis: RIGHT Knee pain     Procedure: RIGHT intra-articular knee injection with Synvisc under fluoroscopic guidance     Procedure Description:  After having signed the informed consent, the patient was taken to the operating room placed in the supine position.  The left knee was with ChloraPrep and draped in a sterile fashion the medial aspect of the RIGHT knee was identified under fluoroscopic guidance.  2 cc of 1% lidocaine was used to anesthetize the skin and subcutaneous tissues.  A 25-gauge 3-1/2 inch spinal needle was advanced into the knee joint.  After negative aspiration 0.5 cc of Omnipaque 300 was injected which revealed appropriate spread in the joint.  Then, 6 cc of Synvisc One was injected into the joint.  The patient tolerated the procedure well.        Procedural Complications: None        Evans Roman DO  Interventional Pain Management/PM&R  [facility]

## 2025-02-20 ENCOUNTER — OFFICE VISIT (OUTPATIENT)
Dept: PHYSICAL MEDICINE AND REHAB | Age: 60
End: 2025-02-20
Payer: COMMERCIAL

## 2025-02-20 VITALS
WEIGHT: 293 LBS | BODY MASS INDEX: 48.82 KG/M2 | SYSTOLIC BLOOD PRESSURE: 120 MMHG | HEIGHT: 65 IN | DIASTOLIC BLOOD PRESSURE: 68 MMHG

## 2025-02-20 DIAGNOSIS — M47.816 SPONDYLOSIS OF LUMBAR SPINE: ICD-10-CM

## 2025-02-20 DIAGNOSIS — M47.814 FACET ARTHROPATHY, THORACIC: ICD-10-CM

## 2025-02-20 DIAGNOSIS — M79.18 CHRONIC MYOFASCIAL PAIN: ICD-10-CM

## 2025-02-20 DIAGNOSIS — M17.11 PRIMARY OSTEOARTHRITIS OF RIGHT KNEE: Primary | ICD-10-CM

## 2025-02-20 DIAGNOSIS — G89.29 CHRONIC MYOFASCIAL PAIN: ICD-10-CM

## 2025-02-20 DIAGNOSIS — M47.816 LUMBAR FACET ARTHROPATHY: ICD-10-CM

## 2025-02-20 DIAGNOSIS — M47.814 SPONDYLOSIS OF THORACIC REGION WITHOUT MYELOPATHY OR RADICULOPATHY: ICD-10-CM

## 2025-02-20 DIAGNOSIS — M54.9 MID BACK PAIN: ICD-10-CM

## 2025-02-20 DIAGNOSIS — G89.29 CHRONIC PAIN OF RIGHT KNEE: ICD-10-CM

## 2025-02-20 DIAGNOSIS — M25.561 CHRONIC PAIN OF RIGHT KNEE: ICD-10-CM

## 2025-02-20 DIAGNOSIS — G89.4 CHRONIC PAIN SYNDROME: ICD-10-CM

## 2025-02-20 PROCEDURE — 1036F TOBACCO NON-USER: CPT | Performed by: NURSE PRACTITIONER

## 2025-02-20 PROCEDURE — 99214 OFFICE O/P EST MOD 30 MIN: CPT | Performed by: NURSE PRACTITIONER

## 2025-02-20 PROCEDURE — G8417 CALC BMI ABV UP PARAM F/U: HCPCS | Performed by: NURSE PRACTITIONER

## 2025-02-20 PROCEDURE — 3017F COLORECTAL CA SCREEN DOC REV: CPT | Performed by: NURSE PRACTITIONER

## 2025-02-20 PROCEDURE — G8427 DOCREV CUR MEDS BY ELIG CLIN: HCPCS | Performed by: NURSE PRACTITIONER

## 2025-02-20 ASSESSMENT — ENCOUNTER SYMPTOMS
VOICE CHANGE: 0
SORE THROAT: 0
GASTROINTESTINAL NEGATIVE: 1
EYES NEGATIVE: 1
BACK PAIN: 0
SINUS PAIN: 0
RESPIRATORY NEGATIVE: 1

## 2025-02-20 NOTE — PROGRESS NOTES
[facility] [place] [facility]  [facility] NEUROSCIENCE AND REHABILITATION CENTER  [address]  [facility] [address]  Dept: [phone]  Dept Fax: [phone]  Loc: [phone]    Visit Date: 2/20/2025    Functionality Assessment/Goals Worksheet     On a scale of 0 (Does not Interfere) to 10 (Completely Interferes)     1.  Which number describes how during the past week pain has interfered with       the following:  A.  General Activity:  7  B.  Mood: 7  C.  Walking Ability:  4  D.  Normal Work (Includes both work outside the home and housework):  6  E.  Relations with Other People:   8  F.  Sleep:   6  G.  Enjoyment of Life:   7    2.  Patient Prefers to Take their Pain Medications:     [x]  On a regular basis   []  Only when necessary    []  Does not take pain medications    3.  What are the Patient's Goals/Expectations for Visiting Pain Management?     [x]  Learn about my pain    [x]  Receive Medication   [x]  Physical Therapy     []  Treat Depression   [x]  Receive Injections    []  Treat Sleep   []  Deal with Anxiety and Stress   []  Treat Opoid Dependence/Addiction   []  Other:        HPI:   Jermain Mann is a 59 y.o. female is here today for    Chief Complaint: Right knee pain     HPI   Follow up from right knee intra-articular Synvisc injection completed by Dr. Romna on 2/4/2025. Jermain reports that she is receiving \"a good 95% relief\" of right knee pain from this injection. States knee pain is doing great and is more intermittent. States just a mild ache or twinge in her right knee that is barely noticeable. Is tolerating walking and more activity has been able to go up and down stairs. \"I feel that this worked great\" Has be    States mid back pain remains tolerable from T-facet RFA. Denies any pain at this time. States she will get some intermittent aching pain.     She feels that pain is very tolerable.   Pain increases with if she does too much.   Continues

## 2025-03-17 ENCOUNTER — APPOINTMENT (OUTPATIENT)
Dept: GENERAL RADIOLOGY | Age: 60
End: 2025-03-17
Payer: COMMERCIAL

## 2025-03-17 ENCOUNTER — HOSPITAL ENCOUNTER (EMERGENCY)
Age: 60
Discharge: HOME | End: 2025-03-17
Payer: COMMERCIAL

## 2025-03-17 VITALS
OXYGEN SATURATION: 97 % | BODY MASS INDEX: 48.82 KG/M2 | HEIGHT: 65 IN | DIASTOLIC BLOOD PRESSURE: 57 MMHG | HEART RATE: 69 BPM | TEMPERATURE: 97.8 F | SYSTOLIC BLOOD PRESSURE: 109 MMHG | WEIGHT: 293 LBS | RESPIRATION RATE: 16 BRPM

## 2025-03-17 DIAGNOSIS — M54.50 ACUTE EXACERBATION OF CHRONIC LOW BACK PAIN: Primary | ICD-10-CM

## 2025-03-17 DIAGNOSIS — G89.29 ACUTE EXACERBATION OF CHRONIC LOW BACK PAIN: Primary | ICD-10-CM

## 2025-03-17 DIAGNOSIS — M85.80 OSTEOPENIA DETERMINED BY X-RAY: ICD-10-CM

## 2025-03-17 DIAGNOSIS — E66.01 MORBID OBESITY WITH BMI OF 50.0-59.9, ADULT: ICD-10-CM

## 2025-03-17 DIAGNOSIS — M47.817 FACET ARTHROPATHY, LUMBOSACRAL: ICD-10-CM

## 2025-03-17 PROCEDURE — 96372 THER/PROPH/DIAG INJ SC/IM: CPT

## 2025-03-17 PROCEDURE — 99203 OFFICE O/P NEW LOW 30 MIN: CPT | Performed by: NURSE PRACTITIONER

## 2025-03-17 PROCEDURE — 99213 OFFICE O/P EST LOW 20 MIN: CPT | Performed by: NURSE PRACTITIONER

## 2025-03-17 PROCEDURE — 72110 X-RAY EXAM L-2 SPINE 4/>VWS: CPT

## 2025-03-17 PROCEDURE — 6360000002 HC RX W HCPCS: Performed by: NURSE PRACTITIONER

## 2025-03-17 PROCEDURE — 99213 OFFICE O/P EST LOW 20 MIN: CPT

## 2025-03-17 RX ORDER — ORPHENADRINE CITRATE 100 MG/1
100 TABLET ORAL 2 TIMES DAILY
Qty: 20 TABLET | Refills: 0 | Status: SHIPPED | OUTPATIENT
Start: 2025-03-17 | End: 2025-03-27

## 2025-03-17 RX ORDER — KETOROLAC TROMETHAMINE 30 MG/ML
60 INJECTION, SOLUTION INTRAMUSCULAR; INTRAVENOUS ONCE
Status: COMPLETED | OUTPATIENT
Start: 2025-03-17 | End: 2025-03-17

## 2025-03-17 RX ADMIN — KETOROLAC TROMETHAMINE 60 MG: 60 INJECTION, SOLUTION INTRAMUSCULAR at 15:17

## 2025-03-17 ASSESSMENT — PAIN DESCRIPTION - LOCATION: LOCATION: BACK

## 2025-03-17 ASSESSMENT — ENCOUNTER SYMPTOMS
ABDOMINAL PAIN: 0
ABDOMINAL SWELLING: 0
COLOR CHANGE: 0
BOWEL INCONTINENCE: 0
BACK PAIN: 1

## 2025-03-17 ASSESSMENT — PAIN - FUNCTIONAL ASSESSMENT
PAIN_FUNCTIONAL_ASSESSMENT: 0-10
PAIN_FUNCTIONAL_ASSESSMENT: NONE - DENIES PAIN

## 2025-03-17 ASSESSMENT — PAIN SCALES - GENERAL: PAINLEVEL_OUTOF10: 7

## 2025-03-17 ASSESSMENT — PAIN DESCRIPTION - ORIENTATION: ORIENTATION: LEFT;LOWER

## 2025-03-17 NOTE — ED PROVIDER NOTES
[facility] URGENT CARE  Urgent Care Encounter      CHIEF COMPLAINT       Chief Complaint   Patient presents with    Back Pain     Left lower back denies injury pt request to lay down upon entry to room and lays on her stomache on cot \" ONly thing that heleps it\"    Letter for School/Work       Nurses Notes reviewed and I agree except as noted in the HPI.  HISTORY OFPRESENT ILLNESS   Jermain Mann is a 59 y.o.  The history is provided by the patient.   Back Pain  Location:  Lumbar spine, gluteal region and sacro-iliac joint  Quality:  Stabbing, stiffness and cramping  Stiffness is present:  All day and in the morning  Radiates to:  Does not radiate  Pain severity:  Severe  Pain is:  Worse during the day  Onset quality:  Sudden  Duration:  3 hours  Timing:  Constant  Progression:  Unchanged  Chronicity:  New  Context: physical stress and twisting    Context: not emotional stress, not falling, not jumping from heights, not lifting heavy objects, not MCA, not MVA, not occupational injury, not pedestrian accident, not recent illness and not recent injury    Relieved by:  Nothing  Worsened by:  Nothing  Ineffective treatments:  None tried  Associated symptoms: numbness    Associated symptoms: no abdominal pain, no abdominal swelling, no bladder incontinence, no bowel incontinence, no chest pain, no dysuria, no fever, no headaches, no leg pain, no paresthesias, no pelvic pain, no perianal numbness, no tingling, no weakness and no weight loss    Risk factors: lack of exercise, menopause and obesity    Risk factors: no hx of cancer, no hx of osteoporosis, not pregnant, no recent surgery, no steroid use and no vascular disease        REVIEW OF SYSTEMS     Review of Systems   Constitutional:  Positive for activity change. Negative for fever and weight loss.   Cardiovascular:  Negative for chest pain, palpitations and leg swelling.   Gastrointestinal:  Negative for abdominal pain and bowel incontinence.   Genitourinary:   Complete     The patient will be given a Rx for short course of muscle relaxer's. And encouraged to use Excedrin or acetaminophen.  See PCP for physical therapy and weight loss options.  Rest,Ice 15-20 minutes TID x 2 days,Then Heat 15-20 minutes TID as needed The patient will be given back stretching exercises, and instructed to follow up with their PCP or community clinic for further evaluation.     The patient should return to the ED if the back pain worsens, or if they experience incontinence, numbness or tingling in the legs, or inability to ambulate.  The patient is in agreement with this plan.The patient tolerated their visit well.  The patient and / or the family were informed of the results of any tests, a time was given to answer questions, a plan was proposed and they agreed with plan. Follow up with PCP ×2-3 days for reevaluation and further management of care      REFERRED TO:  Corina Temple APRN - CNP  [address]  [place] [address] [phone]    Schedule an appointment as soon as possible for a visit       DISCHARGE MEDICATIONS:  New Prescriptions    ORPHENADRINE (NORFLEX) 100 MG EXTENDED RELEASE TABLET    Take 1 tablet by mouth 2 times daily for 10 days     Current Discharge Medication List          TAMIE Romo CNP, Tawnya Rae, APRN - CNP  03/17/25 [id number]

## 2025-03-17 NOTE — ED NOTES
Called pharmacy  spoke with Amy pharmacist to inquire if toradol can be given to pt she is taking rx Mobic.  Instructed yes by her     Amy Buckner, RN  03/17/25 [id number]

## 2025-03-17 NOTE — ED TRIAGE NOTES
Ambulaotry ot room 7qith cane c/o left lower backpain  denies injury  SHe reports  SI joint chronic pain . Pt statess she took her last norco yestreday

## 2025-03-17 NOTE — DISCHARGE INSTRUCTIONS
The patient will be given a Rx for short course of muscle relaxer's and encouraged to see PCP, physical and massage therapy.  Rest,Ice 15-20 minutes TID x 2 days,Then Heat 15-20 minutes TID as needed The patient will be given back stretching exercises, and instructed to follow up with their PCP or community clinic for further evaluation.     The patient should return to the ED if the back pain worsens, or if they experience incontinence, numbness or tingling in the legs, or inability to ambulate.  The patient is in agreement with this plan.The patient tolerated their visit well.  The patient and / or the family were informed of the results of any tests, a time was given to answer questions, a plan was proposed and they agreed with plan. Follow up with PCP ×2-3 days for reevaluation and further management of care

## 2025-04-17 ENCOUNTER — OFFICE VISIT (OUTPATIENT)
Dept: PHYSICAL MEDICINE AND REHAB | Age: 60
End: 2025-04-17
Payer: COMMERCIAL

## 2025-04-17 ENCOUNTER — TELEPHONE (OUTPATIENT)
Dept: PHYSICAL MEDICINE AND REHAB | Age: 60
End: 2025-04-17

## 2025-04-17 VITALS
SYSTOLIC BLOOD PRESSURE: 134 MMHG | DIASTOLIC BLOOD PRESSURE: 72 MMHG | HEIGHT: 65 IN | WEIGHT: 293 LBS | BODY MASS INDEX: 48.82 KG/M2

## 2025-04-17 DIAGNOSIS — M47.816 SPONDYLOSIS OF LUMBAR SPINE: ICD-10-CM

## 2025-04-17 DIAGNOSIS — M17.11 PRIMARY OSTEOARTHRITIS OF RIGHT KNEE: ICD-10-CM

## 2025-04-17 DIAGNOSIS — M79.18 CHRONIC MYOFASCIAL PAIN: ICD-10-CM

## 2025-04-17 DIAGNOSIS — G89.4 CHRONIC PAIN SYNDROME: ICD-10-CM

## 2025-04-17 DIAGNOSIS — G89.29 CHRONIC MYOFASCIAL PAIN: ICD-10-CM

## 2025-04-17 DIAGNOSIS — M46.1 SACROILIAC INFLAMMATION: ICD-10-CM

## 2025-04-17 DIAGNOSIS — M47.816 LUMBAR FACET ARTHROPATHY: Primary | ICD-10-CM

## 2025-04-17 DIAGNOSIS — M47.814 FACET ARTHROPATHY, THORACIC: ICD-10-CM

## 2025-04-17 PROCEDURE — 99214 OFFICE O/P EST MOD 30 MIN: CPT | Performed by: PAIN MEDICINE

## 2025-04-17 PROCEDURE — 1036F TOBACCO NON-USER: CPT | Performed by: PAIN MEDICINE

## 2025-04-17 PROCEDURE — 3017F COLORECTAL CA SCREEN DOC REV: CPT | Performed by: PAIN MEDICINE

## 2025-04-17 PROCEDURE — G8427 DOCREV CUR MEDS BY ELIG CLIN: HCPCS | Performed by: PAIN MEDICINE

## 2025-04-17 PROCEDURE — G8417 CALC BMI ABV UP PARAM F/U: HCPCS | Performed by: PAIN MEDICINE

## 2025-04-17 ASSESSMENT — ENCOUNTER SYMPTOMS
BACK PAIN: 1
NAUSEA: 0
DIARRHEA: 1
EYES NEGATIVE: 1
CONSTIPATION: 0
ABDOMINAL PAIN: 0

## 2025-04-17 NOTE — TELEPHONE ENCOUNTER
Patient denies taking blood thinners prior to procedure that is scheduled for 05/22/25 with Dr. Segura.

## 2025-04-17 NOTE — TELEPHONE ENCOUNTER
She is scheduled for a Bilateral L4-5, 5-S1 MBB procedure on 05/22/25. She wants to know if she can take her anti-depressant - Citalopram - the morning of the procedure.

## 2025-05-22 ENCOUNTER — TELEPHONE (OUTPATIENT)
Dept: PHYSICAL MEDICINE AND REHAB | Age: 60
End: 2025-05-22

## 2025-05-22 NOTE — TELEPHONE ENCOUNTER
Patient left a message and cancelled her Bilateral L4-5, 5-S1 facet MBB procedure for today. States that she is having plumbing issues so she has to be home for someone to come fix it. She will call back to reschedule. I did not cancel her follow up at this time.

## 2025-06-09 NOTE — DISCHARGE INSTRUCTIONS
ask them to clean their hands.    What do I need to do when I go home from the hospital?  Before you go home, your doctor or nurse should explain everything you need to know about taking care of your wound. Make sure you understand how to care for your wound before you leave the hospital.  Always clean your hands before and after caring for your wound.  Before you go home, make sure you know who to contact if you have questions or problems after you get home.  If you have any symptoms of an infection, such as redness and pain at the surgery site, drainage, or fever, call your doctor immediately.    If you have additional questions, please ask your doctor or nurse.

## 2025-06-11 ENCOUNTER — HOSPITAL ENCOUNTER (OUTPATIENT)
Age: 60
Setting detail: OUTPATIENT SURGERY
Discharge: HOME | End: 2025-06-11
Attending: PAIN MEDICINE | Admitting: PAIN MEDICINE
Payer: COMMERCIAL

## 2025-06-11 ENCOUNTER — APPOINTMENT (OUTPATIENT)
Dept: GENERAL RADIOLOGY | Age: 60
End: 2025-06-11
Attending: PAIN MEDICINE
Payer: COMMERCIAL

## 2025-06-11 VITALS
OXYGEN SATURATION: 95 % | HEIGHT: 65 IN | SYSTOLIC BLOOD PRESSURE: 119 MMHG | TEMPERATURE: 97.5 F | RESPIRATION RATE: 16 BRPM | HEART RATE: 70 BPM | DIASTOLIC BLOOD PRESSURE: 60 MMHG | BODY MASS INDEX: 48.82 KG/M2 | WEIGHT: 293 LBS

## 2025-06-11 PROCEDURE — 2709999900 HC NON-CHARGEABLE SUPPLY: Performed by: PAIN MEDICINE

## 2025-06-11 PROCEDURE — 7100000011 HC PHASE II RECOVERY - ADDTL 15 MIN: Performed by: PAIN MEDICINE

## 2025-06-11 PROCEDURE — 6360000002 HC RX W HCPCS: Performed by: PAIN MEDICINE

## 2025-06-11 PROCEDURE — 99152 MOD SED SAME PHYS/QHP 5/>YRS: CPT | Performed by: PAIN MEDICINE

## 2025-06-11 PROCEDURE — 7100000010 HC PHASE II RECOVERY - FIRST 15 MIN: Performed by: PAIN MEDICINE

## 2025-06-11 PROCEDURE — 3600000056 HC PAIN LEVEL 4 BASE: Performed by: PAIN MEDICINE

## 2025-06-11 RX ORDER — MIDAZOLAM HYDROCHLORIDE 1 MG/ML
INJECTION, SOLUTION INTRAMUSCULAR; INTRAVENOUS PRN
Status: DISCONTINUED | OUTPATIENT
Start: 2025-06-11 | End: 2025-06-11 | Stop reason: ALTCHOICE

## 2025-06-11 RX ORDER — LIDOCAINE HYDROCHLORIDE 20 MG/ML
INJECTION, SOLUTION INFILTRATION; PERINEURAL PRN
Status: DISCONTINUED | OUTPATIENT
Start: 2025-06-11 | End: 2025-06-11 | Stop reason: ALTCHOICE

## 2025-06-11 RX ORDER — BUPIVACAINE HYDROCHLORIDE 5 MG/ML
INJECTION, SOLUTION PERINEURAL PRN
Status: DISCONTINUED | OUTPATIENT
Start: 2025-06-11 | End: 2025-06-11 | Stop reason: ALTCHOICE

## 2025-06-11 RX ORDER — FENTANYL CITRATE 50 UG/ML
INJECTION, SOLUTION INTRAMUSCULAR; INTRAVENOUS PRN
Status: DISCONTINUED | OUTPATIENT
Start: 2025-06-11 | End: 2025-06-11 | Stop reason: ALTCHOICE

## 2025-06-11 ASSESSMENT — PAIN - FUNCTIONAL ASSESSMENT
PAIN_FUNCTIONAL_ASSESSMENT: NONE - DENIES PAIN
PAIN_FUNCTIONAL_ASSESSMENT: 0-10

## 2025-06-11 ASSESSMENT — PAIN DESCRIPTION - LOCATION: LOCATION: BACK

## 2025-06-11 ASSESSMENT — PAIN DESCRIPTION - ORIENTATION: ORIENTATION: LOWER

## 2025-06-11 NOTE — PRE SEDATION
PHYSICAL:   Vitals:    06/11/25 0838   BP: (!) 122/58   Pulse: 73   Resp: 16   SpO2: 96%     Mental Status: Alert and oriented   Heart:  Regular rate and rhythm   Lungs:  Clear to auscultation  Abdomen: Soft, non tender   PLANNED PROCEDURE    Bilateral lumbar facet medial branch block at lumbar 4-5 and lumbar 5-sacral 1 under fluoroscopy.   SEDATION  Planned agent:fentanyl and versed  ASA Classification: 3  Class 1: A normal healthy patient  Class 2: Pt with mild to moderate systemic disease  Class 3: Severe systemic disease or disturbance  Class 4: Severe systemic disorders that are already life threatening.  Class 5: Moribund pt with little chances of survival, for more than 24 hours.  Mallampati I Airway Classification: 3    1. Pre-procedure diagnostic studies complete and results available.   Comment:    2. Previous sedation/anesthesia experiences assessed.   Comment:    3. The patient is an appropriate candidate to undergo the planned procedure sedation and anesthesia. (Refer to nursing sedation/analgesia documentation record)  4. Formulation and discussion of sedation/procedure plan, risks, and expectations with patient and/or responsible adult completed.  5. Patient examined immediately prior to the procedure. (Refer to nursing sedation/analgesia documentation record)    Electronically signed by Oli Arenas MD on 6/11/25 at 9:14 AM EDT

## 2025-06-11 NOTE — POST SEDATION
[facility]  Sedation/Analgesia Post Sedation Record    Pt Name: Jermain Mann  MRN: [id number]  YOB: 1965  Procedure Performed By: Oli Arenas MD   Primary Care Physician: Corina Temple APRN - CNP    POST-PROCEDURE    Physicians/Assistants: Oli Arenas MD  Procedure Performed:  Diagnostic/Confirmatory median branch blocks at the levels of L4-5 and L5-S1 bilateral under fluoroscopic guidance with IV sedation.   Sedation/Anesthesia: Versed and Fentanyl (See procedure note for amount and duration)   Estimated Blood Loss:     0  ml  Specimens Removed:  None        Complications: None Immediately appreciated     Post-procedure Diagnosis/Findings:      L-spondylosis         Recommendations:    F/U office           Electronically signed by Oli Arenas MD on 6/11/2025 at 9:35 AM

## 2025-06-11 NOTE — H&P
H&P      Patient c/o lower back pain for several months. Patient pain was unbearable and went  to [facility] urgent care. States was given a shot of toradol and prescription for muscle relaxant. States muscle relaxer not able to tolerate-to sleepy.  Denies any injuries for falls. Denies any bladder/bowel dysfunction changes since lower back developed.  Patient denies any radicular symptoms  Pain scale at worse is a 8/10 and at best is a 6/10.  Pain increases with bending, pushing, pulling, walking, standing, stairs, getting up and down, and housework or working at job.  Treatments Tried muscle relaxer  Pain Description sharp and aching     OSWESTRY DISABILITY:  Pre-procedure 38/50-76%     Prior Injections:  bilateral T-facet RFA @  T8-9, and T9-T10 from 12/19/2022 90% relief for about a full year      right knee Synvisc one injection from 2/6/2023 90% relief for over 10 months         bilateral T-facet RFA @ T8-9 and T9-10 completed by Dr. Segura on 2/22/2024 80% relief for 6.5 months     right knee synvis one injection completed by Dr. Segura on 4/15/2024: received over 95% relief for over 7 months     bilateral T-facet RFA @ T8-9 and T9-10 completed by Dr. Segura on 10/2/2024 95% relief of mid back pain      right knee intra-articular Synvisc injection completed by Dr. Roman on 2/4/2025 over 95% relief      Radiology:  XR LUMBAR SPINE (MIN 4 VIEWS)  Order: [id number]   Status: Final result       Next appt: 05/06/2025 at 10:00 AM in Gastroenterology (Cheryl Gudino, APRN - CNP)    Test Result Released: Yes (seen)    0 Result Notes  Details     Reading Physician Reading Date Result Priority   Chepe Gudino MD  [phone]      3/17/2025        Narrative & Impression  PROCEDURE: XR LUMBAR SPINE (MIN 4 VIEWS)     CLINICAL INFORMATION: Pain     TECHNIQUE: 4 AP, lateral and oblique views of the lumbar spine     COMPARISON: Lumbar spine 1/23/2024     FINDINGS: Lumbar vertebral body heights and alignment are

## 2025-06-11 NOTE — PROGRESS NOTES
0936-Patient to Phase II via cart. Report received from Angela DOMINGUEZ. Patient drowsy but responsive.Vitals obtained and stable. Respirations even and unlabored on room air. Patient denies pain, nausea, numbness and tingling. Patient able to move all extremities. No drainage noted at injection sites. Patient instructed to stay in bed. Instructed on call light use.  0940-Patient provided with snack and drink. Denies needs. Call light remains in reach.  0953-Report given to Mukesh DOMINGUEZ.

## 2025-06-25 ENCOUNTER — TELEPHONE (OUTPATIENT)
Dept: PHYSICAL MEDICINE AND REHAB | Age: 60
End: 2025-06-25

## 2025-06-25 ENCOUNTER — OFFICE VISIT (OUTPATIENT)
Dept: PHYSICAL MEDICINE AND REHAB | Age: 60
End: 2025-06-25
Payer: COMMERCIAL

## 2025-06-25 VITALS
SYSTOLIC BLOOD PRESSURE: 138 MMHG | BODY MASS INDEX: 48.82 KG/M2 | WEIGHT: 293 LBS | DIASTOLIC BLOOD PRESSURE: 86 MMHG | HEIGHT: 65 IN

## 2025-06-25 DIAGNOSIS — M47.814 SPONDYLOSIS OF THORACIC REGION WITHOUT MYELOPATHY OR RADICULOPATHY: ICD-10-CM

## 2025-06-25 DIAGNOSIS — M47.816 SPONDYLOSIS OF LUMBAR SPINE: ICD-10-CM

## 2025-06-25 DIAGNOSIS — M47.814 FACET ARTHROPATHY, THORACIC: ICD-10-CM

## 2025-06-25 DIAGNOSIS — E66.813 CLASS 3 SEVERE OBESITY WITHOUT SERIOUS COMORBIDITY WITH BODY MASS INDEX (BMI) OF 50.0 TO 59.9 IN ADULT, UNSPECIFIED OBESITY TYPE (HCC): ICD-10-CM

## 2025-06-25 DIAGNOSIS — M17.11 PRIMARY OSTEOARTHRITIS OF RIGHT KNEE: ICD-10-CM

## 2025-06-25 DIAGNOSIS — M47.816 LUMBAR FACET ARTHROPATHY: Primary | ICD-10-CM

## 2025-06-25 DIAGNOSIS — M46.1 SACROILIAC INFLAMMATION: ICD-10-CM

## 2025-06-25 DIAGNOSIS — G89.29 CHRONIC MYOFASCIAL PAIN: ICD-10-CM

## 2025-06-25 DIAGNOSIS — G89.4 CHRONIC PAIN SYNDROME: ICD-10-CM

## 2025-06-25 DIAGNOSIS — M79.18 CHRONIC MYOFASCIAL PAIN: ICD-10-CM

## 2025-06-25 PROCEDURE — 99213 OFFICE O/P EST LOW 20 MIN: CPT | Performed by: PAIN MEDICINE

## 2025-06-25 PROCEDURE — 3017F COLORECTAL CA SCREEN DOC REV: CPT | Performed by: PAIN MEDICINE

## 2025-06-25 PROCEDURE — G8427 DOCREV CUR MEDS BY ELIG CLIN: HCPCS | Performed by: PAIN MEDICINE

## 2025-06-25 PROCEDURE — 1036F TOBACCO NON-USER: CPT | Performed by: PAIN MEDICINE

## 2025-06-25 PROCEDURE — G8417 CALC BMI ABV UP PARAM F/U: HCPCS | Performed by: PAIN MEDICINE

## 2025-06-25 ASSESSMENT — ENCOUNTER SYMPTOMS
CONSTIPATION: 0
ABDOMINAL PAIN: 0
DIARRHEA: 1
NAUSEA: 0
BACK PAIN: 1
EYES NEGATIVE: 1

## 2025-06-25 NOTE — TELEPHONE ENCOUNTER
Patient denies taking blood thinners prior to procedure that is scheduled on 07/30/25 with Dr. Segura.

## 2025-06-25 NOTE — PROGRESS NOTES
Functionality Assessment/Goals Worksheet     On a scale of 0 (Does not Interfere) to 10 (Completely Interferes)     1.  Which number describes how during the past week pain has interfered with           the following:  A.  General Activity:  8  B.  Mood: 9  C.  Walking Ability:  10  D.  Normal Work (Includes both work outside the home and housework):  8  E.  Relations with Other People:   7  F.  Sleep:   9  G.  Enjoyment of Life:   8    2.  Patient Prefers to Take their Pain Medications:     [x]  On a regular basis   []  Only when necessary    []  Does not take pain medications    3.  What are the Patient's Goals/Expectations for Visiting Pain Management?     []  Learn about my pain    []  Receive Medication   [x]  Physical Therapy     []  Treat Depression   [x]  Receive Injections    []  Treat Sleep   []  Deal with Anxiety and Stress   []  Treat Opoid Dependence/Addiction   []  Other:           HPI:   Jermain Mann is a 60 y.o. female is here today for    Chief Complaint: Lumbar back pain, Right knee     HPI       S/P Diagnostic/Confirmatory median branch blocks at the levels of L4-5 and L5-S1 bilateral under fluoroscopic guidance with IV sedation on 6/11/25 states helped 80% for several days. Able to increase activity.    States right knee with increase pain. States achy and sharp.      Pain scale at worse is a 8/10 and at best is a 6/10.  Pain increases with bending, pushing, pulling, walking, standing, stairs, getting up and down, and housework or working at job.  Treatments Tried muscle relaxer  Pain Description sharp and aching    OSWESTRY DISABILITY:  Pre-procedure 38/50-76%    OSWESTRY DISABILITY:  After procedure  21/50-42%            Prior Injections:    bilateral T-facet RFA @  T8-9, and T9-T10 from 12/19/2022 90% relief for about a full year      right knee Synvisc one injection from 2/6/2023 90% relief for over 10 months         bilateral T-facet RFA @ T8-9 and T9-10 completed by Dr. Segura on

## 2025-07-21 ENCOUNTER — HOSPITAL ENCOUNTER (OUTPATIENT)
Dept: PHYSICAL THERAPY | Age: 60
Setting detail: THERAPIES SERIES
Discharge: HOME | End: 2025-07-21
Payer: COMMERCIAL

## 2025-07-21 PROCEDURE — 97161 PT EVAL LOW COMPLEX 20 MIN: CPT

## 2025-07-21 NOTE — PROGRESS NOTES
[facility]  PHYSICAL THERAPY  [x] EVALUATION  [] DAILY NOTE (LAND) [] DAILY NOTE (AQUATIC ) [] PROGRESS NOTE [] DISCHARGE NOTE    [x] OUTPATIENT REHABILITATION [facility]   [] [place] [facility]    [] [facility]   [] [facility]    Date: 2025  Patient Name:  Jermain Mann  : 1965  MRN: [id number]  CSN: [id number]    Referring Practitioner Oli Arenas MD [id number]      Diagnosis  Diagnoses       M17.11 (ICD-10-CM) - Primary osteoarthritis of right knee           Treatment Diagnosis M25.561  Right Knee Pain  M25.661 Stiffness of right knee, not elsewhere classified  R53.1 Weakness  R26.2 Difficulty in walking   Date of Evaluation 25   Additional Pertinent History Jermain Mann has a past medical history of Allergic rhinitis, Chronic SI joint pain, Depression, Hypertension, ABDI on CPAP, Osteoarthritis, and Vitamin D deficiency.  she has a past surgical history that includes Dental surgery (); [place] tooth extraction; Hand surgery (Right, 05/15/2015); other surgical history (2016); Colonoscopy (); other surgical history (2016); Hemorrhoid surgery (); Tonsillectomy; other surgical history (Right, 10/16/2017); arthrocentesis (Right, 10/16/2017); Nerve Surgery (Left, 2017); arthrocentesis (Left, 2017); pr arthrocentesis aspir&/inj major jt/bursa w/o us (Right, 2018); pr office/outpt visit,procedure only (N/A, 2018); Nerve Block Lumb Facet Level 1 Bilateral (Bilateral, 2019); Nerve Block Lumb Facet Level 1 Bilateral (Bilateral, 2019); Lumbar spine surgery (Right, 2019); Lumbar spine surgery (Left, 2019); arthrocentesis (Right, 2019); Nerve Surgery (Bilateral, 2019); hip surgery (Left, 2020); Pain management procedure (Right, 2020); Pain management procedure (Right, 2020); Pain management procedure (Right, 2021); Pain management

## 2025-07-23 ENCOUNTER — HOSPITAL ENCOUNTER (OUTPATIENT)
Dept: PHYSICAL THERAPY | Age: 60
Setting detail: THERAPIES SERIES
Discharge: HOME | End: 2025-07-23
Payer: COMMERCIAL

## 2025-07-23 PROCEDURE — 97530 THERAPEUTIC ACTIVITIES: CPT

## 2025-07-23 NOTE — PROGRESS NOTES
[facility]  PHYSICAL THERAPY  [] EVALUATION  [x] DAILY NOTE (LAND) [] DAILY NOTE (AQUATIC ) [] PROGRESS NOTE [] DISCHARGE NOTE    [x] OUTPATIENT REHABILITATION [facility]   [] [place] [facility]    [] [facility]   [] [facility]    Date: 2025  Patient Name:  Jermain Mann  : 1965  MRN: [id number]  CSN: [id number]    Referring Practitioner Oli Arenas MD [id number]      Diagnosis  Diagnoses       M17.11 (ICD-10-CM) - Primary osteoarthritis of right knee           Treatment Diagnosis M25.561  Right Knee Pain  M25.661 Stiffness of right knee, not elsewhere classified  R53.1 Weakness  R26.2 Difficulty in walking   Date of Evaluation 25   Additional Pertinent History Jermain Mann has a past medical history of Allergic rhinitis, Chronic SI joint pain, Depression, Hypertension, ABDI on CPAP, Osteoarthritis, and Vitamin D deficiency.  she has a past surgical history that includes Dental surgery (); [place] tooth extraction; Hand surgery (Right, 05/15/2015); other surgical history (2016); Colonoscopy (); other surgical history (2016); Hemorrhoid surgery (); Tonsillectomy; other surgical history (Right, 10/16/2017); arthrocentesis (Right, 10/16/2017); Nerve Surgery (Left, 2017); arthrocentesis (Left, 2017); pr arthrocentesis aspir&/inj major jt/bursa w/o us (Right, 2018); pr office/outpt visit,procedure only (N/A, 2018); Nerve Block Lumb Facet Level 1 Bilateral (Bilateral, 2019); Nerve Block Lumb Facet Level 1 Bilateral (Bilateral, 2019); Lumbar spine surgery (Right, 2019); Lumbar spine surgery (Left, 2019); arthrocentesis (Right, 2019); Nerve Surgery (Bilateral, 2019); hip surgery (Left, 2020); Pain management procedure (Right, 2020); Pain management procedure (Right, 2020); Pain management procedure (Right, 2021); Pain management 
Yes

## 2025-07-24 ENCOUNTER — TELEPHONE (OUTPATIENT)
Dept: PHYSICAL MEDICINE AND REHAB | Age: 60
End: 2025-07-24

## 2025-07-24 NOTE — TELEPHONE ENCOUNTER
JUNIOR for patient to call us back. We will need to reschedule her procedure on 07/30/25 due to Dr. Segura being out.

## 2025-07-24 NOTE — TELEPHONE ENCOUNTER
She would like to know if Dr. Roman can do this procedure for her same day as it was previously on with Dr. Segura. She wants it done as soon as possible and stated that Dr. Roman has done procedures in the past for her. If it is not okay she is also fine with rescheduling with Dr. Segura.     The procedure is for: Bilateral L4-5, 5-S1 MBB.   Currently scheduled for 07/30/25

## 2025-07-25 NOTE — TELEPHONE ENCOUNTER
Conjuntivae and eyelids appear normal, Sclerae : White without injection LM for patient to call us back.

## 2025-07-28 ENCOUNTER — HOSPITAL ENCOUNTER (OUTPATIENT)
Dept: PHYSICAL THERAPY | Age: 60
Setting detail: THERAPIES SERIES
Discharge: HOME | End: 2025-07-28
Payer: COMMERCIAL

## 2025-07-28 PROCEDURE — 97110 THERAPEUTIC EXERCISES: CPT

## 2025-07-28 NOTE — PROGRESS NOTES
[facility]  PHYSICAL THERAPY  [] EVALUATION  [x] DAILY NOTE (LAND) [] DAILY NOTE (AQUATIC ) [] PROGRESS NOTE [] DISCHARGE NOTE    [x] OUTPATIENT REHABILITATION [facility]   [] [place] [facility]    [] [facility]   [] [facility]    Date: 2025  Patient Name:  Jermain Mann  : 1965  MRN: [id number]  CSN: [id number]    Referring Practitioner Oli Arenas MD [id number]      Diagnosis  Diagnoses       M17.11 (ICD-10-CM) - Primary osteoarthritis of right knee           Treatment Diagnosis M25.561  Right Knee Pain  M25.661 Stiffness of right knee, not elsewhere classified  R53.1 Weakness  R26.2 Difficulty in walking   Date of Evaluation 25   Additional Pertinent History Jermain Mann has a past medical history of Allergic rhinitis, Chronic SI joint pain, Depression, Hypertension, ABDI on CPAP, Osteoarthritis, and Vitamin D deficiency.  she has a past surgical history that includes Dental surgery (); [place] tooth extraction; Hand surgery (Right, 05/15/2015); other surgical history (2016); Colonoscopy (); other surgical history (2016); Hemorrhoid surgery (); Tonsillectomy; other surgical history (Right, 10/16/2017); arthrocentesis (Right, 10/16/2017); Nerve Surgery (Left, 2017); arthrocentesis (Left, 2017); pr arthrocentesis aspir&/inj major jt/bursa w/o us (Right, 2018); pr office/outpt visit,procedure only (N/A, 2018); Nerve Block Lumb Facet Level 1 Bilateral (Bilateral, 2019); Nerve Block Lumb Facet Level 1 Bilateral (Bilateral, 2019); Lumbar spine surgery (Right, 2019); Lumbar spine surgery (Left, 2019); arthrocentesis (Right, 2019); Nerve Surgery (Bilateral, 2019); hip surgery (Left, 2020); Pain management procedure (Right, 2020); Pain management procedure (Right, 2020); Pain management procedure (Right, 2021); Pain management

## 2025-07-29 ASSESSMENT — ENCOUNTER SYMPTOMS
NAUSEA: 0
EYES NEGATIVE: 1
BACK PAIN: 1
DIARRHEA: 1
ABDOMINAL PAIN: 0
CONSTIPATION: 0

## 2025-07-29 NOTE — DISCHARGE INSTRUCTIONS
Post procedure Instructions:    No driving or making significant decisions for the remainder of the day.   Be cautions with walking and activity for 24 hours, do not over exert yourself.  Ok to resume pre-procedure activity level today.  Apply ice to site of injection site if you have pain or discomfort.  Do not submerge sit of injection during bath or pool activities for 48 hours post-procedure.  Resume blood thinning medications in 24 hours.     Call office [phone] if you have:  Temperature greater than 100.4  Persistent nausea and vomiting  Severe uncontrolled pain  Redness, tenderness, or signs of infection (pain, swelling, redness, odor or green/yellow discharge around the site)  Difficulty breathing, headache or visual disturbances  Hives  Persistent dizziness or light-headedness  Extreme fatigue  Any other questions or concerns you may have after discharge    In an emergency, call 911 or go to an Emergency Department at a nearby hospital    “Surgical Site Infections”      How can we work together to prevent Surgical Site Infections?   We would like to thank you for choosing [facility] for your Surgical Care.  Below you will find helpful information on how we can work together to prevent Surgical Site Infections.    What is a Surgical Site Infection (SSI)?  A surgical site infection is an infection that occurs after surgery in the part of the body where the surgery took place. Most patients who have surgery do not develop an infection. However, infections develop in about 1 to 3 out of every 100 patients who have surgery.  Some of the common symptoms of a surgical site infection are:  Redness and pain around the area where you had surgery  Drainage of cloudy fluid from your surgical wound  Fever    Can SSIs be treated?  Yes. Most surgical site infections can be treated with antibiotics. The antibiotic given to you depends on the bacteria (germs) causing the infection. Sometimes patients with

## 2025-07-29 NOTE — H&P
Today, patient presents for planned bilateral L4/L5 and L5/S1 lumbar medial branch blocks    This note is reflective of the patient's previous visit for evaluation. We will proceed with today's planned procedure. Since patient's last visit for evaluation, there have been no interval changes in medical history. Patient has no new numbness, weakness, or focal neurological deficit since evaluation. Patient has no contraindications to injection (no anticoagulation or recent antibiotic intake for active infections), and has a  present or is able to drive themselves (as discussed and cleared by physician).  Allergies to latex, contrast dye, and steroid medications have been confirmed with the patient prior to the procedure.  NPO necessity has been assessed and accepted based on procedure complexity. The risks and benefits of the procedure have been explained including but are not limited to infection, bleeding, paralysis, immediate post procedure weakness, and dizziness; the patient acknowledges understanding and desires to proceed with the procedure. Patient has signed consent for same procedure as discussed in previous clinic encounter. All other questions and concerns were addressed at bedside. See procedure note for full details.       Post procedure Instructions: The patient was advised not to drive during the day of the procedure and not to engage in any significant decision making (unless otherwise states by physician). The patient was also advised to be cautious with walking/activity for 24 hours following today's visit and asked not to engage in over-exertion (unless otherwise states by physician). After this time, it is ok to resume pre-procedure level of activity. Patient advised to apply ice to site of injection in situations of pain and discomfort. Patient advised to not submerge site of injection during bath or pool activities for approximately 24 hours post-procedure. Patient attested to understanding

## 2025-07-30 ENCOUNTER — HOSPITAL ENCOUNTER (OUTPATIENT)
Age: 60
Setting detail: OUTPATIENT SURGERY
Discharge: HOME | End: 2025-07-30
Attending: ANESTHESIOLOGY | Admitting: ANESTHESIOLOGY
Payer: COMMERCIAL

## 2025-07-30 ENCOUNTER — TELEPHONE (OUTPATIENT)
Dept: PHYSICAL MEDICINE AND REHAB | Age: 60
End: 2025-07-30

## 2025-07-30 ENCOUNTER — APPOINTMENT (OUTPATIENT)
Dept: GENERAL RADIOLOGY | Age: 60
End: 2025-07-30
Attending: ANESTHESIOLOGY
Payer: COMMERCIAL

## 2025-07-30 VITALS
BODY MASS INDEX: 50.02 KG/M2 | WEIGHT: 293 LBS | SYSTOLIC BLOOD PRESSURE: 125 MMHG | HEIGHT: 64 IN | OXYGEN SATURATION: 94 % | TEMPERATURE: 97.4 F | DIASTOLIC BLOOD PRESSURE: 65 MMHG | HEART RATE: 75 BPM | RESPIRATION RATE: 12 BRPM

## 2025-07-30 PROBLEM — M47.816 LUMBAR SPONDYLOSIS: Status: ACTIVE | Noted: 2025-07-30

## 2025-07-30 PROCEDURE — 64494 INJ PARAVERT F JNT L/S 2 LEV: CPT | Performed by: ANESTHESIOLOGY

## 2025-07-30 PROCEDURE — 2709999900 HC NON-CHARGEABLE SUPPLY: Performed by: ANESTHESIOLOGY

## 2025-07-30 PROCEDURE — 99152 MOD SED SAME PHYS/QHP 5/>YRS: CPT | Performed by: ANESTHESIOLOGY

## 2025-07-30 PROCEDURE — 7100000011 HC PHASE II RECOVERY - ADDTL 15 MIN: Performed by: ANESTHESIOLOGY

## 2025-07-30 PROCEDURE — 7100000010 HC PHASE II RECOVERY - FIRST 15 MIN: Performed by: ANESTHESIOLOGY

## 2025-07-30 PROCEDURE — 64493 INJ PARAVERT F JNT L/S 1 LEV: CPT | Performed by: ANESTHESIOLOGY

## 2025-07-30 PROCEDURE — 6360000002 HC RX W HCPCS: Performed by: ANESTHESIOLOGY

## 2025-07-30 PROCEDURE — 3600000056 HC PAIN LEVEL 4 BASE: Performed by: ANESTHESIOLOGY

## 2025-07-30 RX ORDER — FENTANYL CITRATE 50 UG/ML
INJECTION, SOLUTION INTRAMUSCULAR; INTRAVENOUS PRN
Status: DISCONTINUED | OUTPATIENT
Start: 2025-07-30 | End: 2025-07-30 | Stop reason: ALTCHOICE

## 2025-07-30 RX ORDER — LIDOCAINE HYDROCHLORIDE 10 MG/ML
INJECTION, SOLUTION EPIDURAL; INFILTRATION; INTRACAUDAL; PERINEURAL PRN
Status: DISCONTINUED | OUTPATIENT
Start: 2025-07-30 | End: 2025-07-30 | Stop reason: ALTCHOICE

## 2025-07-30 RX ORDER — BUPIVACAINE HYDROCHLORIDE 5 MG/ML
INJECTION, SOLUTION PERINEURAL PRN
Status: DISCONTINUED | OUTPATIENT
Start: 2025-07-30 | End: 2025-07-30 | Stop reason: ALTCHOICE

## 2025-07-30 RX ORDER — MIDAZOLAM HYDROCHLORIDE 1 MG/ML
INJECTION, SOLUTION INTRAMUSCULAR; INTRAVENOUS PRN
Status: DISCONTINUED | OUTPATIENT
Start: 2025-07-30 | End: 2025-07-30 | Stop reason: ALTCHOICE

## 2025-07-30 RX ORDER — GABAPENTIN 100 MG/1
300 CAPSULE ORAL DAILY
COMMUNITY

## 2025-07-30 ASSESSMENT — PAIN - FUNCTIONAL ASSESSMENT
PAIN_FUNCTIONAL_ASSESSMENT: 0-10
PAIN_FUNCTIONAL_ASSESSMENT: 0-10

## 2025-07-30 NOTE — PROGRESS NOTES
1142 Patient to phase 2 from surgery. Report from Yue DOMINGUEZ. Patient alert, oriented, appropriate. Vitals stable on room air. Injection site with no complications noted. Patient states pain is 0/10 Call light in reach.   Snack and drink given per preference.   1159 Vitals reassessed, stable. No complaints at this time.  1200 IV taken out and dressing applied with no complications.   1210 Waiting on patient's ride.  1225 Patient taken to discharge vehicle in stable condition. Discharged with AVS and all belongings.

## 2025-07-30 NOTE — PROCEDURES
Pre-operative Diagnosis: Lumbar spondylosis     Post-operative Diagnosis: Lumbar spondylosis     Procedure: Bilateral lumbar medial branch blocks targeting facet joints of L4/L5 and L5/S1     Procedure Description:  After consent was obtained, the patient was placed in the prone position with a pillow under the abdomen to reduce the lordotic curve of the lumbar spine.  The lower back was prepped with chloraprep and draped in a sterile fashion. Then, 0.5 cc of 1 % lidocaine was used for local anesthesia of the skin and superficial subcutaneous tissues.  Three 22-gauge 3.5-inch spinal needles were advanced under fluoroscopy in an AP view: one at the sacral ala and two at the junction of the right superior articular process and the transverse process of the L4 and L5 vertebrae. There was no paresthesias, heme, or CSF obtained.  Needle placement was confirmed using AP and oblique views. Then, 0.5 cc of 0.5% bupivacaine was injected at each site without complications. The procedure was repeated on the contralateral side. The patient tolerated the procedure well, transported to the recovery room, and discharged in ambulatory fashion.     Procedural Complications: None  Estimated Blood Loss: 0 mL      IV sedation was used during the procedure:  - Moderate intravenous conscious sedation was supervised by Dr. Roman  - The patient was independently monitored by a Registered Nurse assigned to the procedure room  - Monitoring included automated blood pressure, continuous EKG, and continuous pulse oximetry  - The detailed conscious record is permanently stored in the Hospital Information System  - The following is the conscious sedation record:  Start Time: 11:36  End Time : 11:51  Duration: 15 minutes   Medications Administered: 2 mg Versed, 100 mcg Fentanyl     Evans Roman DO  Interventional Pain Management/PM&R   [facility]

## 2025-07-30 NOTE — POST SEDATION
[facility]  Sedation/Analgesia Post Sedation Record    Pt Name: Jermain Mann  MRN: [id number]  YOB: 1965  Procedure Performed By: Evans Roman DO  Primary Care Physician: Corina Temple APRN - CNP    POST-PROCEDURE    Physicians/Assistants: Evans Roman DO  Procedure Performed: See Procedure Note   Sedation/Anesthesia: Versed and Fentanyl (See procedure note for amount and duration)  Estimated Blood Loss:     0  ml  Specimens Removed: None        Complications: None           Evans Roman DO  Electronically signed 7/30/2025 at 3:04 PM

## 2025-07-30 NOTE — TELEPHONE ENCOUNTER
I left patient a detailed message to call the office to get scheduled for an appointment to see Antonio Machado or Lianne Hammond.

## 2025-07-30 NOTE — PRE SEDATION
Temp:    SpO2: 94%     PLANNED PROCEDURE   See procedure note  SEDATION  Planned agent: Versed and Fentanyl  ASA Classification: 2  Class 1: A normal healthy patient  Class 2: Pt with mild to moderate systemic disease  Class 3: Severe systemic disease or disturbance  Class 4: Severe systemic disorders that are already life threatening.  Class 5: Moribund pt with little chances of survival, for more than 24 hours.  Mallampati I Airway Classification: 2    1. Pre-procedure diagnostic studies complete and results available.  2. Previous sedation/anesthesia experiences assessed.  3. The patient is an appropriate candidate to undergo the planned procedure sedation and anesthesia. (Refer to nursing sedation/analgesia documentation record)  4. Formulation and discussion of sedation/procedure plan, risks, and expectations with patient and/or responsible adult completed.  5. Patient examined immediately prior to the procedure. (Refer to nursing sedation/analgesia documentation record)    Evans Roman DO  Electronically signed 7/30/2025 at 3:04 PM

## 2025-07-31 ENCOUNTER — APPOINTMENT (OUTPATIENT)
Dept: PHYSICAL THERAPY | Age: 60
End: 2025-07-31
Payer: COMMERCIAL

## 2025-08-06 ENCOUNTER — HOSPITAL ENCOUNTER (OUTPATIENT)
Dept: PHYSICAL THERAPY | Age: 60
Setting detail: THERAPIES SERIES
End: 2025-08-06
Payer: COMMERCIAL

## 2025-08-08 ENCOUNTER — HOSPITAL ENCOUNTER (OUTPATIENT)
Dept: PHYSICAL THERAPY | Age: 60
Setting detail: THERAPIES SERIES
Discharge: HOME | End: 2025-08-08
Payer: COMMERCIAL

## 2025-08-08 PROCEDURE — 97530 THERAPEUTIC ACTIVITIES: CPT

## 2025-08-11 ENCOUNTER — HOSPITAL ENCOUNTER (OUTPATIENT)
Dept: PHYSICAL THERAPY | Age: 60
Setting detail: THERAPIES SERIES
Discharge: HOME | End: 2025-08-11
Payer: COMMERCIAL

## 2025-08-11 PROCEDURE — 97530 THERAPEUTIC ACTIVITIES: CPT

## 2025-08-12 ENCOUNTER — TELEPHONE (OUTPATIENT)
Dept: PHYSICAL MEDICINE AND REHAB | Age: 60
End: 2025-08-12

## 2025-08-14 ENCOUNTER — OFFICE VISIT (OUTPATIENT)
Dept: PHYSICAL MEDICINE AND REHAB | Age: 60
End: 2025-08-14
Payer: COMMERCIAL

## 2025-08-14 ENCOUNTER — HOSPITAL ENCOUNTER (OUTPATIENT)
Dept: PHYSICAL THERAPY | Age: 60
Setting detail: THERAPIES SERIES
Discharge: HOME | End: 2025-08-14
Payer: COMMERCIAL

## 2025-08-14 VITALS
BODY MASS INDEX: 50.02 KG/M2 | SYSTOLIC BLOOD PRESSURE: 136 MMHG | DIASTOLIC BLOOD PRESSURE: 64 MMHG | WEIGHT: 293 LBS | HEIGHT: 64 IN

## 2025-08-14 DIAGNOSIS — G89.4 CHRONIC PAIN SYNDROME: ICD-10-CM

## 2025-08-14 DIAGNOSIS — G89.29 CHRONIC PAIN OF RIGHT KNEE: ICD-10-CM

## 2025-08-14 DIAGNOSIS — M47.816 LUMBAR FACET ARTHROPATHY: Primary | ICD-10-CM

## 2025-08-14 DIAGNOSIS — M47.816 SPONDYLOSIS OF LUMBAR SPINE: ICD-10-CM

## 2025-08-14 DIAGNOSIS — M17.11 PRIMARY OSTEOARTHRITIS OF RIGHT KNEE: ICD-10-CM

## 2025-08-14 DIAGNOSIS — M25.561 CHRONIC PAIN OF RIGHT KNEE: ICD-10-CM

## 2025-08-14 PROCEDURE — 99215 OFFICE O/P EST HI 40 MIN: CPT | Performed by: NURSE PRACTITIONER

## 2025-08-14 PROCEDURE — 97113 AQUATIC THERAPY/EXERCISES: CPT

## 2025-08-14 PROCEDURE — G8427 DOCREV CUR MEDS BY ELIG CLIN: HCPCS | Performed by: NURSE PRACTITIONER

## 2025-08-14 PROCEDURE — 3017F COLORECTAL CA SCREEN DOC REV: CPT | Performed by: NURSE PRACTITIONER

## 2025-08-14 PROCEDURE — 1036F TOBACCO NON-USER: CPT | Performed by: NURSE PRACTITIONER

## 2025-08-14 PROCEDURE — G8417 CALC BMI ABV UP PARAM F/U: HCPCS | Performed by: NURSE PRACTITIONER

## 2025-08-18 ENCOUNTER — TELEPHONE (OUTPATIENT)
Dept: PHYSICAL MEDICINE AND REHAB | Age: 60
End: 2025-08-18

## 2025-08-18 ENCOUNTER — HOSPITAL ENCOUNTER (OUTPATIENT)
Dept: PHYSICAL THERAPY | Age: 60
Setting detail: THERAPIES SERIES
Discharge: HOME | End: 2025-08-18
Payer: COMMERCIAL

## 2025-08-18 PROCEDURE — 97530 THERAPEUTIC ACTIVITIES: CPT

## 2025-08-19 ENCOUNTER — HOSPITAL ENCOUNTER (EMERGENCY)
Age: 60
Discharge: HOME | End: 2025-08-19
Payer: COMMERCIAL

## 2025-08-19 ENCOUNTER — APPOINTMENT (OUTPATIENT)
Dept: GENERAL RADIOLOGY | Age: 60
End: 2025-08-19
Payer: COMMERCIAL

## 2025-08-19 VITALS
WEIGHT: 293 LBS | RESPIRATION RATE: 16 BRPM | TEMPERATURE: 97.8 F | SYSTOLIC BLOOD PRESSURE: 133 MMHG | DIASTOLIC BLOOD PRESSURE: 53 MMHG | HEART RATE: 61 BPM | HEIGHT: 64 IN | BODY MASS INDEX: 50.02 KG/M2 | OXYGEN SATURATION: 97 %

## 2025-08-19 DIAGNOSIS — M54.32 SCIATICA OF LEFT SIDE: Primary | ICD-10-CM

## 2025-08-19 PROCEDURE — 96372 THER/PROPH/DIAG INJ SC/IM: CPT

## 2025-08-19 PROCEDURE — 6360000002 HC RX W HCPCS: Performed by: NURSE PRACTITIONER

## 2025-08-19 PROCEDURE — 73502 X-RAY EXAM HIP UNI 2-3 VIEWS: CPT

## 2025-08-19 PROCEDURE — 99213 OFFICE O/P EST LOW 20 MIN: CPT

## 2025-08-19 RX ORDER — KETOROLAC TROMETHAMINE 30 MG/ML
60 INJECTION, SOLUTION INTRAMUSCULAR; INTRAVENOUS ONCE
Status: COMPLETED | OUTPATIENT
Start: 2025-08-19 | End: 2025-08-19

## 2025-08-19 RX ADMIN — KETOROLAC TROMETHAMINE 60 MG: 60 INJECTION, SOLUTION INTRAMUSCULAR at 11:17

## 2025-08-19 ASSESSMENT — PAIN - FUNCTIONAL ASSESSMENT
PAIN_FUNCTIONAL_ASSESSMENT: 0-10

## 2025-08-19 ASSESSMENT — PAIN SCALES - GENERAL
PAINLEVEL_OUTOF10: 5
PAINLEVEL_OUTOF10: 7
PAINLEVEL_OUTOF10: 5

## 2025-08-19 ASSESSMENT — PAIN DESCRIPTION - PAIN TYPE
TYPE: ACUTE PAIN
TYPE: ACUTE PAIN

## 2025-08-19 ASSESSMENT — ENCOUNTER SYMPTOMS
COLOR CHANGE: 0
WHEEZING: 0
APNEA: 0
SHORTNESS OF BREATH: 0
CHEST TIGHTNESS: 0
STRIDOR: 0
COUGH: 0
CHOKING: 0
BACK PAIN: 1

## 2025-08-19 ASSESSMENT — PAIN DESCRIPTION - FREQUENCY
FREQUENCY: CONTINUOUS
FREQUENCY: INTERMITTENT

## 2025-08-19 ASSESSMENT — PAIN DESCRIPTION - LOCATION
LOCATION: HIP

## 2025-08-19 ASSESSMENT — PAIN DESCRIPTION - ORIENTATION
ORIENTATION: LEFT

## 2025-08-19 ASSESSMENT — PAIN DESCRIPTION - DESCRIPTORS
DESCRIPTORS: ACHING

## 2025-08-20 ENCOUNTER — HOSPITAL ENCOUNTER (OUTPATIENT)
Dept: PHYSICAL THERAPY | Age: 60
Setting detail: THERAPIES SERIES
Discharge: HOME | End: 2025-08-20
Payer: COMMERCIAL

## 2025-08-20 PROCEDURE — 97110 THERAPEUTIC EXERCISES: CPT

## 2025-08-26 ENCOUNTER — TELEPHONE (OUTPATIENT)
Dept: PHYSICAL MEDICINE AND REHAB | Age: 60
End: 2025-08-26

## 2025-08-27 ENCOUNTER — APPOINTMENT (OUTPATIENT)
Dept: GENERAL RADIOLOGY | Age: 60
End: 2025-08-27
Attending: ANESTHESIOLOGY
Payer: COMMERCIAL

## 2025-08-27 ENCOUNTER — HOSPITAL ENCOUNTER (OUTPATIENT)
Age: 60
Setting detail: OUTPATIENT SURGERY
Discharge: HOME | End: 2025-08-27
Attending: ANESTHESIOLOGY | Admitting: ANESTHESIOLOGY
Payer: COMMERCIAL

## 2025-08-27 VITALS
HEIGHT: 64 IN | RESPIRATION RATE: 16 BRPM | TEMPERATURE: 96.6 F | BODY MASS INDEX: 50.02 KG/M2 | DIASTOLIC BLOOD PRESSURE: 63 MMHG | SYSTOLIC BLOOD PRESSURE: 150 MMHG | WEIGHT: 293 LBS | OXYGEN SATURATION: 96 % | HEART RATE: 89 BPM

## 2025-08-27 PROBLEM — M25.561 CHRONIC PAIN OF RIGHT KNEE: Status: ACTIVE | Noted: 2025-08-27

## 2025-08-27 PROBLEM — G89.29 CHRONIC PAIN OF RIGHT KNEE: Status: ACTIVE | Noted: 2025-08-27

## 2025-08-27 PROCEDURE — 2709999900 HC NON-CHARGEABLE SUPPLY: Performed by: ANESTHESIOLOGY

## 2025-08-27 PROCEDURE — 3600000054 HC PAIN LEVEL 3 BASE: Performed by: ANESTHESIOLOGY

## 2025-08-27 PROCEDURE — 7100000010 HC PHASE II RECOVERY - FIRST 15 MIN: Performed by: ANESTHESIOLOGY

## 2025-08-27 PROCEDURE — 6360000002 HC RX W HCPCS: Performed by: ANESTHESIOLOGY

## 2025-08-27 PROCEDURE — 20610 DRAIN/INJ JOINT/BURSA W/O US: CPT | Performed by: ANESTHESIOLOGY

## 2025-08-27 PROCEDURE — 77002 NEEDLE LOCALIZATION BY XRAY: CPT | Performed by: ANESTHESIOLOGY

## 2025-08-27 RX ORDER — LIDOCAINE HYDROCHLORIDE 10 MG/ML
INJECTION, SOLUTION EPIDURAL; INFILTRATION; INTRACAUDAL; PERINEURAL PRN
Status: DISCONTINUED | OUTPATIENT
Start: 2025-08-27 | End: 2025-08-27 | Stop reason: ALTCHOICE

## 2025-08-27 ASSESSMENT — PAIN DESCRIPTION - DESCRIPTORS: DESCRIPTORS: ACHING

## 2025-08-27 ASSESSMENT — PAIN - FUNCTIONAL ASSESSMENT
PAIN_FUNCTIONAL_ASSESSMENT: 0-10
PAIN_FUNCTIONAL_ASSESSMENT: 0-10

## (undated) DEVICE — 3 ML SYRINGE LUER-LOCK TIP: Brand: MONOJECT

## (undated) DEVICE — SHEET,DRAPE,3/4,53X77,STERILE: Brand: MEDLINE

## (undated) DEVICE — HYPODERMIC SAFETY NEEDLE: Brand: MAGELLAN

## (undated) DEVICE — SYRINGE MED 10ML LUERLOCK TIP W/O SFTY DISP

## (undated) DEVICE — PACK,SET UP,NO DRAPES: Brand: MEDLINE

## (undated) DEVICE — NEEDLE SYR 18GA L1.5IN RED PLAS HUB S STL BLNT FILL W/O

## (undated) DEVICE — GLOVE ORANGE PI 7 1/2   MSG9075

## (undated) DEVICE — TOWEL,OR,DSP,ST,BLUE,STD,4/PK,20PK/CS: Brand: MEDLINE

## (undated) DEVICE — SYRINGE MED 5ML STD CLR PLAS LUERLOCK TIP N CTRL DISP

## (undated) DEVICE — CHLORAPREP 26ML CLEAR

## (undated) DEVICE — 6 ML SYRINGE LUER-LOCK TIP: Brand: MONOJECT

## (undated) DEVICE — Z INACTIVE USE 2423038 APPLICATOR MEDICATED 10.5 CC CHLORHEXIDINE GLUC 2%

## (undated) DEVICE — NEEDLE SPNL 22GA L3.5IN BLK HUB S STL REG WALL FIT STYL W/

## (undated) DEVICE — GAUZE SPONGES,USP TYPE VII GAUZE, 12 PLY: Brand: CURITY

## (undated) DEVICE — APPLICATOR MEDICATED 26 CC SOLUTION HI LT ORNG CHLORAPREP

## (undated) DEVICE — NEEDLE SPNL 22GA L3.5IN BLK HUB S STL REG WALL FIT STYL

## (undated) DEVICE — SYRINGE MEDICAL 3ML CLEAR PLASTIC STANDARD NON CONTROL LUERLOCK TIP DISPOSABLE

## (undated) DEVICE — STRIP,CLOSURE,WOUND,MEDI-STRIP,1/2X4: Brand: MEDLINE

## (undated) DEVICE — GAUZE,SPONGE,4"X4",12PLY,STERILE,LF,2'S: Brand: MEDLINE

## (undated) DEVICE — NEEDLE SYRINGE 18GA L1.5IN RED PLAS HUB S STL BLNT FILL W/O

## (undated) DEVICE — GLOVE SURG SZ 65 THK91MIL LTX FREE SYN POLYISOPRENE

## (undated) DEVICE — NEEDLE SPNL 22GA L3.5IN BLK WHTACR PNCL PNT HI FLO DISP

## (undated) DEVICE — INTENDED FOR TISSUE SEPARATION, AND OTHER PROCEDURES THAT REQUIRE A SHARP SURGICAL BLADE TO PUNCTURE OR CUT.: Brand: BARD-PARKER ® CARBON RIB-BACK BLADES

## (undated) DEVICE — NEEDLE SPNL 22 GAX5 IN HUB QNCKE FUNNEL SHP STYL BLK SPINCAN

## (undated) DEVICE — DRAPE C ARM W36XL30IN RECTANG BND BG AND TAPE

## (undated) DEVICE — Device

## (undated) DEVICE — GARMENT,MEDLINE,DVT,INT,CALF,LG, GEN2: Brand: MEDLINE

## (undated) DEVICE — 3M™ WARMING BLANKET, UPPER BODY, 10 PER CASE, 42268: Brand: BAIR HUGGER™

## (undated) DEVICE — CURETTE A13A SIZE 2 T-TIP: Brand: KYPHON® EXPRESS ™ CURETTE

## (undated) DEVICE — TOWEL,OR,DSP,ST,BLUE,DLX,4/PK,20PK/CS: Brand: MEDLINE

## (undated) DEVICE — SOLUTION IV 1000ML 0.9% SOD CHL PH 5 INJ USP VIAFLX PLAS

## (undated) DEVICE — KIT KEX152EB-CDS- 15/2 FF WITH CDS: Brand: KYPHPAK® FIRST FRACTURE TRAY

## (undated) DEVICE — SC PAIN PACK: Brand: MEDLINE INDUSTRIES, INC.

## (undated) DEVICE — SYRINGE MED 3ML CLR PLAS STD N CTRL LUERLOCK TIP DISP

## (undated) DEVICE — GLOVE ORANGE PI 8 1/2   MSG9085

## (undated) DEVICE — SYRINGE FLSH 6ML BOLD GRAD 0.2ML LUERLOCK TIP ULT SHRP TRI

## (undated) DEVICE — GOWN,SIRUS,NONRNF,SETINSLV,XL,20/CS: Brand: MEDLINE

## (undated) DEVICE — GOWN,SIRUS,NON REINFRCD,LARGE,SET IN SL: Brand: MEDLINE

## (undated) DEVICE — GLOVE ORANGE PI 7   MSG9070

## (undated) DEVICE — GLOVE ORTHO 8   MSG9480

## (undated) DEVICE — LABEL MED DRUG CUST

## (undated) DEVICE — SHEET,DRAPE,53X77,STERILE: Brand: MEDLINE

## (undated) DEVICE — BONE BIOPSY DEVICE F07A TAPERED SIZE 2: Brand: MEDTRONIC REUSABLE INSTRUMENTS

## (undated) DEVICE — GAUZE,SPONGE,8"X4",12PLY,XRAY,STRL,LF: Brand: MEDLINE

## (undated) DEVICE — NEEDLE HYPO 25GA L1.5IN ROBUST SFTY SHLD SELF LEVELING SHTH

## (undated) DEVICE — SUTURE MCRYL SZ 4-0 L27IN ABSRB UD L19MM PS-2 1/2 CIR PRIM Y426H

## (undated) DEVICE — SURE SET SINGLE BASIN-LF: Brand: MEDLINE INDUSTRIES, INC.

## (undated) DEVICE — 3M™ STERI-STRIP™ COMPOUND BENZOIN TINCTURE 40 BAGS/CARTON 4 CARTONS/CASE C1544: Brand: 3M™ STERI-STRIP™

## (undated) DEVICE — Z DISCONTINUED USE 2537982 ELECTRODE DISPER W/ CRD DISP

## (undated) DEVICE — LABEL MED WHT OR W O INITIALS AND DATE SECT PRESOURCE

## (undated) DEVICE — CANNULA RF 20 GAUGEX100MM 10MM

## (undated) DEVICE — SHEET, T, LAPAROTOMY, STERILE: Brand: MEDLINE